# Patient Record
Sex: FEMALE | Race: WHITE | Employment: FULL TIME | URBAN - METROPOLITAN AREA
[De-identification: names, ages, dates, MRNs, and addresses within clinical notes are randomized per-mention and may not be internally consistent; named-entity substitution may affect disease eponyms.]

---

## 2017-01-10 ENCOUNTER — OFFICE VISIT (OUTPATIENT)
Dept: ORTHOPEDIC SURGERY | Age: 62
End: 2017-01-10

## 2017-01-10 VITALS
BODY MASS INDEX: 38.45 KG/M2 | WEIGHT: 245 LBS | SYSTOLIC BLOOD PRESSURE: 118 MMHG | DIASTOLIC BLOOD PRESSURE: 83 MMHG | HEART RATE: 60 BPM | HEIGHT: 67 IN

## 2017-01-10 DIAGNOSIS — S46.212A RUPTURE OF LEFT DISTAL BICEPS TENDON, INITIAL ENCOUNTER: Primary | ICD-10-CM

## 2017-01-10 DIAGNOSIS — S53.402D SPRAIN OF LEFT ELBOW, SUBSEQUENT ENCOUNTER: ICD-10-CM

## 2017-01-10 PROCEDURE — 99024 POSTOP FOLLOW-UP VISIT: CPT | Performed by: ORTHOPAEDIC SURGERY

## 2017-01-12 ENCOUNTER — TELEPHONE (OUTPATIENT)
Dept: ORTHOPEDIC SURGERY | Age: 62
End: 2017-01-12

## 2017-01-16 RX ORDER — BUDESONIDE AND FORMOTEROL FUMARATE DIHYDRATE 160; 4.5 UG/1; UG/1
AEROSOL RESPIRATORY (INHALATION)
Qty: 3 INHALER | Refills: 1 | Status: SHIPPED | OUTPATIENT
Start: 2017-01-16 | End: 2017-09-16 | Stop reason: SDUPTHER

## 2017-01-31 RX ORDER — TRIAMTERENE AND HYDROCHLOROTHIAZIDE 37.5; 25 MG/1; MG/1
CAPSULE ORAL
Qty: 90 CAPSULE | OUTPATIENT
Start: 2017-01-31

## 2017-02-13 ENCOUNTER — OFFICE VISIT (OUTPATIENT)
Dept: FAMILY MEDICINE CLINIC | Age: 62
End: 2017-02-13

## 2017-02-13 VITALS
SYSTOLIC BLOOD PRESSURE: 128 MMHG | RESPIRATION RATE: 16 BRPM | OXYGEN SATURATION: 98 % | HEIGHT: 67 IN | WEIGHT: 252 LBS | HEART RATE: 68 BPM | DIASTOLIC BLOOD PRESSURE: 74 MMHG | BODY MASS INDEX: 39.55 KG/M2

## 2017-02-13 DIAGNOSIS — E78.00 HYPERCHOLESTEREMIA: ICD-10-CM

## 2017-02-13 DIAGNOSIS — R73.9 HYPERGLYCEMIA: ICD-10-CM

## 2017-02-13 DIAGNOSIS — J45.20 MILD INTERMITTENT ASTHMA WITHOUT COMPLICATION: ICD-10-CM

## 2017-02-13 DIAGNOSIS — Z12.11 COLON CANCER SCREENING: ICD-10-CM

## 2017-02-13 DIAGNOSIS — E55.9 VITAMIN D DEFICIENCY: ICD-10-CM

## 2017-02-13 DIAGNOSIS — E03.9 ACQUIRED HYPOTHYROIDISM: Chronic | ICD-10-CM

## 2017-02-13 DIAGNOSIS — E27.40 ADRENAL INSUFFICIENCY (HCC): Primary | ICD-10-CM

## 2017-02-13 DIAGNOSIS — D35.2 PITUITARY ADENOMA (HCC): ICD-10-CM

## 2017-02-13 DIAGNOSIS — J06.9 UPPER RESPIRATORY TRACT INFECTION, UNSPECIFIED TYPE: ICD-10-CM

## 2017-02-13 DIAGNOSIS — I10 ESSENTIAL HYPERTENSION: ICD-10-CM

## 2017-02-13 PROCEDURE — 3017F COLORECTAL CA SCREEN DOC REV: CPT | Performed by: FAMILY MEDICINE

## 2017-02-13 PROCEDURE — 1036F TOBACCO NON-USER: CPT | Performed by: FAMILY MEDICINE

## 2017-02-13 PROCEDURE — G8484 FLU IMMUNIZE NO ADMIN: HCPCS | Performed by: FAMILY MEDICINE

## 2017-02-13 PROCEDURE — G8427 DOCREV CUR MEDS BY ELIG CLIN: HCPCS | Performed by: FAMILY MEDICINE

## 2017-02-13 PROCEDURE — 3014F SCREEN MAMMO DOC REV: CPT | Performed by: FAMILY MEDICINE

## 2017-02-13 PROCEDURE — G8417 CALC BMI ABV UP PARAM F/U: HCPCS | Performed by: FAMILY MEDICINE

## 2017-02-13 PROCEDURE — 99214 OFFICE O/P EST MOD 30 MIN: CPT | Performed by: FAMILY MEDICINE

## 2017-02-13 RX ORDER — TRIAMTERENE AND HYDROCHLOROTHIAZIDE 37.5; 25 MG/1; MG/1
CAPSULE ORAL
Qty: 90 CAPSULE | Refills: 3 | Status: SHIPPED | OUTPATIENT
Start: 2017-02-13 | End: 2017-12-27 | Stop reason: SDUPTHER

## 2017-02-13 RX ORDER — PREDNISONE 10 MG/1
TABLET ORAL
Qty: 30 TABLET | Refills: 0 | Status: SHIPPED | OUTPATIENT
Start: 2017-02-13 | End: 2017-02-23

## 2017-03-29 ENCOUNTER — OFFICE VISIT (OUTPATIENT)
Dept: FAMILY MEDICINE CLINIC | Age: 62
End: 2017-03-29

## 2017-03-29 VITALS
HEIGHT: 67 IN | SYSTOLIC BLOOD PRESSURE: 120 MMHG | HEART RATE: 72 BPM | BODY MASS INDEX: 39.18 KG/M2 | RESPIRATION RATE: 16 BRPM | DIASTOLIC BLOOD PRESSURE: 80 MMHG | WEIGHT: 249.6 LBS | OXYGEN SATURATION: 98 % | TEMPERATURE: 97.4 F

## 2017-03-29 DIAGNOSIS — H26.9 CATARACT: Primary | ICD-10-CM

## 2017-03-29 DIAGNOSIS — Z01.818 PRE-OP EXAM: ICD-10-CM

## 2017-03-29 PROCEDURE — 99242 OFF/OP CONSLTJ NEW/EST SF 20: CPT | Performed by: NURSE PRACTITIONER

## 2017-03-29 PROCEDURE — 3017F COLORECTAL CA SCREEN DOC REV: CPT | Performed by: NURSE PRACTITIONER

## 2017-03-29 PROCEDURE — G8417 CALC BMI ABV UP PARAM F/U: HCPCS | Performed by: NURSE PRACTITIONER

## 2017-03-29 PROCEDURE — G8427 DOCREV CUR MEDS BY ELIG CLIN: HCPCS | Performed by: NURSE PRACTITIONER

## 2017-03-29 PROCEDURE — 3014F SCREEN MAMMO DOC REV: CPT | Performed by: NURSE PRACTITIONER

## 2017-03-29 PROCEDURE — G8484 FLU IMMUNIZE NO ADMIN: HCPCS | Performed by: NURSE PRACTITIONER

## 2017-03-29 PROCEDURE — 1036F TOBACCO NON-USER: CPT | Performed by: NURSE PRACTITIONER

## 2017-04-06 ENCOUNTER — TELEPHONE (OUTPATIENT)
Dept: ORTHOPEDIC SURGERY | Age: 62
End: 2017-04-06

## 2017-04-06 ENCOUNTER — NURSE ONLY (OUTPATIENT)
Dept: FAMILY MEDICINE CLINIC | Age: 62
End: 2017-04-06

## 2017-04-06 DIAGNOSIS — Z12.11 COLON CANCER SCREENING: ICD-10-CM

## 2017-04-06 DIAGNOSIS — R19.5 GUAIAC POSITIVE STOOLS: Primary | ICD-10-CM

## 2017-04-06 LAB
CONTROL: YES
HEMOCCULT STL QL: ABNORMAL

## 2017-04-06 PROCEDURE — 82274 ASSAY TEST FOR BLOOD FECAL: CPT | Performed by: FAMILY MEDICINE

## 2017-04-17 ENCOUNTER — OFFICE VISIT (OUTPATIENT)
Dept: ORTHOPEDIC SURGERY | Age: 62
End: 2017-04-17

## 2017-04-17 VITALS
BODY MASS INDEX: 37.67 KG/M2 | WEIGHT: 240 LBS | DIASTOLIC BLOOD PRESSURE: 80 MMHG | SYSTOLIC BLOOD PRESSURE: 118 MMHG | HEART RATE: 72 BPM | HEIGHT: 67 IN

## 2017-04-17 DIAGNOSIS — M25.562 CHRONIC PAIN OF LEFT KNEE: ICD-10-CM

## 2017-04-17 DIAGNOSIS — G89.29 CHRONIC PAIN OF LEFT KNEE: ICD-10-CM

## 2017-04-17 DIAGNOSIS — M17.12 PRIMARY OSTEOARTHRITIS OF LEFT KNEE: Primary | ICD-10-CM

## 2017-04-17 PROCEDURE — 3017F COLORECTAL CA SCREEN DOC REV: CPT | Performed by: ORTHOPAEDIC SURGERY

## 2017-04-17 PROCEDURE — G8417 CALC BMI ABV UP PARAM F/U: HCPCS | Performed by: ORTHOPAEDIC SURGERY

## 2017-04-17 PROCEDURE — 99213 OFFICE O/P EST LOW 20 MIN: CPT | Performed by: ORTHOPAEDIC SURGERY

## 2017-04-17 PROCEDURE — G8427 DOCREV CUR MEDS BY ELIG CLIN: HCPCS | Performed by: ORTHOPAEDIC SURGERY

## 2017-04-17 PROCEDURE — 1036F TOBACCO NON-USER: CPT | Performed by: ORTHOPAEDIC SURGERY

## 2017-04-17 PROCEDURE — 3014F SCREEN MAMMO DOC REV: CPT | Performed by: ORTHOPAEDIC SURGERY

## 2017-04-17 PROCEDURE — 20610 DRAIN/INJ JOINT/BURSA W/O US: CPT | Performed by: ORTHOPAEDIC SURGERY

## 2017-04-24 ENCOUNTER — OFFICE VISIT (OUTPATIENT)
Dept: ORTHOPEDIC SURGERY | Age: 62
End: 2017-04-24

## 2017-04-24 VITALS — BODY MASS INDEX: 37.67 KG/M2 | WEIGHT: 240 LBS | HEIGHT: 67 IN

## 2017-04-24 DIAGNOSIS — M17.12 PRIMARY OSTEOARTHRITIS OF LEFT KNEE: Primary | ICD-10-CM

## 2017-04-24 DIAGNOSIS — M25.562 CHRONIC PAIN OF LEFT KNEE: ICD-10-CM

## 2017-04-24 DIAGNOSIS — G89.29 CHRONIC PAIN OF LEFT KNEE: ICD-10-CM

## 2017-04-24 PROCEDURE — 1036F TOBACCO NON-USER: CPT | Performed by: ORTHOPAEDIC SURGERY

## 2017-04-24 PROCEDURE — 20610 DRAIN/INJ JOINT/BURSA W/O US: CPT | Performed by: ORTHOPAEDIC SURGERY

## 2017-04-26 ENCOUNTER — TELEPHONE (OUTPATIENT)
Dept: FAMILY MEDICINE CLINIC | Age: 62
End: 2017-04-26

## 2017-04-27 ENCOUNTER — OFFICE VISIT (OUTPATIENT)
Dept: FAMILY MEDICINE CLINIC | Age: 62
End: 2017-04-27

## 2017-04-27 VITALS
HEART RATE: 73 BPM | HEIGHT: 67 IN | SYSTOLIC BLOOD PRESSURE: 116 MMHG | RESPIRATION RATE: 16 BRPM | DIASTOLIC BLOOD PRESSURE: 70 MMHG | OXYGEN SATURATION: 97 %

## 2017-04-27 DIAGNOSIS — I48.0 PAROXYSMAL A-FIB (HCC): Primary | ICD-10-CM

## 2017-04-27 PROCEDURE — 1036F TOBACCO NON-USER: CPT | Performed by: FAMILY MEDICINE

## 2017-04-27 PROCEDURE — 99213 OFFICE O/P EST LOW 20 MIN: CPT | Performed by: FAMILY MEDICINE

## 2017-04-27 PROCEDURE — 3017F COLORECTAL CA SCREEN DOC REV: CPT | Performed by: FAMILY MEDICINE

## 2017-04-27 PROCEDURE — G8417 CALC BMI ABV UP PARAM F/U: HCPCS | Performed by: FAMILY MEDICINE

## 2017-04-27 PROCEDURE — G8427 DOCREV CUR MEDS BY ELIG CLIN: HCPCS | Performed by: FAMILY MEDICINE

## 2017-04-27 PROCEDURE — 3014F SCREEN MAMMO DOC REV: CPT | Performed by: FAMILY MEDICINE

## 2017-04-27 RX ORDER — DILTIAZEM HYDROCHLORIDE 180 MG/1
180 CAPSULE, COATED, EXTENDED RELEASE ORAL DAILY
Qty: 30 CAPSULE | Refills: 2 | Status: SHIPPED | OUTPATIENT
Start: 2017-04-27 | End: 2017-05-18

## 2017-04-28 ENCOUNTER — NURSE ONLY (OUTPATIENT)
Dept: FAMILY MEDICINE CLINIC | Age: 62
End: 2017-04-28

## 2017-04-28 DIAGNOSIS — E27.40 ADRENAL INSUFFICIENCY (HCC): ICD-10-CM

## 2017-04-28 DIAGNOSIS — E03.9 ACQUIRED HYPOTHYROIDISM: Chronic | ICD-10-CM

## 2017-04-28 DIAGNOSIS — R73.9 HYPERGLYCEMIA: ICD-10-CM

## 2017-04-28 DIAGNOSIS — E55.9 VITAMIN D DEFICIENCY: ICD-10-CM

## 2017-04-28 DIAGNOSIS — E78.00 HYPERCHOLESTEREMIA: ICD-10-CM

## 2017-04-28 DIAGNOSIS — D35.2 PITUITARY ADENOMA (HCC): ICD-10-CM

## 2017-04-28 LAB
A/G RATIO: 1.9 (ref 1.1–2.2)
ALBUMIN SERPL-MCNC: 4.1 G/DL (ref 3.4–5)
ALP BLD-CCNC: 100 U/L (ref 40–129)
ALT SERPL-CCNC: 13 U/L (ref 10–40)
ANION GAP SERPL CALCULATED.3IONS-SCNC: 15 MMOL/L (ref 3–16)
AST SERPL-CCNC: 11 U/L (ref 15–37)
BILIRUB SERPL-MCNC: 0.4 MG/DL (ref 0–1)
BUN BLDV-MCNC: 18 MG/DL (ref 7–20)
CALCIUM SERPL-MCNC: 9.5 MG/DL (ref 8.3–10.6)
CHLORIDE BLD-SCNC: 99 MMOL/L (ref 99–110)
CHOLESTEROL, TOTAL: 179 MG/DL (ref 0–199)
CO2: 30 MMOL/L (ref 21–32)
CORTISOL - AM: 5.7 UG/DL (ref 4.3–22.4)
CREAT SERPL-MCNC: 1.1 MG/DL (ref 0.6–1.1)
ESTIMATED AVERAGE GLUCOSE: 131.2 MG/DL
GFR AFRICAN AMERICAN: >60
GFR NON-AFRICAN AMERICAN: 51
GLOBULIN: 2.2 G/DL
GLUCOSE BLD-MCNC: 120 MG/DL (ref 70–99)
HBA1C MFR BLD: 6.2 %
HDLC SERPL-MCNC: 66 MG/DL (ref 40–60)
LDL CHOLESTEROL CALCULATED: 89 MG/DL
POTASSIUM SERPL-SCNC: 3.6 MMOL/L (ref 3.5–5.1)
PROLACTIN: 1.6 NG/ML
SODIUM BLD-SCNC: 144 MMOL/L (ref 136–145)
T4 FREE: 1.4 NG/DL (ref 0.9–1.8)
TOTAL PROTEIN: 6.3 G/DL (ref 6.4–8.2)
TRIGL SERPL-MCNC: 122 MG/DL (ref 0–150)
TSH SERPL DL<=0.05 MIU/L-ACNC: 2.4 UIU/ML (ref 0.27–4.2)
VITAMIN D 25-HYDROXY: 35.4 NG/ML
VLDLC SERPL CALC-MCNC: 24 MG/DL

## 2017-04-28 PROCEDURE — 36415 COLL VENOUS BLD VENIPUNCTURE: CPT | Performed by: FAMILY MEDICINE

## 2017-05-01 ENCOUNTER — OFFICE VISIT (OUTPATIENT)
Dept: ORTHOPEDIC SURGERY | Age: 62
End: 2017-05-01

## 2017-05-01 VITALS — WEIGHT: 240 LBS | HEIGHT: 67 IN | RESPIRATION RATE: 12 BRPM | BODY MASS INDEX: 37.67 KG/M2

## 2017-05-01 DIAGNOSIS — M25.562 CHRONIC PAIN OF LEFT KNEE: ICD-10-CM

## 2017-05-01 DIAGNOSIS — G89.29 CHRONIC PAIN OF LEFT KNEE: ICD-10-CM

## 2017-05-01 DIAGNOSIS — M17.12 PRIMARY OSTEOARTHRITIS OF LEFT KNEE: Primary | ICD-10-CM

## 2017-05-01 PROCEDURE — 1036F TOBACCO NON-USER: CPT | Performed by: ORTHOPAEDIC SURGERY

## 2017-05-01 PROCEDURE — 20610 DRAIN/INJ JOINT/BURSA W/O US: CPT | Performed by: ORTHOPAEDIC SURGERY

## 2017-05-05 DIAGNOSIS — I48.0 PAROXYSMAL A-FIB (HCC): ICD-10-CM

## 2017-05-05 PROCEDURE — 93228 REMOTE 30 DAY ECG REV/REPORT: CPT | Performed by: INTERNAL MEDICINE

## 2017-05-08 ENCOUNTER — TELEPHONE (OUTPATIENT)
Dept: FAMILY MEDICINE CLINIC | Age: 62
End: 2017-05-08

## 2017-05-08 ENCOUNTER — OFFICE VISIT (OUTPATIENT)
Dept: CARDIOLOGY CLINIC | Age: 62
End: 2017-05-08

## 2017-05-08 VITALS
WEIGHT: 253 LBS | HEIGHT: 67 IN | BODY MASS INDEX: 39.71 KG/M2 | DIASTOLIC BLOOD PRESSURE: 86 MMHG | OXYGEN SATURATION: 97 % | HEART RATE: 64 BPM | SYSTOLIC BLOOD PRESSURE: 132 MMHG

## 2017-05-08 DIAGNOSIS — E27.40 ADRENAL INSUFFICIENCY (HCC): ICD-10-CM

## 2017-05-08 DIAGNOSIS — I10 ESSENTIAL HYPERTENSION: ICD-10-CM

## 2017-05-08 DIAGNOSIS — E78.2 MIXED HYPERLIPIDEMIA: ICD-10-CM

## 2017-05-08 DIAGNOSIS — I48.0 PAROXYSMAL ATRIAL FIBRILLATION (HCC): Primary | ICD-10-CM

## 2017-05-08 DIAGNOSIS — Z86.73 HISTORY OF TIA (TRANSIENT ISCHEMIC ATTACK): ICD-10-CM

## 2017-05-08 DIAGNOSIS — E07.9 THYROID DISEASE: ICD-10-CM

## 2017-05-08 PROCEDURE — 3014F SCREEN MAMMO DOC REV: CPT | Performed by: INTERNAL MEDICINE

## 2017-05-08 PROCEDURE — G8427 DOCREV CUR MEDS BY ELIG CLIN: HCPCS | Performed by: INTERNAL MEDICINE

## 2017-05-08 PROCEDURE — 93000 ELECTROCARDIOGRAM COMPLETE: CPT | Performed by: INTERNAL MEDICINE

## 2017-05-08 PROCEDURE — 1036F TOBACCO NON-USER: CPT | Performed by: INTERNAL MEDICINE

## 2017-05-08 PROCEDURE — G8417 CALC BMI ABV UP PARAM F/U: HCPCS | Performed by: INTERNAL MEDICINE

## 2017-05-08 PROCEDURE — 99205 OFFICE O/P NEW HI 60 MIN: CPT | Performed by: INTERNAL MEDICINE

## 2017-05-08 PROCEDURE — 3017F COLORECTAL CA SCREEN DOC REV: CPT | Performed by: INTERNAL MEDICINE

## 2017-05-08 RX ORDER — ASCORBIC ACID 500 MG
500 TABLET ORAL DAILY
COMMUNITY
End: 2020-09-16

## 2017-05-15 ENCOUNTER — HOSPITAL ENCOUNTER (OUTPATIENT)
Dept: NON INVASIVE DIAGNOSTICS | Age: 62
Discharge: HOME | End: 2017-05-15
Admitting: INTERNAL MEDICINE

## 2017-05-15 DIAGNOSIS — I48.0 PAROXYSMAL ATRIAL FIBRILLATION (HCC): ICD-10-CM

## 2017-05-15 LAB
LV EF: 58 %
LV EF: 73 %
LVEF MODALITY: NORMAL
LVEF MODALITY: NORMAL

## 2017-05-18 RX ORDER — FLECAINIDE ACETATE 50 MG/1
50 TABLET ORAL 2 TIMES DAILY
Qty: 60 TABLET | Refills: 5 | Status: SHIPPED | OUTPATIENT
Start: 2017-05-18 | End: 2017-07-13

## 2017-05-19 ENCOUNTER — TELEPHONE (OUTPATIENT)
Dept: FAMILY MEDICINE CLINIC | Age: 62
End: 2017-05-19

## 2017-05-19 RX ORDER — TRIAMTERENE AND HYDROCHLOROTHIAZIDE 37.5; 25 MG/1; MG/1
1 TABLET ORAL DAILY
Qty: 30 TABLET | Refills: 0 | Status: SHIPPED | OUTPATIENT
Start: 2017-05-19 | End: 2017-12-27 | Stop reason: ALTCHOICE

## 2017-05-30 RX ORDER — HYDROCORTISONE 10 MG/1
TABLET ORAL
Qty: 270 TABLET | Refills: 1 | Status: SHIPPED | OUTPATIENT
Start: 2017-05-30 | End: 2017-12-27 | Stop reason: SDUPTHER

## 2017-06-30 ENCOUNTER — TELEPHONE (OUTPATIENT)
Dept: FAMILY MEDICINE CLINIC | Age: 62
End: 2017-06-30

## 2017-07-13 ENCOUNTER — OFFICE VISIT (OUTPATIENT)
Dept: FAMILY MEDICINE CLINIC | Age: 62
End: 2017-07-13

## 2017-07-13 VITALS
WEIGHT: 243.6 LBS | BODY MASS INDEX: 38.24 KG/M2 | HEIGHT: 67 IN | RESPIRATION RATE: 16 BRPM | DIASTOLIC BLOOD PRESSURE: 78 MMHG | HEART RATE: 68 BPM | SYSTOLIC BLOOD PRESSURE: 128 MMHG | OXYGEN SATURATION: 97 %

## 2017-07-13 DIAGNOSIS — L98.9 SKIN LESION: ICD-10-CM

## 2017-07-13 DIAGNOSIS — J01.00 ACUTE NON-RECURRENT MAXILLARY SINUSITIS: Primary | ICD-10-CM

## 2017-07-13 DIAGNOSIS — Z11.59 NEED FOR HEPATITIS C SCREENING TEST: ICD-10-CM

## 2017-07-13 DIAGNOSIS — L82.1 SK (SEBORRHEIC KERATOSIS): ICD-10-CM

## 2017-07-13 LAB — HEPATITIS C ANTIBODY INTERPRETATION: NORMAL

## 2017-07-13 PROCEDURE — G8427 DOCREV CUR MEDS BY ELIG CLIN: HCPCS | Performed by: NURSE PRACTITIONER

## 2017-07-13 PROCEDURE — 1036F TOBACCO NON-USER: CPT | Performed by: NURSE PRACTITIONER

## 2017-07-13 PROCEDURE — 3017F COLORECTAL CA SCREEN DOC REV: CPT | Performed by: NURSE PRACTITIONER

## 2017-07-13 PROCEDURE — G8417 CALC BMI ABV UP PARAM F/U: HCPCS | Performed by: NURSE PRACTITIONER

## 2017-07-13 PROCEDURE — 3014F SCREEN MAMMO DOC REV: CPT | Performed by: NURSE PRACTITIONER

## 2017-07-13 PROCEDURE — 99214 OFFICE O/P EST MOD 30 MIN: CPT | Performed by: NURSE PRACTITIONER

## 2017-07-13 RX ORDER — AMOXICILLIN AND CLAVULANATE POTASSIUM 875; 125 MG/1; MG/1
1 TABLET, FILM COATED ORAL 2 TIMES DAILY
Qty: 20 TABLET | Refills: 0 | Status: SHIPPED | OUTPATIENT
Start: 2017-07-13 | End: 2022-09-06 | Stop reason: SDUPTHER

## 2017-07-13 ASSESSMENT — PATIENT HEALTH QUESTIONNAIRE - PHQ9
SUM OF ALL RESPONSES TO PHQ QUESTIONS 1-9: 0
2. FEELING DOWN, DEPRESSED OR HOPELESS: 0
1. LITTLE INTEREST OR PLEASURE IN DOING THINGS: 0
SUM OF ALL RESPONSES TO PHQ9 QUESTIONS 1 & 2: 0

## 2017-08-08 ENCOUNTER — TELEPHONE (OUTPATIENT)
Dept: CARDIOLOGY CLINIC | Age: 62
End: 2017-08-08

## 2017-08-08 ENCOUNTER — OFFICE VISIT (OUTPATIENT)
Dept: ORTHOPEDIC SURGERY | Age: 62
End: 2017-08-08

## 2017-08-08 VITALS — WEIGHT: 240 LBS | HEIGHT: 67 IN | RESPIRATION RATE: 12 BRPM | BODY MASS INDEX: 37.67 KG/M2

## 2017-08-08 DIAGNOSIS — M62.838 TRAPEZIUS MUSCLE SPASM: Primary | ICD-10-CM

## 2017-08-08 DIAGNOSIS — M25.512 ACUTE PAIN OF LEFT SHOULDER: ICD-10-CM

## 2017-08-08 PROCEDURE — 99213 OFFICE O/P EST LOW 20 MIN: CPT | Performed by: ORTHOPAEDIC SURGERY

## 2017-08-08 PROCEDURE — 3017F COLORECTAL CA SCREEN DOC REV: CPT | Performed by: ORTHOPAEDIC SURGERY

## 2017-08-08 PROCEDURE — 1036F TOBACCO NON-USER: CPT | Performed by: ORTHOPAEDIC SURGERY

## 2017-08-08 PROCEDURE — 3014F SCREEN MAMMO DOC REV: CPT | Performed by: ORTHOPAEDIC SURGERY

## 2017-08-08 PROCEDURE — G8427 DOCREV CUR MEDS BY ELIG CLIN: HCPCS | Performed by: ORTHOPAEDIC SURGERY

## 2017-08-08 PROCEDURE — G8417 CALC BMI ABV UP PARAM F/U: HCPCS | Performed by: ORTHOPAEDIC SURGERY

## 2017-08-08 RX ORDER — METHYLPREDNISOLONE 4 MG/1
TABLET ORAL
Qty: 1 KIT | Refills: 0 | Status: SHIPPED | OUTPATIENT
Start: 2017-08-08 | End: 2017-12-27 | Stop reason: ALTCHOICE

## 2017-08-08 RX ORDER — METAXALONE 800 MG/1
800 TABLET ORAL 3 TIMES DAILY PRN
Qty: 30 TABLET | Refills: 0 | Status: SHIPPED | OUTPATIENT
Start: 2017-08-08 | End: 2017-08-18

## 2017-08-08 ASSESSMENT — ENCOUNTER SYMPTOMS
GASTROINTESTINAL NEGATIVE: 1
BACK PAIN: 1
RESPIRATORY NEGATIVE: 1
EYES NEGATIVE: 1

## 2017-08-18 ENCOUNTER — HOSPITAL ENCOUNTER (OUTPATIENT)
Dept: PHYSICAL THERAPY | Age: 62
Discharge: HOME | End: 2017-08-31
Attending: ORTHOPAEDIC SURGERY | Admitting: ORTHOPAEDIC SURGERY

## 2017-09-20 ENCOUNTER — OFFICE VISIT (OUTPATIENT)
Dept: ORTHOPEDIC SURGERY | Age: 62
End: 2017-09-20

## 2017-09-20 VITALS
WEIGHT: 240 LBS | HEIGHT: 67 IN | DIASTOLIC BLOOD PRESSURE: 88 MMHG | SYSTOLIC BLOOD PRESSURE: 153 MMHG | BODY MASS INDEX: 37.67 KG/M2 | HEART RATE: 60 BPM

## 2017-09-20 DIAGNOSIS — M54.2 NECK PAIN: Primary | ICD-10-CM

## 2017-09-20 DIAGNOSIS — Z98.1 STATUS POST CERVICAL SPINAL FUSION: ICD-10-CM

## 2017-09-20 DIAGNOSIS — M54.12 CERVICAL RADICULITIS: ICD-10-CM

## 2017-09-20 PROCEDURE — G8427 DOCREV CUR MEDS BY ELIG CLIN: HCPCS | Performed by: NURSE PRACTITIONER

## 2017-09-20 PROCEDURE — 3017F COLORECTAL CA SCREEN DOC REV: CPT | Performed by: NURSE PRACTITIONER

## 2017-09-20 PROCEDURE — 3014F SCREEN MAMMO DOC REV: CPT | Performed by: NURSE PRACTITIONER

## 2017-09-20 PROCEDURE — 99243 OFF/OP CNSLTJ NEW/EST LOW 30: CPT | Performed by: NURSE PRACTITIONER

## 2017-09-20 PROCEDURE — G8417 CALC BMI ABV UP PARAM F/U: HCPCS | Performed by: NURSE PRACTITIONER

## 2017-09-20 PROCEDURE — 1036F TOBACCO NON-USER: CPT | Performed by: NURSE PRACTITIONER

## 2017-09-20 RX ORDER — BUDESONIDE AND FORMOTEROL FUMARATE DIHYDRATE 160; 4.5 UG/1; UG/1
AEROSOL RESPIRATORY (INHALATION)
Qty: 30.6 G | Refills: 0 | Status: SHIPPED | OUTPATIENT
Start: 2017-09-20 | End: 2017-12-12 | Stop reason: SDUPTHER

## 2017-10-16 RX ORDER — TRAZODONE HYDROCHLORIDE 150 MG/1
TABLET ORAL
Qty: 180 TABLET | Refills: 0 | Status: SHIPPED | OUTPATIENT
Start: 2017-10-16 | End: 2017-12-27 | Stop reason: SDUPTHER

## 2017-10-31 RX ORDER — APIXABAN 5 MG/1
TABLET, FILM COATED ORAL
Qty: 60 TABLET | Refills: 2 | Status: SHIPPED | OUTPATIENT
Start: 2017-10-31 | End: 2017-12-27 | Stop reason: SDUPTHER

## 2017-12-13 RX ORDER — BUDESONIDE AND FORMOTEROL FUMARATE DIHYDRATE 160; 4.5 UG/1; UG/1
AEROSOL RESPIRATORY (INHALATION)
Qty: 30.6 G | Refills: 0 | Status: SHIPPED | OUTPATIENT
Start: 2017-12-13 | End: 2017-12-27 | Stop reason: SDUPTHER

## 2017-12-27 ENCOUNTER — OFFICE VISIT (OUTPATIENT)
Dept: FAMILY MEDICINE CLINIC | Age: 62
End: 2017-12-27

## 2017-12-27 VITALS
HEIGHT: 67 IN | HEART RATE: 70 BPM | WEIGHT: 245 LBS | SYSTOLIC BLOOD PRESSURE: 108 MMHG | DIASTOLIC BLOOD PRESSURE: 72 MMHG | BODY MASS INDEX: 38.45 KG/M2 | RESPIRATION RATE: 14 BRPM

## 2017-12-27 DIAGNOSIS — R73.03 PREDIABETES: ICD-10-CM

## 2017-12-27 DIAGNOSIS — J45.41 MODERATE PERSISTENT ASTHMA WITH ACUTE EXACERBATION: Chronic | ICD-10-CM

## 2017-12-27 DIAGNOSIS — E27.40 ADRENAL INSUFFICIENCY (HCC): Chronic | ICD-10-CM

## 2017-12-27 DIAGNOSIS — I10 ESSENTIAL HYPERTENSION: Chronic | ICD-10-CM

## 2017-12-27 DIAGNOSIS — S39.012A BACK STRAIN, INITIAL ENCOUNTER: ICD-10-CM

## 2017-12-27 DIAGNOSIS — E03.9 ACQUIRED HYPOTHYROIDISM: Primary | Chronic | ICD-10-CM

## 2017-12-27 LAB
A/G RATIO: 1.9 (ref 1.1–2.2)
ALBUMIN SERPL-MCNC: 4.2 G/DL (ref 3.4–5)
ALP BLD-CCNC: 92 U/L (ref 40–129)
ALT SERPL-CCNC: 14 U/L (ref 10–40)
ANION GAP SERPL CALCULATED.3IONS-SCNC: 16 MMOL/L (ref 3–16)
AST SERPL-CCNC: 13 U/L (ref 15–37)
BILIRUB SERPL-MCNC: 0.4 MG/DL (ref 0–1)
BUN BLDV-MCNC: 20 MG/DL (ref 7–20)
CALCIUM SERPL-MCNC: 9.4 MG/DL (ref 8.3–10.6)
CHLORIDE BLD-SCNC: 100 MMOL/L (ref 99–110)
CO2: 27 MMOL/L (ref 21–32)
CREAT SERPL-MCNC: 1.1 MG/DL (ref 0.6–1.2)
GFR AFRICAN AMERICAN: >60
GFR NON-AFRICAN AMERICAN: 51
GLOBULIN: 2.2 G/DL
GLUCOSE BLD-MCNC: 108 MG/DL (ref 70–99)
POTASSIUM SERPL-SCNC: 3.6 MMOL/L (ref 3.5–5.1)
SODIUM BLD-SCNC: 143 MMOL/L (ref 136–145)
T4 FREE: 1.5 NG/DL (ref 0.9–1.8)
TOTAL PROTEIN: 6.4 G/DL (ref 6.4–8.2)
TSH SERPL DL<=0.05 MIU/L-ACNC: 0.99 UIU/ML (ref 0.27–4.2)

## 2017-12-27 PROCEDURE — 99214 OFFICE O/P EST MOD 30 MIN: CPT | Performed by: NURSE PRACTITIONER

## 2017-12-27 PROCEDURE — 3014F SCREEN MAMMO DOC REV: CPT | Performed by: NURSE PRACTITIONER

## 2017-12-27 PROCEDURE — G8484 FLU IMMUNIZE NO ADMIN: HCPCS | Performed by: NURSE PRACTITIONER

## 2017-12-27 PROCEDURE — 36415 COLL VENOUS BLD VENIPUNCTURE: CPT | Performed by: NURSE PRACTITIONER

## 2017-12-27 PROCEDURE — G8417 CALC BMI ABV UP PARAM F/U: HCPCS | Performed by: NURSE PRACTITIONER

## 2017-12-27 PROCEDURE — 1036F TOBACCO NON-USER: CPT | Performed by: NURSE PRACTITIONER

## 2017-12-27 PROCEDURE — G8427 DOCREV CUR MEDS BY ELIG CLIN: HCPCS | Performed by: NURSE PRACTITIONER

## 2017-12-27 PROCEDURE — 3017F COLORECTAL CA SCREEN DOC REV: CPT | Performed by: NURSE PRACTITIONER

## 2017-12-27 RX ORDER — HYDROCORTISONE 10 MG/1
TABLET ORAL
Qty: 270 TABLET | Refills: 1 | Status: SHIPPED | OUTPATIENT
Start: 2017-12-27 | End: 2018-05-14 | Stop reason: SDUPTHER

## 2017-12-27 RX ORDER — TRAZODONE HYDROCHLORIDE 150 MG/1
TABLET ORAL
Qty: 180 TABLET | Refills: 3 | Status: SHIPPED | OUTPATIENT
Start: 2017-12-27 | End: 2018-01-16 | Stop reason: SDUPTHER

## 2017-12-27 RX ORDER — BUDESONIDE AND FORMOTEROL FUMARATE DIHYDRATE 160; 4.5 UG/1; UG/1
AEROSOL RESPIRATORY (INHALATION)
Qty: 1 INHALER | Refills: 12 | Status: SHIPPED | OUTPATIENT
Start: 2017-12-27 | End: 2018-05-21

## 2017-12-27 RX ORDER — TRIAMTERENE AND HYDROCHLOROTHIAZIDE 37.5; 25 MG/1; MG/1
CAPSULE ORAL
Qty: 90 CAPSULE | Refills: 3 | Status: SHIPPED | OUTPATIENT
Start: 2017-12-27 | End: 2018-02-12 | Stop reason: SDUPTHER

## 2017-12-27 RX ORDER — LEVOTHYROXINE SODIUM 0.05 MG/1
50 TABLET ORAL DAILY
Qty: 90 TABLET | Refills: 3 | Status: SHIPPED | OUTPATIENT
Start: 2017-12-27 | End: 2019-01-15 | Stop reason: SDUPTHER

## 2017-12-27 RX ORDER — PREDNISONE 10 MG/1
TABLET ORAL
Qty: 30 TABLET | Refills: 0 | Status: SHIPPED | OUTPATIENT
Start: 2017-12-27 | End: 2018-02-12 | Stop reason: ALTCHOICE

## 2017-12-27 RX ORDER — ALBUTEROL SULFATE 90 MCG
2 HFA AEROSOL WITH ADAPTER (GRAM) INHALATION EVERY 6 HOURS PRN
Qty: 1 INHALER | Refills: 3 | Status: SHIPPED | OUTPATIENT
Start: 2017-12-27 | End: 2018-06-20 | Stop reason: SDUPTHER

## 2017-12-27 NOTE — PROGRESS NOTES
external ear and ear canal normal.   Nose: Nose normal. No mucosal edema. Right sinus exhibits no maxillary sinus tenderness and no frontal sinus tenderness. Left sinus exhibits no maxillary sinus tenderness and no frontal sinus tenderness. Mouth/Throat: Mucous membranes are normal. Posterior oropharyngeal erythema present. No oropharyngeal exudate or posterior oropharyngeal edema. Neck: No thyromegaly (nodules per history and thickening palpable. ) present. Cardiovascular: Normal rate and regular rhythm. Exam reveals no gallop and no friction rub. No murmur heard. Pulmonary/Chest: Effort normal. No respiratory distress. She has decreased breath sounds. She has no wheezes. She has no rhonchi. She has no rales. Pain over left back area. Musculoskeletal: She exhibits no edema. Lymphadenopathy:     She has no cervical adenopathy. Neurological: She is alert and oriented to person, place, and time. Skin: Skin is warm and dry. She is not diaphoretic. No erythema. Psychiatric: Mood, memory, affect and judgment normal.   Nursing note and vitals reviewed. Vitals:    12/27/17 1014   BP: 108/72   Site: Right Arm   Position: Sitting   Cuff Size: Large Adult   Pulse: 70   Resp: 14   Weight: 245 lb (111.1 kg)   Height: 5' 7\" (1.702 m)     Body mass index is 38.37 kg/m². Wt Readings from Last 3 Encounters:   12/27/17 245 lb (111.1 kg)   09/20/17 240 lb (108.9 kg)   08/08/17 240 lb (108.9 kg)     BP Readings from Last 3 Encounters:   12/27/17 108/72   09/20/17 (!) 153/88   07/13/17 128/78        No results found for this visit on 12/27/17. Assessment    1. Acquired hypothyroidism  T4, Free    TSH without Reflex    T4, Free    TSH without Reflex   2. Prediabetes  Hemoglobin A1C    Hemoglobin A1C   3. Essential hypertension  Comprehensive Metabolic Panel    Comprehensive Metabolic Panel   4. Moderate persistent asthma with acute exacerbation  predniSONE (DELTASONE) 10 MG tablet   5.  Adrenal insufficiency     6. Back strain, initial encounter  predniSONE (DELTASONE) 10 MG tablet         Plan  Prednisone taper for Asthma flare and muscle  Use zantac unless has breakthrough GERD then use nexium PRN. Refilled other meds  Labs as above - adjust medications as needed based on results. Return in about 6 months (around 6/27/2018). There are no Patient Instructions on file for this visit.

## 2017-12-28 LAB
ESTIMATED AVERAGE GLUCOSE: 142.7 MG/DL
HBA1C MFR BLD: 6.6 %

## 2017-12-29 ENCOUNTER — TELEPHONE (OUTPATIENT)
Dept: FAMILY MEDICINE CLINIC | Age: 62
End: 2017-12-29

## 2017-12-29 RX ORDER — DOXYCYCLINE HYCLATE 100 MG
100 TABLET ORAL 2 TIMES DAILY
Qty: 20 TABLET | Refills: 0 | Status: SHIPPED | OUTPATIENT
Start: 2017-12-29 | End: 2018-01-08

## 2018-01-16 RX ORDER — TRAZODONE HYDROCHLORIDE 150 MG/1
TABLET ORAL
Qty: 180 TABLET | Refills: 0 | Status: SHIPPED | OUTPATIENT
Start: 2018-01-16 | End: 2018-07-08 | Stop reason: SDUPTHER

## 2018-01-29 RX ORDER — APIXABAN 5 MG/1
TABLET, FILM COATED ORAL
Qty: 60 TABLET | Refills: 0 | Status: SHIPPED | OUTPATIENT
Start: 2018-01-29 | End: 2018-02-12 | Stop reason: SDUPTHER

## 2018-02-12 ENCOUNTER — OFFICE VISIT (OUTPATIENT)
Dept: ENDOCRINOLOGY | Age: 63
End: 2018-02-12

## 2018-02-12 VITALS
SYSTOLIC BLOOD PRESSURE: 126 MMHG | OXYGEN SATURATION: 95 % | WEIGHT: 243.8 LBS | DIASTOLIC BLOOD PRESSURE: 81 MMHG | HEART RATE: 72 BPM | HEIGHT: 67 IN | BODY MASS INDEX: 38.27 KG/M2

## 2018-02-12 DIAGNOSIS — E78.00 HYPERCHOLESTEREMIA: ICD-10-CM

## 2018-02-12 DIAGNOSIS — E11.9 CONTROLLED TYPE 2 DIABETES MELLITUS WITHOUT COMPLICATION, WITHOUT LONG-TERM CURRENT USE OF INSULIN (HCC): ICD-10-CM

## 2018-02-12 DIAGNOSIS — Z86.018 S/P SELECTIVE TRANSSPHENOIDAL PITUITARY ADENOMECTOMY: ICD-10-CM

## 2018-02-12 DIAGNOSIS — E03.9 ACQUIRED HYPOTHYROIDISM: Chronic | ICD-10-CM

## 2018-02-12 DIAGNOSIS — Z98.890 S/P SELECTIVE TRANSSPHENOIDAL PITUITARY ADENOMECTOMY: ICD-10-CM

## 2018-02-12 DIAGNOSIS — E23.0 PANHYPOPITUITARISM (HCC): Primary | ICD-10-CM

## 2018-02-12 DIAGNOSIS — E04.2 MULTINODULAR GOITER: ICD-10-CM

## 2018-02-12 DIAGNOSIS — E27.40 ADRENAL INSUFFICIENCY (HCC): Chronic | ICD-10-CM

## 2018-02-12 PROBLEM — R73.03 PREDIABETES: Status: RESOLVED | Noted: 2017-12-27 | Resolved: 2018-02-12

## 2018-02-12 PROCEDURE — G8427 DOCREV CUR MEDS BY ELIG CLIN: HCPCS | Performed by: INTERNAL MEDICINE

## 2018-02-12 PROCEDURE — 1036F TOBACCO NON-USER: CPT | Performed by: INTERNAL MEDICINE

## 2018-02-12 PROCEDURE — 3014F SCREEN MAMMO DOC REV: CPT | Performed by: INTERNAL MEDICINE

## 2018-02-12 PROCEDURE — G8484 FLU IMMUNIZE NO ADMIN: HCPCS | Performed by: INTERNAL MEDICINE

## 2018-02-12 PROCEDURE — G8417 CALC BMI ABV UP PARAM F/U: HCPCS | Performed by: INTERNAL MEDICINE

## 2018-02-12 PROCEDURE — 99204 OFFICE O/P NEW MOD 45 MIN: CPT | Performed by: INTERNAL MEDICINE

## 2018-02-12 PROCEDURE — 3017F COLORECTAL CA SCREEN DOC REV: CPT | Performed by: INTERNAL MEDICINE

## 2018-02-12 PROCEDURE — 3046F HEMOGLOBIN A1C LEVEL >9.0%: CPT | Performed by: INTERNAL MEDICINE

## 2018-02-12 RX ORDER — TRIAMTERENE AND HYDROCHLOROTHIAZIDE 37.5; 25 MG/1; MG/1
CAPSULE ORAL
Qty: 90 CAPSULE | Refills: 3 | Status: SHIPPED | OUTPATIENT
Start: 2018-02-12 | End: 2019-02-10 | Stop reason: SDUPTHER

## 2018-02-12 RX ORDER — METFORMIN HYDROCHLORIDE 500 MG/1
TABLET, EXTENDED RELEASE ORAL
Qty: 60 TABLET | Refills: 5 | Status: SHIPPED | OUTPATIENT
Start: 2018-02-12 | End: 2019-02-11 | Stop reason: SDUPTHER

## 2018-02-12 RX ORDER — LANCETS
EACH MISCELLANEOUS
Qty: 100 EACH | Refills: 5 | Status: SHIPPED | OUTPATIENT
Start: 2018-02-12 | End: 2018-12-05 | Stop reason: ALTCHOICE

## 2018-02-12 RX ORDER — BLOOD-GLUCOSE METER
KIT MISCELLANEOUS
Qty: 1 KIT | Refills: 0 | Status: SHIPPED | OUTPATIENT
Start: 2018-02-12 | End: 2018-12-05 | Stop reason: ALTCHOICE

## 2018-02-12 ASSESSMENT — PATIENT HEALTH QUESTIONNAIRE - PHQ9
SUM OF ALL RESPONSES TO PHQ9 QUESTIONS 1 & 2: 0
2. FEELING DOWN, DEPRESSED OR HOPELESS: 0
SUM OF ALL RESPONSES TO PHQ QUESTIONS 1-9: 0
1. LITTLE INTEREST OR PLEASURE IN DOING THINGS: 0

## 2018-02-12 NOTE — PROGRESS NOTES
TIA 2006         Past Surgical History:   Procedure Laterality Date    BICEPS TENODESIS Left 11/10/2016    Dr Walt Pena  3/24/11    chiari 1 repair/ decompression and C1 laminectomy    BRAIN SURGERY  11/07    adenoma removal, pituitary    CATARACT REMOVAL Bilateral 04/2017    HYSTERECTOMY      KNEE ARTHROPLASTY Right 08/25/2014    right tkr    LAMINECTOMY  C1    chiari    PITUITARY SURGERY  2/15    transphenoidal         Allergies   Allergen Reactions    Levofloxacin      Out of touch w/ world    Levofloxacin Other (See Comments)    Metformin Other (See Comments)    Metformin And Related Other (See Comments)     Passed out- due to lactic acidosis         Current Outpatient Prescriptions:     triamterene-hydrochlorothiazide (DYAZIDE) 37.5-25 MG per capsule, TAKE 1 CAPSULE DAILY, Disp: 90 capsule, Rfl: 3    RaNITidine HCl (ZANTAC PO), Take by mouth, Disp: , Rfl:     POTASSIUM GLUCONATE PO, Take by mouth, Disp: , Rfl:     metFORMIN (GLUCOPHAGE XR) 500 MG extended release tablet, 500mg daily and increase to 1000mg daily after a week, Disp: 60 tablet, Rfl: 5    Blood Glucose Monitoring Suppl ([employer] BLOOD GLUCOSE KIT) KIT, Check blood sugars 1-2 times per day., Disp: 1 kit, Rfl: 0    glucose blood VI test strips ([employer] BLOOD GLUCOSE TEST) strip, Check blood sugars 1-2 times per day. And As needed. , Disp: 100 each, Rfl: 5    [employer] LANCETS THIN MISC, Check blood sugars 1-2 times per day. And As needed. , Disp: 100 each, Rfl: 5    traZODone (DESYREL) 150 MG tablet, TAKE 1 TO 2 TABLETS BY MOUTH EVERY NIGHT AS NEEDED, Disp: 180 tablet, Rfl: 0    MAGNESIUM MALATE PO, Take by mouth daily, Disp: , Rfl:     apixaban (ELIQUIS) 5 MG TABS tablet, TAKE 1 TABLET BY MOUTH TWICE DAILY, Disp: 60 tablet, Rfl: 12    budesonide-formoterol (SYMBICORT) 160-4.5 MCG/ACT AERO, INHALE 2 PUFFS BY MOUTH TWICE DAILY.  RINSE MOUTH AFTER USE, Disp: 1 Inhaler, Rfl: 12    hydrocortisone (CORTEF) 10 MG tablet, Final    Globulin 12/27/2017 2.2  g/dL Final   Office Visit on 07/13/2017   Component Date Value Ref Range Status    Hep C Ab Interp 07/13/2017 Non-reactive  Non-reactive Final   Nurse Only on 04/28/2017   Component Date Value Ref Range Status    Prolactin 04/28/2017 1.6  ng/mL Final    Cholesterol, Total 04/28/2017 179  0 - 199 mg/dL Final    Triglycerides 04/28/2017 122  0 - 150 mg/dL Final    HDL 04/28/2017 66* 40 - 60 mg/dL Final    LDL Calculated 04/28/2017 89  <100 mg/dL Final    VLDL Cholesterol Calculated 04/28/2017 24  Not Established mg/dL Final    Sodium 04/28/2017 144  136 - 145 mmol/L Final    Potassium 04/28/2017 3.6  3.5 - 5.1 mmol/L Final    Chloride 04/28/2017 99  99 - 110 mmol/L Final    CO2 04/28/2017 30  21 - 32 mmol/L Final    Anion Gap 04/28/2017 15  3 - 16 Final    Glucose 04/28/2017 120* 70 - 99 mg/dL Final    BUN 04/28/2017 18  7 - 20 mg/dL Final    CREATININE 04/28/2017 1.1  0.6 - 1.1 mg/dL Final    GFR Non- 04/28/2017 51* >60 Final    GFR  04/28/2017 >60  >60 Final    Calcium 04/28/2017 9.5  8.3 - 10.6 mg/dL Final    Total Protein 04/28/2017 6.3* 6.4 - 8.2 g/dL Final    Alb 04/28/2017 4.1  3.4 - 5.0 g/dL Final    Albumin/Globulin Ratio 04/28/2017 1.9  1.1 - 2.2 Final    Total Bilirubin 04/28/2017 0.4  0.0 - 1.0 mg/dL Final    Alkaline Phosphatase 04/28/2017 100  40 - 129 U/L Final    ALT 04/28/2017 13  10 - 40 U/L Final    AST 04/28/2017 11* 15 - 37 U/L Final    Globulin 04/28/2017 2.2  g/dL Final    TSH 04/28/2017 2.40  0.27 - 4.20 uIU/mL Final    T4 Free 04/28/2017 1.4  0.9 - 1.8 ng/dL Final    Hemoglobin A1C 04/28/2017 6.2  See comment % Final    eAG 04/28/2017 131.2  mg/dL Final    Vit D, 25-Hydroxy 04/28/2017 35.4  >=30 ng/mL Final    Cortisol - AM 04/28/2017 5.7  4.3 - 22.4 ug/dL Final   Nurse Only on 04/06/2017   Component Date Value Ref Range Status    OCCULT BLOOD FECAL 04/06/2017 POS   Final    Control 04/06/2017 Education: Reviewed how to properly take thyroid replacement. Instructed to take daily with water on an empty stomach without concomitant vitamins or calcium or other medicine. Wait for 30-45 minutes before eating. If patient is confident they missed a day of pills, they can take the missed dose with their next tablet. 4: MNG   US could be done as its been more than 2 years   She wants to wait until she meets high deductibles     5: Type 2 DM controlled uncomplicated   H0H 6.6 Dec 2017     Restart Metformin ER 500mg daily and increase to 1000mg after a week     Eye exam: Needs eye exam   Foot exam: Feb 2018   Monofilament normal   Vibrations with tuning fork: normal   Left big toe ingrow nail: Needs podiatrist eval     Renal screen: later     Check blood sugars 1-2 per day or anytime feeling dizzy, tired.      6: Hyperlipidemia  LDL: 89, HDL: 66, Tgs 122 April 2017   Discussed recommendations of AHA and ADA -- needs statins   According AACE, LDL is on target   She wants to wait   Will recheck     IGF-1, A1C, lipids, Ft4, Ft3, MACR in 3 months     Electronically signed by Joao Isaacs MD on 2/12/2018 at 5:06 PM

## 2018-05-07 DIAGNOSIS — E03.9 ACQUIRED HYPOTHYROIDISM: Chronic | ICD-10-CM

## 2018-05-07 DIAGNOSIS — E78.00 HYPERCHOLESTEREMIA: ICD-10-CM

## 2018-05-07 DIAGNOSIS — E23.0 PANHYPOPITUITARISM (HCC): ICD-10-CM

## 2018-05-07 DIAGNOSIS — E11.9 CONTROLLED TYPE 2 DIABETES MELLITUS WITHOUT COMPLICATION, WITHOUT LONG-TERM CURRENT USE OF INSULIN (HCC): ICD-10-CM

## 2018-05-07 LAB
CHOLESTEROL, TOTAL: 174 MG/DL (ref 0–199)
CREATININE URINE: 207.4 MG/DL (ref 28–259)
ESTIMATED AVERAGE GLUCOSE: 125.5 MG/DL
HBA1C MFR BLD: 6 %
HDLC SERPL-MCNC: 58 MG/DL (ref 40–60)
LDL CHOLESTEROL CALCULATED: 92 MG/DL
MICROALBUMIN UR-MCNC: <1.2 MG/DL
MICROALBUMIN/CREAT UR-RTO: NORMAL MG/G (ref 0–30)
T3 FREE: 2.7 PG/ML (ref 2.3–4.2)
T4 FREE: 1.4 NG/DL (ref 0.9–1.8)
TRIGL SERPL-MCNC: 122 MG/DL (ref 0–150)
VLDLC SERPL CALC-MCNC: 24 MG/DL

## 2018-05-09 LAB
IGF-1 (INSULIN-LIKE GROWTH I): 88 NG/ML (ref 43–241)
INSULIN-LIKE GROWTH FACTOR-1 Z-SCORE: -0.6

## 2018-05-14 ENCOUNTER — OFFICE VISIT (OUTPATIENT)
Dept: ENDOCRINOLOGY | Age: 63
End: 2018-05-14

## 2018-05-14 VITALS
HEIGHT: 67 IN | WEIGHT: 234.6 LBS | BODY MASS INDEX: 36.82 KG/M2 | SYSTOLIC BLOOD PRESSURE: 118 MMHG | OXYGEN SATURATION: 97 % | DIASTOLIC BLOOD PRESSURE: 72 MMHG | HEART RATE: 70 BPM

## 2018-05-14 DIAGNOSIS — E27.40 ADRENAL INSUFFICIENCY (HCC): Chronic | ICD-10-CM

## 2018-05-14 DIAGNOSIS — E20.9 HYPOPARATHYROIDISM, UNSPECIFIED HYPOPARATHYROIDISM TYPE (HCC): ICD-10-CM

## 2018-05-14 DIAGNOSIS — E03.9 HYPOTHYROIDISM, UNSPECIFIED TYPE: Primary | ICD-10-CM

## 2018-05-14 DIAGNOSIS — E78.5 DYSLIPIDEMIA ASSOCIATED WITH TYPE 2 DIABETES MELLITUS (HCC): ICD-10-CM

## 2018-05-14 DIAGNOSIS — E11.69 DYSLIPIDEMIA ASSOCIATED WITH TYPE 2 DIABETES MELLITUS (HCC): ICD-10-CM

## 2018-05-14 DIAGNOSIS — E04.2 MULTINODULAR GOITER: ICD-10-CM

## 2018-05-14 DIAGNOSIS — D49.7 PITUITARY TUMOR: ICD-10-CM

## 2018-05-14 DIAGNOSIS — E11.9 CONTROLLED TYPE 2 DIABETES MELLITUS WITHOUT COMPLICATION, WITHOUT LONG-TERM CURRENT USE OF INSULIN (HCC): ICD-10-CM

## 2018-05-14 DIAGNOSIS — E23.0 PANHYPOPITUITARISM (HCC): ICD-10-CM

## 2018-05-14 DIAGNOSIS — E04.1 THYROID NODULE: ICD-10-CM

## 2018-05-14 PROCEDURE — G8417 CALC BMI ABV UP PARAM F/U: HCPCS | Performed by: INTERNAL MEDICINE

## 2018-05-14 PROCEDURE — 1036F TOBACCO NON-USER: CPT | Performed by: INTERNAL MEDICINE

## 2018-05-14 PROCEDURE — 99214 OFFICE O/P EST MOD 30 MIN: CPT | Performed by: INTERNAL MEDICINE

## 2018-05-14 PROCEDURE — 2022F DILAT RTA XM EVC RTNOPTHY: CPT | Performed by: INTERNAL MEDICINE

## 2018-05-14 PROCEDURE — G8427 DOCREV CUR MEDS BY ELIG CLIN: HCPCS | Performed by: INTERNAL MEDICINE

## 2018-05-14 PROCEDURE — 3044F HG A1C LEVEL LT 7.0%: CPT | Performed by: INTERNAL MEDICINE

## 2018-05-14 PROCEDURE — 3017F COLORECTAL CA SCREEN DOC REV: CPT | Performed by: INTERNAL MEDICINE

## 2018-05-14 RX ORDER — HYDROCORTISONE 10 MG/1
TABLET ORAL
Qty: 60 TABLET | Refills: 5 | Status: SHIPPED | OUTPATIENT
Start: 2018-05-14 | End: 2018-11-18 | Stop reason: SDUPTHER

## 2018-05-14 RX ORDER — ATORVASTATIN CALCIUM 20 MG/1
20 TABLET, FILM COATED ORAL DAILY
Qty: 90 TABLET | Refills: 3 | Status: SHIPPED | OUTPATIENT
Start: 2018-05-14 | End: 2018-10-15 | Stop reason: ALTCHOICE

## 2018-05-14 RX ORDER — FLUTICASONE PROPIONATE 50 MCG
1 SPRAY, SUSPENSION (ML) NASAL
COMMUNITY
Start: 2018-01-26 | End: 2019-12-20

## 2018-05-14 ASSESSMENT — PATIENT HEALTH QUESTIONNAIRE - PHQ9
SUM OF ALL RESPONSES TO PHQ QUESTIONS 1-9: 0
SUM OF ALL RESPONSES TO PHQ9 QUESTIONS 1 & 2: 0
1. LITTLE INTEREST OR PLEASURE IN DOING THINGS: 0
2. FEELING DOWN, DEPRESSED OR HOPELESS: 0

## 2018-05-21 RX ORDER — BUDESONIDE AND FORMOTEROL FUMARATE DIHYDRATE 160; 4.5 UG/1; UG/1
AEROSOL RESPIRATORY (INHALATION)
Qty: 30.6 G | Refills: 0 | Status: SHIPPED | OUTPATIENT
Start: 2018-05-21 | End: 2018-08-16 | Stop reason: SDUPTHER

## 2018-06-11 ENCOUNTER — TELEPHONE (OUTPATIENT)
Dept: ORTHOPEDIC SURGERY | Age: 63
End: 2018-06-11

## 2018-06-11 DIAGNOSIS — G89.29 CHRONIC PAIN OF LEFT KNEE: ICD-10-CM

## 2018-06-11 DIAGNOSIS — M17.12 PRIMARY OSTEOARTHRITIS OF LEFT KNEE: Primary | ICD-10-CM

## 2018-06-11 DIAGNOSIS — M25.562 CHRONIC PAIN OF LEFT KNEE: ICD-10-CM

## 2018-06-20 RX ORDER — ALBUTEROL SULFATE 90 MCG
2 HFA AEROSOL WITH ADAPTER (GRAM) INHALATION EVERY 6 HOURS PRN
Qty: 1 INHALER | Refills: 3 | Status: SHIPPED | OUTPATIENT
Start: 2018-06-20 | End: 2018-06-22 | Stop reason: CLARIF

## 2018-06-20 RX ORDER — LEVOTHYROXINE SODIUM 0.05 MG/1
50 TABLET ORAL DAILY
Qty: 90 TABLET | Refills: 3 | OUTPATIENT
Start: 2018-06-20

## 2018-06-22 RX ORDER — ALBUTEROL SULFATE 90 UG/1
2 AEROSOL, METERED RESPIRATORY (INHALATION) EVERY 6 HOURS PRN
Qty: 1 INHALER | Refills: 3 | Status: SHIPPED | OUTPATIENT
Start: 2018-06-22 | End: 2018-10-07 | Stop reason: SDUPTHER

## 2018-06-28 ENCOUNTER — TELEPHONE (OUTPATIENT)
Dept: ORTHOPEDIC SURGERY | Age: 63
End: 2018-06-28

## 2018-07-09 ENCOUNTER — OFFICE VISIT (OUTPATIENT)
Dept: ORTHOPEDIC SURGERY | Age: 63
End: 2018-07-09

## 2018-07-09 VITALS — WEIGHT: 231.4 LBS | BODY MASS INDEX: 36.32 KG/M2 | HEIGHT: 67 IN

## 2018-07-09 DIAGNOSIS — G89.29 CHRONIC PAIN OF LEFT KNEE: ICD-10-CM

## 2018-07-09 DIAGNOSIS — M25.562 CHRONIC PAIN OF LEFT KNEE: ICD-10-CM

## 2018-07-09 DIAGNOSIS — M17.12 PRIMARY OSTEOARTHRITIS OF LEFT KNEE: Primary | ICD-10-CM

## 2018-07-09 PROCEDURE — 99213 OFFICE O/P EST LOW 20 MIN: CPT | Performed by: ORTHOPAEDIC SURGERY

## 2018-07-09 PROCEDURE — G8417 CALC BMI ABV UP PARAM F/U: HCPCS | Performed by: ORTHOPAEDIC SURGERY

## 2018-07-09 PROCEDURE — 20610 DRAIN/INJ JOINT/BURSA W/O US: CPT | Performed by: ORTHOPAEDIC SURGERY

## 2018-07-09 PROCEDURE — 3017F COLORECTAL CA SCREEN DOC REV: CPT | Performed by: ORTHOPAEDIC SURGERY

## 2018-07-09 PROCEDURE — G8427 DOCREV CUR MEDS BY ELIG CLIN: HCPCS | Performed by: ORTHOPAEDIC SURGERY

## 2018-07-09 PROCEDURE — 1036F TOBACCO NON-USER: CPT | Performed by: ORTHOPAEDIC SURGERY

## 2018-07-09 ASSESSMENT — ENCOUNTER SYMPTOMS
BACK PAIN: 1
RESPIRATORY NEGATIVE: 1
EYES NEGATIVE: 1
GASTROINTESTINAL NEGATIVE: 1

## 2018-07-09 NOTE — PROGRESS NOTES
[facility]: [id number]  Lot #: [id number]  Exp: 6/3/19  : Brian Diaz  Site: 1/3 left knee  Unit: 1     Office supplied medication.
arthroplasty. Left knee has moderate medial and lateral joint line tenderness. Range of motion 0 110°. She has significant crepitation. She has no drainable effusion. Calf is soft. X-rays were obtained today. AP standing, PA flexed, and merchant views of the bilateral knees as well as a lateral of the left knee. These demonstrate: Moderate to significant medial and patellofemoral narrowing of the left knee. She status post arthroplasty on the right. Assessment:      Worsening left knee Symptomatic osteoarthritis      Plan:      Euflexxa Visco supplementation in an attempt to alleviate the need for arthroplasty. Procedure: Under sterile technique the affected left knee was injected with 20 mg of Euflexxa into the anterior lateral arthroscopy portal.  She tolerated that well. Follow-up next week for the second injection. This note was created using voice recognition software. It has been proofread, but occasionally errors remain. Please disregard these errors. They will be corrected as they are noted.

## 2018-07-10 RX ORDER — TRAZODONE HYDROCHLORIDE 150 MG/1
TABLET ORAL
Qty: 180 TABLET | Refills: 0 | Status: SHIPPED | OUTPATIENT
Start: 2018-07-10 | End: 2018-12-05 | Stop reason: SDUPTHER

## 2018-07-16 ENCOUNTER — OFFICE VISIT (OUTPATIENT)
Dept: ORTHOPEDIC SURGERY | Age: 63
End: 2018-07-16

## 2018-07-16 VITALS — WEIGHT: 231 LBS | BODY MASS INDEX: 36.26 KG/M2 | RESPIRATION RATE: 12 BRPM | HEIGHT: 67 IN

## 2018-07-16 DIAGNOSIS — G89.29 CHRONIC PAIN OF LEFT KNEE: Primary | ICD-10-CM

## 2018-07-16 DIAGNOSIS — M17.12 PRIMARY OSTEOARTHRITIS OF LEFT KNEE: ICD-10-CM

## 2018-07-16 DIAGNOSIS — M25.562 CHRONIC PAIN OF LEFT KNEE: Primary | ICD-10-CM

## 2018-07-16 PROCEDURE — 99999 PR OFFICE/OUTPT VISIT,PROCEDURE ONLY: CPT | Performed by: ORTHOPAEDIC SURGERY

## 2018-07-16 PROCEDURE — 20610 DRAIN/INJ JOINT/BURSA W/O US: CPT | Performed by: ORTHOPAEDIC SURGERY

## 2018-07-16 NOTE — PROGRESS NOTES
[facility]: [id number]  Lot #: [id number]  Exp: 6/3/19  : Shannon Rizo  Site: 2/3 left knee  Unit: 1     Office supplied medication.

## 2018-07-23 ENCOUNTER — OFFICE VISIT (OUTPATIENT)
Dept: ORTHOPEDIC SURGERY | Age: 63
End: 2018-07-23

## 2018-07-23 VITALS — RESPIRATION RATE: 12 BRPM | WEIGHT: 231 LBS | HEIGHT: 67 IN | BODY MASS INDEX: 36.26 KG/M2

## 2018-07-23 DIAGNOSIS — G89.29 CHRONIC PAIN OF LEFT KNEE: Primary | ICD-10-CM

## 2018-07-23 DIAGNOSIS — M17.12 PRIMARY OSTEOARTHRITIS OF LEFT KNEE: ICD-10-CM

## 2018-07-23 DIAGNOSIS — M25.562 CHRONIC PAIN OF LEFT KNEE: Primary | ICD-10-CM

## 2018-07-23 PROCEDURE — 20610 DRAIN/INJ JOINT/BURSA W/O US: CPT | Performed by: ORTHOPAEDIC SURGERY

## 2018-07-23 NOTE — PROGRESS NOTES
Tee Mascorro returns today for the second injection of Visco supplementation. .  Pain is currently about 3 out of 10.   she feels a \"twinge\" on stairs. She has had no other issues.     Right knee is status post arthroplasty.     Left knee has mild medial and lateral joint line tenderness.  Range of motion 0 -110°.  She has significant crepitation.  She has no drainable effusion.  Calf is soft.     Assessment: Symptomatic osteoarthritis of the left knee      Plan:              Euflexxa Visco supplementation in an attempt to alleviate the need for arthroplasty.     Procedure: Under sterile technique the affected left knee was injected with 20 mg of Euflexxa into the anterior lateral arthroscopy portal.  She tolerated that well. Follow-up on an as-needed basis. This note was created using voice recognition software. It has been proofread, but occasionally errors remain. Please disregard these errors. They will be corrected as they are noted.

## 2018-07-23 NOTE — PROGRESS NOTES
[facility]: [id number]  Lot #: [id number]  Exp: 8/5/19  : Brian Diaz  Site: 3/3 left knee  Unit: 1     Office supplied medication.

## 2018-08-16 RX ORDER — BUDESONIDE AND FORMOTEROL FUMARATE DIHYDRATE 160; 4.5 UG/1; UG/1
AEROSOL RESPIRATORY (INHALATION)
Qty: 30.6 G | Refills: 0 | Status: SHIPPED | OUTPATIENT
Start: 2018-08-16 | End: 2018-11-17 | Stop reason: SDUPTHER

## 2018-08-28 ENCOUNTER — OFFICE VISIT (OUTPATIENT)
Dept: ORTHOPEDIC SURGERY | Age: 63
End: 2018-08-28

## 2018-08-28 ENCOUNTER — TELEPHONE (OUTPATIENT)
Dept: ENDOCRINOLOGY | Age: 63
End: 2018-08-28

## 2018-08-28 ENCOUNTER — TELEPHONE (OUTPATIENT)
Dept: ORTHOPEDIC SURGERY | Age: 63
End: 2018-08-28

## 2018-08-28 VITALS — BODY MASS INDEX: 35.97 KG/M2 | WEIGHT: 229.2 LBS | HEIGHT: 67 IN

## 2018-08-28 DIAGNOSIS — M76.61 ACHILLES TENDINITIS OF RIGHT LOWER EXTREMITY: ICD-10-CM

## 2018-08-28 DIAGNOSIS — M79.671 RIGHT FOOT PAIN: Primary | ICD-10-CM

## 2018-08-28 DIAGNOSIS — M72.2 PLANTAR FASCIITIS OF RIGHT FOOT: ICD-10-CM

## 2018-08-28 PROCEDURE — G8427 DOCREV CUR MEDS BY ELIG CLIN: HCPCS | Performed by: FAMILY MEDICINE

## 2018-08-28 PROCEDURE — 99213 OFFICE O/P EST LOW 20 MIN: CPT | Performed by: FAMILY MEDICINE

## 2018-08-28 PROCEDURE — L4361 PNEUMA/VAC WALK BOOT PRE OTS: HCPCS | Performed by: FAMILY MEDICINE

## 2018-08-28 PROCEDURE — 3017F COLORECTAL CA SCREEN DOC REV: CPT | Performed by: FAMILY MEDICINE

## 2018-08-28 PROCEDURE — 1036F TOBACCO NON-USER: CPT | Performed by: FAMILY MEDICINE

## 2018-08-28 PROCEDURE — G8417 CALC BMI ABV UP PARAM F/U: HCPCS | Performed by: FAMILY MEDICINE

## 2018-08-28 RX ORDER — PREDNISONE 20 MG/1
TABLET ORAL
Qty: 20 TABLET | Refills: 0 | Status: SHIPPED | OUTPATIENT
Start: 2018-08-28 | End: 2018-10-15 | Stop reason: ALTCHOICE

## 2018-08-28 NOTE — TELEPHONE ENCOUNTER
Mrs. Perez Thomason informed per Dr. Kurtis Sen;    Je Clayton MD Physician Signed  Creation Time: 08/28/2018 2:27 PM         She can take prednisone (hold hydrocortisone on the days of prednisone)  After prednisone resume hydrocortisone

## 2018-08-28 NOTE — TELEPHONE ENCOUNTER
8/28/18  [facility]    -  NO PRECERT REQUIRED - PER MATTY -  REF #[id number] -  NDS

## 2018-08-28 NOTE — TELEPHONE ENCOUNTER
She can take prednisone (hold hydrocortisone on the days of prednisone)  After prednisone resume hydrocortisone

## 2018-08-28 NOTE — PROGRESS NOTES
adequately groomed with no evidence of malnutrition  DTRs: Deep tendon reflexes are intact  Mental Status: The patient is oriented to time, place and person. The patient's mood and affect are appropriate. Lymphatic: The lymphatic examination bilaterally reveals all areas to be without enlargement or induration. Vascular: Examination reveals no swelling or calf tenderness. Peripheral pulses are palpable and 2+. Neurological: The patient has good coordination. There is no weakness or sensory deficit. Ankle Examination  Inspection:  There is no high-grade deformity although she does have some swelling and thickening over the distal portion of the Achilles and mildly over the posterior tip. Greater than peroneals. No tense ankle joint effusion. Palpation:  She does have clinical tenderness without defect to the distal 6 cm of the Achilles. She has some posterior tip and medial calcaneal tubercle tenderness as well. Rang of Motion:  She is very stiff and inflamed this point and can only dorsiflex to 5°. Plantar flexion recently well preserved with limitations of inversion and eversion. Strength: Ankle strength testing appears to be intact although she does have some Achilles discomfort with plantarflexion and weakness 4-5 with resisted inversion and dorsiflexion. Special Tests: She does have a negative anterior drawer talar tilt and Avila's test.         Skin: There are no rashes, ulcerations or lesions. Distal motor sensory and vascular exam is intact. Gait: Moderate altalgia. Currently using crutches. Reflex symmetrically preserved. Additional Comments:       Additional Examinations:       Contralateral Exam: Examination of the left ankle reveals intact skin. There is no focal tenderness or swelling. The patient demonstrates full painless range of motion with regards to plantarflexion, dorsiflexion, inversion, eversion. Strength is 5/5 thorough out all planes.

## 2018-09-04 ENCOUNTER — HOSPITAL ENCOUNTER (OUTPATIENT)
Dept: PHYSICAL THERAPY | Age: 63
Discharge: HOME | End: 2018-09-05
Attending: FAMILY MEDICINE | Admitting: FAMILY MEDICINE

## 2018-09-04 NOTE — PLAN OF CARE
experience  []other:      Falls Risk Assessment (30 days):   [x] Falls Risk assessed and no intervention required. [] Falls Risk assessed and Patient requires intervention due to being higher risk   TUG score (>12s at risk):     [] Falls education provided, including       G-Codes:  PT G-Codes  Functional Assessment Tool Used: WOMAC  Score: 47%  Functional Limitation: Mobility: Walking and moving around  Mobility: Walking and Moving Around Current Status (): At least 40 percent but less than 60 percent impaired, limited or restricted  Mobility: Walking and Moving Around Goal Status ():  At least 1 percent but less than 20 percent impaired, limited or restricted    ASSESSMENT:   Functional Impairments:     []Noted lumbar/proximal hip/LE joint hypomobility   [x]Decreased LE functional ROM   []Decreased core/proximal hip strength and neuromuscular control   [x]Decreased LE functional strength   [x]Reduced balance/proprioceptive control   []other:      Functional Activity Limitations (from functional questionnaire and intake)   [x]Reduced ability to tolerate prolonged functional positions   [x]Reduced ability or difficulty with changes of positions or transfers between positions   []Reduced ability to maintain good posture and demonstrate good body mechanics with sitting, bending, and lifting   [x]Reduced ability to sleep   [x] Reduced ability or tolerance with driving and/or computer work   []Reduced ability to perform lifting, carrying tasks   [x]Reduced ability to squat   []Reduced ability to forward bend   [x]Reduced ability to ambulate prolonged functional periods/distances/surfaces   [x]Reduced ability to ascend/descend stairs   [x]Reduced ability to run, hop, cut or jump   []other:    Participation Restrictions   [x]Reduced participation in self care activities   [x]Reduced participation in home management activities   []Reduced participation in work activities   [x]Reduced participation in social

## 2018-09-04 NOTE — FLOWSHEET NOTE
positioning and eccentric body weight control with ambulation re-education including up and down stairs     Home Exercise Program:    [x] (23787) Reviewed/Progressed HEP activities related to strengthening, flexibility, endurance, ROM of core, proximal hip and LE for functional self-care, mobility, lifting and ambulation/stair navigation   [] (79798)Reviewed/Progressed HEP activities related to improving balance, coordination, kinesthetic sense, posture, motor skill, proprioception of core, proximal hip and LE for self care, mobility, lifting, and ambulation/stair navigation      Manual Treatments:  PROM / STM / Oscillations-Mobs:  G-I, II, III, IV (PA's, Inf., Post.)  [] (60881) Provided manual therapy to mobilize LE, proximal hip and/or LS spine soft tissue/joints for the purpose of modulating pain, promoting relaxation,  increasing ROM, reducing/eliminating soft tissue swelling/inflammation/restriction, improving soft tissue extensibility and allowing for proper ROM for normal function with self care, mobility, lifting and ambulation. Modalities:      Charges:  Timed Code Treatment Minutes: 33   Total Treatment Minutes: 60     [x] EVAL (LOW) 97278   [] EVAL (MOD) 33829   [] EVAL (HIGH) 72134   [] RE-EVAL   [x] FX(95157) x  2   [] IONTO  [] NMR (17077) x      [] VASO  [] Manual (64005) x       [] Other:  [] TA x       [] Mech Traction (84862)  [] ES(attended) (54795)      [] ES (un) (99005):       GOALS:  Patient stated goal: to reduce pain with walking      Therapist goals for Patient:   Short Term Goals: To be achieved in: 2 weeks  1. Independent in HEP and progression per patient tolerance, in order to prevent re-injury. 2. Patient will have a decrease in pain to facilitate improvement in movement, function, and ADLs as indicated by Functional Deficits.     Long Term Goals: To be achieved in:4-6  weeks  1. Disability index score of 19% or less for the [facility] to assist with reaching prior level of function.

## 2018-09-11 ENCOUNTER — OFFICE VISIT (OUTPATIENT)
Dept: ORTHOPEDIC SURGERY | Age: 63
End: 2018-09-11

## 2018-09-11 ENCOUNTER — HOSPITAL ENCOUNTER (OUTPATIENT)
Dept: PHYSICAL THERAPY | Age: 63
Discharge: HOME | End: 2018-09-12
Admitting: FAMILY MEDICINE

## 2018-09-11 VITALS — BODY MASS INDEX: 35.94 KG/M2 | WEIGHT: 229 LBS | HEIGHT: 67 IN

## 2018-09-11 DIAGNOSIS — M76.61 ACHILLES TENDINITIS OF RIGHT LOWER EXTREMITY: ICD-10-CM

## 2018-09-11 DIAGNOSIS — M79.671 RIGHT FOOT PAIN: ICD-10-CM

## 2018-09-11 DIAGNOSIS — M25.571 ACUTE RIGHT ANKLE PAIN: ICD-10-CM

## 2018-09-11 DIAGNOSIS — M76.821 POSTERIOR TIBIAL TENDINITIS OF RIGHT LOWER EXTREMITY: ICD-10-CM

## 2018-09-11 DIAGNOSIS — M72.2 PLANTAR FASCIITIS OF RIGHT FOOT: Primary | ICD-10-CM

## 2018-09-11 PROCEDURE — G8427 DOCREV CUR MEDS BY ELIG CLIN: HCPCS | Performed by: FAMILY MEDICINE

## 2018-09-11 PROCEDURE — 3017F COLORECTAL CA SCREEN DOC REV: CPT | Performed by: FAMILY MEDICINE

## 2018-09-11 PROCEDURE — G8417 CALC BMI ABV UP PARAM F/U: HCPCS | Performed by: FAMILY MEDICINE

## 2018-09-11 PROCEDURE — 1036F TOBACCO NON-USER: CPT | Performed by: FAMILY MEDICINE

## 2018-09-11 PROCEDURE — L1902 AFO ANKLE GAUNTLET PRE OTS: HCPCS | Performed by: FAMILY MEDICINE

## 2018-09-11 PROCEDURE — 99213 OFFICE O/P EST LOW 20 MIN: CPT | Performed by: FAMILY MEDICINE

## 2018-09-11 NOTE — PROGRESS NOTES
Chief Complaint     Foot Pain (CK RIGHT FOOT)    Follow-up right foot and ankle Achilles tendinitis with posterior tib tendinitis and low grade plantar fasciitis with altalgia    History of Present Illness:  Breann Rodriguez is a 63 y.o. female is a very pleasant white female with history of adrenal insufficiency followed by endocrinology is maintained on chronic hydrocortisone who is a patient of Dr. Zane Lowe who is being seen today for evaluation of acute onset right Achilles and heel pain. Apparently this past weekend on 8/24/2018, she was walking with her friends around [place] and developed pain to her distal Achilles and heel. There is no history of actual injury or activity prior to become symptomatic. She initially did have some mild soreness but she continued to attempt walking had progressively worsening pain to the point where his subjective we have 10 out of 10 and limited her ability to walk and she was actively limping. Did obtain an anklet and has been using crutches. She feels very stiff and weak. Has been attempting to ice that with her adrenal insufficiency was told to limit anti-inflammatory medications. She is having soreness and stiffness at rest but most her pain is with weightbearing. Once again there is no history of injury. She does typically wear off-the-shelf inserts. It sounds as if she did have \"bone spurs\" removed from heel as a teenager. She is being seen today for orthopedic and sports consultation with imaging. She was last seen in the office on 8/28/2018 was started on aggressive conservative treatment for her right foot and ankle. She presents back today stating her symptoms are doing much better. She is 89% improved subjectively and has had 2 sessions of therapy formally. Her motion and strength are improving although she does have residual stiffness.   She began excellent response to the prednisone but has been a little bit reluctant to use her Voltaren gel as she is on Eloquist for A. Fib. She has been very compliant with utilization of her boot. She is having less rest and night pain and her ability to walk distances has improved and she has been very compliant with utilizing the boot. Medical History    Patient's medications, allergies, past medical, surgical, social and family histories were reviewed and updated as appropriate. Review of Systems    Pertinent items are noted in HPI  Review of systems reviewed from Patient History Form dated on 8/28/2018 and available in the patient's chart under the Media tab. Vital Signs  There were no vitals filed for this visit. General Exam:     Constitutional: Patient is adequately groomed with no evidence of malnutrition  DTRs: Deep tendon reflexes are intact  Mental Status: The patient is oriented to time, place and person. The patient's mood and affect are appropriate. Lymphatic: The lymphatic examination bilaterally reveals all areas to be without enlargement or induration. Vascular: Examination reveals no swelling or calf tenderness. Peripheral pulses are palpable and 2+. Neurological: The patient has good coordination. There is no weakness or sensory deficit. Ankle Examination  Inspection:  There is no high-grade deformity although she does have some swelling and thickening over the distal portion of the Achilles and mildly over the posterior tip. Greater than peroneals. No tense ankle joint effusion. Palpation:  She does have less prominent clinical tenderness without defect to the distal 6 cm of the Achilles. She still rates a 5-6 out of 10. She has some posterior tib tenderness at 4 out of 10 with less pain with palpation of the medial calcaneal tubercle tenderness as well. Rang of Motion:  She is less stiff and inflamed this point and can now dorsiflex to about 10° °. Plantar flexion recently well preserved with limitations of inversion and eversion.          Strength: and it was checked for errors. It is possible that there are still dictated errors within this office note. If so, please bring any errors to my attention for an addendum. All efforts were made to ensure that this office note is accurate.

## 2018-09-11 NOTE — FLOWSHEET NOTE
[address] [facility], [place]                                                         Physical Therapy Daily Treatment Note  Date:  2018    Patient Name:  Freda Keating    :  1955  MRN: [id number]    Medical/Treatment Diagnosis Information:  · Diagnosis: M76.61 (ICD-10-CM) - Achilles tendinitis of right lower extremity; M72.2 (ICD-10-CM) - Plantar fasciitis of right foot  · Treatment Diagnosis: M79.671 (ICD-10-CM) - Right foot pain  Insurance/Certification information:  PT Insurance Information: Med [place]   Physician Information:  Referring Practitioner: Sandra Wesley of care signed (Y/N):     Date of Patient follow up with Physician:     G-Code (if applicable):      Date G-Code Applied:    PT G-Codes 18  Functional Assessment Tool Used: WOMAC  Score: 47%  Functional Limitation: Mobility: Walking and moving around  Mobility: Walking and Moving Around Current Status (): At least 40 percent but less than 60 percent impaired, limited or restricted  Mobility: Walking and Moving Around Goal Status (): At least 1 percent but less than 20 percent impaired, limited or restricted    Progress Note: [x]  Yes  []  No  Next due by: Visit #10       Latex Allergy:  [x]NO      []YES  Preferred Language for Healthcare:   [x]English       []other:    Visit # Insurance Allowable   2 40     Pain level:  1-2/10     SUBJECTIVE:  Pt reports she has intermittent pains along achilles, into gastroc and occasionally along lateral side of ankle near peroneals.      OBJECTIVE: See eval  Observation:   Test measurements:            ROM PROM AROM Comments     Left Right Left Right     Dorsiflexion       To neutral; painful     Plantarflexion       30 painful      Inversion             Eversion                                            Strength Left Right Comments   Dorsiflexion   4-     Plantarflexion   4-     Inversion       Eversion                RESTRICTIONS/PRECAUTIONS:     Exercises/Interventions:     Exercise/Equipment Resistance/Repetitions Other comments   ROM     ABC'S x2    BAPS     Circles     Ankle Pumps     Inversion/Eversion          Rocks     Towels     Toe curls     Bottle Roll               Stretching     Towel Pull 1 gastroc [id number]    Towel Pull 2 Soleus 5x30 sec     Calf 1     Calf 2     Incline Stretch     Stair Stretch     Pro-Stretch     Toe Extension [id number]     ERMI          Hamstring                         Isometrics     Dorsiflexion 10x10    Plantarflexion 10x10    Inversion 10x10    Eversion 10x10         PRE's     Dorsiflexion     Plantarflexion     Inversion     Eversion          SLR flex     SLR ABD     SLR ADD     SLR ext          Calf Raises     Calf Raises off step          Soleus Calf raises               Step Up          Knee Extension     Hamstring Curls               Leg Press               Balance:     Rocker Board     BOSU     SLS     Aeromat     Foam Roll     Plyoback     Tandem Stance     Biodex          Bike     Treadmill          Cone Walks                          Therapeutic Exercise and NMR EXR  [] (37656) Provided verbal/tactile cueing for activities related to strengthening, flexibility, endurance, ROM for improvements in LE, proximal hip, and core control with self care, mobility, lifting, ambulation.  [] (55502) Provided verbal/tactile cueing for activities related to improving balance, coordination, kinesthetic sense, posture, motor skill, proprioception  to assist with LE, proximal hip, and core control in self care, mobility, lifting, ambulation and eccentric single leg control.      NMR and Therapeutic Activities:    [] (75702 or 87991) Provided verbal/tactile cueing for activities related to improving balance, coordination, kinesthetic sense, posture, motor skill, proprioception and motor activation to allow for proper function of core, proximal hip and LE with self care and ADLs  [] (05978) Gait Re-education- Provided training and instruction to the patient for proper LE, core and proximal hip recruitment and positioning and eccentric body weight control with ambulation re-education including up and down stairs     Home Exercise Program:    [x] (61579) Reviewed/Progressed HEP activities related to strengthening, flexibility, endurance, ROM of core, proximal hip and LE for functional self-care, mobility, lifting and ambulation/stair navigation   [] (61243)Reviewed/Progressed HEP activities related to improving balance, coordination, kinesthetic sense, posture, motor skill, proprioception of core, proximal hip and LE for self care, mobility, lifting, and ambulation/stair navigation      Manual Treatments:  PROM / STM / Oscillations-Mobs:  G-I, II, III, IV (PA's, Inf., Post.)  [] (58516) Provided manual therapy to mobilize LE, proximal hip and/or LS spine soft tissue/joints for the purpose of modulating pain, promoting relaxation,  increasing ROM, reducing/eliminating soft tissue swelling/inflammation/restriction, improving soft tissue extensibility and allowing for proper ROM for normal function with self care, mobility, lifting and ambulation. Instrument Assisted Soft Tissue Mobilization (IASTM): was applied to the following muscles: gastroc and peroneals, into achilles tendon with Hawk  tools including HG2 (handle-bar), HG4 (small can-opener), HG5 (medium can-opener), HG 8 (biscotti), and HG9 (tongue depressor). Treatment consisted of IASTM strokes including sweeping, fanning, brushing, strumming, filleting, pinning and framing, based on body region contours, nature of the soft tissue restriction and desired treatment outcomes. These techniques were used to restore neurophysiology, improve mechanotransduction, enhance fluid dynamics and break collagen crosslinks. The treatment area was exposed and the patient was draped in an appropriate manner.  Upon completion the clinician cleaned the IASTM tools as per manufactures recommendations. Skin check pre:   Skin check post:   Intermittent tx time: 10    Modalities:      Charges:  Timed Code Treatment Minutes: 42   Total Treatment Minutes: 60     [] EVAL (LOW) 94253   [] EVAL (MOD) 49449   [] EVAL (HIGH) 29378   [] RE-EVAL   [x] MX(88371) x  2   [] IONTO  [] NMR (20112) x      [] VASO  [x] Manual (19558) x  1    [] Other:  [] TA x       [] Mech Traction (82465)  [] ES(attended) (15884)      [] ES (un) (67375):       GOALS:  Patient stated goal: to reduce pain with walking      Therapist goals for Patient:   Short Term Goals: To be achieved in: 2 weeks  1. Independent in HEP and progression per patient tolerance, in order to prevent re-injury. 2. Patient will have a decrease in pain to facilitate improvement in movement, function, and ADLs as indicated by Functional Deficits.     Long Term Goals: To be achieved in:4-6  weeks  1. Disability index score of 19% or less for the [facility] to assist with reaching prior level of function. 2. Patient will demonstrate increased AROM to 5+ DF, 35+ DF to allow for proper joint functioning as indicated by patients Functional Deficits. 3. Patient will demonstrate an increase in Strength to 4+/5 or greater to allow for proper functional mobility as indicated by patients Functional Deficits. 4. Patient will return to ambulation on all surfaces without increased symptoms or restriction. Progression Towards Functional goals:  [] Patient is progressing as expected towards functional goals listed. [] Progression is slowed due to complexities listed. [] Progression has been slowed due to co-morbidities.   [x] Plan just implemented, too soon to assess goals progression  [] Other:     ASSESSMENT:  See eval    Treatment/Activity Tolerance:  [] Patient tolerated treatment well [] Patient limited by fatique  [] Patient limited by pain  [] Patient limited by other medical complications  [] Other: Pt able

## 2018-09-21 ENCOUNTER — HOSPITAL ENCOUNTER (OUTPATIENT)
Dept: PHYSICAL THERAPY | Age: 63
Discharge: HOME | End: 2018-09-22
Admitting: FAMILY MEDICINE

## 2018-09-21 NOTE — FLOWSHEET NOTE
[address] [facility], [place]                                                         Physical Therapy Daily Treatment Note  Date:  2018    Patient Name:  Ariana Harmon    :  1955  MRN: [id number]    Medical/Treatment Diagnosis Information:  · Diagnosis: M76.61 (ICD-10-CM) - Achilles tendinitis of right lower extremity; M72.2 (ICD-10-CM) - Plantar fasciitis of right foot  · Treatment Diagnosis: M79.671 (ICD-10-CM) - Right foot pain  Insurance/Certification information:  PT Insurance Information: Med [place]   Physician Information:  Referring Practitioner: Amisha Caballero of care signed (Y/N):     Date of Patient follow up with Physician:     G-Code (if applicable):      Date G-Code Applied:    PT G-Codes 18  Functional Assessment Tool Used: WOMAC  Score: 47%  Functional Limitation: Mobility: Walking and moving around  Mobility: Walking and Moving Around Current Status (): At least 40 percent but less than 60 percent impaired, limited or restricted  Mobility: Walking and Moving Around Goal Status (): At least 1 percent but less than 20 percent impaired, limited or restricted    Progress Note: [x]  Yes  []  No  Next due by: Visit #10       Latex Allergy:  [x]NO      []YES  Preferred Language for Healthcare:   [x]English       []other:    Visit # Insurance Allowable   3 40     Pain level:  2-3/10     SUBJECTIVE:  Pt has weaned to brace for ankle and she notes she drove up to [place] and back over the last two days. She did not wear her brace yesterday while driving and is frustrated that she has soreness today.       OBJECTIVE: See eval  Observation:   Test measurements:            ROM PROM AROM Comments     Left Right Left Right     Dorsiflexion       To neutral; painful     Plantarflexion       30 painful      Inversion             Eversion                                            Strength Left Right Comments   Dorsiflexion   4-     Plantarflexion   4-     Inversion         Eversion                RESTRICTIONS/PRECAUTIONS:     Exercises/Interventions:     Exercise/Equipment Resistance/Repetitions Other comments   ROM     ABC'S x2    BAPS x15 CW/CCW    Circles     Ankle Pumps     Inversion/Eversion          Rocks     Towels     Toe curls     Bottle Roll               Stretching     Towel Pull 1        Calf 1     Calf 2     Incline Stretch gastroc [id number]    Stair Stretch     Pro-Stretch     Toe Extension [id number]     ERMI          Hamstring                         Isometrics     Dorsiflexion    Plantarflexion    Inversion    Eversion         PRE's Blue     Dorsiflexion 3x10     Plantarflexion 3x10     Inversion 3x10     Eversion 3x10          SLR flex     SLR ABD     SLR ADD     SLR ext          Calf Raises 3x10  Only able to to complete 2x10 because    Calf Raises off step          Soleus Calf raises               Step Up          Knee Extension     Hamstring Curls               Leg Press               Balance:     Rocker Board     BOSU     SLS 5x20 sec     Aeromat     Foam Roll     Plyoback     Tandem Stance     Biodex          Bike     Treadmill          [facility]one Walks                          Therapeutic Exercise and NMR EXR  [] ([id number]) Provided verbal/tactile cueing for activities related to strengthening, flexibility, endurance, ROM for improvements in LE, proximal hip, and core control with self care, mobility, lifting, ambulation.  [] (64776) Provided verbal/tactile cueing for activities related to improving balance, coordination, kinesthetic sense, posture, motor skill, proprioception  to assist with LE, proximal hip, and core control in self care, mobility, lifting, ambulation and eccentric single leg control.      NMR and Therapeutic Activities:    [] (45714 or 69777) Provided verbal/tactile cueing for activities related to improving balance, coordination, kinesthetic sense, posture, motor skill,

## 2018-09-25 ENCOUNTER — HOSPITAL ENCOUNTER (OUTPATIENT)
Dept: PHYSICAL THERAPY | Age: 63
Setting detail: THERAPIES SERIES
Discharge: HOME | End: 2018-09-25
Payer: COMMERCIAL

## 2018-09-25 NOTE — FLOWSHEET NOTE
[address] [facility]     Physical Therapy  Cancellation/No-show Note  Patient Name:  Uvaldo Lance  :  1955   Date:  2018  Cancelled visits to date: 1  No-shows to date: 0    For today's appointment patient:  [x]  Cancelled  []  Rescheduled appointment  []  No-show     Reason given by patient:  []  Patient ill  []  Conflicting appointment  []  No transportation    []  Conflict with work  []  No reason given  [x]  Other:  Family emergency   Comments:      Electronically signed by:  Keegan Syed PT

## 2018-10-11 ENCOUNTER — HOSPITAL ENCOUNTER (OUTPATIENT)
Dept: MRI IMAGING | Age: 63
Discharge: HOME | End: 2018-10-11
Payer: COMMERCIAL

## 2018-10-11 ENCOUNTER — HOSPITAL ENCOUNTER (OUTPATIENT)
Dept: ULTRASOUND IMAGING | Age: 63
Discharge: HOME | End: 2018-10-11
Payer: COMMERCIAL

## 2018-10-11 DIAGNOSIS — D49.7 PITUITARY TUMOR: ICD-10-CM

## 2018-10-11 DIAGNOSIS — E11.9 CONTROLLED TYPE 2 DIABETES MELLITUS WITHOUT COMPLICATION, WITHOUT LONG-TERM CURRENT USE OF INSULIN (HCC): ICD-10-CM

## 2018-10-11 DIAGNOSIS — E11.69 DYSLIPIDEMIA ASSOCIATED WITH TYPE 2 DIABETES MELLITUS (HCC): ICD-10-CM

## 2018-10-11 DIAGNOSIS — E03.9 HYPOTHYROIDISM, UNSPECIFIED TYPE: ICD-10-CM

## 2018-10-11 DIAGNOSIS — E04.1 THYROID NODULE: ICD-10-CM

## 2018-10-11 DIAGNOSIS — E78.5 DYSLIPIDEMIA ASSOCIATED WITH TYPE 2 DIABETES MELLITUS (HCC): ICD-10-CM

## 2018-10-11 LAB
A/G RATIO: 1.6 (ref 1.1–2.2)
ALBUMIN SERPL-MCNC: 4.1 G/DL (ref 3.4–5)
ALP BLD-CCNC: 98 U/L (ref 40–129)
ALT SERPL-CCNC: 33 U/L (ref 10–40)
ANION GAP SERPL CALCULATED.3IONS-SCNC: 13 MMOL/L (ref 3–16)
AST SERPL-CCNC: 24 U/L (ref 15–37)
BILIRUB SERPL-MCNC: 0.4 MG/DL (ref 0–1)
BUN BLDV-MCNC: 18 MG/DL (ref 7–20)
CALCIUM SERPL-MCNC: 9.3 MG/DL (ref 8.3–10.6)
CHLORIDE BLD-SCNC: 103 MMOL/L (ref 99–110)
CHOLESTEROL, TOTAL: 176 MG/DL (ref 0–199)
CO2: 28 MMOL/L (ref 21–32)
CREAT SERPL-MCNC: 1.1 MG/DL (ref 0.6–1.2)
ESTIMATED AVERAGE GLUCOSE: 131.2 MG/DL
GFR AFRICAN AMERICAN: >60
GFR NON-AFRICAN AMERICAN: 51
GLOBULIN: 2.5 G/DL
GLUCOSE BLD-MCNC: 97 MG/DL (ref 70–99)
HBA1C MFR BLD: 6.2 %
HDLC SERPL-MCNC: 66 MG/DL (ref 40–60)
LDL CHOLESTEROL CALCULATED: 86 MG/DL
POTASSIUM SERPL-SCNC: 3.3 MMOL/L (ref 3.5–5.1)
PROLACTIN: 1.2 NG/ML
SODIUM BLD-SCNC: 144 MMOL/L (ref 136–145)
T3 FREE: 2.5 PG/ML (ref 2.3–4.2)
T4 FREE: 1.4 NG/DL (ref 0.9–1.8)
TOTAL PROTEIN: 6.6 G/DL (ref 6.4–8.2)
TRIGL SERPL-MCNC: 120 MG/DL (ref 0–150)
VLDLC SERPL CALC-MCNC: 24 MG/DL

## 2018-10-11 PROCEDURE — 84146 ASSAY OF PROLACTIN: CPT

## 2018-10-11 PROCEDURE — 83036 HEMOGLOBIN GLYCOSYLATED A1C: CPT

## 2018-10-11 PROCEDURE — 80061 LIPID PANEL: CPT

## 2018-10-11 PROCEDURE — 70553 MRI BRAIN STEM W/O & W/DYE: CPT

## 2018-10-11 PROCEDURE — 36415 COLL VENOUS BLD VENIPUNCTURE: CPT

## 2018-10-11 PROCEDURE — A9577 INJ MULTIHANCE: HCPCS | Performed by: INTERNAL MEDICINE

## 2018-10-11 PROCEDURE — 6360000004 HC RX CONTRAST MEDICATION: Performed by: INTERNAL MEDICINE

## 2018-10-11 PROCEDURE — 84439 ASSAY OF FREE THYROXINE: CPT

## 2018-10-11 PROCEDURE — 84481 FREE ASSAY (FT-3): CPT

## 2018-10-11 PROCEDURE — 76536 US EXAM OF HEAD AND NECK: CPT

## 2018-10-11 PROCEDURE — 80053 COMPREHEN METABOLIC PANEL: CPT

## 2018-10-11 RX ADMIN — GADOBENATE DIMEGLUMINE 20 ML: 529 INJECTION, SOLUTION INTRAVENOUS at 09:30

## 2018-10-11 NOTE — PROGRESS NOTES
MRI brain Oct 2018: There appears to be evidence of prior transsphenoidal surgery.  There appears to be increasing asymmetric soft tissue within the right aspect of the sella extending into the right cavernous sinus.  Overall, this region measures approximately 12 x 16 x 12 mm (AP x TRANS x CC).  Lucyann Shark is no extension into the suprasellar cistern.  There is leftward deviation of the infundibulum.  No compression on the optic chiasm

## 2018-10-15 ENCOUNTER — OFFICE VISIT (OUTPATIENT)
Dept: ENDOCRINOLOGY | Age: 63
End: 2018-10-15
Payer: COMMERCIAL

## 2018-10-15 VITALS
HEIGHT: 67 IN | HEART RATE: 60 BPM | WEIGHT: 228 LBS | OXYGEN SATURATION: 98 % | BODY MASS INDEX: 35.79 KG/M2 | SYSTOLIC BLOOD PRESSURE: 113 MMHG | DIASTOLIC BLOOD PRESSURE: 72 MMHG

## 2018-10-15 DIAGNOSIS — D49.7 PITUITARY TUMOR: ICD-10-CM

## 2018-10-15 DIAGNOSIS — D35.2 BENIGN NEOPLASM OF PITUITARY GLAND AND CRANIOPHARYNGEAL DUCT (POUCH) (HCC): Primary | ICD-10-CM

## 2018-10-15 DIAGNOSIS — D35.2 PITUITARY ADENOMA (HCC): ICD-10-CM

## 2018-10-15 DIAGNOSIS — E11.9 CONTROLLED TYPE 2 DIABETES MELLITUS WITHOUT COMPLICATION, WITHOUT LONG-TERM CURRENT USE OF INSULIN (HCC): ICD-10-CM

## 2018-10-15 DIAGNOSIS — E04.2 MULTINODULAR GOITER: ICD-10-CM

## 2018-10-15 DIAGNOSIS — E23.0 PANHYPOPITUITARISM (HCC): ICD-10-CM

## 2018-10-15 DIAGNOSIS — E27.40 ADRENAL INSUFFICIENCY (HCC): Chronic | ICD-10-CM

## 2018-10-15 DIAGNOSIS — D35.3 BENIGN NEOPLASM OF PITUITARY GLAND AND CRANIOPHARYNGEAL DUCT (POUCH) (HCC): Primary | ICD-10-CM

## 2018-10-15 PROBLEM — E04.1 THYROID NODULE: Status: RESOLVED | Noted: 2018-05-14 | Resolved: 2018-10-15

## 2018-10-15 PROCEDURE — 99214 OFFICE O/P EST MOD 30 MIN: CPT | Performed by: INTERNAL MEDICINE

## 2018-10-15 NOTE — PROGRESS NOTES
Unspecified cerebral artery occlusion with cerebral infarction       TIA 2006         Past Surgical History:   Procedure Laterality Date    BICEPS TENODESIS Left 11/10/2016    Dr Vanessa Esteves  3/24/11    chiari 1 repair/ decompression and C1 laminectomy    BRAIN SURGERY  11/07    adenoma removal, pituitary    CATARACT REMOVAL Bilateral 04/2017    HYSTERECTOMY      KNEE ARTHROPLASTY Right 08/25/2014    right tkr    LAMINECTOMY  C1    chiari    PITUITARY SURGERY  2/15    transphenoidal         Allergies   Allergen Reactions    Levofloxacin      Out of touch w/ world    Metformin And Related Other (See Comments)     Passed out- due to lactic acidosis         Current Outpatient Prescriptions:     PROAIR  (90 Base) MCG/ACT inhaler, INHALE 2 PUFFS INTO LUNGS EVERY 6 HOURS AS NEEDED FOR WHEEZING, Disp: 8.5 g, Rfl: 0    SYMBICORT 160-4.5 MCG/ACT AERO, INHALE 2 PUFFS BY MOUTH TWICE DAILY. RINSE MOUTH AFTER USE, Disp: 30.6 g, Rfl: 0    traZODone (DESYREL) 150 MG tablet, TAKE 1 TO 2 TABLETS BY MOUTH EVERY NIGHT AS NEEDED, Disp: 180 tablet, Rfl: 0    fluticasone (FLONASE) 50 MCG/ACT nasal spray, 1 spray by Nasal route, Disp: , Rfl:     hydrocortisone (CORTEF) 10 MG tablet, TAKE 1 TABLET TWICE A DAY, Disp: 60 tablet, Rfl: 5    triamterene-hydrochlorothiazide (DYAZIDE) 37.5-25 MG per capsule, TAKE 1 CAPSULE DAILY, Disp: 90 capsule, Rfl: 3    RaNITidine HCl (ZANTAC PO), Take by mouth, Disp: , Rfl:     POTASSIUM GLUCONATE PO, Take by mouth, Disp: , Rfl:     metFORMIN (GLUCOPHAGE XR) 500 MG extended release tablet, 500mg daily and increase to 1000mg daily after a week (Patient taking differently: 500mg daily), Disp: 60 tablet, Rfl: 5    Blood Glucose Monitoring Suppl ([employer] BLOOD GLUCOSE KIT) KIT, Check blood sugars 1-2 times per day., Disp: 1 kit, Rfl: 0    glucose blood VI test strips ([employer] BLOOD GLUCOSE TEST) strip, Check blood sugars 1-2 times per day. And As needed. , Disp: 100 each, 12/27/2017 1.1  0.6 - 1.2 mg/dL Final    GFR Non- 12/27/2017 51* >60 Final    GFR  12/27/2017 >60  >60 Final    Calcium 12/27/2017 9.4  8.3 - 10.6 mg/dL Final    Total Protein 12/27/2017 6.4  6.4 - 8.2 g/dL Final    Alb 12/27/2017 4.2  3.4 - 5.0 g/dL Final    Albumin/Globulin Ratio 12/27/2017 1.9  1.1 - 2.2 Final    Total Bilirubin 12/27/2017 0.4  0.0 - 1.0 mg/dL Final    Alkaline Phosphatase 12/27/2017 92  40 - 129 U/L Final    ALT 12/27/2017 14  10 - 40 U/L Final    AST 12/27/2017 13* 15 - 37 U/L Final    Globulin 12/27/2017 2.2  g/dL Final           Assessment and [address] [facility] was seen today for follow-up. 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) (HCC)    Adrenal insufficiency  -     Comprehensive Metabolic Panel; Future    Controlled type 2 diabetes mellitus without complication, without long-term current use of insulin (HCC)  -     Hemoglobin A1C; Future    Multinodular goiter    Panhypopituitarism (HCC)  -     T4, Free; Future  -     T3, Free; Future  -     Insulin-Like Growth Factor; Future  -     Insulin-Like Growth Factor; Future    Pituitary adenoma (HCC)  -     Prolactin; Future  -     Insulin-Like Growth Factor; Future    Pituitary tumor          1: Recurrent pituitary tumor with panhypopituitarism with out DI   IGF normal May 2018   Need a repeat VFA     MRI brain Oct 2018: There appears to be evidence of prior transsphenoidal surgery.  There appears to be increasing asymmetric soft tissue within the right aspect of the sella extending into the right cavernous sinus.  Overall, this region measures approximately 12 x 16 x 12 mm (AP x TRANS x CC).  There is no extension into the suprasellar cistern.  There is leftward deviation of the infundibulum.  No compression on the optic chiasm. The tumor has shrunken since last MRI.      C/w Cabergoline 0.25mg twice a week    Next MRI is due in Oct 2019    2: Adrenal insufficiency   C/w hydrocortisone replacement, on it for along time     C/w Hydrocortisone 10mg 8:30am and 1pm, definitely before 4pm     Adrenal insufficiency education. Reviewed adrenal insufficiency precautions. If patient has a fever or significant illness they are to triple their dose of steroids for three days. If illness has resolved, they can resume their previous dose. If not better, they are to call. If they have an illness where they are unable to keep down oral intake, call EMS. They should also get a medic Alert bracelet that states 'Adrenal Insufficiency. 3: Hypothyroidism   Free levels in good range    C/w LT 50mcg daily     Education: Reviewed how to properly take thyroid replacement. Instructed to take daily with water on an empty stomach without concomitant vitamins or calcium or other medicine. Wait for 30-45 minutes before eating. If patient is confident they missed a day of pills, they can take the missed dose with their next tablet. 4: MNG   US thyroid Oct 2018: Multiple nodule on left biggest is 11 x 7 x 7 mm. Nodules are stable or smaller from last scan      Repeat Us in fall 2020     5: Type 2 DM controlled uncomplicated   B8I 9.4% Oct 2018 << 6.0      C/w Metformin ER 500mg daily     Eye exam: Needs eye exam   Foot exam: Feb 2018   Monofilament normal   Vibrations with tuning fork: normal   Left big toe ingrow nail: Needs podiatrist eval     Renal screen: May 2018     Check blood sugars 1-2 per day or anytime feeling dizzy, tired.      6: Hyperlipidemia  LDL: 86, HDL: 66, Tgs 120 Oct 2018   Discussed recommendations of AHA and ADA -- needs statins   According AACE, LDL is on target   Atorvastatin was prescribed, she did not start yet     Prolactin, A1C, IGF-1 Ft4, Ft3 in 6 months     Electronically signed by Giovany Bryant MD on 10/15/2018 at 10:26 AM

## 2018-11-18 DIAGNOSIS — E23.0 PANHYPOPITUITARISM (HCC): ICD-10-CM

## 2018-11-18 DIAGNOSIS — E27.40 ADRENAL INSUFFICIENCY (HCC): Chronic | ICD-10-CM

## 2018-11-19 RX ORDER — HYDROCORTISONE 10 MG/1
TABLET ORAL
Qty: 60 TABLET | Refills: 0 | Status: SHIPPED | OUTPATIENT
Start: 2018-11-19 | End: 2018-12-17 | Stop reason: SDUPTHER

## 2018-11-19 RX ORDER — BUDESONIDE AND FORMOTEROL FUMARATE DIHYDRATE 160; 4.5 UG/1; UG/1
AEROSOL RESPIRATORY (INHALATION)
Qty: 30.6 G | Refills: 0 | Status: SHIPPED | OUTPATIENT
Start: 2018-11-19 | End: 2018-12-05 | Stop reason: SDUPTHER

## 2018-12-05 ENCOUNTER — OFFICE VISIT (OUTPATIENT)
Dept: FAMILY MEDICINE CLINIC | Age: 63
End: 2018-12-05
Payer: COMMERCIAL

## 2018-12-05 VITALS
HEIGHT: 67 IN | SYSTOLIC BLOOD PRESSURE: 124 MMHG | HEART RATE: 68 BPM | OXYGEN SATURATION: 98 % | DIASTOLIC BLOOD PRESSURE: 78 MMHG | BODY MASS INDEX: 35.66 KG/M2 | RESPIRATION RATE: 16 BRPM | WEIGHT: 227.2 LBS

## 2018-12-05 DIAGNOSIS — F51.04 PSYCHOPHYSIOLOGICAL INSOMNIA: ICD-10-CM

## 2018-12-05 DIAGNOSIS — I48.91 ATRIAL FIBRILLATION, UNSPECIFIED TYPE (HCC): Primary | ICD-10-CM

## 2018-12-05 DIAGNOSIS — J45.909 ASTHMA, UNSPECIFIED ASTHMA SEVERITY, UNSPECIFIED WHETHER COMPLICATED, UNSPECIFIED WHETHER PERSISTENT: ICD-10-CM

## 2018-12-05 PROCEDURE — G8427 DOCREV CUR MEDS BY ELIG CLIN: HCPCS | Performed by: NURSE PRACTITIONER

## 2018-12-05 PROCEDURE — 99212 OFFICE O/P EST SF 10 MIN: CPT | Performed by: NURSE PRACTITIONER

## 2018-12-05 PROCEDURE — 3017F COLORECTAL CA SCREEN DOC REV: CPT | Performed by: NURSE PRACTITIONER

## 2018-12-05 PROCEDURE — 1036F TOBACCO NON-USER: CPT | Performed by: NURSE PRACTITIONER

## 2018-12-05 PROCEDURE — G8417 CALC BMI ABV UP PARAM F/U: HCPCS | Performed by: NURSE PRACTITIONER

## 2018-12-05 PROCEDURE — G8484 FLU IMMUNIZE NO ADMIN: HCPCS | Performed by: NURSE PRACTITIONER

## 2018-12-05 RX ORDER — BUDESONIDE AND FORMOTEROL FUMARATE DIHYDRATE 160; 4.5 UG/1; UG/1
AEROSOL RESPIRATORY (INHALATION)
Qty: 30.6 G | Refills: 0 | Status: SHIPPED | OUTPATIENT
Start: 2018-12-05 | End: 2019-06-12

## 2018-12-05 RX ORDER — TRAZODONE HYDROCHLORIDE 150 MG/1
TABLET ORAL
Qty: 180 TABLET | Refills: 0 | Status: SHIPPED | OUTPATIENT
Start: 2018-12-05 | End: 2020-02-21 | Stop reason: SDUPTHER

## 2018-12-05 ASSESSMENT — PATIENT HEALTH QUESTIONNAIRE - PHQ9
SUM OF ALL RESPONSES TO PHQ QUESTIONS 1-9: 0
1. LITTLE INTEREST OR PLEASURE IN DOING THINGS: 0
SUM OF ALL RESPONSES TO PHQ9 QUESTIONS 1 & 2: 0
SUM OF ALL RESPONSES TO PHQ QUESTIONS 1-9: 0
2. FEELING DOWN, DEPRESSED OR HOPELESS: 0

## 2018-12-05 NOTE — PROGRESS NOTES
SUBJECTIVE:    Patient ID: Tahira Giles is a 66 y.o. y.o. female. HPI    SHE WILL BE CHANGING HER HEALTH INSURANCE IN January TO [facility] -[facility] ML BARFIEDL TO COME IN TO DISCUSS    PRICING IN REGARDS TO  COPAYS SHE WOULD ALSO LIKE TO KNOW HOW MUCH HER SYMBICORT WILL BE  AS IT IS CURRENTLY 240 MONTHLY AND HER ELIQUIS  MONTHLY- HER INSURANCE PAYMENT  MONTHLY NOT INCLUDING THE SCRIPTS- SHE     SHE HAS AN ENDOCRINOLOGIST FOR HER PRE DIABETES HER ADRENAL INSUFFICIENCY     SHE NEEDS REFILLS ON HER TRAZODONE AS THIS IS THE ONLY THING THAT HELPS HER SLEEP     Review of Systems   Psychiatric/Behavioral: Positive for sleep disturbance. Negative for dysphoric mood and suicidal ideas. All other systems reviewed and are negative. OBJECTIVE:    Vitals:    12/05/18 1053   BP: 124/78   Pulse: 68   Resp: 16   SpO2: 98%       Physical Exam   Constitutional: Vital signs are normal. She appears well-developed and well-nourished. She is active. Cardiovascular: Normal rate, regular rhythm, S1 normal, S2 normal and normal heart sounds. Pulmonary/Chest: Effort normal and breath sounds normal.   Neurological: She is alert. Psychiatric: She has a normal mood and affect. Her speech is normal and behavior is normal. Judgment and thought content normal. Cognition and memory are normal.       Lisseth Barrera was seen today for discuss medications.     Diagnoses and all orders for this visit:    Atrial fibrillation, unspecified type ([address]  SHE DOES NOT NEED ANY ELIQUIS  [address] PRICING  I GAVE THE PT A 30 DAY FREE/DISCOUNT CARD TO BE USED IN 1/19 SINCE SHE WILL HAVE NEW INSURANCE AT THAT TIME-SHE IS ALSO ENCOURAGED TO CALL THE NEW PLAN TO SEE IF IT IS COVERED ON THEIR PLAN    Asthma, unspecified asthma severity, unspecified whether complicated, unspecified whether persistent  NO MEDICATION NEEDED AT THIS TIME   SHE IS GIVEN SYMBICORT DISCOUNT CARD -SCRIPT TO FILL IN 1/19 AS

## 2018-12-17 DIAGNOSIS — E27.40 ADRENAL INSUFFICIENCY (HCC): Chronic | ICD-10-CM

## 2018-12-17 DIAGNOSIS — E23.0 PANHYPOPITUITARISM (HCC): ICD-10-CM

## 2018-12-17 RX ORDER — HYDROCORTISONE 10 MG/1
TABLET ORAL
Qty: 60 TABLET | Refills: 0 | Status: SHIPPED | OUTPATIENT
Start: 2018-12-17 | End: 2019-02-12

## 2018-12-31 RX ORDER — APIXABAN 5 MG/1
TABLET, FILM COATED ORAL
Qty: 60 TABLET | Refills: 0 | Status: SHIPPED | OUTPATIENT
Start: 2018-12-31 | End: 2019-01-27 | Stop reason: SDUPTHER

## 2019-01-07 RX ORDER — LEVOTHYROXINE SODIUM 0.05 MG/1
50 TABLET ORAL DAILY
Qty: 90 TABLET | Refills: 0 | OUTPATIENT
Start: 2019-01-07

## 2019-01-14 RX ORDER — LEVOTHYROXINE SODIUM 0.05 MG/1
50 TABLET ORAL DAILY
Qty: 90 TABLET | Refills: 0 | OUTPATIENT
Start: 2019-01-14

## 2019-01-15 RX ORDER — LEVOTHYROXINE SODIUM 0.05 MG/1
50 TABLET ORAL DAILY
Qty: 90 TABLET | Refills: 1 | Status: SHIPPED | OUTPATIENT
Start: 2019-01-15 | End: 2019-06-03 | Stop reason: SDUPTHER

## 2019-01-28 RX ORDER — APIXABAN 5 MG/1
TABLET, FILM COATED ORAL
Qty: 60 TABLET | Refills: 0 | Status: SHIPPED | OUTPATIENT
Start: 2019-01-28 | End: 2019-02-24 | Stop reason: SDUPTHER

## 2019-01-31 ENCOUNTER — TELEPHONE (OUTPATIENT)
Dept: FAMILY MEDICINE CLINIC | Age: 64
End: 2019-01-31

## 2019-02-11 DIAGNOSIS — E27.40 ADRENAL INSUFFICIENCY (HCC): Chronic | ICD-10-CM

## 2019-02-11 DIAGNOSIS — E23.0 PANHYPOPITUITARISM (HCC): ICD-10-CM

## 2019-02-11 RX ORDER — TRIAMTERENE AND HYDROCHLOROTHIAZIDE 37.5; 25 MG/1; MG/1
CAPSULE ORAL
Qty: 90 CAPSULE | Refills: 0 | Status: SHIPPED | OUTPATIENT
Start: 2019-02-11 | End: 2019-05-03 | Stop reason: SDUPTHER

## 2019-02-12 RX ORDER — HYDROCORTISONE 10 MG/1
TABLET ORAL
Qty: 60 TABLET | Refills: 0 | Status: SHIPPED | OUTPATIENT
Start: 2019-02-12 | End: 2019-03-11 | Stop reason: SDUPTHER

## 2019-02-18 RX ORDER — BUDESONIDE AND FORMOTEROL FUMARATE DIHYDRATE 160; 4.5 UG/1; UG/1
AEROSOL RESPIRATORY (INHALATION)
Qty: 30.6 G | Refills: 0 | Status: SHIPPED | OUTPATIENT
Start: 2019-02-18 | End: 2019-06-03 | Stop reason: ALTCHOICE

## 2019-02-26 RX ORDER — APIXABAN 5 MG/1
TABLET, FILM COATED ORAL
Qty: 60 TABLET | Refills: 3 | Status: SHIPPED | OUTPATIENT
Start: 2019-02-26 | End: 2019-06-20 | Stop reason: SDUPTHER

## 2019-04-29 ENCOUNTER — TELEPHONE (OUTPATIENT)
Dept: FAMILY MEDICINE CLINIC | Age: 64
End: 2019-04-29

## 2019-04-29 NOTE — TELEPHONE ENCOUNTER
She is on eliquis for afib. I don't know how much bleeding is entailed in root canal, but if able to contain bleeding, would prefer to not take her off eliquis. If bleeding concerns on this medication, pt can stop med 2 days prior to procedure and restart right after surgery is completed.

## 2019-04-29 NOTE — TELEPHONE ENCOUNTER
Patient needs to have a root canal. Dr Zackery Quan needs to know if Dr Bindu Valadez wants her to  Stop her Eliquis prior to the procedure.    What ever Dr Clary Paredes decides they need his instructions in writing faxed to them at [phone]

## 2019-05-03 RX ORDER — TRIAMTERENE AND HYDROCHLOROTHIAZIDE 37.5; 25 MG/1; MG/1
CAPSULE ORAL
Qty: 90 CAPSULE | Refills: 0 | Status: SHIPPED | OUTPATIENT
Start: 2019-05-03 | End: 2019-07-31 | Stop reason: SDUPTHER

## 2019-05-06 DIAGNOSIS — E23.0 PANHYPOPITUITARISM (HCC): ICD-10-CM

## 2019-05-06 DIAGNOSIS — E27.40 ADRENAL INSUFFICIENCY (HCC): Chronic | ICD-10-CM

## 2019-05-06 RX ORDER — HYDROCORTISONE 10 MG/1
TABLET ORAL
Qty: 60 TABLET | Refills: 1 | Status: SHIPPED | OUTPATIENT
Start: 2019-05-06 | End: 2019-06-12

## 2019-05-06 NOTE — TELEPHONE ENCOUNTER
Pt states that she increased the hydrocortisone to 2 in AM and 1 in the PM. Pt needs a refill sent to [facility] on [address]

## 2019-05-12 DIAGNOSIS — E23.0 PANHYPOPITUITARISM (HCC): ICD-10-CM

## 2019-05-12 DIAGNOSIS — E27.40 ADRENAL INSUFFICIENCY (HCC): Chronic | ICD-10-CM

## 2019-05-13 RX ORDER — HYDROCORTISONE 10 MG/1
TABLET ORAL
Qty: 60 TABLET | Refills: 0 | Status: SHIPPED | OUTPATIENT
Start: 2019-05-13 | End: 2019-06-03

## 2019-05-28 DIAGNOSIS — E23.0 PANHYPOPITUITARISM (HCC): ICD-10-CM

## 2019-05-28 DIAGNOSIS — E27.40 ADRENAL INSUFFICIENCY (HCC): Chronic | ICD-10-CM

## 2019-05-28 DIAGNOSIS — E11.9 CONTROLLED TYPE 2 DIABETES MELLITUS WITHOUT COMPLICATION, WITHOUT LONG-TERM CURRENT USE OF INSULIN (HCC): ICD-10-CM

## 2019-05-28 DIAGNOSIS — D35.2 PITUITARY ADENOMA (HCC): ICD-10-CM

## 2019-05-28 LAB
A/G RATIO: 1.8 (ref 1.1–2.2)
ALBUMIN SERPL-MCNC: 4 G/DL (ref 3.4–5)
ALP BLD-CCNC: 78 U/L (ref 40–129)
ALT SERPL-CCNC: 13 U/L (ref 10–40)
ANION GAP SERPL CALCULATED.3IONS-SCNC: 13 MMOL/L (ref 3–16)
AST SERPL-CCNC: 10 U/L (ref 15–37)
BILIRUB SERPL-MCNC: 0.4 MG/DL (ref 0–1)
BUN BLDV-MCNC: 25 MG/DL (ref 7–20)
CALCIUM SERPL-MCNC: 9.6 MG/DL (ref 8.3–10.6)
CHLORIDE BLD-SCNC: 101 MMOL/L (ref 99–110)
CO2: 27 MMOL/L (ref 21–32)
CREAT SERPL-MCNC: 1.2 MG/DL (ref 0.6–1.2)
GFR AFRICAN AMERICAN: 55
GFR NON-AFRICAN AMERICAN: 46
GLOBULIN: 2.2 G/DL
GLUCOSE BLD-MCNC: 85 MG/DL (ref 70–99)
POTASSIUM SERPL-SCNC: 3.4 MMOL/L (ref 3.5–5.1)
PROLACTIN: 12.6 NG/ML
SODIUM BLD-SCNC: 141 MMOL/L (ref 136–145)
T3 FREE: 2.2 PG/ML (ref 2.3–4.2)
T4 FREE: 1.4 NG/DL (ref 0.9–1.8)
TOTAL PROTEIN: 6.2 G/DL (ref 6.4–8.2)

## 2019-05-29 LAB
ESTIMATED AVERAGE GLUCOSE: 125.5 MG/DL
HBA1C MFR BLD: 6 %

## 2019-05-30 LAB
IGF-1 (INSULIN-LIKE GROWTH I): 126 NG/ML (ref 41–243)
INSULIN-LIKE GROWTH FACTOR-1 Z-SCORE: 0.2

## 2019-06-03 ENCOUNTER — OFFICE VISIT (OUTPATIENT)
Dept: ENDOCRINOLOGY | Age: 64
End: 2019-06-03

## 2019-06-03 VITALS
WEIGHT: 227 LBS | SYSTOLIC BLOOD PRESSURE: 121 MMHG | OXYGEN SATURATION: 99 % | HEIGHT: 67 IN | DIASTOLIC BLOOD PRESSURE: 71 MMHG | BODY MASS INDEX: 35.63 KG/M2 | HEART RATE: 68 BPM

## 2019-06-03 DIAGNOSIS — I10 ESSENTIAL HYPERTENSION: Chronic | ICD-10-CM

## 2019-06-03 DIAGNOSIS — E23.0 PANHYPOPITUITARISM (HCC): ICD-10-CM

## 2019-06-03 DIAGNOSIS — D49.7 PITUITARY TUMOR: ICD-10-CM

## 2019-06-03 DIAGNOSIS — E23.0 GROWTH HORMONE DEFICIENCY (HCC): ICD-10-CM

## 2019-06-03 DIAGNOSIS — D35.2 PITUITARY ADENOMA (HCC): Primary | ICD-10-CM

## 2019-06-03 DIAGNOSIS — E78.00 HYPERCHOLESTEREMIA: ICD-10-CM

## 2019-06-03 DIAGNOSIS — Z86.018 S/P SELECTIVE TRANSSPHENOIDAL PITUITARY ADENOMECTOMY: ICD-10-CM

## 2019-06-03 DIAGNOSIS — Z98.890 S/P SELECTIVE TRANSSPHENOIDAL PITUITARY ADENOMECTOMY: ICD-10-CM

## 2019-06-03 DIAGNOSIS — E11.9 CONTROLLED TYPE 2 DIABETES MELLITUS WITHOUT COMPLICATION, WITHOUT LONG-TERM CURRENT USE OF INSULIN (HCC): ICD-10-CM

## 2019-06-03 DIAGNOSIS — E04.2 MULTINODULAR GOITER: ICD-10-CM

## 2019-06-03 PROCEDURE — 99214 OFFICE O/P EST MOD 30 MIN: CPT | Performed by: INTERNAL MEDICINE

## 2019-06-03 RX ORDER — CABERGOLINE 0.5 MG/1
0.25 TABLET ORAL
Qty: 8 TABLET | Refills: 3 | Status: SHIPPED | OUTPATIENT
Start: 2019-06-03 | End: 2020-01-03

## 2019-06-03 RX ORDER — LEVOTHYROXINE SODIUM 0.05 MG/1
TABLET ORAL
Qty: 130 TABLET | Refills: 1 | Status: SHIPPED | OUTPATIENT
Start: 2019-06-03 | End: 2019-06-30 | Stop reason: SDUPTHER

## 2019-06-03 RX ORDER — LISINOPRIL AND HYDROCHLOROTHIAZIDE 12.5; 1 MG/1; MG/1
1 TABLET ORAL DAILY
Qty: 90 TABLET | Refills: 1 | Status: SHIPPED | OUTPATIENT
Start: 2019-06-03 | End: 2019-06-12

## 2019-06-03 NOTE — PROGRESS NOTES
Patient ID:   Avery Corrigan is a 66 y.o. 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 Subjective:   Avery Corrigan had pituitary adenoma status post transphenoidal resection 11/07, repeat TSR 2/17/15 for rt sellar recurrence. Pathology benign with Immunoperoxidase stains for ACTH, LH, FSH, TSH, prolactin and growth hormones is negative. Now s/p SRS in April 2015. MRI Oct 2017:   Postoperative changes are again identified, status post transsphenoidal surgery. A residual mass is again noted in the right sellar region, also involving the right cavernous sinus region, measuring 2.3 x 1.5 cm. Overall size and morphology of the mass is unchanged as compared to the prior study dated 10/14/2016. Infundibulum is again noted to be deviated towards the left side. Saw Dr. Renata Medina from 2008 till 2016. Switched as insurance changed. Established Feb 2018       VFA more than a year ago. Off genotropin Sep 2016. Feeling tired. Levothyroxine 50mcg daily with water, fasting and waits for at least 30 minutes. Some days energy levels are low. Weight stable. Ssome cold intolerance-stable, no compressive symptoms. Hydrocortisone 20mg in am and 10mg around 12pm-1pm. Not missing doses any more. On Cabergoline since Dec 2015: 0.25mg twice weekly. Out for 2 months. Dm2 since Dec 2017  Prediabetes for a long time     Stable     Metformin Er 500mg daily     Check blood sugars once a day   AM: , 150    Meals: 2, skips lunch. Dinner is bigger, No snacks. Exercise: None scheduled. L knee replacement is needed. Eliquis for A fib     MNG: US thyroid March 2015:   Multiple nodules again identified throughout the left lobe, with largest dominant nodules inferiorly  stable measuring 11 x 8 x 13 mm and 11 x 8 x 11 mm. There are other small nodules     FNA in March 2015 was benign for both left sided nodules. FNA previously on left side in 2004 was benign. The following portions of the patient's history were reviewed and updated as appropriate:      Family History   Problem Relation Age of Onset    Hypertension Mother     Arrhythmia Mother     Cancer Father         skin?     No Known Problems Sister     Diabetes Paternal Grandfather     No Known Problems Brother     No Known Problems Maternal Aunt     No Known Problems Maternal Uncle     No Known Problems Paternal Aunt     No Known Problems Paternal Uncle     No Known Problems Maternal Grandmother     No Known Problems Maternal Grandfather     No Known Problems Paternal Grandmother     No Known Problems Other     Anesth Problems Neg Hx     Broken Bones Neg Hx     Clotting Disorder Neg Hx     Collagen Disease Neg Hx     Dislocations Neg Hx     Osteoporosis Neg Hx     Rheumatologic Disease Neg Hx     Scoliosis Neg Hx     Severe Sprains Neg Hx          Social History     Socioeconomic History    Marital status:      Spouse name: Not on file    Number of children: 2    Years of education: Not on file    Highest education level: Not on file   Occupational History    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    Occupation:  of Non-Profit   Social Needs    Financial resource strain: Not on file    Food insecurity:     Worry: Not on file     Inability: Not on file   [place] needs:     Medical: Not on file     Non-medical: Not on file   Tobacco Use    Smoking status: Never Smoker    Smokeless tobacco: Never Used   Substance and Sexual Activity    Alcohol use: No    Drug use: No    Sexual activity: Not on file   Lifestyle    Physical activity:     Days per week: Not on file     Minutes per session: Not on file    Stress: Not on file   Relationships    Social connections:     Talks on phone: Not on file     Gets together: Not on file     Attends Uatsdin service: Not on file     Active member of club or organization: Not on file     Attends meetings of clubs or organizations: Not on file     Relationship status: Not on file    Intimate partner violence:     Fear of current or ex partner: Not on file     Emotionally abused: Not on file     Physically abused: Not on file     Forced sexual activity: Not on file   Other Topics Concern    Not on file   Social History Narrative    Not on file         Past Medical History:   Diagnosis Date    Adrenal insufficiency ([address]     Arthritis     Asthma     [facility]rial [facility]     Brain tumor ([facility]     chiari - malformation    Chiari malformation 3/11    Fibromyalgia     History of TIA (transient ischemic attack) 2006    Panhypopituitarism ([facility] 8/26/2014    S/P selective transsphenoidal pituitary adenomectomy 2007    Thyroid disease     Total knee replacement status 8/14    right    Unspecified cerebral artery occlusion with cerebral infarction       TIA 2006         Past Surgical History:   Procedure Laterality Date    BICEPS TENODESIS Left 11/10/2016    Dr Andrew Kauffman  3/24/11    chiari 1 repair/ decompression and C1 laminectomy    BRAIN SURGERY  11/07    adenoma removal, pituitary    CATARACT REMOVAL Bilateral 04/2017    HYSTERECTOMY      KNEE ARTHROPLASTY Right 08/25/2014    right tkr    LAMINECTOMY  C1    chiari    PITUITARY SURGERY  2/15    transphenoidal         Allergies   Allergen Reactions    Levofloxacin      Out of touch w/ world         Current Outpatient Medications:     Cyanocobalamin (VITAMIN B-12 PO), Take by mouth, Disp: , Rfl:     levothyroxine (SYNTHROID) 50 MCG tablet, Once a day and two on weekends.  9 tabs per week, Disp: 130 tablet, Rfl: 1    hydrocortisone (CORTEF) 10 MG tablet, TAKE 2 TAB PO QAM AND 1 TAB PO QPM, Disp: 60 tablet, Rfl: 1    triamterene-hydrochlorothiazide (DYAZIDE) 37.5-25 MG per capsule, TAKE 1 CAPSULE DAILY, Disp: 90 capsule, Rfl: 0    ELIQUIS 5 MG TABS tablet, TAKE 1 TABLET BY MOUTH TWICE DAILY, Disp: 60 tablet, Rfl: 3    metFORMIN (GLUCOPHAGE-XR) 500 MG extended release tablet, TAKE 1 TABLET BY MOUTH DAILY FOR 1 WEEK AND THEN INCREASE TO 2 TABLETS DAILY THEREAFTER (Patient taking differently: TAKE 1 TABLET BY MOUTH DAILY), Disp: 60 tablet, Rfl: 5    traZODone (DESYREL) 150 MG tablet, TAKE 1 TO 2 TABLETS BY MOUTH EVERY NIGHT AS NEEDED, Disp: 180 tablet, Rfl: 0    budesonide-formoterol (SYMBICORT) 160-4.5 MCG/ACT AERO, INHALE 2 PUFFS BY MOUTH TWICE DAILY. RINSE MOUTH AFTER USE, Disp: 30.6 g, Rfl: 0    PROAIR  (90 Base) MCG/ACT inhaler, INHALE 2 PUFFS INTO THE LUNGS EVERY 6 HOURS AS NEEDED FOR WHEEZING, Disp: 8.5 g, Rfl: 0    fluticasone (FLONASE) 50 MCG/ACT nasal spray, 1 spray by Nasal route, Disp: , Rfl:     RaNITidine HCl (ZANTAC PO), Take by mouth, Disp: , Rfl:     POTASSIUM GLUCONATE PO, Take by mouth, Disp: , Rfl:     MAGNESIUM MALATE PO, Take by mouth daily, Disp: , Rfl:     ascorbic acid (VITAMIN C) 500 MG tablet, Take 500 mg by mouth daily, Disp: , Rfl:     Cholecalciferol (VITAMIN D3) 1000 UNITS TABS, Take by mouth, Disp: , Rfl:     cabergoline (DOSTINEX) 0.5 MG tablet, Take 0.5 tablets by mouth Twice a Week, Disp: 8 tablet, Rfl: 3    lisinopril-hydrochlorothiazide (PRINZIDE;ZESTORETIC) 10-12.5 MG per tablet, Take 1 tablet by mouth daily, Disp: 90 tablet, Rfl: 1    Review of Systems:    Constitutional: Negative for fever, chills, and unexpected weight change. HENT: Negative for congestion, ear pain, rhinorrhea,  sore throat and trouble swallowing. Eyes: Negative for photophobia, redness, itching. Respiratory: Negative for cough, shortness of breath and sputum. Cardiovascular: Negative for chest pain, palpitations and leg swelling. Gastrointestinal: Negative for nausea, vomiting, abdominal pain, diarrhea, constipation. Endocrine: Negative for cold intolerance, heat intolerance, polydipsia, polyphagia and polyuria.    Genitourinary: Negative for dysuria, urgency, frequency, hematuria and flank pain. Musculoskeletal: Negative for myalgias, back pain, arthralgias and neck pain. Skin/Nail: Negative for rash, itching. Normal nails. Neurological: Negative for seizures, weakness, light-headedness, numbness and headaches. Hematological/ Lymph nodes: Negative for adenopathy. Does not bruise/bleed easily. Psychiatric/Behavioral: Negative for suicidal ideas, depression, anxiety, sleep disturbance and decreased concentration. Objective:   Physical Exam:    /71 (Site: Left Upper Arm, Position: Sitting, Cuff Size: Large Adult)   Pulse 68   Ht 5' 7\" (1.702 m)   Wt 227 lb (103 kg)   SpO2 99%   Breastfeeding? No   BMI 35.55 kg/m²       Constitutional: Patient is oriented to person, place, and time. Patient appears well-developed and well-nourished. HENT:    Head: Normocephalic and atraumatic. Eyes: Conjunctivae and EOM are normal. Pupils are equal, round, and reactive to light. Neck: Normal range of motion. Thyroid normal.   Cardiovascular: Normal rate, regular rhythm and normal heart sounds. Pulmonary/Chest: Effort normal and breath sounds normal.   Abdominal: Soft. Bowel sounds are normal.   Musculoskeletal: Normal range of motion. Neurological: Patient is alert and oriented to person, place, and time. Patient has normal reflexes. Skin: Skin is warm and dry. Psychiatric: Patient has a normal mood and affect.  Patient behavior is normal.     Lab Review:      Orders Only on 05/28/2019   Component Date Value Ref Range Status    IGF-1 (INSULIN-LIKE GROWTH I) 05/28/2019 126  41 - 243 ng/mL Final    Insulin-Like GF-1 Z-Score 05/28/2019 0.2   Final    Prolactin 05/28/2019 12.6  ng/mL Final    T3, Free 05/28/2019 2.2* 2.3 - 4.2 pg/mL Final    T4 Free 05/28/2019 1.4  0.9 - 1.8 ng/dL Final    Sodium 05/28/2019 141  136 - 145 mmol/L Final    Potassium 05/28/2019 3.4* 3.5 - 5.1 mmol/L Final    Chloride 05/28/2019 101  99 - 110 mmol/L Final  CO2 05/28/2019 27  21 - 32 mmol/L Final    Anion Gap 05/28/2019 13  3 - 16 Final    Glucose 05/28/2019 85  70 - 99 mg/dL Final    BUN 05/28/2019 25* 7 - 20 mg/dL Final    CREATININE 05/28/2019 1.2  0.6 - 1.2 mg/dL Final    GFR Non- 05/28/2019 46* >60 Final    GFR  05/28/2019 55* >60 Final    Calcium 05/28/2019 9.6  8.3 - 10.6 mg/dL Final    Total Protein 05/28/2019 6.2* 6.4 - 8.2 g/dL Final    Alb 05/28/2019 4.0  3.4 - 5.0 g/dL Final    Albumin/Globulin Ratio 05/28/2019 1.8  1.1 - 2.2 Final    Total Bilirubin 05/28/2019 0.4  0.0 - 1.0 mg/dL Final    Alkaline Phosphatase 05/28/2019 78  40 - 129 U/L Final    ALT 05/28/2019 13  10 - 40 U/L Final    AST 05/28/2019 10* 15 - 37 U/L Final    Globulin 05/28/2019 2.2  g/dL Final    Hemoglobin A1C 05/28/2019 6.0  See comment % Final    eAG 05/28/2019 125.5  mg/dL Final   Hospital Outpatient Visit on 10/11/2018   Component Date Value Ref Range Status    Cholesterol, Total 10/11/2018 176  0 - 199 mg/dL Final    Triglycerides 10/11/2018 120  0 - 150 mg/dL Final    HDL 10/11/2018 66* 40 - 60 mg/dL Final    LDL Calculated 10/11/2018 86  <100 mg/dL Final    VLDL Cholesterol Calculated 10/11/2018 24  Not Established mg/dL Final    Hemoglobin A1C 10/11/2018 6.2  See comment % Final    eAG 10/11/2018 131.2  mg/dL Final    Sodium 10/11/2018 144  136 - 145 mmol/L Final    Potassium 10/11/2018 3.3* 3.5 - 5.1 mmol/L Final    Chloride 10/11/2018 103  99 - 110 mmol/L Final    CO2 10/11/2018 28  21 - 32 mmol/L Final    Anion Gap 10/11/2018 13  3 - 16 Final    Glucose 10/11/2018 97  70 - 99 mg/dL Final    BUN 10/11/2018 18  7 - 20 mg/dL Final    CREATININE 10/11/2018 1.1  0.6 - 1.2 mg/dL Final    GFR Non- 10/11/2018 51* >60 Final    GFR  10/11/2018 >60  >60 Final    Calcium 10/11/2018 9.3  8.3 - 10.6 mg/dL Final    Total Protein 10/11/2018 6.6  6.4 - 8.2 g/dL Final    Alb 10/11/2018 4.1 3.4 - 5.0 g/dL Final    Albumin/Globulin Ratio 10/11/2018 1.6  1.1 - 2.2 Final    Total Bilirubin 10/11/2018 0.4  0.0 - 1.0 mg/dL Final    Alkaline Phosphatase 10/11/2018 98  40 - 129 U/L Final    ALT 10/11/2018 33  10 - 40 U/L Final    AST 10/11/2018 24  15 - 37 U/L Final    Globulin 10/11/2018 2.5  g/dL Final    Prolactin 10/11/2018 1.2  ng/mL Final    T4 Free 10/11/2018 1.4  0.9 - 1.8 ng/dL Final    T3, Free 10/11/2018 2.5  2.3 - 4.2 pg/mL Final           Assessment and [address] was seen today for follow-up. Diagnoses and all orders for this visit:    [facility] ([facility]    Growth hormone deficiency ([facility]  -     cabergoline (DOSTINEX) 0.5 MG tablet; Take 0.5 tablets by mouth Twice a Week  -     lisinopril-hydrochlorothiazide (PRINZIDE;ZESTORETIC) 10-12.5 MG per tablet; Take 1 tablet by mouth daily  -     Comprehensive Metabolic Panel; Future  -     T3, Free; Future  -     T4, Free; Future  -     Hemoglobin A1C; Future  -     Microalbumin / Creatinine Urine Ratio; Future  -     Prolactin; Future  -     MRI Brain W WO Contrast; Future    Pituitary tumor  -     cabergoline (DOSTINEX) 0.5 MG tablet; Take 0.5 tablets by mouth Twice a Week  -     lisinopril-hydrochlorothiazide (PRINZIDE;ZESTORETIC) 10-12.5 MG per tablet; Take 1 tablet by mouth daily  -     Comprehensive Metabolic Panel; Future  -     T3, Free; Future  -     T4, Free; Future  -     Hemoglobin A1C; Future  -     Microalbumin / Creatinine Urine Ratio; Future  -     Prolactin; Future  -     MRI Brain W WO Contrast; Future    Multinodular goiter  -     cabergoline (DOSTINEX) 0.5 MG tablet; Take 0.5 tablets by mouth Twice a Week  -     lisinopril-hydrochlorothiazide (PRINZIDE;ZESTORETIC) 10-12.5 MG per tablet; Take 1 tablet by mouth daily  -     Comprehensive Metabolic Panel; Future  -     T3, Free; Future  -     T4, Free; Future  -     Hemoglobin A1C; Future  -     Microalbumin / Creatinine Urine Ratio;  Future  -     Prolactin; 30-45 minutes before eating. If patient is confident they missed a day of pills, they can take the missed dose with their next tablet. 4: MNG   US thyroid Oct 2018: Multiple nodule on left biggest is 11 x 7 x 7 mm. Nodules are stable or smaller from last scan      Repeat Us in fall 2020     5: Type 2 DM controlled uncomplicated   W9W 6% < 2.4% << 6.0      C/w Metformin ER 500mg daily     Eye exam: Needs eye exam   Foot exam: June 2019   Monofilament normal   Vibrations with tuning fork: normal   Left big toe ingrow nail: Needs podiatrist eval     Renal screen: May 2018     Check blood sugars 1-2 per day or anytime feeling dizzy, tired.      6: Hyperlipidemia  LDL: 86, HDL: 66, Tgs 120 Oct 2018   Discussed recommendations of AHA and ADA -- needs statins   According AACE, LDL is on target   Atorvastatin was prescribed, she did not start yet     7: HTN and ac on CH kidney disease   Will see pcp but with GFR of 46 down from 51 and BUN/Cr >20:1, low GFR is likely due to diuretic   Since BP is running from last few visit will decrease the dose of diuretic   Change triam-HCTZ 37/25mg to lisinopril-HCTZ 10/12.5      Prolactin, A1C, Ft4, Ft3, CMP, MRI in 5 months     Electronically signed by Matt Sen MD on 6/3/2019 at 9:56 AM

## 2019-06-03 NOTE — PATIENT INSTRUCTIONS
Patient Education        Home Blood Pressure Test: About This Test  What is it? A home blood pressure test allows you to keep track of your blood pressure at home. Blood pressure is a measure of the force of blood against the walls of your arteries. Blood pressure readings include two numbers, such as 130/80 (say \"130 over 80\"). The first number is the systolic pressure. The second number is the diastolic pressure. Why is this test done? You may do this test at home to:  · Find out if you have high blood pressure. · Track your blood pressure if you have high blood pressure. · Track how well medicine is working to reduce high blood pressure. · Check how lifestyle changes, such as weight loss and exercise, are affecting blood pressure. How can you prepare for the test?  · Do not use caffeine, tobacco, or medicines known to raise blood pressure (such as nasal decongestant sprays) for at least 30 minutes before taking your blood pressure. · Do not exercise for at least 30 minutes before taking your blood pressure. What happens before the test?  Take your blood pressure while you feel comfortable and relaxed. Sit quietly with both feet on the floor for at least 5 minutes before the test.  What happens during the test?  · Sit with your arm slightly bent and resting on a table so that your upper arm is at the same level as your heart. · Roll up your sleeve or take off your shirt to expose your upper arm. · Wrap the blood pressure cuff around your upper arm so that the lower edge of the cuff is about 1 inch above the bend of your elbow. Proceed with the following steps depending on if you are using an automatic or manual pressure monitor. Automatic blood pressure monitors  · Press the on/off button on the automatic monitor and wait until the ready-to-measure \"heart\" symbol appears next to zero in the display window. · Press the start button. The cuff will inflate and deflate by itself.   · Your blood pressure numbers will appear on the screen. · Write your numbers in your log book, along with the date and time. Manual blood pressure monitors  · Place the earpieces of a stethoscope in your ears, and place the bell of the stethoscope over the artery, just below the cuff. · Close the valve on the rubber inflating bulb. · Squeeze the bulb rapidly with your opposite hand to inflate the cuff until the dial or column of mercury reads about 30 mm Hg higher than your usual systolic pressure. If you do not know your usual pressure, inflate the cuff to 210 mm Hg or until the pulse at your wrist disappears. · Open the pressure valve just slightly by twisting or pressing the valve on the bulb. · As you watch the pressure slowly fall, note the level on the dial at which you first start to hear a pulsing or tapping sound through the stethoscope. This is your systolic blood pressure. · Continue letting the air out slowly. The sounds will become muffled and will finally disappear. Note the pressure when the sounds completely disappear. This is your diastolic blood pressure. Let out all the remaining air. · Write your numbers in your log book, along with the date and time. What else should you know about the test?  It is more accurate to take the average of several readings made throughout the day than to rely on a single reading. It's normal for blood pressure to go up and down throughout the day. Follow-up care is a key part of your treatment and safety. Be sure to make and go to all appointments, and call your doctor if you are having problems. It's also a good idea to keep a list of the medicines you take. Where can you learn more? Go to https://Ethertronicsesther.Advisity. org and sign in to your SprainGo account. Enter C427 in the Fariqak box to learn more about \"Home Blood Pressure Test: About This Test.\"     If you do not have an account, please click on the \"Sign Up Now\" link.   Current as of:

## 2019-06-10 DIAGNOSIS — E23.0 PANHYPOPITUITARISM (HCC): ICD-10-CM

## 2019-06-10 DIAGNOSIS — E27.40 ADRENAL INSUFFICIENCY (HCC): Chronic | ICD-10-CM

## 2019-06-10 RX ORDER — BUDESONIDE AND FORMOTEROL FUMARATE DIHYDRATE 160; 4.5 UG/1; UG/1
AEROSOL RESPIRATORY (INHALATION)
Qty: 30.6 G | Refills: 0 | Status: SHIPPED | OUTPATIENT
Start: 2019-06-10 | End: 2020-02-21 | Stop reason: ALTCHOICE

## 2019-06-10 RX ORDER — HYDROCORTISONE 10 MG/1
TABLET ORAL
Qty: 60 TABLET | Refills: 5 | Status: SHIPPED | OUTPATIENT
Start: 2019-06-10 | End: 2019-06-12

## 2019-06-10 NOTE — TELEPHONE ENCOUNTER
Radha Salas informed to stop the Lisinopril-HCTZ and take previous HTN medication Triamterene. Advised to follow up with PCP .

## 2019-06-10 NOTE — TELEPHONE ENCOUNTER
PT states that her new med Lisinopril is terrible. She has taken herself off of it. States it makes her dizzy and gain weight.  She would like to speak with nurse, [phone]

## 2019-06-10 NOTE — TELEPHONE ENCOUNTER
If it is causing headaches then stop Lisinopril-HCTZ and resume previous HTN med, triamterene and see pcp

## 2019-06-12 ENCOUNTER — OFFICE VISIT (OUTPATIENT)
Dept: FAMILY MEDICINE CLINIC | Age: 64
End: 2019-06-12

## 2019-06-12 VITALS
BODY MASS INDEX: 35.85 KG/M2 | HEIGHT: 67 IN | SYSTOLIC BLOOD PRESSURE: 114 MMHG | WEIGHT: 228.4 LBS | DIASTOLIC BLOOD PRESSURE: 66 MMHG | OXYGEN SATURATION: 98 % | HEART RATE: 60 BPM

## 2019-06-12 DIAGNOSIS — R60.0 LOCALIZED EDEMA: ICD-10-CM

## 2019-06-12 DIAGNOSIS — Z12.39 BREAST CANCER SCREENING: ICD-10-CM

## 2019-06-12 DIAGNOSIS — Z12.11 COLON CANCER SCREENING: ICD-10-CM

## 2019-06-12 DIAGNOSIS — R94.4 DECREASED GFR: ICD-10-CM

## 2019-06-12 PROCEDURE — 99213 OFFICE O/P EST LOW 20 MIN: CPT | Performed by: NURSE PRACTITIONER

## 2019-06-12 ASSESSMENT — PATIENT HEALTH QUESTIONNAIRE - PHQ9
SUM OF ALL RESPONSES TO PHQ9 QUESTIONS 1 & 2: 0
1. LITTLE INTEREST OR PLEASURE IN DOING THINGS: 0
SUM OF ALL RESPONSES TO PHQ QUESTIONS 1-9: 0
SUM OF ALL RESPONSES TO PHQ QUESTIONS 1-9: 0
2. FEELING DOWN, DEPRESSED OR HOPELESS: 0

## 2019-06-12 NOTE — PATIENT INSTRUCTIONS
weather. ? Alcohol or drug abuse or withdrawal.  ? Certain medicines, such as cold and allergy pills (antihistamines), diet pills (diuretics), and laxatives. ? Certain diseases, such as diabetes, cancer, and heart or kidney disease. When should you call for help? Call 911 anytime you think you may need emergency care. For example, call if:    · You passed out (lost consciousness).    Call your doctor now or seek immediate medical care if:    · You are confused and cannot think clearly.     · You are dizzy or lightheaded, or you feel like you may faint.     · You have signs of needing more fluids. You have sunken eyes and a dry mouth, and you pass only a little dark urine.     · You cannot keep fluids down.    Watch closely for changes in your health, and be sure to contact your doctor if:    · You are not making tears.     · Your skin is very dry and sags slowly back into place after you pinch it.     · Your mouth and eyes are very dry. Where can you learn more? Go to https://[email or web address]. org and sign in to your Sync.ME account. Enter X704 in the Storyz box to learn more about \"Dehydration: Care Instructions. \"     If you do not have an account, please click on the \"Sign Up Now\" link. Current as of: September 23, 2018  Content Version: 12.0  © 5740-7907 Healthwise, Incorporated. Care instructions adapted under license by [facility] ([facility]). If you have questions about a medical condition or this instruction, always ask your healthcare professional. [place] [address] any warranty or liability for your use of this information.

## 2019-06-12 NOTE — PROGRESS NOTES
SUBJECTIVE:    Patient ID: Key Tobin is a 66 y.o. y.o. female. HPI  PT STATES HER FEET AND ANKLES GET SWOLLEN LEFT WORSE THAN RIGHT- THE SWELLING GETS WORSE AS THE DAY GOES - SHE HAS NOT HAD ANY SOB OR CHEST PAIN - SHE DOES DRINK ABOUT 59 OZ OF WATER DAILY - CURRENTLY ON DYAZIDE AS WELL- ENDOCRINOLOGIST TOLD HER SHE WAS DEHYDRATED    Review of Systems   Constitutional: Negative for fatigue. Cardiovascular: Positive for leg swelling. Negative for chest pain and palpitations. OBJECTIVE:    Vitals:    06/12/19 1119   BP: 114/66   Pulse: 60   SpO2: 98%      Physical Exam   Constitutional: Vital signs are normal. She appears well-developed and well-nourished. She is active. Cardiovascular: Normal rate, regular rhythm, S1 normal, S2 normal and normal heart sounds. Exam reveals no gallop, no S3, no S4, no distant heart sounds and no friction rub. No murmur heard. No systolic murmur is present. No diastolic murmur is present. NON PITTING EDEMA NOTED TO LEFT FOOT AND ANKLE   Pulmonary/Chest: Effort normal and breath sounds normal.   Neurological: She is alert. Psychiatric: She has a normal mood and affect. Her speech is normal and behavior is normal. Judgment and thought content normal.         Nika was seen today for swelling. Diagnoses and all orders for this visit:    Localized edema  CONTINUE DYAZIDE   ENCOURAGED PT TO ELEVATE FEET AS MUCH AS POSSIBLE- ANKLE PUMPS WHEN SITTING FOR LONG PERIODS- LIMITING SODIUM- COMPRESSION STOCKINGS  NO ADDITIONAL MEDICATION PRESCRIBED AT THS TIME ( DUE TO KIDNEY FXN- SHE WANTED ANOTHER WATER PILL_  REPEAT KIDNEY FXN IN 2-4 WEEKS - INSTRUCTED TO MAKE SURE SHE IS WELL HYDRATED- IF KIDNEY FXN STILL DECREASED CONSIDER REFERRAL TO NEPHROLOGIST- I INFORMED PT THAT THE DECREASE COULD BE DUE TO HER DM  LABS REVIEWED          Decreased GFR  -     RENAL FUNCTION PANEL; Future    Breast cancer screening  -     CRISTÓBAL DANIELA DIGITAL SCREEN BILATERAL;  Future    Colon cancer screening  -     POCT Fecal Immunochemical Test (FIT);  Future    PT DECLINED SHINGRIX- PNEUMONIA VACCINE AS SHE HAS NO INSURANCE AT THIS TIME    WILL HAVE MA CALL FOR  LABS - FOR DIABETES

## 2019-06-21 RX ORDER — APIXABAN 5 MG/1
TABLET, FILM COATED ORAL
Qty: 60 TABLET | Refills: 0 | Status: SHIPPED | OUTPATIENT
Start: 2019-06-21 | End: 2019-07-18 | Stop reason: SDUPTHER

## 2019-06-28 RX ORDER — TRAZODONE HYDROCHLORIDE 150 MG/1
TABLET ORAL
Qty: 180 TABLET | Refills: 0 | Status: SHIPPED | OUTPATIENT
Start: 2019-06-28 | End: 2019-09-26 | Stop reason: SDUPTHER

## 2019-06-30 DIAGNOSIS — E23.0 PANHYPOPITUITARISM (HCC): ICD-10-CM

## 2019-07-01 ENCOUNTER — OFFICE VISIT (OUTPATIENT)
Dept: ORTHOPEDIC SURGERY | Age: 64
End: 2019-07-01

## 2019-07-01 VITALS
HEART RATE: 69 BPM | DIASTOLIC BLOOD PRESSURE: 79 MMHG | SYSTOLIC BLOOD PRESSURE: 126 MMHG | BODY MASS INDEX: 35.31 KG/M2 | HEIGHT: 67 IN | WEIGHT: 225 LBS

## 2019-07-01 DIAGNOSIS — M17.12 PRIMARY OSTEOARTHRITIS OF LEFT KNEE: ICD-10-CM

## 2019-07-01 DIAGNOSIS — M25.562 ACUTE PAIN OF LEFT KNEE: Primary | ICD-10-CM

## 2019-07-01 PROCEDURE — 20610 DRAIN/INJ JOINT/BURSA W/O US: CPT | Performed by: ORTHOPAEDIC SURGERY

## 2019-07-01 PROCEDURE — 99214 OFFICE O/P EST MOD 30 MIN: CPT | Performed by: ORTHOPAEDIC SURGERY

## 2019-07-01 RX ORDER — LEVOTHYROXINE SODIUM 0.05 MG/1
TABLET ORAL
Qty: 390 TABLET | Refills: 1 | Status: SHIPPED | OUTPATIENT
Start: 2019-07-01 | End: 2019-12-31

## 2019-07-01 RX ORDER — METHYLPREDNISOLONE ACETATE 40 MG/ML
80 INJECTION, SUSPENSION INTRA-ARTICULAR; INTRALESIONAL; INTRAMUSCULAR; SOFT TISSUE ONCE
Status: COMPLETED | OUTPATIENT
Start: 2019-07-01 | End: 2019-07-01

## 2019-07-01 RX ADMIN — METHYLPREDNISOLONE ACETATE 80 MG: 40 INJECTION, SUSPENSION INTRA-ARTICULAR; INTRALESIONAL; INTRAMUSCULAR; SOFT TISSUE at 15:59

## 2019-07-01 ASSESSMENT — ENCOUNTER SYMPTOMS
EYES NEGATIVE: 1
RESPIRATORY NEGATIVE: 1
GASTROINTESTINAL NEGATIVE: 1
ALLERGIC/IMMUNOLOGIC NEGATIVE: 1

## 2019-07-15 NOTE — TELEPHONE ENCOUNTER
Patient needs to get a Prescription sent in for Advair.  This is going to be less expensive than the Symbicort  Spoke with Asha Body about this last week  Please send to [employer] at [phone] [facility]

## 2019-07-18 RX ORDER — APIXABAN 5 MG/1
TABLET, FILM COATED ORAL
Qty: 60 TABLET | Refills: 0 | Status: SHIPPED | OUTPATIENT
Start: 2019-07-18 | End: 2019-08-19 | Stop reason: SDUPTHER

## 2019-07-31 RX ORDER — TRIAMTERENE AND HYDROCHLOROTHIAZIDE 37.5; 25 MG/1; MG/1
CAPSULE ORAL
Qty: 90 CAPSULE | Refills: 0 | Status: SHIPPED | OUTPATIENT
Start: 2019-07-31 | End: 2019-10-27 | Stop reason: SDUPTHER

## 2019-08-19 RX ORDER — APIXABAN 5 MG/1
TABLET, FILM COATED ORAL
Qty: 60 TABLET | Refills: 5 | Status: SHIPPED | OUTPATIENT
Start: 2019-08-19 | End: 2020-02-21 | Stop reason: SDUPTHER

## 2019-08-27 RX ORDER — ALBUTEROL SULFATE 90 UG/1
AEROSOL, METERED RESPIRATORY (INHALATION)
Qty: 8.5 G | Refills: 2 | Status: SHIPPED | OUTPATIENT
Start: 2019-08-27 | End: 2020-06-08

## 2019-09-23 DIAGNOSIS — E11.9 CONTROLLED TYPE 2 DIABETES MELLITUS WITHOUT COMPLICATION, WITHOUT LONG-TERM CURRENT USE OF INSULIN (HCC): ICD-10-CM

## 2019-09-23 RX ORDER — METFORMIN HYDROCHLORIDE 500 MG/1
TABLET, EXTENDED RELEASE ORAL
Qty: 90 TABLET | Refills: 0 | Status: SHIPPED | OUTPATIENT
Start: 2019-09-23 | End: 2019-12-09 | Stop reason: SDUPTHER

## 2019-10-01 RX ORDER — TRAZODONE HYDROCHLORIDE 150 MG/1
TABLET ORAL
Qty: 180 TABLET | Refills: 0 | Status: SHIPPED | OUTPATIENT
Start: 2019-10-01 | End: 2019-12-17 | Stop reason: SDUPTHER

## 2019-10-28 RX ORDER — TRIAMTERENE AND HYDROCHLOROTHIAZIDE 37.5; 25 MG/1; MG/1
CAPSULE ORAL
Qty: 90 CAPSULE | Refills: 0 | Status: SHIPPED | OUTPATIENT
Start: 2019-10-28 | End: 2020-01-29 | Stop reason: SDUPTHER

## 2019-11-03 DIAGNOSIS — E27.40 ADRENAL INSUFFICIENCY (HCC): Chronic | ICD-10-CM

## 2019-11-03 DIAGNOSIS — E23.0 PANHYPOPITUITARISM (HCC): ICD-10-CM

## 2019-11-03 RX ORDER — HYDROCORTISONE 10 MG/1
TABLET ORAL
Qty: 60 TABLET | Refills: 0 | Status: CANCELLED | OUTPATIENT
Start: 2019-11-03

## 2019-11-04 RX ORDER — HYDROCORTISONE 10 MG/1
TABLET ORAL
Qty: 60 TABLET | Refills: 0 | Status: SHIPPED | OUTPATIENT
Start: 2019-11-04 | End: 2019-12-09

## 2019-11-14 PROBLEM — H43.813 VITREOUS DEGENERATION, BILATERAL: Status: ACTIVE | Noted: 2019-11-14

## 2019-11-14 PROBLEM — H25.10 AGE-RELATED NUCLEAR CATARACT: Status: ACTIVE | Noted: 2019-11-14

## 2019-11-14 PROBLEM — H52.221 REGULAR ASTIGMATISM, RIGHT EYE: Status: ACTIVE | Noted: 2019-11-14

## 2019-11-14 PROBLEM — Z09 ENCNTR FOR F/U EXAM AFT TRTMT FOR COND OTH THAN MALIG NEOPLM: Status: ACTIVE | Noted: 2019-11-14

## 2019-12-01 DIAGNOSIS — E04.2 MULTINODULAR GOITER: ICD-10-CM

## 2019-12-01 DIAGNOSIS — E23.0 GROWTH HORMONE DEFICIENCY (HCC): ICD-10-CM

## 2019-12-01 DIAGNOSIS — D49.7 PITUITARY TUMOR: ICD-10-CM

## 2019-12-01 DIAGNOSIS — E23.0 PANHYPOPITUITARISM (HCC): ICD-10-CM

## 2019-12-01 DIAGNOSIS — E78.00 HYPERCHOLESTEREMIA: ICD-10-CM

## 2019-12-01 DIAGNOSIS — E11.9 CONTROLLED TYPE 2 DIABETES MELLITUS WITHOUT COMPLICATION, WITHOUT LONG-TERM CURRENT USE OF INSULIN (HCC): ICD-10-CM

## 2019-12-01 DIAGNOSIS — I10 ESSENTIAL HYPERTENSION: Chronic | ICD-10-CM

## 2019-12-02 RX ORDER — LISINOPRIL AND HYDROCHLOROTHIAZIDE 12.5; 1 MG/1; MG/1
1 TABLET ORAL DAILY
Qty: 90 TABLET | Refills: 0 | Status: SHIPPED | OUTPATIENT
Start: 2019-12-02 | End: 2019-12-20

## 2019-12-09 DIAGNOSIS — E27.40 ADRENAL INSUFFICIENCY (HCC): Chronic | ICD-10-CM

## 2019-12-09 DIAGNOSIS — E11.9 CONTROLLED TYPE 2 DIABETES MELLITUS WITHOUT COMPLICATION, WITHOUT LONG-TERM CURRENT USE OF INSULIN (HCC): ICD-10-CM

## 2019-12-09 DIAGNOSIS — E23.0 PANHYPOPITUITARISM (HCC): ICD-10-CM

## 2019-12-09 RX ORDER — HYDROCORTISONE 10 MG/1
TABLET ORAL
Qty: 90 TABLET | Refills: 0 | Status: SHIPPED | OUTPATIENT
Start: 2019-12-09 | End: 2020-01-08

## 2019-12-09 RX ORDER — METFORMIN HYDROCHLORIDE 500 MG/1
TABLET, EXTENDED RELEASE ORAL
Qty: 90 TABLET | Refills: 1 | Status: SHIPPED | OUTPATIENT
Start: 2019-12-09 | End: 2020-04-16

## 2019-12-19 DIAGNOSIS — E23.0 PANHYPOPITUITARISM (HCC): ICD-10-CM

## 2019-12-19 DIAGNOSIS — I10 ESSENTIAL HYPERTENSION: Chronic | ICD-10-CM

## 2019-12-19 DIAGNOSIS — D49.7 PITUITARY TUMOR: ICD-10-CM

## 2019-12-19 DIAGNOSIS — E04.2 MULTINODULAR GOITER: ICD-10-CM

## 2019-12-19 DIAGNOSIS — E11.9 CONTROLLED TYPE 2 DIABETES MELLITUS WITHOUT COMPLICATION, WITHOUT LONG-TERM CURRENT USE OF INSULIN (HCC): ICD-10-CM

## 2019-12-19 DIAGNOSIS — E78.00 HYPERCHOLESTEREMIA: ICD-10-CM

## 2019-12-19 DIAGNOSIS — E23.0 GROWTH HORMONE DEFICIENCY (HCC): ICD-10-CM

## 2019-12-19 LAB
A/G RATIO: 2 (ref 1.1–2.2)
ALBUMIN SERPL-MCNC: 4.3 G/DL (ref 3.4–5)
ALP BLD-CCNC: 82 U/L (ref 40–129)
ALT SERPL-CCNC: 12 U/L (ref 10–40)
ANION GAP SERPL CALCULATED.3IONS-SCNC: 20 MMOL/L (ref 3–16)
AST SERPL-CCNC: 13 U/L (ref 15–37)
BILIRUB SERPL-MCNC: 0.4 MG/DL (ref 0–1)
BUN BLDV-MCNC: 22 MG/DL (ref 7–20)
CALCIUM SERPL-MCNC: 9.8 MG/DL (ref 8.3–10.6)
CHLORIDE BLD-SCNC: 99 MMOL/L (ref 99–110)
CO2: 22 MMOL/L (ref 21–32)
CREAT SERPL-MCNC: 1.2 MG/DL (ref 0.6–1.2)
CREATININE URINE: 106.1 MG/DL (ref 28–259)
GFR AFRICAN AMERICAN: 55
GFR NON-AFRICAN AMERICAN: 46
GLOBULIN: 2.1 G/DL
GLUCOSE BLD-MCNC: 108 MG/DL (ref 70–99)
MICROALBUMIN UR-MCNC: <1.2 MG/DL
MICROALBUMIN/CREAT UR-RTO: NORMAL MG/G (ref 0–30)
POTASSIUM SERPL-SCNC: 3.9 MMOL/L (ref 3.5–5.1)
PROLACTIN: 0.9 NG/ML
SODIUM BLD-SCNC: 141 MMOL/L (ref 136–145)
T3 FREE: 2.6 PG/ML (ref 2.3–4.2)
T4 FREE: 1.4 NG/DL (ref 0.9–1.8)
TOTAL PROTEIN: 6.4 G/DL (ref 6.4–8.2)

## 2019-12-20 LAB
ESTIMATED AVERAGE GLUCOSE: 119.8 MG/DL
HBA1C MFR BLD: 5.8 %

## 2019-12-23 ENCOUNTER — OFFICE VISIT (OUTPATIENT)
Dept: ENDOCRINOLOGY | Age: 64
End: 2019-12-23

## 2019-12-23 VITALS
DIASTOLIC BLOOD PRESSURE: 81 MMHG | RESPIRATION RATE: 18 BRPM | SYSTOLIC BLOOD PRESSURE: 125 MMHG | HEIGHT: 67 IN | HEART RATE: 69 BPM | BODY MASS INDEX: 35.16 KG/M2 | OXYGEN SATURATION: 93 % | WEIGHT: 224 LBS

## 2019-12-23 DIAGNOSIS — E27.40 ADRENAL INSUFFICIENCY (HCC): Chronic | ICD-10-CM

## 2019-12-23 DIAGNOSIS — E03.9 HYPOTHYROIDISM, UNSPECIFIED TYPE: Chronic | ICD-10-CM

## 2019-12-23 DIAGNOSIS — Z98.890 S/P SELECTIVE TRANSSPHENOIDAL PITUITARY ADENOMECTOMY: ICD-10-CM

## 2019-12-23 DIAGNOSIS — E04.2 MULTINODULAR GOITER: ICD-10-CM

## 2019-12-23 DIAGNOSIS — E11.69 DYSLIPIDEMIA ASSOCIATED WITH TYPE 2 DIABETES MELLITUS (HCC): ICD-10-CM

## 2019-12-23 DIAGNOSIS — E78.5 DYSLIPIDEMIA ASSOCIATED WITH TYPE 2 DIABETES MELLITUS (HCC): ICD-10-CM

## 2019-12-23 DIAGNOSIS — D35.2 PITUITARY ADENOMA (HCC): ICD-10-CM

## 2019-12-23 DIAGNOSIS — E11.9 CONTROLLED TYPE 2 DIABETES MELLITUS WITHOUT COMPLICATION, WITHOUT LONG-TERM CURRENT USE OF INSULIN (HCC): ICD-10-CM

## 2019-12-23 DIAGNOSIS — Z86.018 S/P SELECTIVE TRANSSPHENOIDAL PITUITARY ADENOMECTOMY: ICD-10-CM

## 2019-12-23 DIAGNOSIS — E23.0 PANHYPOPITUITARISM (HCC): Primary | ICD-10-CM

## 2019-12-23 PROCEDURE — 99215 OFFICE O/P EST HI 40 MIN: CPT | Performed by: INTERNAL MEDICINE

## 2019-12-23 RX ORDER — ATORVASTATIN CALCIUM 10 MG/1
10 TABLET, FILM COATED ORAL DAILY
Qty: 90 TABLET | Refills: 3 | Status: SHIPPED | OUTPATIENT
Start: 2019-12-23 | End: 2019-12-23

## 2019-12-23 RX ORDER — ATORVASTATIN CALCIUM 10 MG/1
10 TABLET, FILM COATED ORAL DAILY
Qty: 90 TABLET | Refills: 3 | Status: SHIPPED | OUTPATIENT
Start: 2019-12-23 | End: 2020-02-21 | Stop reason: ALTCHOICE

## 2019-12-28 DIAGNOSIS — E23.0 PANHYPOPITUITARISM (HCC): ICD-10-CM

## 2019-12-31 RX ORDER — LEVOTHYROXINE SODIUM 0.05 MG/1
TABLET ORAL
Qty: 390 TABLET | Refills: 1 | Status: SHIPPED | OUTPATIENT
Start: 2019-12-31 | End: 2020-07-20 | Stop reason: SDUPTHER

## 2020-01-15 ENCOUNTER — PATIENT MESSAGE (OUTPATIENT)
Dept: FAMILY MEDICINE CLINIC | Age: 65
End: 2020-01-15

## 2020-01-16 ENCOUNTER — TELEPHONE (OUTPATIENT)
Dept: FAMILY MEDICINE CLINIC | Age: 65
End: 2020-01-16

## 2020-01-16 NOTE — TELEPHONE ENCOUNTER
Patient had a MRI of the Brain done on 1/7/20 at [address] said the sent the result on 1/9/20. Please give her a call back. The result is in Dr. Fernie Rodas folder in front office - just received.

## 2020-01-16 NOTE — TELEPHONE ENCOUNTER
From: Jennifer Lewis  To: Werner Crawford MD  Sent: 1/15/2020 7:05 PM EST  Subject: Test Results Question    Hi  I recently had an MRI at [facility] and they were supposed to send you the results. Have you heard anything?

## 2020-01-30 RX ORDER — TRIAMTERENE AND HYDROCHLOROTHIAZIDE 37.5; 25 MG/1; MG/1
CAPSULE ORAL
Qty: 90 CAPSULE | Refills: 0 | Status: SHIPPED | OUTPATIENT
Start: 2020-01-30 | End: 2020-02-21 | Stop reason: SDUPTHER

## 2020-01-30 RX ORDER — TRIAMTERENE AND HYDROCHLOROTHIAZIDE 37.5; 25 MG/1; MG/1
CAPSULE ORAL
Qty: 90 CAPSULE | Refills: 0 | OUTPATIENT
Start: 2020-01-30

## 2020-01-31 RX ORDER — TRIAMTERENE AND HYDROCHLOROTHIAZIDE 37.5; 25 MG/1; MG/1
CAPSULE ORAL
Qty: 90 CAPSULE | Refills: 0 | OUTPATIENT
Start: 2020-01-31

## 2020-02-13 ENCOUNTER — OFFICE VISIT (OUTPATIENT)
Dept: ORTHOPEDIC SURGERY | Age: 65
End: 2020-02-13

## 2020-02-13 VITALS
HEIGHT: 67 IN | WEIGHT: 225 LBS | BODY MASS INDEX: 35.31 KG/M2 | DIASTOLIC BLOOD PRESSURE: 76 MMHG | SYSTOLIC BLOOD PRESSURE: 121 MMHG | HEART RATE: 72 BPM

## 2020-02-13 PROCEDURE — 20610 DRAIN/INJ JOINT/BURSA W/O US: CPT | Performed by: ORTHOPAEDIC SURGERY

## 2020-02-13 RX ORDER — METHYLPREDNISOLONE ACETATE 40 MG/ML
80 INJECTION, SUSPENSION INTRA-ARTICULAR; INTRALESIONAL; INTRAMUSCULAR; SOFT TISSUE ONCE
Status: COMPLETED | OUTPATIENT
Start: 2020-02-13 | End: 2020-02-13

## 2020-02-13 RX ORDER — LIDOCAINE HYDROCHLORIDE 10 MG/ML
4 INJECTION, SOLUTION INFILTRATION; PERINEURAL ONCE
Status: COMPLETED | OUTPATIENT
Start: 2020-02-13 | End: 2020-02-13

## 2020-02-13 RX ADMIN — LIDOCAINE HYDROCHLORIDE 4 ML: 10 INJECTION, SOLUTION INFILTRATION; PERINEURAL at 15:29

## 2020-02-13 RX ADMIN — METHYLPREDNISOLONE ACETATE 80 MG: 40 INJECTION, SUSPENSION INTRA-ARTICULAR; INTRALESIONAL; INTRAMUSCULAR; SOFT TISSUE at 15:30

## 2020-02-13 NOTE — PROGRESS NOTES
Jeny Stovall is today requesting a cortisone injection of the left knee. Her last injection was given July 1. That helped until least December. Pain is currently 6 or 7 out of 10. She is frustrated. Patient's medications, allergies, past medical, surgical, social and family histories were reviewed and updated as appropriate. Relevant review of systems reviewed. General Exam:    Vitals: Blood pressure 121/76, pulse 72, height 5' 7\" (1.702 m), weight 225 lb (102.1 kg), not currently breastfeeding. Constitutional: Patient is adequately groomed with no evidence of malnutrition  Mental Status: The patient is oriented to time, place and person. The patient's mood and affect are appropriate. Left knee has moderate discomfort and  crepitation. Range of motion 0 to about 95 degrees. She has mild tenderness laterally that is worse with extension. She has some trace tenderness toward the calf. She has mild pain over the pes bursa. She has no significant intra-articular effusion. She is grossly ligamentously stable.           Left knee x-rays from July 2019 demonstrate demonstrate: Status-post right knee arthroplasty  Moderate to severe degenerative change tricompartmental of the left knee     Assessment: Exacerbation of left knee arthritis                Plan: Cortisone injection        Procedure: Under sterile technique, left knee was injected into the anterolateral arthroscopy portal with 80 mg of Depo-Medrol 40 mg of lidocaine. She tolerated that well. Follow-up with me on an as-needed basis. She requested narcotic pain medicine because she cannot take anti-inflammatory but I reminded her that that is not indicated for chronic arthritic pain. Ultimately she needs arthroplasty but says she is trying to hold off until she turns 74.                                                                                                                                                   This note was created using voice recognition software. It has been proofread, but occasionally errors remain. Please disregard these errors. They will be corrected as they are noted.

## 2020-02-21 ENCOUNTER — OFFICE VISIT (OUTPATIENT)
Dept: FAMILY MEDICINE CLINIC | Age: 65
End: 2020-02-21

## 2020-02-21 VITALS
HEIGHT: 67 IN | WEIGHT: 223 LBS | DIASTOLIC BLOOD PRESSURE: 66 MMHG | RESPIRATION RATE: 18 BRPM | HEART RATE: 50 BPM | OXYGEN SATURATION: 99 % | BODY MASS INDEX: 35 KG/M2 | SYSTOLIC BLOOD PRESSURE: 106 MMHG

## 2020-02-21 PROBLEM — I48.0 PAROXYSMAL ATRIAL FIBRILLATION (HCC): Status: ACTIVE | Noted: 2020-02-21

## 2020-02-21 PROBLEM — M79.7 FIBROMYALGIA: Status: ACTIVE | Noted: 2020-02-21

## 2020-02-21 PROBLEM — M25.571 ACUTE RIGHT ANKLE PAIN: Status: RESOLVED | Noted: 2018-09-11 | Resolved: 2020-02-21

## 2020-02-21 PROBLEM — M76.61 ACHILLES TENDINITIS OF RIGHT LOWER EXTREMITY: Status: RESOLVED | Noted: 2018-08-28 | Resolved: 2020-02-21

## 2020-02-21 PROCEDURE — 99214 OFFICE O/P EST MOD 30 MIN: CPT | Performed by: NURSE PRACTITIONER

## 2020-02-21 RX ORDER — TRIAMTERENE AND HYDROCHLOROTHIAZIDE 37.5; 25 MG/1; MG/1
CAPSULE ORAL
Qty: 90 CAPSULE | Refills: 3 | Status: SHIPPED | OUTPATIENT
Start: 2020-02-21 | End: 2021-03-17 | Stop reason: SDUPTHER

## 2020-02-21 RX ORDER — TRAZODONE HYDROCHLORIDE 150 MG/1
TABLET ORAL
Qty: 180 TABLET | Refills: 3 | Status: SHIPPED | OUTPATIENT
Start: 2020-02-21 | End: 2021-03-18 | Stop reason: SDUPTHER

## 2020-02-21 ASSESSMENT — PATIENT HEALTH QUESTIONNAIRE - PHQ9
SUM OF ALL RESPONSES TO PHQ9 QUESTIONS 1 & 2: 0
SUM OF ALL RESPONSES TO PHQ QUESTIONS 1-9: 0
SUM OF ALL RESPONSES TO PHQ QUESTIONS 1-9: 0
2. FEELING DOWN, DEPRESSED OR HOPELESS: 0
1. LITTLE INTEREST OR PLEASURE IN DOING THINGS: 0

## 2020-02-21 NOTE — PROGRESS NOTES
mucosal edema, congestion or rhinorrhea. Right Sinus: No maxillary sinus tenderness or frontal sinus tenderness. Left Sinus: No maxillary sinus tenderness or frontal sinus tenderness. Mouth/Throat:      Mouth: Mucous membranes are not pale, not dry and not cyanotic. No oral lesions. Dentition: Normal dentition. Pharynx: Uvula midline. No oropharyngeal exudate or posterior oropharyngeal erythema. Eyes:      General:         Right eye: No discharge. Left eye: No discharge. Conjunctiva/sclera:      Right eye: Right conjunctiva is not injected. No exudate or hemorrhage. Left eye: Left conjunctiva is not injected. No exudate or hemorrhage. Pupils: Pupils are equal, round, and reactive to light. Cardiovascular:      Rate and Rhythm: Normal rate and regular rhythm. Heart sounds: Normal heart sounds, S1 normal and S2 normal. No murmur. No friction rub. No gallop. Pulmonary:      Effort: Pulmonary effort is normal. No respiratory distress. Breath sounds: Normal breath sounds. No decreased breath sounds, wheezing, rhonchi or rales. Abdominal:      General: Bowel sounds are normal. There is no distension. Palpations: Abdomen is soft. Abdomen is not rigid. There is no mass. Tenderness: There is no abdominal tenderness. There is no guarding or rebound. Musculoskeletal:         General: No swelling. Lymphadenopathy:      Head:      Right side of head: No submental, submandibular, tonsillar, preauricular, posterior auricular or occipital adenopathy. Left side of head: No submental, submandibular, tonsillar, preauricular, posterior auricular or occipital adenopathy. Cervical: No cervical adenopathy. Skin:     General: Skin is warm and dry. Findings: No erythema or rash. Neurological:      Mental Status: She is alert and oriented to person, place, and time. Sensory: No sensory deficit.       Coordination: Coordination normal. Psychiatric:         Mood and Affect: Mood normal.         Behavior: Behavior normal. Behavior is cooperative. Thought Content: Thought content normal.         Judgment: Judgment normal.         Vitals:    02/21/20 1028   BP: 106/66   Site: Right Upper Arm   Position: Sitting   Cuff Size: Large Adult   Pulse: 50   Resp: 18   SpO2: 99%   Weight: 223 lb (101.2 kg)   Height: 5' 7\" (1.702 m)     Body mass index is 34.93 kg/m². Wt Readings from Last 3 Encounters:   02/21/20 223 lb (101.2 kg)   02/13/20 225 lb (102.1 kg)   12/23/19 224 lb (101.6 kg)     BP Readings from Last 3 Encounters:   02/21/20 106/66   02/13/20 121/76   12/23/19 125/81        No results found for this visit on 02/21/20. Assessment     Diagnosis Orders   1. Essential hypertension     2. Asthma, unspecified asthma severity, unspecified whether complicated, unspecified whether persistent     3. Paroxysmal atrial fibrillation (HCC)     4. Hypothyroidism, unspecified type     5. Psychophysiological insomnia     6. Fibromyalgia           Plan  Hypertension-well-controlled-continue current medications  Asthma- well controlled-continue current meds  Paroxysmal A. fib-on Eliquis  Hypothyroid-labs from December reviewed and are normal  Recommend Flonase for dull right TM  Is on the-takes trazodone for sleep if she has trouble with sleep due to fibromyalgia-this is controlled with trazodone    Return in about 1 year (around 2/21/2021) for Hypertension. There are no Patient Instructions on file for this visit.

## 2020-04-16 ENCOUNTER — TELEPHONE (OUTPATIENT)
Dept: ENDOCRINOLOGY | Age: 65
End: 2020-04-16

## 2020-04-17 RX ORDER — METFORMIN HYDROCHLORIDE 500 MG/1
1000 TABLET, EXTENDED RELEASE ORAL
Qty: 180 TABLET | Refills: 0 | Status: SHIPPED | OUTPATIENT
Start: 2020-04-17 | End: 2020-07-20 | Stop reason: SDUPTHER

## 2020-04-17 RX ORDER — METFORMIN HYDROCHLORIDE 500 MG/1
1000 TABLET, EXTENDED RELEASE ORAL
Qty: 180 TABLET | Refills: 0 | Status: SHIPPED | OUTPATIENT
Start: 2020-04-17 | End: 2020-04-17

## 2020-04-30 RX ORDER — TRIAMTERENE AND HYDROCHLOROTHIAZIDE 37.5; 25 MG/1; MG/1
CAPSULE ORAL
Qty: 90 CAPSULE | Refills: 3 | OUTPATIENT
Start: 2020-04-30

## 2020-06-02 ENCOUNTER — TELEPHONE (OUTPATIENT)
Dept: FAMILY MEDICINE CLINIC | Age: 65
End: 2020-06-02

## 2020-06-08 RX ORDER — ALBUTEROL SULFATE 90 UG/1
AEROSOL, METERED RESPIRATORY (INHALATION)
Qty: 8.5 G | Refills: 2 | Status: SHIPPED | OUTPATIENT
Start: 2020-06-08 | End: 2021-03-17 | Stop reason: SDUPTHER

## 2020-06-22 ENCOUNTER — TELEPHONE (OUTPATIENT)
Dept: FAMILY MEDICINE CLINIC | Age: 65
End: 2020-06-22

## 2020-07-14 NOTE — TELEPHONE ENCOUNTER
Requested Prescriptions     Pending Prescriptions Disp Refills    hydrocortisone (CORTEF) 10 MG tablet 90 tablet 5     Sig: TAKE 2 TABLETS BY MOUTH EVERY DAY AT 8:30 AM AND 1 TABLET EVERY DAY AT 1 PM       Last OV 12/23/19  Next OV not scheduled  Cx Appt 6/29/20

## 2020-07-15 RX ORDER — HYDROCORTISONE 10 MG/1
TABLET ORAL
Qty: 90 TABLET | Refills: 0 | Status: SHIPPED | OUTPATIENT
Start: 2020-07-15 | End: 2020-08-24

## 2020-07-16 RX ORDER — METFORMIN HYDROCHLORIDE 500 MG/1
TABLET, EXTENDED RELEASE ORAL
Qty: 180 TABLET | Refills: 0 | OUTPATIENT
Start: 2020-07-16

## 2020-07-20 ENCOUNTER — VIRTUAL VISIT (OUTPATIENT)
Dept: ENDOCRINOLOGY | Age: 65
End: 2020-07-20

## 2020-07-20 PROCEDURE — 99442 PR PHYS/QHP TELEPHONE EVALUATION 11-20 MIN: CPT | Performed by: INTERNAL MEDICINE

## 2020-07-20 RX ORDER — LEVOTHYROXINE SODIUM 0.05 MG/1
TABLET ORAL
Qty: 390 TABLET | Refills: 1 | Status: SHIPPED | OUTPATIENT
Start: 2020-07-20 | End: 2021-09-29 | Stop reason: SDUPTHER

## 2020-07-20 RX ORDER — METFORMIN HYDROCHLORIDE 500 MG/1
1000 TABLET, EXTENDED RELEASE ORAL
Qty: 180 TABLET | Refills: 1 | Status: SHIPPED | OUTPATIENT
Start: 2020-07-20 | End: 2022-07-18

## 2020-07-20 NOTE — PATIENT INSTRUCTIONS
rapid-acting insulin to take before you eat. If you use an insulin pump, you get a constant rate of insulin during the day. So the pump must be programmed at meals to give you extra insulin to cover the rise in blood sugar after meals. When you know how much carbohydrate you will eat, you can take the right amount of insulin. Or, if you always use the same amount of insulin, you need to make sure that you eat the same amount of carbohydrate at meals. If you need more help to understand carbohydrate counting and food labels, ask your doctor, dietitian, or diabetes educator. How do you get started with meal planning? Here are some tips to get started:  · Plan your meals a week at a time. Don't forget to include snacks too. · Use cookbooks or online recipes to plan several main meals. Plan some quick meals for busy nights. You also can double some recipes that freeze well. Then you can save half for other busy nights when you don't have time to cook. · Make sure you have the ingredients you need for your recipes. If you're running low on basic items, put these items on your shopping list too. · List foods that you use to make breakfasts, lunches, and snacks. List plenty of fruits and vegetables. · Post this list on the refrigerator. Add to it as you think of more things you need. · Take the list to the store to do your weekly shopping. Follow-up care is a key part of your treatment and safety. Be sure to make and go to all appointments, and call your doctor if you are having problems. It's also a good idea to know your test results and keep a list of the medicines you take. Where can you learn more? Go to https://Tapletesther.[email or web address]. org and sign in to your [employer] account. Enter H180 in the ChangeAgain.Me box to learn more about \"Learning About Meal Planning for Diabetes. \"     If you do not have an account, please click on the \"Sign Up Now\" link.   Current as of: December 20, 2453               VQGBEKH Version: 12.5  © 4041-3814 Healthwise, Incorporated. Care instructions adapted under license by [facility] ([facility]). If you have questions about a medical condition or this instruction, always ask your healthcare professional. [address] any warranty or liability for your use of this information.

## 2020-07-20 NOTE — PROGRESS NOTES
Patient ID:   Alaina Villalobos is a 60 y.o. 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 Pursuant to the emergency declaration under the [facility], 1135 waiver authority and the [employer] Act this Telephone Visit was insisted, with patient's consent, to reduce the patient's risk of exposure to COVID-19 and provide continuity of care for an established patient. Services were provided through a synchronous discussion over a telephone to substitute for in-person clinic visit. Subjective:   Alaina Villalobos had pituitary adenoma status post transphenoidal resection 11/07, repeat TSR 2/17/15 for rt sellar recurrence. Pathology benign with Immunoperoxidase stains for ACTH, LH, FSH, TSH, prolactin and growth hormones is negative. Now s/p SRS in April 2015. MRI Oct 2017:   Postoperative changes are again identified, status post transsphenoidal surgery. A residual mass is again noted in the right sellar region, also involving the right cavernous sinus region, measuring 2.3 x 1.5 cm. Overall size and morphology of the mass is unchanged as compared to the prior study dated 10/14/2016. Infundibulum is again noted to be deviated towards the left side. Saw Dr. Shadi Nick from 2008 till 2016. Switched as insurance changed. Established Feb 2018       MRI brain Oct 2018: There appears to be evidence of prior transsphenoidal surgery.  There appears to be increasing asymmetric soft tissue within the right aspect of the sella extending into the right cavernous sinus.  Overall, this region measures approximately 12 x 16 x 12 mm (AP x TRANS x CC).  There is no extension into the suprasellar cistern.  There is leftward deviation of the infundibulum.  No compression on the optic chiasm. The tumor has shrunken. VFA more than a year ago. Off genotropin Sep 2016. Feeling tired. Levothyroxine 50mcg, one tab on weekdays and two on weekend, with water, fasting and waits for at least 30 minutes. A fib for > 2 years, controlled. Energy levels are not great. Weight stable. Some cold intolerance-stable, no compressive symptoms. Hydrocortisone 20mg in am and 10mg around 12pm-1pm. Missing afternoon dose twice a week. She did not tolerate lower dose in am.       On Cabergoline since Dec 2015: 0.25mg once a week. Dm2 since Dec 2017  Prediabetes for a long time     Metformin Er 500mg two tabs, three days a week and one 4 days a week    Check blood sugars 0 per day   AM:      Meals: 2, skips lunch. Dinner is bigger, No snacks. Exercise: None scheduled. L knee replacement is needed. Eliquis for A fib     MNG: US thyroid March 2015:   Multiple nodules again identified throughout the left lobe, with largest dominant nodules inferiorly  stable measuring 11 x 8 x 13 mm and 11 x 8 x 11 mm. There are other small nodules     FNA in March 2015 was benign for both left sided nodules. FNA previously on left side in 2004 was benign. The following portions of the patient's history were reviewed and updated as appropriate:      Family History   Problem Relation Age of Onset    Hypertension Mother     Arrhythmia Mother     Cancer Father         skin?     No Known Problems Sister     Diabetes Paternal Grandfather     No Known Problems Brother     No Known Problems Maternal Aunt     No Known Problems Maternal Uncle     No Known Problems Paternal Aunt     No Known Problems Paternal Uncle     No Known Problems Maternal Grandmother     No Known Problems Maternal Grandfather     No Known Problems Paternal Grandmother     No Known Problems Other     Anesth Problems Neg Hx     Broken Bones Neg Hx     Clotting Disorder Neg Hx     Collagen Disease Neg Hx     Dislocations Neg Hx     Osteoporosis Neg Hx     Rheumatologic Disease Neg Hx     Scoliosis Neg Hx     Severe Sprains Neg Hx          Social History     Socioeconomic History    Marital status:      Spouse name: Not on file    Number of children: 2    Years of education: Not on file    Highest education level: Not on file   Occupational History    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    Occupation:  of [employer] resource strain: Not on file    Food insecurity     Worry: Not on file     Inability: Not on file   [employer] needs     Medical: Not on file     Non-medical: Not on file   Tobacco Use    Smoking status: Never Smoker    Smokeless tobacco: Never Used   Substance and Sexual Activity    Alcohol use: No    Drug use: No    Sexual activity: Not on file   Lifestyle    Physical activity     Days per week: Not on file     Minutes per session: Not on file    Stress: Not on file   Relationships    Social connections     Talks on phone: Not on file     Gets together: Not on file     Attends Taoism service: Not on file     Active member of club or organization: Not on file     Attends meetings of clubs or organizations: Not on file     Relationship status: Not on file    Intimate partner violence     Fear of current or ex partner: Not on file     Emotionally abused: Not on file     Physically abused: Not on file     Forced sexual activity: Not on file   Other Topics Concern    Not on file   Social History Narrative    Not on file         Past Medical History:   Diagnosis Date    Adrenal insufficiency ([facility]     Arthritis     Asthma     Atrial fibrillation ([facility]     Brain tumor ([facility]     chiari - malformation    Chiari malformation 3/11    Fibromyalgia     History of TIA (transient ischemic attack) 2006    Panhypopituitarism ([facility] 8/26/2014    S/P selective transsphenoidal pituitary adenomectomy 2007    Thyroid disease     Total knee replacement status 8/14    right    Unspecified cerebral artery occlusion with cerebral infarction       TIA 2006         Past Surgical History:   Procedure Laterality Date    BICEPS TENODESIS Left 11/10/2016    Dr Edwin Jensen  3/24/11    chiari 1 repair/ decompression and C1 laminectomy    BRAIN SURGERY  11/07    adenoma removal, pituitary    CATARACT REMOVAL Bilateral 04/2017    HYSTERECTOMY      KNEE ARTHROPLASTY Right 08/25/2014    right tkr    LAMINECTOMY  C1    chiari    PITUITARY SURGERY  2/15    transphenoidal         Allergies   Allergen Reactions    Levofloxacin      Out of touch w/ world         Current Outpatient Medications:     metFORMIN (GLUCOPHAGE-XR) 500 MG extended release tablet, Take 2 tablets by mouth daily (with breakfast), Disp: 180 tablet, Rfl: 1    levothyroxine (SYNTHROID) 50 MCG tablet, One tab five days a week and two on Saturday/Sunday, Disp: 390 tablet, Rfl: 1    hydrocortisone (CORTEF) 10 MG tablet, TAKE 2 TABLETS BY MOUTH EVERY DAY AT 8:30 AM AND 1 TABLET EVERY DAY AT 1 PM (Patient taking differently: TAKE 2 TABLETS BY MOUTH EVERY DAY AT 8:30 AM AND 1 TABLET EVERY DAY AT 12:15 PM), Disp: 90 tablet, Rfl: 0    albuterol sulfate  (90 Base) MCG/ACT inhaler, INHALE 2 PUFFS INTO THE LUNGS EVERY 6 HOURS AS NEEDED FOR WHEEZING, Disp: 8.5 g, Rfl: 2    triamterene-hydroCHLOROthiazide (DYAZIDE) 37.5-25 MG per capsule, TAKE 1 CAPSULE BY MOUTH EVERY DAY, Disp: 90 capsule, Rfl: 3    traZODone (DESYREL) 150 MG tablet, TAKE 2 TABLETS BY MOUTH EVERY NIGHT AS NEEDED, Disp: 180 tablet, Rfl: 3    fluticasone-salmeterol (WIXELA INHUB) 100-50 MCG/DOSE diskus inhaler, INHALE 1 PUFF INTO THE LUNGS EVERY 12 HOURS, Disp: 60 each, Rfl: 11    apixaban (ELIQUIS) 5 MG TABS tablet, TAKE 1 TABLET BY MOUTH TWICE DAILY, Disp: 60 tablet, Rfl: 11    cabergoline (DOSTINEX) 0.5 MG tablet, Take 0.5 tablets by mouth once a week, Disp: 8 tablet, Rfl: 3    Cyanocobalamin (VITAMIN B-12 PO), Take by mouth, Disp: , Rfl:     ascorbic acid (VITAMIN C) 500 MG tablet, Take 500 mg by mouth daily, Disp: , Rfl:     Cholecalciferol (VITAMIN D3) 1000 UNITS TABS, Take by mouth, Disp: , Rfl:     Review of Systems:    Constitutional: Negative for fever, chills, and unexpected weight change. HENT: Negative for congestion, ear pain, rhinorrhea,  sore throat and trouble swallowing. Eyes: Negative for photophobia, redness, itching. Respiratory: Negative for cough, shortness of breath and sputum. Cardiovascular: Negative for chest pain, palpitations and leg swelling. Gastrointestinal: Negative for nausea, vomiting, abdominal pain, diarrhea, constipation. Endocrine: Negative for cold intolerance, heat intolerance, polydipsia, polyphagia and polyuria. Genitourinary: Negative for dysuria, urgency, frequency, hematuria and flank pain. Musculoskeletal: Negative for myalgias, back pain, arthralgias and neck pain. Skin/Nail: Negative for rash, itching. Normal nails. Neurological: Negative for seizures, weakness, light-headedness, numbness and headaches. Hematological/ Lymph nodes: Negative for adenopathy. Does not bruise/bleed easily. Psychiatric/Behavioral: Negative for suicidal ideas, depression, anxiety, sleep disturbance and decreased concentration. Objective:   Physical Exam:    There were no vitals taken for this visit.       Lab Review:      Orders Only on 12/19/2019   Component Date Value Ref Range Status    Microalbumin, Random Urine 12/19/2019 <1.20  <2.0 mg/dL Final    Creatinine, Ur 12/19/2019 106.1  28.0 - 259.0 mg/dL Final    Microalbumin Creatinine Ratio 12/19/2019 see below  0.0 - 30.0 mg/g Final    Hemoglobin A1C 12/19/2019 5.8  See comment % Final    eAG 12/19/2019 119.8  mg/dL Final    T4 Free 12/19/2019 1.4  0.9 - 1.8 ng/dL Final    T3, Free 12/19/2019 2.6  2.3 - 4.2 pg/mL Final    Sodium 12/19/2019 141  136 - 145 mmol/L Final    Potassium 12/19/2019 3.9  3.5 - 5.1 mmol/L Final    Chloride 12/19/2019 99  99 - 110 mmol/L Final  CO2 12/19/2019 22  21 - 32 mmol/L Final    Anion Gap 12/19/2019 20* 3 - 16 Final    Glucose 12/19/2019 108* 70 - 99 mg/dL Final    BUN 12/19/2019 22* 7 - 20 mg/dL Final    CREATININE 12/19/2019 1.2  0.6 - 1.2 mg/dL Final    GFR Non- 12/19/2019 46* >60 Final    GFR  12/19/2019 55* >60 Final    Calcium 12/19/2019 9.8  8.3 - 10.6 mg/dL Final    Total Protein 12/19/2019 6.4  6.4 - 8.2 g/dL Final    Alb 12/19/2019 4.3  3.4 - 5.0 g/dL Final    Albumin/Globulin Ratio 12/19/2019 2.0  1.1 - 2.2 Final    Total Bilirubin 12/19/2019 0.4  0.0 - 1.0 mg/dL Final    Alkaline Phosphatase 12/19/2019 82  40 - 129 U/L Final    ALT 12/19/2019 12  10 - 40 U/L Final    AST 12/19/2019 13* 15 - 37 U/L Final    Globulin 12/19/2019 2.1  g/dL Final    Prolactin 12/19/2019 0.9  ng/mL Final           Assessment and [address] was seen today for follow-up. Diagnoses and all orders for this visit:    Controlled type 2 diabetes mellitus without complication, without long-term current use of insulin (HCC)  -     Hemoglobin A1C; Future  -     Comprehensive Metabolic Panel; Future  -     Lipid, Fasting; Future  -     metFORMIN (GLUCOPHAGE-XR) 500 MG extended release tablet; Take 2 tablets by mouth daily (with breakfast)    Panhypopituitarism (HCC)  -     Prolactin; Future  -     levothyroxine (SYNTHROID) 50 MCG tablet; One tab five days a week and two on Saturday/Sunday    Adrenal insufficiency  -     T4, Free; Future  -     T3, Free; Future    Pituitary tumor  -     Prolactin; Future    Multinodular goiter    Essential hypertension    Hypercholesteremia  -     Lipid, Fasting; Future    Hypothyroidism, unspecified type  -     T4, Free; Future  -     T3, Free; Future    S/P selective transsphenoidal pituitary adenomectomy          1: Recurrent pituitary tumor with panhypopituitarism with out DI   IGF normal May 2019    MRI Jan 2020:  Improvement in size of the pituitary adenoma  Postsurgical changes at pituitary include small pituitary centrally and leftward and 12 x    8mm soft tissue structure rightward favored to reflect residual pituitary adenoma; encasing;    not occluding right cavernous carotid artery     She has an appointment scheduled for VFA     Prolactin 0.9 Dec 2019   C/w Cabergoline 0.25mg once a week  Will continue Cabergoline as it is probably helping in shrinking     If MRI is stable then will keep cabergoline until she is 74     Next MRI in Jan 2021     2: Adrenal insufficiency   C/w hydrocortisone replacement, on it for along time     C/w Hydrocortisone 20mg 8:30am and 10mg at 1pm, definitely before 4pm     Adrenal insufficiency education. Reviewed adrenal insufficiency precautions. If patient has a fever or significant illness they are to triple their dose of steroids for three days. If illness has resolved, they can resume their previous dose. If not better, they are to call. If they have an illness where they are unable to keep down oral intake, call EMS. They should also get a medic Alert bracelet that states 'Adrenal Insufficiency. 3: Hypothyroidism   Free T4 is normal, Free T3 is normal now Dec 2019     Will keep free thyroid levels in mid to low normal range      C/w LT4 50mcg one tab on weekdays and two on weekend     Education: Reviewed how to properly take thyroid replacement. Instructed to take daily with water on an empty stomach without concomitant vitamins or calcium or other medicine. Wait for 30-45 minutes before eating. If patient is confident they missed a day of pills, they can take the missed dose with their next tablet. 4: MNG   US thyroid Oct 2018: Multiple nodule on left biggest is 11 x 7 x 7 mm.    Nodules are stable or smaller from last scan      She has high copays   Repeat Us in fall 2021     5: Type 2 DM controlled uncomplicated   R5G 0.8% < 6% < 6.2% < 6.0      No need to check blood sugars at this time, as she has no coverage     C/w Metformin ER 500mg, two tabs daily with breakfast     Eye exam: Needs eye exam.   Foot exam: June 2019   Monofilament normal   Vibrations with tuning fork: normal   Left big toe ingrow nail: Needs podiatrist chandana     Renal screen: Dec 2019      Check blood sugars 1-2 per day or anytime feeling dizzy, tired. 6: Hyperlipidemia  LDL: 86, HDL: 66, Tgs 120 Oct 2018   Discussed recommendations of AHA and ADA -- needs statins     Atorvastatin was prescribed, she did not start yet     The 10-year ASCVD risk score (Barrington Ng, et al., 2013) is: 5.9%    Values used to calculate the score:      Age: 60 years      Sex: Female      Is Non- : No      Diabetic: Yes      Tobacco smoker: No      Systolic Blood Pressure: 166 mmHg      Is BP treated: Yes      HDL Cholesterol: 66 mg/dL      Total Cholesterol: 176 mg/dL    10 year risk is high   Statins are indicated     Atorva was started in Dec 2019   She does not want to take it     7: HTN and acute on Ch kidney disease   Stable     Prolactin, A1C, Ft4, Ft3, CMP, lipids, CMP now     RTC in 6 months   OR via telemedicine depending on COVID-19 restrictions at the time of their next appointment. A total of 15 minutes was spent conversing with the patient and over half of that time was spent counseling the patient on endocrine related medical issues.        Electronically signed by Loida Lopez MD on 7/20/2020 at 9:54 AM

## 2020-09-16 ENCOUNTER — OFFICE VISIT (OUTPATIENT)
Dept: FAMILY MEDICINE CLINIC | Age: 65
End: 2020-09-16

## 2020-09-16 VITALS
OXYGEN SATURATION: 98 % | RESPIRATION RATE: 18 BRPM | SYSTOLIC BLOOD PRESSURE: 118 MMHG | DIASTOLIC BLOOD PRESSURE: 74 MMHG | HEART RATE: 70 BPM | HEIGHT: 67 IN | WEIGHT: 228 LBS | BODY MASS INDEX: 35.79 KG/M2

## 2020-09-16 PROCEDURE — 99214 OFFICE O/P EST MOD 30 MIN: CPT | Performed by: NURSE PRACTITIONER

## 2020-09-16 ASSESSMENT — ENCOUNTER SYMPTOMS
WHEEZING: 0
ABDOMINAL PAIN: 0
EYE PAIN: 0
EYE DISCHARGE: 0
SORE THROAT: 0
NAUSEA: 0
ABDOMINAL DISTENTION: 0
SINUS PRESSURE: 0
EYE REDNESS: 0
COUGH: 0
DIARRHEA: 0
SINUS PAIN: 0
SHORTNESS OF BREATH: 0
VOMITING: 0

## 2020-09-16 NOTE — PROGRESS NOTES
2020     Jessica Watts (:  1955) is a 60 y.o. female, here for evaluation of the following medical concerns:    HPI  Elizabeth Ramires presents today with chronic fatigue. States it has been going on for years. History of brain tumors with panhypopituitarism. She was being followed by an endocrinologist, but she is in the process of changing to a new endocrinologist at this time. Soonest she could be seen is January. She has not had lab work since 2019. She also knows that she has a thyroid nodule which she wants checked. She has had fatigue for years and currently manages it well in her opinion. Denies any chest pain, shortness of breath, cold or heat intolerance, activity intolerance, dizziness, or light headedness. She also presents to discuss fibromyalgia. States that it predominantly occurs in her left arm. She typically does not treat, but when it is worsened she uses CBD oil. She states this helps significantly. Review of Systems   Constitutional: Positive for fatigue. Negative for chills and fever. HENT: Negative for ear discharge, ear pain, hearing loss, sinus pressure, sinus pain and sore throat. Eyes: Negative for pain, discharge and redness. Respiratory: Negative for cough, shortness of breath and wheezing. Cardiovascular: Negative for chest pain, palpitations and leg swelling. Gastrointestinal: Negative for abdominal distention, abdominal pain, diarrhea, nausea and vomiting. Endocrine: Negative for cold intolerance and heat intolerance. Genitourinary: Negative for dysuria and hematuria. Musculoskeletal: Positive for myalgias. Skin: Negative for rash. Neurological: Negative for weakness, numbness and headaches. Prior to Visit Medications    Medication Sig Taking?  Authorizing Provider   hydrocortisone (CORTEF) 10 MG tablet TAKE 2 TABLETS BY MOUTH EVERY DAY AT 8:30 AM AND 1 TABLET EVERY DAY AT 12:15 PM Yes Zainab Rendon MD   metFORMIN normal.      Left Ear: Tympanic membrane, ear canal and external ear normal.      Mouth/Throat:      Mouth: Mucous membranes are moist.   Eyes:      Extraocular Movements: Extraocular movements intact. Conjunctiva/sclera: Conjunctivae normal.      Pupils: Pupils are equal, round, and reactive to light. Neck:      Musculoskeletal: Normal range of motion and neck supple. Thyroid: Thyromegaly and thyroid tenderness present. Cardiovascular:      Rate and Rhythm: Normal rate and regular rhythm. Pulmonary:      Effort: Pulmonary effort is normal.      Breath sounds: Normal breath sounds. Abdominal:      General: Bowel sounds are normal.      Palpations: Abdomen is soft. Tenderness: There is no abdominal tenderness. There is no guarding or rebound. Musculoskeletal: Normal range of motion. Lymphadenopathy:      Cervical: No cervical adenopathy. Skin:     General: Skin is warm and dry. Capillary Refill: Capillary refill takes less than 2 seconds. Neurological:      Mental Status: She is alert and oriented to person, place, and time. Psychiatric:         Mood and Affect: Mood normal.         Behavior: Behavior normal.         ASSESSMENT/PLAN:  1. Chronic fatigue  - Likely endocrine in cause. Repeat lab work given it has been since December. Needs to be managed by endocrinology given complicated case. Can manage until January unless labs dictate otherwise. 2. Panhypopituitarism ([facility] [address]  - Check thyroid and cortisol  - Cortisol AM, Total; Future    3. Fibromyalgia  - Encouraged to continue CBD oil and use more frequently if necessary since it helps. Educated that this can cause false positive for THC on Urine Drug Tests    4. Hypothyroidism, unspecified type  - Continue current medication at this time. Repeat labs. - TSH without Reflex; Future  - T3, Free; Future  - T4, Free; Future    5. IFG (impaired fasting glucose)  - Hemoglobin A1C; Future    6.  Thyroid nodule  - Last ultrasound was in 2018 and recommended follow-up in 1 year. Repeat US, especially given patient's concern  - US HEAD NECK SOFT TISSUE THYROID; Future    7. Essential hypertension  - COMPREHENSIVE METABOLIC PANEL; Future    8. Screening for hyperlipidemia   - Lipid, Fasting; Future    9. Vaccination refused by patient  - Discussed Health Maintenance. Patient refused all vaccinations and screenings. Return in about 1 year (around 9/16/2021) for Annual Physical.    An electronic signature was used to authenticate this note.     --JERMAINE Torres - CNP on 9/16/2020 at 2:09 PM

## 2020-09-23 ENCOUNTER — HOSPITAL ENCOUNTER (OUTPATIENT)
Dept: ULTRASOUND IMAGING | Age: 65
Discharge: HOME | End: 2020-09-23

## 2020-09-23 DIAGNOSIS — E03.9 HYPOTHYROIDISM, UNSPECIFIED TYPE: Chronic | ICD-10-CM

## 2020-09-23 DIAGNOSIS — I10 ESSENTIAL HYPERTENSION: Chronic | ICD-10-CM

## 2020-09-23 DIAGNOSIS — Z13.220 SCREENING FOR HYPERLIPIDEMIA: ICD-10-CM

## 2020-09-23 DIAGNOSIS — R73.01 IFG (IMPAIRED FASTING GLUCOSE): ICD-10-CM

## 2020-09-23 DIAGNOSIS — E23.0 PANHYPOPITUITARISM (HCC): ICD-10-CM

## 2020-09-23 LAB
A/G RATIO: 2.3 (ref 1.1–2.2)
ALBUMIN SERPL-MCNC: 4.1 G/DL (ref 3.4–5)
ALP BLD-CCNC: 97 U/L (ref 40–129)
ALT SERPL-CCNC: 11 U/L (ref 10–40)
ANION GAP SERPL CALCULATED.3IONS-SCNC: 14 MMOL/L (ref 3–16)
AST SERPL-CCNC: 13 U/L (ref 15–37)
BILIRUB SERPL-MCNC: 0.5 MG/DL (ref 0–1)
BUN BLDV-MCNC: 22 MG/DL (ref 7–20)
CALCIUM SERPL-MCNC: 9.6 MG/DL (ref 8.3–10.6)
CHLORIDE BLD-SCNC: 99 MMOL/L (ref 99–110)
CHOLESTEROL, FASTING: 195 MG/DL (ref 0–199)
CO2: 25 MMOL/L (ref 21–32)
CORTISOL - AM: 12.1 UG/DL (ref 4.3–22.4)
CREAT SERPL-MCNC: 1.4 MG/DL (ref 0.6–1.2)
ESTIMATED AVERAGE GLUCOSE: 128.4 MG/DL
GFR AFRICAN AMERICAN: 46
GFR NON-AFRICAN AMERICAN: 38
GLOBULIN: 1.8 G/DL
GLUCOSE BLD-MCNC: 96 MG/DL (ref 70–99)
HBA1C MFR BLD: 6.1 %
HDLC SERPL-MCNC: 63 MG/DL (ref 40–60)
LDL CHOLESTEROL CALCULATED: 97 MG/DL
POTASSIUM SERPL-SCNC: 3.5 MMOL/L (ref 3.5–5.1)
SODIUM BLD-SCNC: 138 MMOL/L (ref 136–145)
T3 FREE: 2.5 PG/ML (ref 2.3–4.2)
T4 FREE: 1.5 NG/DL (ref 0.9–1.8)
TOTAL PROTEIN: 5.9 G/DL (ref 6.4–8.2)
TRIGLYCERIDE, FASTING: 174 MG/DL (ref 0–150)
TSH SERPL DL<=0.05 MIU/L-ACNC: 1.91 UIU/ML (ref 0.27–4.2)
VLDLC SERPL CALC-MCNC: 35 MG/DL

## 2020-09-23 PROCEDURE — 76536 US EXAM OF HEAD AND NECK: CPT

## 2020-10-19 LAB
CATARACTS: NEGATIVE
DIABETIC RETINOPATHY: NEGATIVE
GLAUCOMA: NEGATIVE
INTRAOCULAR PRESSURE EYE: NORMAL
VISUAL ACUITY DISTANCE LEFT EYE: NORMAL
VISUAL ACUITY DISTANCE RIGHT EYE: NORMAL

## 2020-10-21 ENCOUNTER — PATIENT MESSAGE (OUTPATIENT)
Dept: FAMILY MEDICINE CLINIC | Age: 65
End: 2020-10-21

## 2020-12-09 ENCOUNTER — OFFICE VISIT (OUTPATIENT)
Dept: PRIMARY CARE CLINIC | Age: 65
End: 2020-12-09

## 2020-12-09 PROCEDURE — 99211 OFF/OP EST MAY X REQ PHY/QHP: CPT | Performed by: NURSE PRACTITIONER

## 2020-12-09 NOTE — PROGRESS NOTES
Misty Womack received a viral test for COVID-19. They were educated on isolation and quarantine as appropriate. For any symptoms, they were directed to seek care from their PCP, given contact information to establish with a doctor, directed to an urgent care or the emergency room.

## 2020-12-11 LAB — SARS-COV-2, NAA: ABNORMAL

## 2021-03-17 RX ORDER — ALBUTEROL SULFATE 90 UG/1
AEROSOL, METERED RESPIRATORY (INHALATION)
Qty: 8.5 G | Refills: 2 | Status: SHIPPED | OUTPATIENT
Start: 2021-03-17 | End: 2021-06-04

## 2021-03-17 RX ORDER — TRIAMTERENE AND HYDROCHLOROTHIAZIDE 37.5; 25 MG/1; MG/1
CAPSULE ORAL
Qty: 90 CAPSULE | Refills: 1 | Status: SHIPPED | OUTPATIENT
Start: 2021-03-17 | End: 2021-09-13

## 2021-03-18 RX ORDER — TRAZODONE HYDROCHLORIDE 150 MG/1
TABLET ORAL
Qty: 180 TABLET | Refills: 1 | Status: SHIPPED | OUTPATIENT
Start: 2021-03-18 | End: 2021-09-13

## 2021-06-04 RX ORDER — ALBUTEROL SULFATE 90 UG/1
AEROSOL, METERED RESPIRATORY (INHALATION)
Qty: 8.5 G | Refills: 2 | Status: SHIPPED | OUTPATIENT
Start: 2021-06-04 | End: 2021-08-23

## 2021-06-16 ENCOUNTER — TELEPHONE (OUTPATIENT)
Dept: ORTHOPEDIC SURGERY | Age: 66
End: 2021-06-16

## 2021-06-16 ENCOUNTER — OFFICE VISIT (OUTPATIENT)
Dept: ORTHOPEDIC SURGERY | Age: 66
End: 2021-06-16

## 2021-06-16 VITALS
HEART RATE: 85 BPM | SYSTOLIC BLOOD PRESSURE: 114 MMHG | BODY MASS INDEX: 34.06 KG/M2 | DIASTOLIC BLOOD PRESSURE: 76 MMHG | HEIGHT: 67 IN | WEIGHT: 217 LBS

## 2021-06-16 DIAGNOSIS — M76.61 TENDONITIS, ACHILLES, RIGHT: ICD-10-CM

## 2021-06-16 DIAGNOSIS — M79.671 FOOT PAIN, RIGHT: Primary | ICD-10-CM

## 2021-06-16 PROCEDURE — L4361 PNEUMA/VAC WALK BOOT PRE OTS: HCPCS | Performed by: PHYSICIAN ASSISTANT

## 2021-06-16 PROCEDURE — 99214 OFFICE O/P EST MOD 30 MIN: CPT | Performed by: PHYSICIAN ASSISTANT

## 2021-06-16 NOTE — PROGRESS NOTES
Subjective:      Patient ID: Chanel Foss is a 63 y.o. female. Chief Complaint   Patient presents with    Foot Pain     Rt. HPI:   She is here for an initial evaluation of a new problem. In her right posterior ankle, Achilles tendon. She states she does routine Pilates exercises. The most recent Pilates course was on 6/10/2021. Approximately 2 days later while sitting in a meeting she developed pain in her distal Achilles tendon region. She has developed swelling. She is having difficulty ambulating due to pain. Pain Scale 7/10 VAS. Location of pain near the insertion of the Achilles into the calcaneus. .  Pain is worse with ambulation. Pain improves with elevation. Previous treatments have included Tylenol with minimal relief. She has a history of prior right Achilles tendinitis several years ago. This responded well to physical therapy. She does have a history of a prior bone spur removed from the calcaneus years ago. Review Of Systems:   A 14 point review of systems and history form completed by the patient has been reviewed. This scanned in the media tab of the patient's chart under today's date. As outlined in the HPI. Negative for fever or chills. Negative for poly-joint pain, swelling and stiffness. Negative for numbness or tingling.      Past Medical History:   Diagnosis Date    Adrenal insufficiency (HCC)     Arthritis     Asthma     Atrial fibrillation (HCC)     Brain tumor ([address]     chiari - malformation    Chiari malformation 3/11    Fibromyalgia     History of TIA (transient ischemic attack) 2006    Panhypopituitarism ([address] 8/26/2014    S/P selective transsphenoidal pituitary adenomectomy 2007    Thyroid disease     Total knee replacement status 8/14    right    Unspecified cerebral artery occlusion with cerebral infarction       TIA 2006       Family History   Problem Relation Age of Onset    Hypertension Mother     Arrhythmia Mother    Yudith Curranell Cancer Father         skin?     No Known Problems Sister     Diabetes Paternal Grandfather     No Known Problems Brother     No Known Problems Maternal Aunt     No Known Problems Maternal Uncle     No Known Problems Paternal Aunt     No Known Problems Paternal Uncle     No Known Problems Maternal Grandmother     No Known Problems Maternal Grandfather     No Known Problems Paternal Grandmother     No Known Problems Other     Anesth Problems Neg Hx     Broken Bones Neg Hx     Clotting Disorder Neg Hx     Collagen Disease Neg Hx     Dislocations Neg Hx     Osteoporosis Neg Hx     Rheumatologic Disease Neg Hx     Scoliosis Neg Hx     Severe Sprains Neg Hx        Past Surgical History:   Procedure Laterality Date    BICEPS TENODESIS Left 11/10/2016    Dr Leonard Sicard  3/24/11    chiari 1 repair/ decompression and C1 laminectomy    BRAIN SURGERY  11/07    adenoma removal, pituitary    CATARACT REMOVAL Bilateral 04/2017    HYSTERECTOMY      KNEE ARTHROPLASTY Right 08/25/2014    right tkr    LAMINECTOMY  C1    chiari    PITUITARY SURGERY  2/15    transphenoidal       Social History     Occupational History    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    Occupation:  of Non-Profit   Tobacco Use    Smoking status: Never Smoker    Smokeless tobacco: Never Used   Vaping Use    Vaping Use: Never used   Substance and Sexual Activity    Alcohol use: No    Drug use: No    Sexual activity: Not on file       Current Outpatient Medications   Medication Sig Dispense Refill    albuterol sulfate  (90 Base) MCG/ACT inhaler INHALE 2 PUFFS INTO THE LUNGS EVERY 6 HOURS AS NEEDED FOR WHEEZING 8.5 g 2    fluticasone-salmeterol (WIXELA INHUB) 100-50 MCG/DOSE diskus inhaler INHALE 1 PUFF INTO THE LUNGS EVERY 12 HOURS 60 each 0    apixaban (ELIQUIS) 5 MG TABS tablet TAKE 1 TABLET BY MOUTH TWICE DAILY 60 tablet 4    traZODone (DESYREL) 150 MG tablet TAKE 2 TABLETS BY MOUTH EVERY NIGHT AS NEEDED 180 tablet 1    triamterene-hydroCHLOROthiazide (DYAZIDE) 37.5-25 MG per capsule TAKE 1 CAPSULE BY MOUTH EVERY DAY 90 capsule 1    hydrocortisone (CORTEF) 10 MG tablet TAKE 2 TABLETS BY MOUTH EVERY DAY AT 8:30 AM AND 1 TABLET EVERY DAY AT 12:15 PM 90 tablet 5    metFORMIN (GLUCOPHAGE-XR) 500 MG extended release tablet Take 2 tablets by mouth daily (with breakfast) 180 tablet 1    levothyroxine (SYNTHROID) 50 MCG tablet One tab five days a week and two on Saturday/Sunday 390 tablet 1    cabergoline (DOSTINEX) 0.5 MG tablet Take 0.5 tablets by mouth once a week 8 tablet 3    Cyanocobalamin (VITAMIN B-12 PO) Take by mouth      Cholecalciferol (VITAMIN D3) 1000 UNITS TABS Take by mouth       No current facility-administered medications for this visit. Objective:     She is alert, oriented x 3, pleasant, well nourished, developed and in no   acute distress. /76   Pulse 85   Ht 5' 7\" (1.702 m)   Wt 217 lb (98.4 kg)   BMI 33.99 kg/m²          Examination of the right ankle demonstrates: There is moderate swelling of the posterior lateral ankle. There is not a palpable defect within the achilles tendon. Avila's test is Negative. There is no tenderness and swelling within the Gastrocnemius. There is not a joint effusion. There is not tenderness of the lateral malleolus. There is not tenderness of the medial malleolus. There is not tenderness of the fifth metatarsal.  There is not tenderness over the ATFL. There is not tenderness over the proximal fibula. There is not tenderness of the calcaneous. There is not laxity with anterior drawer testing. There is not laxity with Inversion testing. There is not laxity with Eversion testing. Examination of the lower extremities are intact with sensation to light touch. Motor testing  5/5 in all major motor groups of the lower extremities. Negative Leon's Sign. SLR negative. Examination of the lower extremities shows intact perfusion to all extremities. No cyanosis. Digits are warm to touch, capillary refill is less than 2 seconds. There is mild edema noted. Examination of the skin over both lower extremities reveals: The skin to be intact without lacerations or abrasions. No significant erythema. No significant rashes or skin lesions. X Rays: performed in the office today:   AP, lateral and oblique x-ray of the right foot:  Calcific changes noted in the Achilles tendon region. These calcific changes are unchanged compared to x-rays of the ankle 2018. Advanced arthritis of the first MTP joint. Additional Tests reviewed: None. Additional Outside Records reviewed: None. Diagnosis:       ICD-10-CM    1. Foot pain, right  M79.671 XR FOOT RIGHT (MIN 3 VIEWS)   2. Tendonitis, Achilles, right  M76.61 Ambulatory referral to Physical Therapy     Breg Tall Jennifer Walking Boot        Assessment and Plan:       Assessment:  Right foot/ankle pain in the region of the Achilles insertion into the calcaneus. This is felt to be related to Achilles tendinitis. She does not have a palpable defect to suggest rupture. She denies any significant traumatic event. She does have a history of the same issue 3 years ago and responded well to physical therapy. Plan:  Medications-she cannot take NSAIDs. She is currently on a maintenance dose of steroids hydrocortisone 10 mg tablets. PT-recommended PT for stretching and modalities. Further Imaging-discussed possible need for MRI if symptoms fail to improve over the next 4 weeks. Procedures-she will be immobilized in a tall boot to assist with ambulation. Procedures    Breg Tall Jennifer Walking Boot     [facility]. The right ankle will require stabilization / immobilization from this semi-rigid / rigid orthosis to improve their function.   The orthosis will assist in

## 2021-07-09 LAB
ALBUMIN SERPL-MCNC: 3.7 G/DL
ALP BLD-CCNC: NORMAL U/L
ALT SERPL-CCNC: 12 U/L
ANION GAP SERPL CALCULATED.3IONS-SCNC: 8 MMOL/L
AST SERPL-CCNC: 11 U/L
AVERAGE GLUCOSE: NORMAL
BILIRUB SERPL-MCNC: 0.6 MG/DL (ref 0.1–1.4)
BUN BLDV-MCNC: 16 MG/DL
CALCIUM SERPL-MCNC: 9.2 MG/DL
CHLORIDE BLD-SCNC: 106 MMOL/L
CO2: NORMAL
CREAT SERPL-MCNC: 1.35 MG/DL
GFR CALCULATED: 40
GLUCOSE BLD-MCNC: 104 MG/DL
HBA1C MFR BLD: 5.9 %
POTASSIUM SERPL-SCNC: 3.6 MMOL/L
SODIUM BLD-SCNC: 141 MMOL/L
T4 TOTAL: 1.1
TOTAL PROTEIN: NORMAL
TSH SERPL DL<=0.05 MIU/L-ACNC: 1.37 UIU/ML
VITAMIN B-12: 736

## 2021-07-13 NOTE — TELEPHONE ENCOUNTER
LAST APPT 9/16/20  NO FOLLOW UP SCHEDULED    PER CARISSA HERNANDEZ, PATIENT IS TO FOLLOW UP IN 1 YEAR. SENT "[employer] MESSAGE FOR PATIENT ASKING HER TO SCHEDULE.  SC

## 2021-08-13 NOTE — TELEPHONE ENCOUNTER
Last visit 09/16/2020. Next visit none    Pt is due for an appt in September    Called and spoke with pt.  She was driving and stated that she will call back to schedule

## 2021-08-23 RX ORDER — ALBUTEROL SULFATE 90 UG/1
AEROSOL, METERED RESPIRATORY (INHALATION)
Qty: 8.5 G | Refills: 1 | Status: SHIPPED | OUTPATIENT
Start: 2021-08-23 | End: 2021-09-14

## 2021-08-23 NOTE — TELEPHONE ENCOUNTER
LAST OV WITH TMR 9/16/2020   Return in about 1 year (around 9/16/2021) for Annual Physical.  Future Appointments   Date Time Provider Isabelle Choi   9/29/2021 11:10 AM JERMAINE Chawla - GERALDO Yarbrough

## 2021-09-13 ENCOUNTER — TELEPHONE (OUTPATIENT)
Dept: FAMILY MEDICINE CLINIC | Age: 66
End: 2021-09-13

## 2021-09-13 ENCOUNTER — NURSE TRIAGE (OUTPATIENT)
Dept: OTHER | Facility: CLINIC | Age: 66
End: 2021-09-13

## 2021-09-13 DIAGNOSIS — E23.0 PANHYPOPITUITARISM (HCC): ICD-10-CM

## 2021-09-13 DIAGNOSIS — M54.2 NECK PAIN: Primary | ICD-10-CM

## 2021-09-13 RX ORDER — CYCLOBENZAPRINE HCL 5 MG
5-10 TABLET ORAL 3 TIMES DAILY PRN
Qty: 30 TABLET | Refills: 0 | Status: SHIPPED | OUTPATIENT
Start: 2021-09-13 | End: 2021-09-23

## 2021-09-13 RX ORDER — LEVOTHYROXINE SODIUM 0.05 MG/1
TABLET ORAL
Qty: 390 TABLET | Refills: 1 | OUTPATIENT
Start: 2021-09-13

## 2021-09-13 NOTE — TELEPHONE ENCOUNTER
Sent flexeril to pharmacy for muscle pain/spasms. Pt needs to be evaluated further. Xray of cervical spine ordered. Recommend neurology evaluation and a visit either in person or virtually with Dr Meghann Gupta tomorrow. The vision changes are concerning.

## 2021-09-13 NOTE — TELEPHONE ENCOUNTER
Reason for Disposition   Stiff neck (can't touch chin to chest)    Answer Assessment - Initial Assessment Questions  1. LOCATION: \"Where does it hurt? \"       Hurts in the back area of her head (4/10)    2. ONSET: \"When did the headache start? \" (Minutes, hours or days)       Onset was 9 days ago    3. PATTERN: \"Does the pain come and go, or has it been constant since it started? \"      Constant pain that gets worse with trying to breathe - pain is radiating to left shoulder and she feels she cannot get a good breath    4. SEVERITY: \"How bad is the pain? \" and \"What does it keep you from doing? \"  (e.g., Scale 1-10; mild, moderate, or severe)    - MILD (1-3): doesn't interfere with normal activities     - MODERATE (4-7): interferes with normal activities or awakens from sleep     - SEVERE (8-10): excruciating pain, unable to do any normal activities         Patient will not rate pain - she it irritated - no relief from any of the OTC meds she is taking    5. RECURRENT SYMPTOM: \"Have you ever had headaches before? \" If so, ask: \"When was the last time? \" and \"What happened that time? \"       Yes she had these before    6. CAUSE: \"What do you think is causing the headache? \"      She thinks it is her malformation    7. MIGRAINE: \"Have you been diagnosed with migraine headaches? \" If so, ask: \"Is this headache similar? \"       No    8. HEAD INJURY: \"Has there been any recent injury to the head? \"       No    9. OTHER SYMPTOMS: \"Do you have any other symptoms? \" (fever, stiff neck, eye pain, sore throat, cold symptoms)      Headache pain    10. PREGNANCY: \"Is there any chance you are pregnant? \" \"When was your last menstrual period? \"        No    Protocols used: HEADACHE-ADULT-[place]    Received call from [facility] with Red Flag Complaint. Brief description of triage: Patient calls in to reports headache pain (back of head) that radiates down into her left shoulder that makes is difficult to breathe.  Patient description:  \"I felt like my head had dropped. \"  When trying to determine what this meant, she clarified that if she lifts up on the back of her head the pain is better - she refuses to rate her pain. This began about 9 days ago and has worsened. Patient denies fever but states that she cannot place her chin on her chest.      Triage indicates for patient to be seen in the ED. This disposition was refused and a call to her office was placed. The situation was discussed with Constanza Díaz NP who stated that the office would call the patient back. Care advice provided, patient verbalizes understanding; denies any other questions or concerns; instructed to call back for any new or worsening symptoms. Attention Provider: Thank you for allowing me to participate in the care of your patient. The patient was connected to triage in response to information provided to the ECC. Please do not respond through this encounter as the response is not directed to a shared pool.

## 2021-09-13 NOTE — TELEPHONE ENCOUNTER
PT CALLING WITH NECK PAIN THAT STARTED 2 WEEKS AGO. SHE HAS BEEN USING ICE, HEAT, AND OTHER TREATMENTS BUT THE PAIN IS NOT GOING AWAY. SHE VOICED THAT THE PAIN IS NOW IN HER RIGHT SHOULDER BLADE. SHE IS HAVING PAIN WHEN SHE BREATHS IN, CAUSING HER TO HAVE SOB. SHE ALSO IS EXPERIENCING FATIGUE, EYES SHAKING, DOUBLE VISION AND DIZZINESS. WE SPOKE ABOUT GOING TO THE HOSPITAL FOR AN EVAL. PT DOES NOT WANT TO GO TO [facility] [address]. SHE IS REQUESTING SOMETHING TO TAKE AWAY THE PAIN. PLEASE ADVISE.   ML

## 2021-09-14 ENCOUNTER — HOSPITAL ENCOUNTER (OUTPATIENT)
Age: 66
Discharge: HOME | End: 2021-09-14

## 2021-09-14 ENCOUNTER — VIRTUAL VISIT (OUTPATIENT)
Dept: FAMILY MEDICINE CLINIC | Age: 66
End: 2021-09-14

## 2021-09-14 ENCOUNTER — HOSPITAL ENCOUNTER (OUTPATIENT)
Dept: GENERAL RADIOLOGY | Age: 66
Discharge: HOME | End: 2021-09-14

## 2021-09-14 DIAGNOSIS — M54.2 NECK PAIN: ICD-10-CM

## 2021-09-14 DIAGNOSIS — R51.9 NONINTRACTABLE HEADACHE, UNSPECIFIED CHRONICITY PATTERN, UNSPECIFIED HEADACHE TYPE: Primary | ICD-10-CM

## 2021-09-14 DIAGNOSIS — Z86.69 HISTORY OF CHIARI MALFORMATION: ICD-10-CM

## 2021-09-14 DIAGNOSIS — Z86.018 HISTORY OF PITUITARY ADENOMA: ICD-10-CM

## 2021-09-14 DIAGNOSIS — H53.9 VISION CHANGES: ICD-10-CM

## 2021-09-14 PROCEDURE — 99213 OFFICE O/P EST LOW 20 MIN: CPT | Performed by: FAMILY MEDICINE

## 2021-09-14 PROCEDURE — 72040 X-RAY EXAM NECK SPINE 2-3 VW: CPT

## 2021-09-14 RX ORDER — TRAMADOL HYDROCHLORIDE 50 MG/1
50 TABLET ORAL EVERY 6 HOURS PRN
Qty: 28 TABLET | Refills: 0 | Status: SHIPPED | OUTPATIENT
Start: 2021-09-14 | End: 2021-09-21

## 2021-09-14 RX ORDER — PREDNISONE 10 MG/1
TABLET ORAL
Qty: 25 TABLET | Refills: 0 | Status: SHIPPED | OUTPATIENT
Start: 2021-09-14 | End: 2021-09-29 | Stop reason: ALTCHOICE

## 2021-09-14 SDOH — ECONOMIC STABILITY: FOOD INSECURITY: WITHIN THE PAST 12 MONTHS, YOU WORRIED THAT YOUR FOOD WOULD RUN OUT BEFORE YOU GOT MONEY TO BUY MORE.: NEVER TRUE

## 2021-09-14 SDOH — ECONOMIC STABILITY: TRANSPORTATION INSECURITY
IN THE PAST 12 MONTHS, HAS LACK OF TRANSPORTATION KEPT YOU FROM MEETINGS, WORK, OR FROM GETTING THINGS NEEDED FOR DAILY LIVING?: NO

## 2021-09-14 SDOH — ECONOMIC STABILITY: TRANSPORTATION INSECURITY
IN THE PAST 12 MONTHS, HAS THE LACK OF TRANSPORTATION KEPT YOU FROM MEDICAL APPOINTMENTS OR FROM GETTING MEDICATIONS?: NO

## 2021-09-14 SDOH — ECONOMIC STABILITY: FOOD INSECURITY: WITHIN THE PAST 12 MONTHS, THE FOOD YOU BOUGHT JUST DIDN'T LAST AND YOU DIDN'T HAVE MONEY TO GET MORE.: NEVER TRUE

## 2021-09-14 ASSESSMENT — PATIENT HEALTH QUESTIONNAIRE - PHQ9
SUM OF ALL RESPONSES TO PHQ QUESTIONS 1-9: 0
2. FEELING DOWN, DEPRESSED OR HOPELESS: 0
SUM OF ALL RESPONSES TO PHQ9 QUESTIONS 1 & 2: 0
1. LITTLE INTEREST OR PLEASURE IN DOING THINGS: 0

## 2021-09-14 ASSESSMENT — SOCIAL DETERMINANTS OF HEALTH (SDOH): HOW HARD IS IT FOR YOU TO PAY FOR THE VERY BASICS LIKE FOOD, HOUSING, MEDICAL CARE, AND HEATING?: NOT HARD AT ALL

## 2021-09-14 NOTE — PROGRESS NOTES
2021    TELEHEALTH EVALUATION -- Audio/Visual (During OHCTG-28 public health emergency)    HPI:    Lonnie Neely (:  1955) has requested an audio/video evaluation for the following concern(s):    Chief Complaint   Patient presents with    Neck Pain     PT C/O NECK PAIN, RIGHT SHOULDER BLADE PAIN, VISION CHANGES, SOB, FATIGUE. PT HAS HX OF DISC ISSUES IN THE PAST. PT HAD CHIARI REPAIR IN  AND IT DOES FEEL SIMILIAR TO THAT. PT HAS HISTORY OF 2 BENIGN BRAIN TUMORS. s/p c1 decompression/ chiari surgery  and pituitary adenoma surgery  and   Started 2.5 weeks ago - awoke w/ pain at base of skull -able to turn head but now down into right side of shoulder blade   Can't take deep breath  nki - just woke w/ pain - feels like traction would help. Persistent this time - taking shallow breaths - not able to take deep breath - hurts to do this. occ tingling but ? Positional  Seen by dr. Ezio Zuleta in past - ? Similar to chiari in past.  Nothing helping pain presently - muscle relaxant not helpful  Can't take ibuprofen - if takes 3 tylenol - it may dull pain. Tried cbd w/o relief also  On hydrocortisone 2 am, 1 pm now    Review of Systems    Prior to Visit Medications    Medication Sig Taking?  Authorizing Provider   triamterene-hydroCHLOROthiazide (DYAZIDE) 37.5-25 MG per capsule TAKE 1 CAPSULE BY MOUTH EVERY DAY Yes JERMAINE Santos CNP   traZODone (DESYREL) 150 MG tablet TAKE 2 TABLETS BY MOUTH EVERY NIGHT AS NEEDED Yes JERMAINE Santos CNP   fluticasone-salmeterol (WIXELA INHUB) 100-50 MCG/DOSE diskus inhaler INHALE 1 PUFF INTO THE LUNGS EVERY 12 HOURS Yes JERMAINE Santos CNP   cyclobenzaprine (FLEXERIL) 5 MG tablet Take 1-2 tablets by mouth 3 times daily as needed for Muscle spasms (neck and shoulder pain) Yes JERMAINE Rashid CNP   apixaban (ELIQUIS) 5 MG TABS tablet TAKE 1 TABLET BY MOUTH TWICE DAILY Yes Ivania Hodge MD   hydrocortisone (CORTEF) 10 MG tablet TAKE 2 TABLETS BY MOUTH EVERY DAY AT 8:30 AM AND 1 TABLET EVERY DAY AT 12:15 PM Yes Natalia Flanagan MD   metFORMIN (GLUCOPHAGE-XR) 500 MG extended release tablet Take 2 tablets by mouth daily (with breakfast)  Patient taking differently: Take 500 mg by mouth daily (with breakfast)  Yes Natalia Flanagan MD   levothyroxine (SYNTHROID) 50 MCG tablet One tab five days a week and two on Saturday/Sunday Yes Natalia Flanagan MD   Cholecalciferol (VITAMIN D3) 1000 UNITS TABS Take 1 tablet by mouth daily  Yes Historical Provider, MD       Social History     Tobacco Use    Smoking status: Never Smoker    Smokeless tobacco: Never Used   Vaping Use    Vaping Use: Never used   Substance Use Topics    Alcohol use: No    Drug use: No            PHYSICAL EXAMINATION:  [ INSTRUCTIONS:  \"[x]\" Indicates a positive item  \"[]\" Indicates a negative item  -- DELETE ALL ITEMS NOT EXAMINED]  Vital Signs: (As obtained by patient/caregiver or practitioner observation)    Blood pressure-  Heart rate-    Respiratory rate-    Temperature-  Pulse oximetry-     Constitutional: [x] Appears well-developed and well-nourished [] No apparent distress      [] Abnormal-   Mental status  [x] Alert and awake  [] Oriented to person/place/time []Able to follow commands      Eyes:  EOM    []  Normal  [] Abnormal-  Sclera  []  Normal  [] Abnormal -         Discharge []  None visible  [] Abnormal -    HENT:   [x] Normocephalic, atraumatic.   [] Abnormal   [] Mouth/Throat: Mucous membranes are moist.     External Ears [] Normal  [] Abnormal-     Neck: [] No visualized mass     Pulmonary/Chest: [x] Respiratory effort normal.  [] No visualized signs of difficulty breathing or respiratory distress        [] Abnormal-      Musculoskeletal:   [] Normal gait with no signs of ataxia         [] Normal range of motion of neck        [] Abnormal-       Neurological:        [x] No Facial Asymmetry (Cranial nerve 7 motor function) (limited exam to video visit)          [] No gaze palsy        [] Abnormal-         Skin:        [x] No significant exanthematous lesions or discoloration noted on facial skin         [] Abnormal-            Psychiatric:       [x] Normal Affect [] No Hallucinations        [] Abnormal-     Other pertinent observable physical exam findings-     ASSESSMENT/PLAN:   Diagnosis Orders   1. Nonintractable headache, unspecified chronicity pattern, unspecified headache type  MRI BRAIN W WO CONTRAST    MRI CERVICAL SPINE W WO CONTRAST    traMADol (ULTRAM) 50 MG tablet   2. Vision changes  MRI BRAIN W WO CONTRAST   3. Neck pain  MRI CERVICAL SPINE W WO CONTRAST    traMADol (ULTRAM) 50 MG tablet   4. History of Chiari malformation  MRI BRAIN W WO CONTRAST    MRI CERVICAL SPINE W WO CONTRAST   5. History of pituitary adenoma  MRI BRAIN W WO CONTRAST     F/u endo routinely  Mri brain/ c-spine w/ w/o contrast  Prednisone taper - hold hc usual dose while on 40mg and then resume back starting with one pill /day until gets down to 10mg dose daily should be back on 2 am, 1 pm for last stage of taper. Awaiting word from dr. David Marx regarding any special imaging but last imaging was over 18 months ago and w/ new changes, need further eval w/ her hx  Tramadol/ apap for pain for next 7 days        Valdez Roslyn, was evaluated through a synchronous (real-time) audio-video encounter. The patient (or guardian if applicable) is aware that this is a billable service. Verbal consent to proceed has been obtained within the past 12 months. The visit was conducted pursuant to the emergency declaration under the [address] [place] [address] authority and the [employer] Act. Patient identification was verified, and a caregiver was present when appropriate. The patient was located in a state where the provider was credentialed to provide care.     Total time spent on this encounter: 11-20 minutes were spent on the digital evaluation and management of this patient. --Matt Bowman MD on 9/14/2021 at 2:32 PM    An electronic signature was used to authenticate this note.

## 2021-09-29 ENCOUNTER — OFFICE VISIT (OUTPATIENT)
Dept: FAMILY MEDICINE CLINIC | Age: 66
End: 2021-09-29

## 2021-09-29 VITALS
TEMPERATURE: 97 F | BODY MASS INDEX: 33.99 KG/M2 | DIASTOLIC BLOOD PRESSURE: 72 MMHG | HEART RATE: 66 BPM | SYSTOLIC BLOOD PRESSURE: 114 MMHG | HEIGHT: 67 IN | OXYGEN SATURATION: 98 %

## 2021-09-29 DIAGNOSIS — D35.2 BENIGN NEOPLASM OF PITUITARY GLAND AND CRANIOPHARYNGEAL DUCT (POUCH) (HCC): ICD-10-CM

## 2021-09-29 DIAGNOSIS — D35.3 BENIGN NEOPLASM OF PITUITARY GLAND AND CRANIOPHARYNGEAL DUCT (POUCH) (HCC): ICD-10-CM

## 2021-09-29 DIAGNOSIS — I48.0 PAROXYSMAL ATRIAL FIBRILLATION (HCC): ICD-10-CM

## 2021-09-29 DIAGNOSIS — D35.2 PITUITARY ADENOMA (HCC): ICD-10-CM

## 2021-09-29 DIAGNOSIS — E23.0 PANHYPOPITUITARISM (HCC): ICD-10-CM

## 2021-09-29 DIAGNOSIS — I10 ESSENTIAL HYPERTENSION: Primary | Chronic | ICD-10-CM

## 2021-09-29 DIAGNOSIS — E23.0 PITUITARY DWARFISM (HCC): ICD-10-CM

## 2021-09-29 PROCEDURE — 99213 OFFICE O/P EST LOW 20 MIN: CPT | Performed by: NURSE PRACTITIONER

## 2021-09-29 RX ORDER — LEVOTHYROXINE SODIUM 0.05 MG/1
TABLET ORAL
Qty: 390 TABLET | Refills: 1 | Status: SHIPPED | OUTPATIENT
Start: 2021-09-29 | End: 2022-03-23 | Stop reason: SDUPTHER

## 2021-09-29 ASSESSMENT — ENCOUNTER SYMPTOMS
WHEEZING: 0
SHORTNESS OF BREATH: 0

## 2021-09-29 NOTE — PROGRESS NOTES
Lam Conway (:  1955) is a 63 y.o. female,Established patient, here for evaluation of the following chief complaint(s):  Hypertension (ROUTINE HTN FOLLOW UP NO CONCERNS ) and Hypothyroidism (ROUTINE THYROID FOLLOW UP WITH MED REFILLS )      ASSESSMENT/PLAN:  1. Essential hypertension  -Controlled today. Given impaired renal function, discontinue Dyazide. Educated to monitor swelling as well blood pressure. Follow-up as needed. Otherwise can follow-up yearly. -     Basic Metabolic Panel; Future  -     Lipid Panel; Future  2. Panhypopituitarism ([address]  -Therapeutic on current dose. Management per endocrinology. -     levothyroxine (SYNTHROID) 50 MCG tablet; One tab five days a week and two on Saturday/, Disp-390 tablet, R-1Normal  3. Paroxysmal atrial fibrillation (HCC)  -Continue Eliquis. Irregular in office today. -     apixaban (ELIQUIS) 5 MG TABS tablet; TAKE 1 TABLET BY MOUTH TWICE DAILY, Disp-60 tablet, [id number] refills remain on this prescription. Your patient is requesting advance approval of refills for this medication to [address]  4. Pituitary adenoma ([address]  -Follows endocrinology. Defer management to them. 5. 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[address]  -Follows endocrinology. Defer management to them. 6. Benign neoplasm of pituitary gland and craniopharyngeal duct (pouch) ([address]  -Follows endocrinology. Defer management to them. Return in about 1 year (around 2022) for Annual Physical.    SUBJECTIVE/OBJECTIVE:  TJ  Yessi Mack presents today for blood pressure and thyroid follow-ups. She continues to see endocrinology. Went back to her previously noted cardiologist.  Has been receiving lab work periodically from them. They manage her Metformin as well as her thyroid medication. Most recent TSH was performed in July and was therapeutic. Continues Eliquis for A. fib. It is taking Dyazide. Kidney function last year.   Repeat this year showed creatinine 1.35 with [place].  She denies any swelling. States she has been significantly monitoring her salt intake and this has not been an issue for quite some time. She is inquiring whether she could discontinue the diuretic. She checks her blood pressure at home. States that typically does run in the 110-120 range. She is self-pay and declines all healthcare maintenance today. Lacy Bryant does state that she continues to have some dizziness. Have the MRI scheduled that were ordered by Dr. Junior Rojas recently. She states that the neck discomfort has resolved. Review of Systems   Constitutional: Negative for chills and fever. Respiratory: Negative for shortness of breath and wheezing. Cardiovascular: Negative for chest pain, palpitations and leg swelling. Neurological: Positive for dizziness. Negative for weakness, light-headedness, numbness and headaches. Psychiatric/Behavioral: Negative for decreased concentration, dysphoric mood and sleep disturbance. The patient is not nervous/anxious. Physical Exam  Constitutional:       General: She is not in acute distress. Appearance: Normal appearance. She is obese. Cardiovascular:      Rate and Rhythm: Normal rate. Rhythm irregular. Pulses: Normal pulses. Heart sounds: Murmur heard. No gallop. Comments: Grade 2 systolic murmur  Pulmonary:      Effort: Pulmonary effort is normal.      Breath sounds: Normal breath sounds. No wheezing. Neurological:      Mental Status: She is alert and oriented to person, place, and time. Psychiatric:         Mood and Affect: Mood normal.         Behavior: Behavior normal.         Thought Content: Thought content normal.         Judgment: Judgment normal.               This dictation was generated by voice recognition computer software. Although all attempts are made to edit the dictation for accuracy, there may be errors in the transcription that are not intended.     An electronic signature was used to authenticate this note.     --Braulio Stafford, APRPETR - CNP

## 2021-10-06 ENCOUNTER — HOSPITAL ENCOUNTER (OUTPATIENT)
Dept: MRI IMAGING | Age: 66
Discharge: HOME | End: 2021-10-06

## 2021-10-06 DIAGNOSIS — M54.2 NECK PAIN: ICD-10-CM

## 2021-10-06 DIAGNOSIS — Z86.018 HISTORY OF PITUITARY ADENOMA: ICD-10-CM

## 2021-10-06 DIAGNOSIS — R51.9 NONINTRACTABLE HEADACHE, UNSPECIFIED CHRONICITY PATTERN, UNSPECIFIED HEADACHE TYPE: ICD-10-CM

## 2021-10-06 DIAGNOSIS — Z86.69 HISTORY OF CHIARI MALFORMATION: ICD-10-CM

## 2021-10-06 DIAGNOSIS — H53.9 VISION CHANGES: ICD-10-CM

## 2021-10-06 PROCEDURE — 6360000004 HC RX CONTRAST MEDICATION: Performed by: FAMILY MEDICINE

## 2021-10-06 PROCEDURE — 70553 MRI BRAIN STEM W/O & W/DYE: CPT

## 2021-10-06 PROCEDURE — 72156 MRI NECK SPINE W/O & W/DYE: CPT

## 2021-10-06 PROCEDURE — A9577 INJ MULTIHANCE: HCPCS | Performed by: FAMILY MEDICINE

## 2021-10-06 RX ADMIN — GADOBENATE DIMEGLUMINE 20 ML: 529 INJECTION, SOLUTION INTRAVENOUS at 12:59

## 2021-12-14 RX ORDER — TRAZODONE HYDROCHLORIDE 150 MG/1
TABLET ORAL
Qty: 180 TABLET | Refills: 1 | Status: SHIPPED | OUTPATIENT
Start: 2021-12-14 | End: 2022-06-14

## 2022-03-23 ENCOUNTER — OFFICE VISIT (OUTPATIENT)
Dept: FAMILY MEDICINE CLINIC | Age: 67
End: 2022-03-23

## 2022-03-23 VITALS
HEIGHT: 67 IN | OXYGEN SATURATION: 100 % | BODY MASS INDEX: 33.99 KG/M2 | DIASTOLIC BLOOD PRESSURE: 72 MMHG | HEART RATE: 102 BPM | SYSTOLIC BLOOD PRESSURE: 124 MMHG

## 2022-03-23 DIAGNOSIS — E23.0 PITUITARY DWARFISM (HCC): ICD-10-CM

## 2022-03-23 DIAGNOSIS — E23.0 PANHYPOPITUITARISM (HCC): ICD-10-CM

## 2022-03-23 DIAGNOSIS — D35.2 PITUITARY ADENOMA (HCC): ICD-10-CM

## 2022-03-23 DIAGNOSIS — I48.0 PAROXYSMAL ATRIAL FIBRILLATION (HCC): ICD-10-CM

## 2022-03-23 DIAGNOSIS — Z00.00 ANNUAL VISIT FOR GENERAL ADULT MEDICAL EXAMINATION WITHOUT ABNORMAL FINDINGS: Primary | ICD-10-CM

## 2022-03-23 DIAGNOSIS — J45.30 MILD PERSISTENT ALLERGIC ASTHMA WITHOUT COMPLICATION: ICD-10-CM

## 2022-03-23 PROCEDURE — 99396 PREV VISIT EST AGE 40-64: CPT | Performed by: NURSE PRACTITIONER

## 2022-03-23 RX ORDER — LEVOTHYROXINE SODIUM 0.05 MG/1
TABLET ORAL
Qty: 390 TABLET | Refills: 1 | Status: SHIPPED | OUTPATIENT
Start: 2022-03-23 | End: 2022-05-04

## 2022-03-23 ASSESSMENT — ENCOUNTER SYMPTOMS
SORE THROAT: 0
ABDOMINAL PAIN: 0
NAUSEA: 0
WHEEZING: 0
DIARRHEA: 0
ABDOMINAL DISTENTION: 0
EYE PAIN: 0
EYE DISCHARGE: 0
EYE REDNESS: 0
SINUS PRESSURE: 0
COUGH: 0
SHORTNESS OF BREATH: 0
SINUS PAIN: 0
VOMITING: 0

## 2022-03-23 NOTE — PROGRESS NOTES
Behzad Hodgson (:  1955) is a 61 y.o. female,Established patient, here for evaluation of the following chief complaint(s): Other (NEEDS FORM TO WORK IN  ) and Annual Exam (PHYSICAL )      ASSESSMENT/PLAN:  1. Annual visit for general adult medical examination without abnormal findings  -Aside from baseline A. fib, normal exam  -Gets labs periodically through [facility].  Has not had lipids checked since . Recommend repeating when she has Medicare  -Form completed for childcare. Patient is not up-to-date on Tdap at this time. Since she is self-pay, recommended that she have this obtained at the local pharmacy.  -Follow-up in 1 year for AWV  2. Panhypopituitarism ([address]  -Continues to follow endocrinology. Recent results reviewed. Therapeutic with levothyroxine. Continue current dose. -     levothyroxine (SYNTHROID) 50 MCG tablet; One tab five days a week and two on Saturday/, Disp-390 tablet, R-1Normal  3. Mild persistent allergic asthma without complication  -Controlled with Advair. Has some issues with cost, but no better alternatives. Can continue as ordered. Hopefully will have improved cost once on Medicare November.  -     fluticasone-salmeterol (ADVAIR) 100-50 MCG/DOSE diskus inhaler; INHALE 1 PUFF INTO THE LUNGS EVERY 12 HOURS, Disp-60 each, R-5Normal  4. Paroxysmal atrial fibrillation (HCC)  -Heart rate irregular in office today. Continue Eliquis. Controlled rate. 5. Pituitary adenoma ([address]  -Continues to follow endocrinology. Defer management to them. 6. 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 Defer management to them. Return in about 1 year (around 3/23/2023) for Welcome to Medicare. SUBJECTIVE/OBJECTIVE:  TJ Burger presents today for annual physical.  Denies any concerns today. She has a childcare form that needs to be completed. She continues to see endocrinology. No new updates regarding panhypopituitary is him.   She has been cleared to repeat MRI in 5 years. No changes in medications. She periodically gets labs through her endocrinologist.  Only taking Metformin 500 mg. Last A1c was 5.9%. Continues hydrocortisone chronically. They have not checked her cholesterol in quite some time. She would like to wait till next year when she is on Medicare to have this done. Also declines all healthcare maintenance at this time as she will be on Medicare next year. Patient denies any issues with mood. Continues trazodone. States this works well for her. She continues Eliquis for A. fib. States that Advil works well for her. This is quite expensive for her, but she needs this. She is going to be on Medicare later in the year and is hopeful that this will help. No MRI for 5 years. Endocrinology. Review of Systems   Constitutional: Negative for chills and fever. HENT: Negative for ear discharge, ear pain, hearing loss, sinus pressure, sinus pain and sore throat. Eyes: Negative for pain, discharge and redness. Respiratory: Negative for cough, shortness of breath and wheezing. Cardiovascular: Negative for chest pain and palpitations. Gastrointestinal: Negative for abdominal distention, abdominal pain, diarrhea, nausea and vomiting. Genitourinary: Negative for dysuria and hematuria. Musculoskeletal: Positive for arthralgias. Negative for myalgias. Skin: Negative for rash. Neurological: Negative for dizziness, weakness, light-headedness, numbness and headaches. Psychiatric/Behavioral: Negative for decreased concentration, dysphoric mood, self-injury, sleep disturbance and suicidal ideas. The patient is not nervous/anxious. Physical Exam  Constitutional:       General: She is not in acute distress. Appearance: Normal appearance. She is obese. HENT:      Head: Normocephalic and atraumatic.       Right Ear: Tympanic membrane, ear canal and external ear normal.      Left Ear: Tympanic membrane, ear canal and external ear normal.      Mouth/Throat:      Mouth: Mucous membranes are moist.   Eyes:      Extraocular Movements: Extraocular movements intact. Conjunctiva/sclera: Conjunctivae normal.      Pupils: Pupils are equal, round, and reactive to light. Neck:      Thyroid: No thyromegaly. Cardiovascular:      Rate and Rhythm: Normal rate. Rhythm irregular. Pulses: Normal pulses. Heart sounds: Normal heart sounds. No murmur heard. No gallop. Pulmonary:      Effort: Pulmonary effort is normal.      Breath sounds: Normal breath sounds. Abdominal:      General: Bowel sounds are normal.      Palpations: Abdomen is soft. Tenderness: There is no abdominal tenderness. There is no guarding or rebound. Musculoskeletal:         General: Normal range of motion. Cervical back: Normal range of motion and neck supple. Lymphadenopathy:      Cervical: No cervical adenopathy. Skin:     General: Skin is warm and dry. Capillary Refill: Capillary refill takes less than 2 seconds. Neurological:      Mental Status: She is alert and oriented to person, place, and time. Psychiatric:         Mood and Affect: Mood normal.         Behavior: Behavior normal.         Thought Content: Thought content normal.         Judgment: Judgment normal.               This dictation was generated by voice recognition computer software. Although all attempts are made to edit the dictation for accuracy, there may be errors in the transcription that are not intended. An electronic signature was used to authenticate this note.     --JERMAINE Martins - CNP

## 2022-03-23 NOTE — PATIENT INSTRUCTIONS
Patient Education        Well Visit, Women 48 to 72: Care Instructions  Overview     Well visits can help you stay healthy. Your doctor has checked your overall health and may have suggested ways to take good care of yourself. Your doctor also may have recommended tests. At home, you can help prevent illness with healthy eating, regular exercise, and other steps. Follow-up care is a key part of your treatment and safety. Be sure to make and go to all appointments, and call your doctor if you are having problems. It's also a good idea to know your test results and keep a list of the medicines you take. How can you care for yourself at home? · Get screening tests that you and your doctor decide on. Screening helps find diseases before any symptoms appear. · Eat healthy foods. Choose fruits, vegetables, whole grains, protein, and low-fat dairy foods. Limit fat, especially saturated fat. Reduce salt in your diet. · Limit alcohol. Have no more than 1 drink a day or 7 drinks a week. · Get at least 30 minutes of exercise on most days of the week. Walking is a good choice. You also may want to do other activities, such as running, swimming, cycling, or playing tennis or team sports. · Reach and stay at a healthy weight. This will lower your risk for many problems, such as obesity, diabetes, heart disease, and high blood pressure. · Do not smoke. Smoking can make health problems worse. If you need help quitting, talk to your doctor about stop-smoking programs and medicines. These can increase your chances of quitting for good. · Care for your mental health. It is easy to get weighed down by worry and stress. Learn strategies to manage stress, like deep breathing and mindfulness, and stay connected with your family and community. If you find you often feel sad or hopeless, talk with your doctor. Treatment can help.   · Talk to your doctor about whether you have any risk factors for sexually transmitted infections (STIs). You can help prevent STIs if you wait to have sex with a new partner (or partners) until you've each been tested for STIs. It also helps if you use condoms (male or female condoms) and if you limit your sex partners to one person who only has sex with you. Vaccines are available for some STIs. · If you think you may have a problem with alcohol or drug use, talk to your doctor. This includes prescription medicines (such as amphetamines and opioids) and illegal drugs (such as cocaine and methamphetamine). Your doctor can help you figure out what type of treatment is best for you. · Protect your skin from too much sun. When you're outdoors from 10 a.m. to 4 p.m., stay in the shade or cover up with clothing and a hat with a wide brim. Wear sunglasses that block UV rays. Even when it's cloudy, put broad-spectrum sunscreen (SPF 30 or higher) on any exposed skin. · See a dentist one or two times a year for checkups and to have your teeth cleaned. · Wear a seat belt in the car. When should you call for help? Watch closely for changes in your health, and be sure to contact your doctor if you have any problems or symptoms that concern you. Where can you learn more? Go to https://[employer]. org and sign in to your [employer] account. Enter K069 in the [facility] box to learn more about \"Well Visit, Women 50 to 72: Care Instructions. \"     If you do not have an account, please click on the \"Sign Up Now\" link. Current as of: October 6, 2021               Content Version: 13.1  © 0478-1807 [employer]wise, Incorporated. Care instructions adapted under license by [facility] ([facility]). If you have questions about a medical condition or this instruction, always ask your healthcare professional. [place] [address] any warranty or liability for your use of this information.

## 2022-04-13 DIAGNOSIS — J45.30 MILD PERSISTENT ALLERGIC ASTHMA WITHOUT COMPLICATION: ICD-10-CM

## 2022-04-13 NOTE — TELEPHONE ENCOUNTER
LV 3/23/22 WITH TR FOR PHYSICAL NV NONE  fluticasone-salmeterol (ADVAIR) 100-50 MCG/DOSE diskus inhaler 60 each 5 3/23/2022     Sig: INHALE 1 PUFF INTO THE LUNGS EVERY 12 HOURS    Sent to pharmacy as: Fluticasone-Salmeterol 100-50 MCG/DOSE Inhalation Aerosol Powder Breath Activated (ADVAIR)    E-Prescribing Status: Receipt confirmed by pharmacy (3/23/2022  1:33 PM EDT)

## 2022-05-04 DIAGNOSIS — E23.0 PANHYPOPITUITARISM (HCC): ICD-10-CM

## 2022-05-04 RX ORDER — LEVOTHYROXINE SODIUM 0.05 MG/1
TABLET ORAL
Qty: 390 TABLET | Refills: 1 | Status: SHIPPED | OUTPATIENT
Start: 2022-05-04

## 2022-05-18 ENCOUNTER — TELEPHONE (OUTPATIENT)
Dept: FAMILY MEDICINE CLINIC | Age: 67
End: 2022-05-18

## 2022-05-18 RX ORDER — PREDNISONE 10 MG/1
TABLET ORAL
Qty: 25 TABLET | Refills: 0 | Status: SHIPPED | OUTPATIENT
Start: 2022-05-18 | End: 2022-06-09 | Stop reason: ALTCHOICE

## 2022-05-18 NOTE — TELEPHONE ENCOUNTER
SPOKE TO PATIENT. SHE IS STRUGGLING TO BREATH, USING HER INHALER MORE THAN NORMAL, RUNNY/STUFFY NOSE, ASTHMATIC COUGH, PT IS UNABLE TO TAKE OTC MEDICATION DUE TO HER MEDICAL HISTORY X 5 DAYS, ASKING FOR PREDNISONE TO HELP WITH BREATHING DUE TO ASTHMA.  SC

## 2022-05-18 NOTE — TELEPHONE ENCOUNTER
Prednisone sent to pharmacy - ER or urgent care if worsening - will take 1-2 days to see improvement from prednisone - continue albuterol and symptom tx w/ hydration

## 2022-05-18 NOTE — TELEPHONE ENCOUNTER
----- Message from Germania Shelley sent at 5/18/2022 10:18 AM EDT -----  Subject: Message to Provider    QUESTIONS  Information for Provider? Patient is calling to request Prednisone Rx to   her pharmacy for sinus/ Allergy issue. Pharmacy is [facility]. Please call   the patient  ---------------------------------------------------------------------------  --------------  CALL BACK INFO  What is the best way for the office to contact you? OK to leave message on   voicemail  Preferred Call Back Phone Number? [phone]  ---------------------------------------------------------------------------  --------------  SCRIPT ANSWERS  Relationship to Patient?  Self

## 2022-05-19 RX ORDER — ALBUTEROL SULFATE 90 UG/1
AEROSOL, METERED RESPIRATORY (INHALATION)
Qty: 8.5 G | Refills: 1 | OUTPATIENT
Start: 2022-05-19

## 2022-05-19 RX ORDER — ALBUTEROL SULFATE 90 UG/1
AEROSOL, METERED RESPIRATORY (INHALATION)
Qty: 8.5 G | Refills: 0 | Status: SHIPPED | OUTPATIENT
Start: 2022-05-19 | End: 2022-06-09 | Stop reason: SDUPTHER

## 2022-05-19 NOTE — TELEPHONE ENCOUNTER
LV 3/23/22 WITH TR FOR PHYSICAL NV NONE  albuterol sulfate  (90 Base) MCG/ACT inhaler (Discontinued) 8.5 g 1 8/23/2021 9/14/2021    Sig: INHALE 2 PUFFS INTO THE LUNGS EVERY 6 HOURS AS NEEDED FOR WHEEZING    Sent to pharmacy as:  Albuterol Sulfate  (90 Base) MCG/ACT Inhalation Aerosol Solution    Cosign for Ordering: Accepted by Noemi Coker MD on 8/23/2021  4:24 PM    E-Prescribing Status: Receipt confirmed by pharmacy (8/23/2021  1:00 PM EDT)      DISCONTINUED

## 2022-05-19 NOTE — TELEPHONE ENCOUNTER
SPOKE WITH PT.  SHE VOICED THAT SHE STILL DOES TAKE THE ALBUTEROL INHALER.  SCRIPT OK TO RESEND PER DR FOREMAN

## 2022-06-09 ENCOUNTER — OFFICE VISIT (OUTPATIENT)
Dept: FAMILY MEDICINE CLINIC | Age: 67
End: 2022-06-09

## 2022-06-09 ENCOUNTER — TELEPHONE (OUTPATIENT)
Dept: FAMILY MEDICINE CLINIC | Age: 67
End: 2022-06-09

## 2022-06-09 VITALS
BODY MASS INDEX: 34.46 KG/M2 | WEIGHT: 220 LBS | HEART RATE: 113 BPM | SYSTOLIC BLOOD PRESSURE: 118 MMHG | OXYGEN SATURATION: 97 % | DIASTOLIC BLOOD PRESSURE: 76 MMHG

## 2022-06-09 DIAGNOSIS — I48.21 PERMANENT ATRIAL FIBRILLATION (HCC): ICD-10-CM

## 2022-06-09 DIAGNOSIS — R07.89 CHEST TIGHTNESS: Primary | ICD-10-CM

## 2022-06-09 DIAGNOSIS — I49.9 IRREGULAR HEART BEAT: ICD-10-CM

## 2022-06-09 DIAGNOSIS — R01.1 HEART MURMUR: ICD-10-CM

## 2022-06-09 PROCEDURE — 99213 OFFICE O/P EST LOW 20 MIN: CPT | Performed by: FAMILY MEDICINE

## 2022-06-09 PROCEDURE — 93000 ELECTROCARDIOGRAM COMPLETE: CPT | Performed by: FAMILY MEDICINE

## 2022-06-09 RX ORDER — ALBUTEROL SULFATE 90 UG/1
AEROSOL, METERED RESPIRATORY (INHALATION)
Qty: 8.5 G | Refills: 3 | Status: SHIPPED | OUTPATIENT
Start: 2022-06-09 | End: 2022-09-07

## 2022-06-09 ASSESSMENT — PATIENT HEALTH QUESTIONNAIRE - PHQ9
SUM OF ALL RESPONSES TO PHQ QUESTIONS 1-9: 0
10. IF YOU CHECKED OFF ANY PROBLEMS, HOW DIFFICULT HAVE THESE PROBLEMS MADE IT FOR YOU TO DO YOUR WORK, TAKE CARE OF THINGS AT HOME, OR GET ALONG WITH OTHER PEOPLE: 0
SUM OF ALL RESPONSES TO PHQ QUESTIONS 1-9: 0
2. FEELING DOWN, DEPRESSED OR HOPELESS: 0
7. TROUBLE CONCENTRATING ON THINGS, SUCH AS READING THE NEWSPAPER OR WATCHING TELEVISION: 0
6. FEELING BAD ABOUT YOURSELF - OR THAT YOU ARE A FAILURE OR HAVE LET YOURSELF OR YOUR FAMILY DOWN: 0
SUM OF ALL RESPONSES TO PHQ QUESTIONS 1-9: 0
1. LITTLE INTEREST OR PLEASURE IN DOING THINGS: 0
SUM OF ALL RESPONSES TO PHQ QUESTIONS 1-9: 0
SUM OF ALL RESPONSES TO PHQ QUESTIONS 1-9: 0
5. POOR APPETITE OR OVEREATING: 0
8. MOVING OR SPEAKING SO SLOWLY THAT OTHER PEOPLE COULD HAVE NOTICED. OR THE OPPOSITE, BEING SO FIGETY OR RESTLESS THAT YOU HAVE BEEN MOVING AROUND A LOT MORE THAN USUAL: 0
4. FEELING TIRED OR HAVING LITTLE ENERGY: 0
9. THOUGHTS THAT YOU WOULD BE BETTER OFF DEAD, OR OF HURTING YOURSELF: 0
2. FEELING DOWN, DEPRESSED OR HOPELESS: 0
SUM OF ALL RESPONSES TO PHQ QUESTIONS 1-9: 0
SUM OF ALL RESPONSES TO PHQ9 QUESTIONS 1 & 2: 0
SUM OF ALL RESPONSES TO PHQ QUESTIONS 1-9: 0
3. TROUBLE FALLING OR STAYING ASLEEP: 0
SUM OF ALL RESPONSES TO PHQ QUESTIONS 1-9: 0

## 2022-06-09 NOTE — TELEPHONE ENCOUNTER
Pt was prescribed Prednisone 05/18/22    She still has the coughing - do you want to call in another script for Prednisone or what can she do now?     Pt @  [address] [place] [address] Balta Han - [place] [phone]

## 2022-06-09 NOTE — PROGRESS NOTES
[facility] Medicine  Clinic Note    Date: 6/9/2022                                               Subjective:     Chief Complaint   Patient presents with    Cough     DRY COUGH THAT WILL NOT GO AWAY, STARTED OVER A MONTH AGO. COMES AND GOES SOME SOB      HPI  STARTED EARLY MAY - GOT BETTER W/ PREDNISONE BUT BACK  MUCUS VARIES CLEAR TO GREEN  GETS WEARY GENERALLY  NO SINUS / NASAL CONGESTION OF SIGNIFICANCE  TAKES BENADRYL AT NIGHT TO HELP SLEEP  CHEST TIGHT AND FULL IN AM - ALBUTEROL ALLOWS HER TO GET MUCUS UP - TAKES AN HOUR TO GET UP - THEN STARTS BACK UP AND USES ALBUTEROL AGAIN LATER IN DAY  NASAL DRAINAGE ALSO SEEMS TO HAVE SUBSIDED  NEVER GOT COVID - NO EXPOSURES. FEELS TIGHT BUT NOT REALLY SOBOE  DOESN'T FEEL LIKE REALLY WHEEZING.   HARDER TO BREATHE LYING ON LEFT SIDE AND W/O BRA  CAN'T USE MUCINEX/ OTC COUGH MEDS AS CORN/ CORN STARCH MATERIAL - WHICH MAKES HER FEEL TERRIBLE  HYDRATES WELL W/ WATER  SOME PAIN RIGHT SIDE OF LUNGS       Patient Active Problem List    Diagnosis Date Noted    Fibromyalgia 02/21/2020    Paroxysmal atrial fibrillation ([address] 02/21/2020    Dyslipidemia associated with type 2 diabetes mellitus ([address] 12/23/2019    Age-related nuclear cataract 11/14/2019    Encntr for f/u exam aft trtmt for cond oth than malig neoplm 11/14/2019    Regular astigmatism, right eye 11/14/2019    Vitreous degeneration, bilateral 11/14/2019    Posterior tibial tendinitis of right lower extremity 09/11/2018    Plantar fasciitis of right foot 08/28/2018    Tendonitis, Achilles, right 08/28/2018    Right foot pain 08/28/2018    Pituitary tumor 05/14/2018    Multinodular goiter 02/12/2018    Controlled type 2 diabetes mellitus without complication, without long-term current use of insulin ([address] 02/12/2018    Hypercholesteremia 02/12/2018    History of TIA (transient ischemic attack)     Rupture of left distal biceps tendon 11/10/2016    Sprain of left elbow  [facility]  DOI 6/17/16 08/23/2016  Pituitary adenoma ([address] 10/01/2015    Chronic ethmoidal sinusitis 02/16/2015    Chronic maxillary sinusitis 02/16/2015    Status post total right knee replacement using cement 8/25/2014 09/04/2014    Panhypopituitarism ([address] 08/26/2014    S/P selective transsphenoidal pituitary adenomectomy 08/26/2014    Right knee DJD 07/22/2014    Obesity 04/17/2014    Adrenal insufficiency 06/05/2012    Insomnia 03/01/2012    Asthma 03/01/2012    Edema 03/01/2012    Hypertension 03/01/2012    Hypothyroidism 03/01/2012    Chronic rhinitis 09/10/2010    Pain in limb 09/10/2010    Pruritic disorder 09/10/2010    Pain in joint 02/23/2010    Anemia 04/24/2009    Depressive disorder, not elsewhere classified 04/24/2009    Disorder of skin or subcutaneous tissue 04/24/2009    Brachial neuritis or radiculitis 12/23/2008    Constipation 12/23/2008    Degeneration of intervertebral disc, site unspecified 12/23/2008    Displacement of cervical intervertebral disc without myelopathy 12/23/2008    Disturbance of skin sensation 12/23/2008    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    Iron deficiency anemia 12/23/2008    Myalgia and myositis 12/23/2008    Other extrapyramidal disease and abnormal movement disorder 12/23/2008    Chronic pain disorder 11/13/2008    Hypopotassemia 08/26/2008    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    Galactorrhea not associated with childbirth 01/31/2008    Other malaise and fatigue 12/20/2007    Benign neoplasm of pituitary gland and craniopharyngeal duct (pouch) ([address] 11/09/2007     Past Medical History:   Diagnosis Date    Adrenal insufficiency (HCC)     Arthritis     Asthma     Atrial fibrillation ([address]     Brain tumor ([address]     chiari - malformation    Chiari malformation 3/11    Fibromyalgia     History of TIA (transient ischemic attack) 2006    Panhypopituitarism ([address] 8/26/2014    S/P selective transsphenoidal pituitary adenomectomy 2007    Thyroid disease     Total knee replacement status 8/14    right    Unspecified cerebral artery occlusion with cerebral infarction       TIA 2006     Past Surgical History:   Procedure Laterality Date    BICEPS TENODESIS Left 11/10/2016    Dr Stacie Bonilla  3/24/11    chiari 1 repair/ decompression and C1 laminectomy    BRAIN SURGERY  11/07    adenoma removal, pituitary    CATARACT REMOVAL Bilateral 04/2017    HYSTERECTOMY (CERVIX STATUS UNKNOWN)      KNEE ARTHROPLASTY Right 08/25/2014    right tkr    LAMINECTOMY  C1    chiari    PITUITARY SURGERY  2/15    transphenoidal     Office Visit on 09/29/2021   Component Date Value Ref Range Status    Hemoglobin A1C 07/09/2021 5.9  % Final    Vitamin B-12 07/09/2021 736   Final    T4, Total 07/09/2021 1.1   Final    TSH 07/09/2021 1.37  uIU/mL Final    Sodium 07/09/2021 141  mmol/L Final    Chloride 07/09/2021 106  mmol/L Final    Potassium 07/09/2021 3.6  mmol/L Final    BUN 07/09/2021 16  mg/dL Final    CREATININE 07/09/2021 1.35   Final    Glucose 07/09/2021 104  mg/dL Final    AST 07/09/2021 11  U/L Final    ALT 07/09/2021 12  U/L Final    Calcium 07/09/2021 9.2  mg/dL Final    Albumin 07/09/2021 3.7   Final    Total Bilirubin 07/09/2021 0.6  0.1 - 1.4 mg/dL Final    Gfr Calculated 07/09/2021 40   Final    Anion Gap 07/09/2021 8  mmol/L Final     Family History   Problem Relation Age of Onset    Hypertension Mother     Arrhythmia Mother     Cancer Father         skin?     No Known Problems Sister     Diabetes Paternal Grandfather     No Known Problems Brother     No Known Problems Maternal Aunt     No Known Problems Maternal Uncle     No Known Problems Paternal Aunt     No Known Problems Paternal Uncle     No Known Problems Maternal Grandmother     No Known Problems Maternal Grandfather     No Known Problems Paternal Grandmother     No Known Problems Other     Anesth Problems Neg Hx     Broken Bones Neg Hx     Clotting Disorder Neg Hx  Collagen Disease Neg Hx     Dislocations Neg Hx     Osteoporosis Neg Hx     Rheumatologic Disease Neg Hx     Scoliosis Neg Hx     Severe Sprains Neg Hx      Current Outpatient Medications   Medication Sig Dispense Refill    albuterol sulfate  (90 Base) MCG/ACT inhaler INHALE 2 PUFFS INTO THE LUNGS EVERY 6 HOURS AS NEEDED FOR WHEEZING 8.5 g 0    levothyroxine (SYNTHROID) 50 MCG tablet TAKE 1 TABLET BY MOUTH FIVE DAYS A WEEK AND 2 TABLETS ON SATURDAY/SUNDAY 390 tablet 1    fluticasone-salmeterol (ADVAIR) 100-50 MCG/DOSE diskus inhaler INHALE 1 PUFF INTO THE LUNGS EVERY 12 HOURS 60 each 5    traZODone (DESYREL) 150 MG tablet TAKE 2 TABLETS BY MOUTH EVERY NIGHT AS NEEDED 180 tablet 1    apixaban (ELIQUIS) 5 MG TABS tablet TAKE 1 TABLET BY MOUTH TWICE DAILY 60 tablet 11    hydrocortisone (CORTEF) 10 MG tablet TAKE 2 TABLETS BY MOUTH EVERY DAY AT 8:30 AM AND 1 TABLET EVERY DAY AT 12:15 PM 90 tablet 5    metFORMIN (GLUCOPHAGE-XR) 500 MG extended release tablet Take 2 tablets by mouth daily (with breakfast) (Patient taking differently: Take 500 mg by mouth daily (with breakfast) ) 180 tablet 1    Cholecalciferol (VITAMIN D3) 1000 UNITS TABS Take 1 tablet by mouth daily        No current facility-administered medications for this visit.      Allergies   Allergen Reactions    Corn Oil     Levofloxacin      Out of touch w/ world       Review of Systems    Objective:  /76 (Site: Left Upper Arm, Position: Sitting, Cuff Size: Large Adult)   Pulse (!) 113   Wt 220 lb (99.8 kg)   LMP  (Exact Date)   SpO2 97%   Breastfeeding No   BMI 34.46 kg/m²     BP Readings from Last 3 Encounters:   06/09/22 118/76   03/23/22 124/72   09/29/21 114/72       Pulse Readings from Last 3 Encounters:   06/09/22 (!) 113   03/23/22 102   09/29/21 66       Wt Readings from Last 3 Encounters:   06/09/22 220 lb (99.8 kg)   06/16/21 217 lb (98.4 kg)   09/16/20 228 lb (103.4 kg)       Physical Exam  Cardiovascular: Rate and Rhythm: Normal rate. Rhythm irregular. Heart sounds: Murmur heard. Pulmonary:      Comments: Decreased bs at bases germán on right side  Musculoskeletal:         General: Swelling (trace feet) present. Assessment/Plan:      Diagnosis Orders   1. Chest tightness  XR CHEST (2 VW)    EKG 12 Lead   2. Irregular heart beat  EKG 12 Lead   3.  Heart murmur  EKG 12 Lead     Check cxr   Check ekg - afib -hr 104  Continue eliquis - consider bb/ diltiazem but bp/ hr generally okay pending cardio appointment  Refer to EP cardiology for tx of afib/ flutter  Hold on tx pending cxr results  Reviewed 3/22 labs and 7/21 labs - stable thyroid - sees endo routinely    Arlyne Gosselin, MD, MD  6/9/2022  1:37 PM

## 2022-06-14 RX ORDER — TRAZODONE HYDROCHLORIDE 150 MG/1
TABLET ORAL
Qty: 180 TABLET | Refills: 0 | Status: SHIPPED | OUTPATIENT
Start: 2022-06-14 | End: 2022-09-13

## 2022-06-16 NOTE — PROGRESS NOTES
Cardiac Electrophysiology Consultation   Date: 6/22/2022   Reason for Consultation: atrial fibrillation  Consult Requesting Physician: Arlyne Gosselin, MD     Chief Complaint:   Chief Complaint   Patient presents with    New Patient     afib        HPI: Meena Ortega is a 61 y.o. patient with a history of TIA (2006), pituitary brain tumors with adrenal insufficiency, chiari malformation, thyroid disease and atrial fibrillation. She was first noted to have atrial fibrillation during her cataract surgery in 2017. Seen on EKG 4/26/2017. EKG 5/8/2017 showed NSR. She has not undergone any procedure for her atrial fibrillation nor has she been tried on any AAD's. Previously followed with Dr. Rodrigo Bianchi     Today, she presents to office for evaluation of atrial fibrillation. She states that she was at her PCP office because she had a cold and EKG showed atrial fibrillation. She states she has been feeling fatigued since she was in atrial fibrillation. Denies any other symptoms when in afib and stated she would not have known if she did not have the EKG done. She recalls her history of brain tumors for which she underwent radiation therapy. She is compliant with her medications and tolerating them well. She denies chest pain/pressure, tightness, edema, shortness of breath, heart racing, palpitations, lightheadedness, dizziness, syncope, presyncope,  PND or orthopnea.        Past Medical History:   Diagnosis Date    Adrenal insufficiency (HCC)     Arthritis     Asthma     Atrial fibrillation ([address]     Brain tumor ([address]     chiari - malformation    Chiari malformation 3/11    Fibromyalgia     History of TIA (transient ischemic attack) 2006    Panhypopituitarism ([address] 8/26/2014    S/P selective transsphenoidal pituitary adenomectomy 2007    Thyroid disease     Total knee replacement status 8/14    right    Unspecified cerebral artery occlusion with cerebral infarction       TIA 2006      Past Surgical History: Procedure Laterality Date    BICEPS TENODESIS Left 11/10/2016    Dr Jassi Pereira  3/24/11    chiari 1 repair/ decompression and C1 laminectomy    BRAIN SURGERY  11/07    adenoma removal, pituitary    CATARACT REMOVAL Bilateral 04/2017    HYSTERECTOMY (CERVIX STATUS UNKNOWN)      KNEE ARTHROPLASTY Right 08/25/2014    right tkr    LAMINECTOMY  C1    chiari    PITUITARY SURGERY  2/15    transphenoidal       Allergies: Allergies   Allergen Reactions    Corn Oil     Levofloxacin      Out of touch w/ world       Medication:   Prior to Admission medications    Medication Sig Start Date End Date Taking? Authorizing Provider   traZODone (DESYREL) 150 MG tablet TAKE 2 TABLETS BY MOUTH EVERY NIGHT AS NEEDED 6/14/22  Yes JERMAINE De La Cruz CNP   albuterol sulfate HFA (PROVENTIL;VENTOLIN;PROAIR) 108 (90 Base) MCG/ACT inhaler INHALE 2 PUFFS INTO THE LUNGS EVERY 6 HOURS AS NEEDED FOR WHEEZING 6/9/22  Yes John Werner MD   levothyroxine (SYNTHROID) 50 MCG tablet TAKE 1 TABLET BY MOUTH FIVE DAYS A WEEK AND 2 TABLETS ON SATURDAY/SUNDAY 5/4/22  Yes JERMAINE Hoyos CNP   apixaban (ELIQUIS) 5 MG TABS tablet TAKE 1 TABLET BY MOUTH TWICE DAILY 9/29/21  Yes JERMAINE Hoyos CNP   hydrocortisone (CORTEF) 10 MG tablet TAKE 2 TABLETS BY MOUTH EVERY DAY AT 8:30 AM AND 1 TABLET EVERY DAY AT 12:15 PM 8/24/20  Yes Mayra Fletcher MD   metFORMIN (GLUCOPHAGE-XR) 500 MG extended release tablet Take 2 tablets by mouth daily (with breakfast)  Patient taking differently: Take 500 mg by mouth daily (with breakfast)  7/20/20  Yes Mayra Fletcher MD   Cholecalciferol (VITAMIN D3) 1000 UNITS TABS Take 1 tablet by mouth daily    Yes Historical Provider, MD   fluticasone-salmeterol (ADVAIR) 100-50 MCG/DOSE diskus inhaler INHALE 1 PUFF INTO THE LUNGS EVERY 12 HOURS 3/23/22   JERMAINE Hoyos CNP       Social History:   reports that she has never smoked.  She has never used smokeless tobacco. She reports that she does not drink alcohol and does not use drugs. Family History:  family history includes Arrhythmia in her mother; Cancer in her father; Diabetes in her paternal grandfather; Hypertension in her mother; No Known Problems in her brother, maternal aunt, maternal grandfather, maternal grandmother, maternal uncle, paternal aunt, paternal grandmother, paternal uncle, sister, and another family member. Reviewed. Denies family history of sudden cardiac death, arrhythmia, premature CAD    Review of System:  Pertinent positive and negatives are in the HPI, the rest are negative. Physical Examination:  /70 (Site: Right Upper Arm, Position: Sitting)   Pulse 70   Ht 5' 7\" (1.702 m)   Wt 218 lb (98.9 kg) Comment: pt reported  SpO2 100%   BMI 34.14 kg/m²      · Constitutional: Oriented. No distress. + fatigue  · Head: Normocephalic and atraumatic. · Mouth/Throat: Oropharynx is clear and moist.   · Eyes: Conjunctivae normal. EOM are normal.   · Neck: Normal range of motion. Neck supple. No rigidity. No JVD present. · Cardiovascular: Normal rate, irregular rhythm, S1&S2 and intact distal pulses. · Pulmonary/Chest: Bilateral respiratory sounds. No wheezes. No rhonchi. · Abdominal: Soft. Bowel sounds present. No distension, No tenderness. · Musculoskeletal: No tenderness. No edema    · Lymphadenopathy: Has no cervical adenopathy. · Neurological: Alert and oriented. Cranial nerve appears intact, No Gross deficit   · Skin: Skin is warm and dry. No rash noted. · Psychiatric: Has a normal mood, affect and behavior     Labs:  Reviewed. ECG: reviewed, atrial fibrillation with v-rate of 93 bpm with QRS duration 78 ms. No ventricular pre-excitation, or QT prolongation. Studies:   1. Event monitor:   none    2. Echo: 5/15/2017   Summary  Normal left ventricular size and wall thickness.  Normal left ventricularsystolic function with an estimated ejection fraction of 55-60% . Normal right ventricular size and function. No significant valvular disease. LA volume/Index: 71 ml /32 ml/m2    3. Stress Test:  5/15/2017  Summary    Normal myocardial perfusion study.    Normal LV size and systolic function.    Overall findings represent a low risk study.        Resting ECG    Normal sinus rhythm. Normal ECG.      Resting HR:59 bpm            Resting BP:121/83 mmHg        Pre-stress physical exam: Walking Lexiscan Stress Test:    Findings on the brief pre-stress cardiopulmonary exam:unremarkable heart and lungs.    Prior to test initiation, patient states she is asymptomatic.       Stress Protocol:Pharmacologic - Lexiscan's        Peak HR:94 bpm                               HR response: Normal    Peak BP:143/83 mmHg                          BP response: Normal    Predicted HR: 161 bpm                        HR/BP product:[id number]    % of predicted HR: 58    Test duration: 1 min and 40 sec    Reason for termination:Completed     4. Cath:   none    I independently reviewed the ECG, MCOT, echocardiogram, stress test, and coronary angiography/PCI results and used them for my plan of care. [id number] Score for Atrial Fibrillation Stroke Risk   Risk   Factors  Component Value   C CHF No 0   H HTN Yes 1   A2 Age >= 76 No,  (64 y.o.) 0   D DM Yes 1   S2 Prior Stroke/TIA yes 2   V Vascular Disease No 0   A Age 74-69 No,  (64 y.o.) 0   Sc Sex female 1    [id number]  Score  5   Score last updated 6/16/22 04:58 AM EDT    Click here for a link to the UpToDate guideline \"Atrial Fibrillation: Anticoagulation therapy to prevent embolization    Disclaimer: Risk Score calculation is dependent on accuracy of patient problem list and past encounter diagnosis. Cardiac Procedures:  1. none    Assessment/Plan:    Atrial fibrillation   -Symptomatic with fatigue.   -First noted during cataract surgery. Seen on EKG 4/26/2017 was noted to be back in rhythm on EKG 5/8/2017.  Return of atrial fibrillation noted on EKG on 6/9/2022 and today.   -She has a [id number] Score 5 (HTN, DM,TIA, GENDER)   -Continue Eliquis 5 mg BID for  thromboembolic risk reduction.   -Tolerating well no signs or symptoms of abnormal bruising or bleeding. -TSH 1.37 (7/9/2021)    Ms. Silvana Enrique had paroxysmal atrial fibrillation now seems persistent with symptoms of fatigue. No recent echocardiogram. We discussed treatment options for rhythm and rate control including medications vs ablation and the risks and benefits of each. She opts for ablation over medications given its higher efficacy and not be on rhythm management medications long term. Continue anticoagulation for now. Will plan for a THANIA before ablation to assess systolic function. AAD with flecainide for 3 months. Has allergy to corn oil hence amiodarone not an option.     - Afib risk factors including age, HTN, obesity, inactivity and WINIFRED were discussed with patient. Risk factor modification recommended      - Treatment options including cardioversion, rate control strategy, antiarrhythmics, anticoagulation and possible ablation were discussed with patient. Risks, benefits and alternative of each treatment options were explained. All questions answered                          ~ Information given regarding ablation for pt to review                          ~ The risks, benefits and alternatives of the ablation procedure were discussed with the patient.  The risks including, but not limited to, the risks of bleeding, infection, radiation exposure, injury to vascular, cardiac and surrounding structures (including pneumothorax), stroke, cardiac perforation, tamponade, need for emergent open heart surgery, need for pacemaker implantation, Injury to the phrenic nerve, injury to the esophagus, myocardial infarction and death were discussed in detail.                 - I explained the success rate considering possible need for a second procedure is about 60-80% and with addition of anti arrhythmics is higher     · Full THANIA day of procedure to assess heart and valvular function. · Patient verbalized understanding of procedure, risks and benefits and wishes to proceed with ablation. · Start Flecainide 50 mg BID. Essential hypertension   -Stable    -Continue current medical therapy. History of TIA   -2006.   -Residual deficit right eye does not close all the way when sleeping but can blink. Type 2 DM   -Continue current medical management.   -Hb A1c 5.9 (7/9/2021)    Follow ups:  -Follow up one month after the procedure with myself and  three months after procedure with Diamante Charlton CNP. Thank you for allowing me to participate in the care of Corey Curran. All questions and concerns were addressed to the patient/family. Alternatives to my treatment were discussed. This note was scribed in the presence of Darlene Christian MD by Kandace Joseph RN. Physician attestation: The scribe's documentation has been prepared under my direction and has been personally reviewed by me in its entirety. I confirm that the note above reflects all work, treatment, procedures, and medical decision making performed by me.      Darlene Christian MD  Cardiac Electrophysiology  [facility]

## 2022-06-22 ENCOUNTER — TELEPHONE (OUTPATIENT)
Dept: CARDIOLOGY CLINIC | Age: 67
End: 2022-06-22

## 2022-06-22 ENCOUNTER — OFFICE VISIT (OUTPATIENT)
Dept: CARDIOLOGY CLINIC | Age: 67
End: 2022-06-22

## 2022-06-22 VITALS
BODY MASS INDEX: 34.21 KG/M2 | DIASTOLIC BLOOD PRESSURE: 70 MMHG | SYSTOLIC BLOOD PRESSURE: 118 MMHG | HEART RATE: 70 BPM | HEIGHT: 67 IN | WEIGHT: 218 LBS | OXYGEN SATURATION: 100 %

## 2022-06-22 DIAGNOSIS — E11.9 CONTROLLED TYPE 2 DIABETES MELLITUS WITHOUT COMPLICATION, WITHOUT LONG-TERM CURRENT USE OF INSULIN (HCC): ICD-10-CM

## 2022-06-22 DIAGNOSIS — I48.0 PAROXYSMAL ATRIAL FIBRILLATION (HCC): Primary | ICD-10-CM

## 2022-06-22 DIAGNOSIS — I10 ESSENTIAL HYPERTENSION: ICD-10-CM

## 2022-06-22 DIAGNOSIS — Z86.73 HISTORY OF TIA (TRANSIENT ISCHEMIC ATTACK): ICD-10-CM

## 2022-06-22 DIAGNOSIS — Z79.01 CURRENT USE OF ANTICOAGULANT THERAPY: ICD-10-CM

## 2022-06-22 PROCEDURE — 99205 OFFICE O/P NEW HI 60 MIN: CPT | Performed by: INTERNAL MEDICINE

## 2022-06-22 RX ORDER — FLECAINIDE ACETATE 50 MG/1
50 TABLET ORAL 2 TIMES DAILY
Qty: 60 TABLET | Refills: 3 | Status: SHIPPED | OUTPATIENT
Start: 2022-06-22 | End: 2022-06-27 | Stop reason: SINTOL

## 2022-06-22 NOTE — PATIENT INSTRUCTIONS
If you have any question regarding your ablation please contact Dr. Janae Felix at [phone]. If you would like to proceed with scheduling please contact  Shawn Carvajal at [phone]. Since you are self pay you will need to reach out to Roya Bryant, Public  with [facility] she will assist you in seeing if you qualify for patient assistnace to help cover the cost of the procedure. She can be reached at [phone]. Atrial Fibrillation Ablation Pre procedure Instructions    Date:______________________________    Arrive at:__________________________    Procedure time:____________________    The morning of your procedure you will park in the hospital parking lot and report directly to the cath lab to check in. At the information desk stay right and go all the way to the end of the fraser, this will take you directly to your check in desk for the cath lab. Pre-Procedure Instructions   1. You will need to fast (nothing to eat or drink) after midnight the day of your procedure  2. Do NOT chew gum or eat mints the day of your procedure. 3. You will need to hold your Eliquis for 12 hours prior to the procedure. 4. You will need to hold all diabetic medications including, Metformin the morning of the procedure. If you take Lantus/Levemir only take ½ your normal dose the evening before. 5. Do not use any lotions, creams or perfume the morning of procedure. 6. You will need to complete pre-procedure lab work 5-7 days prior to your procedure. 7. Please have a responsible adult to drive you home after procedure, you should go home same day, but there is always a possibility of an overnight stay. 8. Cath lab will provide you with all post procedure instructions  9.  A 3 month follow up will be scheduled with Dr. Yazmin Gamboa Nurse practitioner Michael Moody CNP post procedure                                        [facility] Lab services  [address] [address]  Phone: [phone]  The hours are Mon -Fri. 6:30 am - 4:00 pm   Saturday 8:00 am - noon  No appointment necessary              Patient Education        Learning About Atrial Fibrillation  What is atrial fibrillation? Atrial fibrillation (say \"AY-tree-wade zlf-pjdc-OOF-shun\") is a common type of irregular heartbeat (arrhythmia). Normally, the heart beats in a strong, steady rhythm. In atrial fibrillation, a problem with the heart's electrical system causes the two upper chambers of the heart (called the atria) to quiver, orfibrillate. Atrial fibrillation can be dangerous. This is because if the heartbeat isn't strong and steady, blood can collect, or pool, in the atria. And pooled blood is more likely to form clots. Clots can travel to the brain, block blood flow,and cause a stroke. Atrial fibrillation can also lead to heart failure. This condition also upsets the normal rhythm between the atria and the lower chambers of the heart. (These chambers are called the ventricles.) Theventricles may beat fast and without a regular rhythm. What are the symptoms? Some people feel symptoms when they have episodes of atrial fibrillation. Butother people don't notice any symptoms. If you have symptoms, you may feel:   A fluttering, racing, or pounding feeling in your chest called palpitations.  Weak or tired.  Dizzy or lightheaded.  Short of breath.  Chest pain.  Confused. You may notice signs of atrial fibrillation when you check your pulse. Yourpulse may seem uneven or fast.  What can you expect when you have atrial fibrillation? At first, spells of atrial fibrillation may come on suddenly and last a short time. They may go away on their own or with treatment. Over time, the spellsmay last longer and occur more often. They often don't go away on their own. How is it treated?   Treatments can help you feel better and prevent future problems, especiallystroke and heart failure. Your treatment will depend on the cause of your atrial fibrillation, yoursymptoms, and your risk for stroke. Types of treatment include:   Heart rate treatment. Medicine may be used to slow your heart rate. Your heartbeat may still be irregular. But these medicines keep your heart from beating too fast. They may also help relieve symptoms.  Heart rhythm treatment. Different treatments may be used to try to stop atrial fibrillation and keep it from returning. They can also relieve symptoms. These treatments include medicine, electrical cardioversion to shock the heart back to a normal rhythm, a procedure called catheter ablation, and heart surgery.  Stroke prevention. You and your doctor can decide how to lower your risk. You may decide to take a blood-thinning medicine called an anticoagulant. What is a heart-healthy lifestyle for atrial fibrillation? You can live well and help manage atrial fibrillation by having a heart-healthy lifestyle. This lifestyle may help reduce how often you have episodes of atrialfibrillation. Don't smoke. Avoid secondhand smoke too. Quitting smoking is the best thing you can do for your heart. Be active. Talk to your doctor about what type and level of exercise is safe for you. Eat a heart-healthy diet. These foods include vegetables, fruits, nuts, beans, lean meat, fish, and wholegrains. Limit sodium, alcohol, and sugar. Avoid alcohol if it triggers symptoms. Stay at a healthy weight. Lose weight if you need to. Losing weight can help relieve symptoms. Manage other health problems. These problems include diabetes, high blood pressure, and high cholesterol. If you think you may have a problem with alcohol or drug use, talk to your doctor. Manage stress. Options like yoga, biofeedback, and meditation may help. Where can you learn more? Go to https://chong.health-QuantumSphere. org and sign in to your MyChart account. Enter N454 in the [facility] box to learn more about \"Learning About Atrial Fibrillation. \"     If you do not have an account, please click on the \"Sign Up Now\" link. Current as of: January 10, 2022               Content Version: 13.3  © 2006-2022 Healthwise, [employer]. Care instructions adapted under license by [facility] ([facility]). If you have questions about a medical condition or this instruction, always ask your healthcare professional. [address] [place] [address] any warranty or liability for your use of this information. Patient Education        Atrial Fibrillation: Care Instructions  Your Care Instructions     Atrial fibrillation is an irregular and often fast heartbeat. Treating this condition is important for several reasons. It can cause blood clots, which can travel from your heart to your brain and cause a stroke. If you have a fast heartbeat, you may feel lightheaded, dizzy, and weak. An irregular heartbeatcan also increase your risk for heart failure. Atrial fibrillation is often the result of another heart condition, such as high blood pressure or coronary artery disease. Making changes to improve yourheart condition will help you stay healthy and active. Follow-up care is a key part of your treatment and safety. Be sure to make and go to all appointments, and call your doctor if you are having problems. It's also a good idea to know your test results and keep alist of the medicines you take. How can you care for yourself at home? Medicines     Take your medicines exactly as prescribed. Call your doctor if you think you are having a problem with your medicine. You will get more details on the specific medicines your doctor prescribes.      If your doctor has given you a blood thinner to prevent a stroke, be sure you get instructions about how to take your medicine safely.  Blood thinners can cause serious bleeding problems.      Do not take any vitamins, over-the-counter drugs, or herbal products without talking to your doctor first.   Lifestyle changes     Do not smoke. Smoking can increase your chance of a stroke and heart attack. If you need help quitting, talk to your doctor about stop-smoking programs and medicines. These can increase your chances of quitting for good.      Eat a heart-healthy diet.      Stay at a healthy weight. Lose weight if you need to.      Limit alcohol to 2 drinks a day for men and 1 drink a day for women. Too much alcohol can cause health problems.      Avoid colds and flu. Get a pneumococcal vaccine shot. If you have had one before, ask your doctor whether you need another dose. Get a flu shot every year. If you must be around people with colds or flu, wash your hands often. Activity     If your doctor recommends it, get more exercise. Walking is a good choice. Bit by bit, increase the amount you walk every day. Try for at least 30 minutes on most days of the week. You also may want to swim, bike, or do other activities. Your doctor may suggest that you join a cardiac rehabilitation program so that you can have help increasing your physical activity safely.      Start light exercise if your doctor says it is okay. Even a small amount will help you get stronger, have more energy, and manage stress. Walking is an easy way to get exercise. Start out by walking a little more than you did in the hospital. Gradually increase the amount you walk.      When you exercise, watch for signs that your heart is working too hard. You are pushing too hard if you cannot talk while you are exercising. If you become short of breath or dizzy or have chest pain, sit down and rest immediately.      Check your pulse regularly. Place two fingers on the artery at the palm side of your wrist, in line with your thumb. If your heartbeat seems uneven or fast, talk to your doctor. When should you call for help?    Call 911 anytime you think you may need questions about a medical condition or this instruction, always ask your healthcare professional. [address] any warranty or liability for your use of this information. Patient Education        Learning About Catheter Ablation for Heart Rhythm Problems  What is catheter ablation? Catheter ablation is a procedure that treats heart rhythm problems. These problems include atrial fibrillation, supraventricular tachycardia (SVT),atrial flutter, and ventricular tachycardia. Your heart should have a strong, steady beat. That beat is controlled by the heart's electrical system. Sometimes that system misfires. This causes aheartbeat that is too fast and isn't steady. Catheter ablation is a way to get into your heart and fix the problem. Ablationis not surgery. How is catheter ablation done? Your doctor inserts thin tubes called catheters into a blood vessel in your groin, arm, or neck. Then your doctor feeds them into the heart. Wires in the catheters help the doctor find the problem areas. Then the doctor uses the wires to send energy to destroy the tiny areas of heart tissue that are causingthe problems. It may seem like a bad idea to destroy parts of your heart on purpose. But the areas that are destroyed are very tiny. They should not affect your heart'sability to do its job. You may be awake during the procedure. Or you may be asleep. The doctor will give you medicines to help you feel relaxed and to numb the areas where thecatheters go in. What can you expect after catheter ablation? You may stay overnight in the hospital.  You can do light activities at home. Don't do anything strenuous until yourdoctor says it is okay. This may be for several days. You may have swelling, bruising, or a small lump around the site where thecatheters went into your body. These should go away in 3 to 4 weeks. You may have to take some medicines for a while.   Follow-up care is a key part of your treatment and safety. Be sure to make and go to all appointments, and call your doctor if you are having problems. It's also a good idea to know your test results and keep alist of the medicines you take. Where can you learn more? Go to https://chong.Strutta. org and sign in to your [employer] account. Enter K309 in the [employer]oVadis box to learn more about \"Learning About Catheter Ablation for Heart Rhythm Problems. \"     If you do not have an account, please click on the \"Sign Up Now\" link. Current as of: January 10, 2022               Content Version: 13.3  © 2006-2022 [employer] Harry. Care instructions adapted under license by [facility] ([facility]). If you have questions about a medical condition or this instruction, always ask your healthcare professional. [address] any warranty or liability for your use of this information. Patient Education        Catheter Ablation: Before Your Procedure  What is a catheter ablation? A catheter ablation may be done if there is a problem with your heartbeat (heart rhythm). This procedure destroys (ablates) tiny areas of the heart that are causing your heart rhythm problem. This should not affect the heart'sability to do its job. The doctor puts thin tubes called catheters into blood vessels in your groin, arm, or neck. The tubes are guided to your heart. There is an electrode at the tip of each tube. The electrode helps the doctor find the problem areas. Then the doctor uses the electrode to send energy to destroy the areas of hearttissue that are causing the problem. You may get medicine that relaxes you or puts you in a light sleep. Or you might be asleep during the procedure. The places where the catheters go in willbe numb. After an ablation, you may stay overnight in the hospital.  How do you prepare for the procedure? Procedures can be stressful.  This information will help you understand what youcan was put in a blood vessel. This will help prevent bleeding. A small device may also be used to close the blood vessel. You may have a bandage or a compression device on each catheter site.      Nurses will check your heart rate and blood pressure. The nurse will also check the catheter site for bleeding.      If the catheter was put in your groin, you will need to lie still and keep your leg straight for up to a few hours. The nurse may put a weighted bag on your leg to help you keep it still.      If the catheter was put in your neck or arm, you may be able to sit up right away. If it was in your arm, you will need to keep your arm still for at least 1 hour.      You may have a bruise or a small lump where the catheter was put in your blood vessel. This is normal and will go away. When should you call your doctor?  You have questions or concerns.      You don't understand how to prepare for your procedure.      You become ill before the procedure (such as fever, flu, or a cold).      You need to reschedule or have changed your mind about having the procedure. Where can you learn more? Go to https://Grokr.Lizhi. org and sign in to your Getbazza account. Enter C459 in the [employer] box to learn more about \"Catheter Ablation: Before Your Procedure. \"     If you do not have an account, please click on the \"Sign Up Now\" link. Current as of: January 10, 2022               Content Version: 13.3  © 2006-2022 Central Desktop. Care instructions adapted under license by [facility] ([facility]). If you have questions about a medical condition or this instruction, always ask your healthcare professional. [address] [place] [address] any warranty or liability for your use of this information. Patient Education        Catheter Ablation: What to Expect at [address] [place] [address] Drive had a catheter ablation to try to correct a problem with your heartbeat (heart rhythm).  You may have swelling, bruising, or a small lump around the sites where the catheters went into your body. These should go away in 3 to 4weeks. You can do light activities at home. Don't do anything strenuous until yourdoctor says it is okay. This may be for several days. This care sheet gives you a general idea about how long it will take for you to recover. But each person recovers at a different pace. Follow the steps belowto get better as quickly as possible. How can you care for yourself at home? Activity     If the doctor gave you a sedative:  ? For 24 hours, don't do anything that requires attention to detail, such as going to work, making important decisions, or signing any legal documents. It takes time for the medicine's effects to completely wear off.  ? For your safety, do not drive or operate any machinery that could be dangerous. Wait until the medicine wears off and you can think clearly and react easily.      Do not do strenuous exercise and do not lift, pull, or push anything heavy until your doctor says it is okay. This may be for several days.      If the catheter was placed in your groin, try not to walk up stairs for the first couple of days.      If the catheter was placed in your arm near your wrist, do not bend your wrist deeply for the first couple of days. Be careful using your hand to get into and out of a chair or bed.      Ask your doctor when it is okay to have sex. Diet     You can eat your normal diet. If your stomach is upset, try bland, low-fat foods like plain rice, broiled chicken, toast, and yogurt.      Drink plenty of fluids (unless your doctor tells you not to).    Eat heart-healthy foods. These foods include vegetables, fruits, nuts, beans, lean meat, fish, and whole grains. Limit sodium, alcohol, and sugar. Medicines     Your doctor will tell you if and when you can restart your medicines.  You will also get instructions about taking any new medicines.      If you stopped taking aspirin or some other blood thinner, your doctor will tell you when to start taking it again.      Be safe with medicines. Take your medicines exactly as prescribed. Call your doctor if you think you are having a problem with your medicine. Catheter site care     For 1 or 2 days, keep a bandage over the spot where the catheter was inserted. The bandage probably will fall off in this time.      Put ice or a cold pack on the area for 10 to 20 minutes at a time to help with soreness or swelling. Put a thin cloth between the ice and your skin.      You may shower 24 to 48 hours after the procedure, if your doctor okays it. Pat the incision dry.      Do not soak the catheter site until it is healed. Don't take a bath for 1 week, or until your doctor tells you it is okay.      Watch for bleeding from the site. A small amount of blood (up to the size of a quarter) on the bandage can be normal.      If you are bleeding, lie down and press on the area for 15 minutes to try to make it stop. If the bleeding does not stop, call your doctor or seek immediate medical care. Heart-healthy lifestyle     Don't smoke.      Be active. Try for at least 30 minutes of activity on most days of the week. Talk to your doctor about what type and level of exercise is safe for you.      Stay at a healthy weight. Lose weight if you need to.      Manage other health problems such as high blood pressure, high cholesterol, diabetes, and sleep apnea. Follow-up care is a key part of your treatment and safety. Be sure to make and go to all appointments, and call your doctor if you are having problems. It's also a good idea to know your test results and keep alist of the medicines you take. When should you call for help? Call 911  anytime you think you may need emergency care. For example, call if:     You passed out (lost consciousness).      You have symptoms of a heart attack. These may include:  ?  Chest pain or pressure, or a strange feeling in the chest.  ? Sweating. ? Shortness of breath. ? Nausea or vomiting. ? Pain, pressure, or a strange feeling in the back, neck, jaw, or upper belly, or in one or both shoulders or arms. ? Lightheadedness or sudden weakness. ? A fast or irregular heartbeat. After you call 911, the  may tell you to chew 1 adult-strength or 2 to 4 low-dose aspirin. Wait for an ambulance. Do not try to drive yourself.      You have symptoms of a stroke. These may include:  ? Sudden numbness, tingling, weakness, or loss of movement in your face, arm, or leg, especially on only one side of your body. ? Sudden vision changes. ? Sudden trouble speaking. ? Sudden confusion or trouble understanding simple statements. ? Sudden problems with walking or balance. ? A sudden, severe headache that is different from past headaches. Call your doctor now or seek immediate medical care if:     You are bleeding from the area where the catheter was put in your blood vessel.      You have a fast-growing, painful lump at the catheter site.      You have signs of infection, such as:  ? Increased pain, swelling, warmth, or redness. ? Red streaks leading from the catheter site. ? Pus draining from the catheter site. ? A fever.      Your leg, arm, or hand is painful, looks blue, or feels cold, numb, or tingly. Watch closely for any changes in your health, and be sure to contact yourdoctor if you have any problems. Current as of: January 10, 2022               Content Version: 13.3  © 2006-2022 Healthwise, Incorporated. Care instructions adapted under license by [facility] ([facility]). If you have questions about a medical condition or this instruction, always ask your healthcare professional. [address] [place] [address] any warranty or liability for your use of this information.

## 2022-06-22 NOTE — TELEPHONE ENCOUNTER
Please reach out to patient and schedule THANIA atrial fibrillation ablation with Dr. Maureen Dyer. Echo tech needed for THANIA. Patient is self pay and was instructed to call Willi Malcolm, Public  with [facility] to assist in seeing if she qualifies for patient assistance to help cover the cost of the procedure. Atrial Fibrillation Ablation Pre procedure Instructions     Date:______________________________     Arrive at:__________________________     Procedure time:____________________     The morning of your procedure you will park in the hospital parking lot and report directly to the cath lab to check in. At the information desk stay right and go all the way to the end of the fraser, this will take you directly to your check in desk for the cath lab.     Pre-Procedure Instructions   1. You will need to fast (nothing to eat or drink) after midnight the day of your procedure  2. Do NOT chew gum or eat mints the day of your procedure. 3. You will need to hold your Eliquis for 12 hours prior to the procedure. 4. You will need to hold all diabetic medications including, Metformin the morning of the procedure. If you take Lantus/Levemir only take ½ your normal dose the evening before. 5. Do not use any lotions, creams or perfume the morning of procedure. 6. You will need to complete pre-procedure lab work 5-7 days prior to your procedure. 7. Please have a responsible adult to drive you home after procedure, you should go home same day, but there is always a possibility of an overnight stay. 8. Cath lab will provide you with all post procedure instructions  9. A 3 month follow up will be scheduled with Dr. Venkatesh Webster Nurse practitioner Niles Veloz CNP post procedure        [facility]     [facility] services  [address] [place] [address] Drive. [address] [address]  Phone: [phone]  The hours are Mon -Fri.   6:30 am - 4:00 pm   Saturday 8:00 am - noon  No appointment necessary

## 2022-06-23 PROCEDURE — 93000 ELECTROCARDIOGRAM COMPLETE: CPT | Performed by: INTERNAL MEDICINE

## 2022-06-23 NOTE — TELEPHONE ENCOUNTER
Pt called in wanting to schedule procedure. She wanted the week of 7/18. Procedure is scheduled on 7/18/22 at 7:30 am. Arrival time is 6:30 am. Pt is agreeable to date/time. Went over pre-procedure instructions. Pt v/u. Published on Vital Connect and e-mail to Lisa Basilio. While on the phone pt had a question about a shot because she has adrenal insufficiency. Spoke with Owen JARRETT and she is going to check with Dr. Clementine Silver. Pt v/u.

## 2022-06-23 NOTE — TELEPHONE ENCOUNTER
Assumed care of PT A&O 4. Pt resting in bed with no signs of labored breathing. On RA. Tele monitor in place, cardiac rhythm being monitored. Call light within reach, bed in lowest position, upper bed rails up. Pt was updated on plan of care for the day. Will continue to monitor.    Dr. Loni Carroll,    With patient adrenal insufficieny would we want her to see her endocrinologist to get her cortisol shot before her ablation on 7/18/2022 of so how soon before or would this be something you could give right before the procedure.     Please advise,  Thanks,  Joan Rhodes RN

## 2022-06-27 ENCOUNTER — TELEPHONE (OUTPATIENT)
Dept: CARDIOLOGY CLINIC | Age: 67
End: 2022-06-27

## 2022-06-27 NOTE — TELEPHONE ENCOUNTER
Nasreen Pereira called in this afternoon, she states she cannot take the flecainide it makes her ankles swell and she has trouble breathing. She can be reached at [phone].

## 2022-06-27 NOTE — TELEPHONE ENCOUNTER
Dr. Jose Serrano,    Patient was seen in office on 6/22/2022 and started on Flecainide 50 mg BID. The initial thought was to put her on amiodarone but she has a allergy to corn oil and it was popping up a warning for contraindication for use of amio with the allergy. She is reporting symptoms of leg swelling and shortness of breath since starting. She does not have a history of HF and last ECHO 5/15/2017 LVEF 55-60 %. Plan was for Full THANIA at time of her ablation which is scheduled for 7/18/2022. Please advise.   Thanks,  Anna Morales RN

## 2022-07-13 DIAGNOSIS — I48.0 PAROXYSMAL ATRIAL FIBRILLATION (HCC): ICD-10-CM

## 2022-07-13 LAB
ABO/RH: NORMAL
ANION GAP SERPL CALCULATED.3IONS-SCNC: 11 MMOL/L (ref 3–16)
ANTIBODY SCREEN: NORMAL
BUN BLDV-MCNC: 20 MG/DL (ref 7–20)
CALCIUM SERPL-MCNC: 9.2 MG/DL (ref 8.3–10.6)
CHLORIDE BLD-SCNC: 104 MMOL/L (ref 99–110)
CO2: 25 MMOL/L (ref 21–32)
CREAT SERPL-MCNC: 1.2 MG/DL (ref 0.6–1.2)
GFR AFRICAN AMERICAN: 55
GFR NON-AFRICAN AMERICAN: 45
GLUCOSE BLD-MCNC: 89 MG/DL (ref 70–99)
HCT VFR BLD CALC: 37.8 % (ref 36–48)
HEMOGLOBIN: 12.7 G/DL (ref 12–16)
MCH RBC QN AUTO: 29.9 PG (ref 26–34)
MCHC RBC AUTO-ENTMCNC: 33.5 G/DL (ref 31–36)
MCV RBC AUTO: 89.4 FL (ref 80–100)
PDW BLD-RTO: 14.8 % (ref 12.4–15.4)
PLATELET # BLD: 191 K/UL (ref 135–450)
PMV BLD AUTO: 9.5 FL (ref 5–10.5)
POTASSIUM SERPL-SCNC: 4 MMOL/L (ref 3.5–5.1)
RBC # BLD: 4.23 M/UL (ref 4–5.2)
SODIUM BLD-SCNC: 140 MMOL/L (ref 136–145)
WBC # BLD: 6.9 K/UL (ref 4–11)

## 2022-07-15 NOTE — TELEPHONE ENCOUNTER
I spoke with patient and advised that they would give cortisone prior to the procedure. She confirmed that I had previously relayed this information. I review pre procedure instruction and she verbalized understanding.

## 2022-07-18 ENCOUNTER — ANESTHESIA (OUTPATIENT)
Dept: CARDIAC CATH/INVASIVE PROCEDURES | Age: 67
End: 2022-07-18

## 2022-07-18 ENCOUNTER — HOSPITAL ENCOUNTER (OUTPATIENT)
Dept: CARDIAC CATH/INVASIVE PROCEDURES | Age: 67
Discharge: HOME | End: 2022-07-18

## 2022-07-18 ENCOUNTER — ANESTHESIA EVENT (OUTPATIENT)
Dept: CARDIAC CATH/INVASIVE PROCEDURES | Age: 67
End: 2022-07-18

## 2022-07-18 VITALS
OXYGEN SATURATION: 93 % | HEART RATE: 74 BPM | SYSTOLIC BLOOD PRESSURE: 123 MMHG | BODY MASS INDEX: 35.31 KG/M2 | DIASTOLIC BLOOD PRESSURE: 82 MMHG | RESPIRATION RATE: 18 BRPM | HEIGHT: 67 IN | WEIGHT: 225 LBS | TEMPERATURE: 96.1 F

## 2022-07-18 DIAGNOSIS — I48.0 PAROXYSMAL ATRIAL FIBRILLATION (HCC): ICD-10-CM

## 2022-07-18 LAB
ABO/RH: NORMAL
ANTIBODY SCREEN: NORMAL
LV EF: 60 %
LVEF MODALITY: NORMAL
POC ACT LR: 202 SEC
POC ACT LR: 272 SEC
POC ACT LR: 345 SEC
POC ACT LR: 347 SEC
POC ACT LR: 352 SEC
POC ACT LR: 370 SEC
POC ACT LR: 384 SEC
POC ACT LR: 397 SEC
POC ACT LR: >400 SEC

## 2022-07-18 PROCEDURE — 93656 COMPRE EP EVAL ABLTJ ATR FIB: CPT | Performed by: INTERNAL MEDICINE

## 2022-07-18 PROCEDURE — 93005 ELECTROCARDIOGRAM TRACING: CPT | Performed by: INTERNAL MEDICINE

## 2022-07-18 PROCEDURE — 93656 COMPRE EP EVAL ABLTJ ATR FIB: CPT

## 2022-07-18 PROCEDURE — 6360000002 HC RX W HCPCS: Performed by: NURSE ANESTHETIST, CERTIFIED REGISTERED

## 2022-07-18 PROCEDURE — C1760 CLOSURE DEV, VASC: HCPCS

## 2022-07-18 PROCEDURE — 2500000003 HC RX 250 WO HCPCS

## 2022-07-18 PROCEDURE — 93325 DOPPLER ECHO COLOR FLOW MAPG: CPT

## 2022-07-18 PROCEDURE — 93320 DOPPLER ECHO COMPLETE: CPT

## 2022-07-18 PROCEDURE — C1759 CATH, INTRA ECHOCARDIOGRAPHY: HCPCS

## 2022-07-18 PROCEDURE — C1894 INTRO/SHEATH, NON-LASER: HCPCS

## 2022-07-18 PROCEDURE — 93312 ECHO TRANSESOPHAGEAL: CPT

## 2022-07-18 PROCEDURE — A4216 STERILE WATER/SALINE, 10 ML: HCPCS

## 2022-07-18 PROCEDURE — 85347 COAGULATION TIME ACTIVATED: CPT

## 2022-07-18 PROCEDURE — 86850 RBC ANTIBODY SCREEN: CPT

## 2022-07-18 PROCEDURE — 86901 BLOOD TYPING SEROLOGIC RH(D): CPT

## 2022-07-18 PROCEDURE — 93308 TTE F-UP OR LMTD: CPT

## 2022-07-18 PROCEDURE — 2500000003 HC RX 250 WO HCPCS: Performed by: NURSE ANESTHETIST, CERTIFIED REGISTERED

## 2022-07-18 PROCEDURE — C1766 INTRO/SHEATH,STRBLE,NON-PEEL: HCPCS

## 2022-07-18 PROCEDURE — 93623 PRGRMD STIMJ&PACG IV RX NFS: CPT | Performed by: INTERNAL MEDICINE

## 2022-07-18 PROCEDURE — 93321 DOPPLER ECHO F-UP/LMTD STD: CPT

## 2022-07-18 PROCEDURE — 93657 TX L/R ATRIAL FIB ADDL: CPT | Performed by: INTERNAL MEDICINE

## 2022-07-18 PROCEDURE — 2580000003 HC RX 258

## 2022-07-18 PROCEDURE — 3700000000 HC ANESTHESIA ATTENDED CARE

## 2022-07-18 PROCEDURE — 7100000000 HC PACU RECOVERY - FIRST 15 MIN

## 2022-07-18 PROCEDURE — 93623 PRGRMD STIMJ&PACG IV RX NFS: CPT

## 2022-07-18 PROCEDURE — C1730 CATH, EP, 19 OR FEW ELECT: HCPCS

## 2022-07-18 PROCEDURE — 2500000003 HC RX 250 WO HCPCS: Performed by: INTERNAL MEDICINE

## 2022-07-18 PROCEDURE — 2580000003 HC RX 258: Performed by: NURSE ANESTHETIST, CERTIFIED REGISTERED

## 2022-07-18 PROCEDURE — 86900 BLOOD TYPING SEROLOGIC ABO: CPT

## 2022-07-18 PROCEDURE — 2709999900 HC NON-CHARGEABLE SUPPLY

## 2022-07-18 PROCEDURE — 93622 COMP EP EVAL L VENTR PAC&REC: CPT

## 2022-07-18 PROCEDURE — 2580000003 HC RX 258: Performed by: INTERNAL MEDICINE

## 2022-07-18 PROCEDURE — C1732 CATH, EP, DIAG/ABL, 3D/VECT: HCPCS

## 2022-07-18 PROCEDURE — 3700000001 HC ADD 15 MINUTES (ANESTHESIA)

## 2022-07-18 PROCEDURE — 93622 COMP EP EVAL L VENTR PAC&REC: CPT | Performed by: INTERNAL MEDICINE

## 2022-07-18 PROCEDURE — 6360000002 HC RX W HCPCS

## 2022-07-18 RX ORDER — INSULIN ASPART 100 [IU]/ML
4 INJECTION, SOLUTION INTRAVENOUS; SUBCUTANEOUS
COMMUNITY
End: 2022-07-21

## 2022-07-18 RX ORDER — SODIUM CHLORIDE 9 MG/ML
INJECTION, SOLUTION INTRAVENOUS PRN
Status: DISCONTINUED | OUTPATIENT
Start: 2022-07-18 | End: 2022-07-19 | Stop reason: HOSPADM

## 2022-07-18 RX ORDER — MIDAZOLAM HYDROCHLORIDE 1 MG/ML
INJECTION INTRAMUSCULAR; INTRAVENOUS PRN
Status: DISCONTINUED | OUTPATIENT
Start: 2022-07-18 | End: 2022-07-18 | Stop reason: SDUPTHER

## 2022-07-18 RX ORDER — SODIUM CHLORIDE 0.9 % (FLUSH) 0.9 %
5-40 SYRINGE (ML) INJECTION EVERY 12 HOURS SCHEDULED
Status: DISCONTINUED | OUTPATIENT
Start: 2022-07-18 | End: 2022-07-18

## 2022-07-18 RX ORDER — PROPOFOL 10 MG/ML
INJECTION, EMULSION INTRAVENOUS PRN
Status: DISCONTINUED | OUTPATIENT
Start: 2022-07-18 | End: 2022-07-18 | Stop reason: SDUPTHER

## 2022-07-18 RX ORDER — GLYCOPYRROLATE 0.2 MG/ML
INJECTION INTRAMUSCULAR; INTRAVENOUS PRN
Status: DISCONTINUED | OUTPATIENT
Start: 2022-07-18 | End: 2022-07-18 | Stop reason: SDUPTHER

## 2022-07-18 RX ORDER — SODIUM CHLORIDE 0.9 % (FLUSH) 0.9 %
5-40 SYRINGE (ML) INJECTION PRN
Status: DISCONTINUED | OUTPATIENT
Start: 2022-07-18 | End: 2022-07-19 | Stop reason: HOSPADM

## 2022-07-18 RX ORDER — INSULIN GLARGINE 100 [IU]/ML
18 INJECTION, SOLUTION SUBCUTANEOUS NIGHTLY
COMMUNITY
End: 2022-07-21

## 2022-07-18 RX ORDER — PROTAMINE SULFATE 10 MG/ML
INJECTION, SOLUTION INTRAVENOUS PRN
Status: DISCONTINUED | OUTPATIENT
Start: 2022-07-18 | End: 2022-07-18 | Stop reason: SDUPTHER

## 2022-07-18 RX ORDER — ONDANSETRON 2 MG/ML
4 INJECTION INTRAMUSCULAR; INTRAVENOUS
Status: ACTIVE | OUTPATIENT
Start: 2022-07-18 | End: 2022-07-18

## 2022-07-18 RX ORDER — FENTANYL CITRATE 50 UG/ML
INJECTION, SOLUTION INTRAMUSCULAR; INTRAVENOUS PRN
Status: DISCONTINUED | OUTPATIENT
Start: 2022-07-18 | End: 2022-07-18 | Stop reason: SDUPTHER

## 2022-07-18 RX ORDER — SODIUM CHLORIDE 0.9 % (FLUSH) 0.9 %
5-40 SYRINGE (ML) INJECTION EVERY 12 HOURS SCHEDULED
Status: DISCONTINUED | OUTPATIENT
Start: 2022-07-18 | End: 2022-07-19 | Stop reason: HOSPADM

## 2022-07-18 RX ORDER — ONDANSETRON 2 MG/ML
INJECTION INTRAMUSCULAR; INTRAVENOUS PRN
Status: DISCONTINUED | OUTPATIENT
Start: 2022-07-18 | End: 2022-07-18 | Stop reason: SDUPTHER

## 2022-07-18 RX ORDER — SUCCINYLCHOLINE/SOD CL,ISO/PF 200MG/10ML
SYRINGE (ML) INTRAVENOUS PRN
Status: DISCONTINUED | OUTPATIENT
Start: 2022-07-18 | End: 2022-07-18 | Stop reason: SDUPTHER

## 2022-07-18 RX ORDER — ROCURONIUM BROMIDE 10 MG/ML
INJECTION, SOLUTION INTRAVENOUS PRN
Status: DISCONTINUED | OUTPATIENT
Start: 2022-07-18 | End: 2022-07-18 | Stop reason: SDUPTHER

## 2022-07-18 RX ORDER — PANTOPRAZOLE SODIUM 40 MG/1
40 TABLET, DELAYED RELEASE ORAL
Qty: 30 TABLET | Refills: 0 | Status: SHIPPED | OUTPATIENT
Start: 2022-07-18 | End: 2022-07-21 | Stop reason: SINTOL

## 2022-07-18 RX ORDER — PANTOPRAZOLE SODIUM 40 MG/1
40 TABLET, DELAYED RELEASE ORAL
Qty: 30 TABLET | Refills: 0 | Status: CANCELLED | OUTPATIENT
Start: 2022-07-18 | End: 2022-08-17

## 2022-07-18 RX ORDER — HEPARIN SODIUM 1000 [USP'U]/ML
INJECTION, SOLUTION INTRAVENOUS; SUBCUTANEOUS PRN
Status: DISCONTINUED | OUTPATIENT
Start: 2022-07-18 | End: 2022-07-18 | Stop reason: SDUPTHER

## 2022-07-18 RX ADMIN — FENTANYL CITRATE 50 MCG: 50 INJECTION INTRAMUSCULAR; INTRAVENOUS at 07:51

## 2022-07-18 RX ADMIN — ROCURONIUM BROMIDE 20 MG: 10 INJECTION INTRAVENOUS at 08:23

## 2022-07-18 RX ADMIN — HEPARIN SODIUM 6000 UNITS: 1000 INJECTION INTRAVENOUS; SUBCUTANEOUS at 08:49

## 2022-07-18 RX ADMIN — PHENYLEPHRINE HYDROCHLORIDE 200 MCG: 10 INJECTION INTRAVENOUS at 08:34

## 2022-07-18 RX ADMIN — ONDANSETRON 4 MG: 2 INJECTION INTRAMUSCULAR; INTRAVENOUS at 08:38

## 2022-07-18 RX ADMIN — SUGAMMADEX 200 MG: 100 INJECTION, SOLUTION INTRAVENOUS at 12:04

## 2022-07-18 RX ADMIN — ROCURONIUM BROMIDE 5 MG: 10 INJECTION INTRAVENOUS at 07:51

## 2022-07-18 RX ADMIN — Medication 120 MG: at 07:52

## 2022-07-18 RX ADMIN — HEPARIN SODIUM 5000 UNITS: 1000 INJECTION INTRAVENOUS; SUBCUTANEOUS at 08:38

## 2022-07-18 RX ADMIN — PHENYLEPHRINE HYDROCHLORIDE 200 MCG: 10 INJECTION INTRAVENOUS at 09:39

## 2022-07-18 RX ADMIN — HEPARIN SODIUM 6000 UNITS: 1000 INJECTION INTRAVENOUS; SUBCUTANEOUS at 09:09

## 2022-07-18 RX ADMIN — PROPOFOL 100 MG: 10 INJECTION, EMULSION INTRAVENOUS at 07:51

## 2022-07-18 RX ADMIN — PHENYLEPHRINE HYDROCHLORIDE 100 MCG: 10 INJECTION INTRAVENOUS at 08:24

## 2022-07-18 RX ADMIN — HYDROCORTISONE SODIUM SUCCINATE 100 MG: 100 INJECTION, POWDER, FOR SOLUTION INTRAMUSCULAR; INTRAVENOUS at 07:42

## 2022-07-18 RX ADMIN — PHENYLEPHRINE HYDROCHLORIDE 200 MCG: 10 INJECTION INTRAVENOUS at 09:52

## 2022-07-18 RX ADMIN — PHENYLEPHRINE HYDROCHLORIDE 200 MCG: 10 INJECTION INTRAVENOUS at 09:49

## 2022-07-18 RX ADMIN — MIDAZOLAM 2 MG: 1 INJECTION INTRAMUSCULAR; INTRAVENOUS at 07:42

## 2022-07-18 RX ADMIN — PHENYLEPHRINE HYDROCHLORIDE 300 MCG: 10 INJECTION INTRAVENOUS at 11:16

## 2022-07-18 RX ADMIN — PHENYLEPHRINE HYDROCHLORIDE 100 MCG: 10 INJECTION INTRAVENOUS at 09:46

## 2022-07-18 RX ADMIN — PHENYLEPHRINE HYDROCHLORIDE 200 MCG: 10 INJECTION INTRAVENOUS at 10:27

## 2022-07-18 RX ADMIN — PHENYLEPHRINE HYDROCHLORIDE 200 MCG: 10 INJECTION INTRAVENOUS at 12:02

## 2022-07-18 RX ADMIN — ROCURONIUM BROMIDE 25 MG: 10 INJECTION INTRAVENOUS at 07:59

## 2022-07-18 RX ADMIN — GLYCOPYRROLATE 0.2 MG: 0.2 INJECTION, SOLUTION INTRAMUSCULAR; INTRAVENOUS at 07:42

## 2022-07-18 RX ADMIN — HEPARIN SODIUM 2000 UNITS: 1000 INJECTION INTRAVENOUS; SUBCUTANEOUS at 10:35

## 2022-07-18 RX ADMIN — PHENYLEPHRINE HYDROCHLORIDE 200 MCG: 10 INJECTION INTRAVENOUS at 11:14

## 2022-07-18 RX ADMIN — PHENYLEPHRINE HYDROCHLORIDE 200 MCG: 10 INJECTION INTRAVENOUS at 10:56

## 2022-07-18 RX ADMIN — PHENYLEPHRINE HYDROCHLORIDE 200 MCG: 10 INJECTION INTRAVENOUS at 08:39

## 2022-07-18 RX ADMIN — PHENYLEPHRINE HYDROCHLORIDE 200 MCG: 10 INJECTION INTRAVENOUS at 09:00

## 2022-07-18 RX ADMIN — FENTANYL CITRATE 50 MCG: 50 INJECTION INTRAMUSCULAR; INTRAVENOUS at 08:23

## 2022-07-18 RX ADMIN — HEPARIN SODIUM 1000 UNITS: 1000 INJECTION INTRAVENOUS; SUBCUTANEOUS at 10:14

## 2022-07-18 RX ADMIN — PHENYLEPHRINE HYDROCHLORIDE 200 MCG: 10 INJECTION INTRAVENOUS at 11:25

## 2022-07-18 RX ADMIN — PHENYLEPHRINE HYDROCHLORIDE 100 MCG: 10 INJECTION INTRAVENOUS at 08:09

## 2022-07-18 RX ADMIN — PHENYLEPHRINE HYDROCHLORIDE 200 MCG: 10 INJECTION INTRAVENOUS at 10:35

## 2022-07-18 RX ADMIN — HEPARIN SODIUM 2000 UNITS: 1000 INJECTION INTRAVENOUS; SUBCUTANEOUS at 10:56

## 2022-07-18 RX ADMIN — SODIUM CHLORIDE: 9 INJECTION, SOLUTION INTRAVENOUS at 07:00

## 2022-07-18 RX ADMIN — PHENYLEPHRINE HYDROCHLORIDE 200 MCG: 10 INJECTION INTRAVENOUS at 08:55

## 2022-07-18 RX ADMIN — PROTAMINE SULFATE 30 MG: 10 INJECTION, SOLUTION INTRAVENOUS at 12:02

## 2022-07-18 RX ADMIN — PHENYLEPHRINE HYDROCHLORIDE 200 MCG: 10 INJECTION INTRAVENOUS at 08:42

## 2022-07-18 RX ADMIN — PROPOFOL 50 MG: 10 INJECTION, EMULSION INTRAVENOUS at 09:36

## 2022-07-18 RX ADMIN — SODIUM CHLORIDE 2 MCG/MIN: 9 INJECTION, SOLUTION INTRAVENOUS at 11:53

## 2022-07-18 RX ADMIN — PHENYLEPHRINE HYDROCHLORIDE 100 MCG: 10 INJECTION INTRAVENOUS at 08:57

## 2022-07-18 RX ADMIN — PHENYLEPHRINE HYDROCHLORIDE 200 MCG: 10 INJECTION INTRAVENOUS at 08:49

## 2022-07-18 RX ADMIN — PHENYLEPHRINE HYDROCHLORIDE 200 MCG: 10 INJECTION INTRAVENOUS at 08:46

## 2022-07-18 ASSESSMENT — ENCOUNTER SYMPTOMS: SHORTNESS OF BREATH: 0

## 2022-07-18 NOTE — DISCHARGE INSTRUCTIONS
CARDIAC ABLATION    Care of your puncture site:  Remove bandage 24 hours after the procedure. May shower in 24 hours but do not sit in a bathtub/pool of water for 5 days or until the wound is healed. Gently clean groin using soap and water. Dry thoroughly and apply a Band-Aid that covers the entire site. Use Band-Aid until skin heals over in about 3-5 days. Do not apply powder or lotion. Limit walking and stair climbing today. Normal Observations:  Soreness or tenderness which may last one week. Mild oozing from the incision site. Possible bruising that could last 2 weeks. You may experience chest burning that will peak in 2 days of the procedure. The burning will begin to subside the following days. You may take Tylenol for the discomfort    Activity:  You may resume driving 24 hours following the procedure. Do not make important / legal decisions within 24 hours after procedure. Do not drink alcoholic beverages or take any sleeping pills for 24 hours. You may resume normal activity in 3 days or after the wound heals. Avoid lifting more than 10 pounds for 3 days or until the wound heals. Avoid strenuous exercise or activity for 1 week. Nutrition:  Regular diet    Call your doctor immediately if your condition worsens, for any other concerns, for a follow-up appointment or if you experience any of the following:  Increased swelling on the groin or leg. Unusual pain, numbness, or tingling of the groin or down the leg. Any signs of infection such as: redness, yellow drainage at the site, swelling or pain.     IF GROIN STARTS BLEEDING SIGNIFICANTLY:   LAY FLAT, HOLD FIRM DIRECT PRESSURE, AND CALL 911

## 2022-07-18 NOTE — PROGRESS NOTES
Patient to cath recovery. NC Oxygen at 3L weaned to RA. Pt alert and oriented x4. Right groin site clean, dry and intact. No s/s hematoma. Patient complains of pain in right great toe, denies needs for pain medications, states she has an ingrown toenail. No other pain, numbness or tingling. Patient able to move all extremities freely. Patient instructed on bedrest times. Ghada 10. Bedside handoff performed with Marcy Parsons RN to assume care.     Electronically signed by Hernán Llamas RN on 7/18/2022 at 12:48 PM

## 2022-07-18 NOTE — ANESTHESIA PRE PROCEDURE
[facility] Department of Anesthesiology  Pre-Anesthesia Evaluation/Consultation       Name:  Isrrael Quinones  : 1955  Age:  61 y.o. MRN:  [id number]  Date: 2022           Surgeon: * Surgery not found *    Procedure:       Allergies   Allergen Reactions    Flecainide Shortness Of Breath and Swelling    Corn Oil     Levofloxacin      Out of touch w/ world     Patient Active Problem List   Diagnosis    Insomnia    Asthma    Edema    Hypertension    Hypothyroidism    Adrenal insufficiency    Obesity    Right knee DJD    Panhypopituitarism (HCC)    S/P selective transsphenoidal pituitary adenomectomy    Status post total right knee replacement using cement 2014    Sprain of left elbow  BWC  DOI 16    Rupture of left distal biceps tendon    History of TIA (transient ischemic attack)    Multinodular goiter    Controlled type 2 diabetes mellitus without complication, without long-term current use of insulin ([facility])    Hypercholesteremia    Anemia    Benign neoplasm of pituitary gland and craniopharyngeal duct (pouch) ([facility])    Brachial neuritis or radiculitis    Chronic ethmoidal sinusitis    Chronic maxillary sinusitis    Chronic pain disorder    Chronic rhinitis    Constipation    Degeneration of intervertebral disc, site unspecified    Depressive disorder, not elsewhere classified    Disorder of skin or subcutaneous tissue    Displacement of cervical intervertebral disc without myelopathy    Disturbance of skin sensation    Dyshormonogenic goiter    Galactorrhea not associated with childbirth    Hypopotassemia    Iron deficiency anemia    Myalgia and myositis    Other extrapyramidal disease and abnormal movement disorder    Other malaise and fatigue    Pain in joint    Pain in limb    Pituitary adenoma (HCC)    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[address]    Pituitary tumor    Pruritic disorder    Plantar fasciitis of right foot  Tendonitis, Achilles, right    Right foot pain    Posterior tibial tendinitis of right lower extremity    Age-related nuclear cataract    Encntr for f/u exam aft trtmt for cond oth than malig neoplm    Regular astigmatism, right eye    Vitreous degeneration, bilateral    Dyslipidemia associated with type 2 diabetes mellitus (HCC)    Fibromyalgia    Paroxysmal atrial fibrillation (HCC)     Past Medical History:   Diagnosis Date    Adrenal insufficiency (HCC)     Arthritis     Asthma     Atrial fibrillation ([address]     Brain tumor ([address]     chiari - malformation    Chiari malformation 3/11    Fibromyalgia     History of TIA (transient ischemic attack) 2006    Panhypopituitarism ([address] 8/26/2014    S/P selective transsphenoidal pituitary adenomectomy 2007    Thyroid disease     Total knee replacement status 8/14    right    Unspecified cerebral artery occlusion with cerebral infarction       TIA 2006     Past Surgical History:   Procedure Laterality Date    BICEPS TENODESIS Left 11/10/2016    Dr Lakia Gross  3/24/11    chiari 1 repair/ decompression and C1 laminectomy    BRAIN SURGERY  11/07    adenoma removal, pituitary    CATARACT REMOVAL Bilateral 04/2017    HYSTERECTOMY (CERVIX STATUS UNKNOWN)      KNEE ARTHROPLASTY Right 08/25/2014    right tkr    LAMINECTOMY  C1    chiari    PITUITARY SURGERY  2/15    transphenoidal     Social History     Tobacco Use    Smoking status: Never    Smokeless tobacco: Never   Vaping Use    Vaping Use: Never used   Substance Use Topics    Alcohol use: No    Drug use: No     Medications  Current Outpatient Medications on File Prior to Encounter   Medication Sig Dispense Refill    traZODone (DESYREL) 150 MG tablet TAKE 2 TABLETS BY MOUTH EVERY NIGHT AS NEEDED 180 tablet 0    albuterol sulfate HFA (PROVENTIL;VENTOLIN;PROAIR) 108 (90 Base) MCG/ACT inhaler INHALE 2 PUFFS INTO THE LUNGS EVERY 6 HOURS AS NEEDED FOR WHEEZING 8.5 g 3    levothyroxine (SYNTHROID) 50 MCG tablet TAKE 1 TABLET BY MOUTH FIVE DAYS A WEEK AND 2 TABLETS ON SATURDAY/SUNDAY 390 tablet 1    apixaban (ELIQUIS) 5 MG TABS tablet TAKE 1 TABLET BY MOUTH TWICE DAILY 60 tablet 11    hydrocortisone (CORTEF) 10 MG tablet TAKE 2 TABLETS BY MOUTH EVERY DAY AT 8:30 AM AND 1 TABLET EVERY DAY AT 12:15 PM 90 tablet 5    metFORMIN (GLUCOPHAGE-XR) 500 MG extended release tablet Take 2 tablets by mouth daily (with breakfast) (Patient taking differently: Take 500 mg by mouth daily (with breakfast) ) 180 tablet 1    Cholecalciferol (VITAMIN D3) 1000 UNITS TABS Take 1 tablet by mouth daily        No current facility-administered medications on file prior to encounter.      Current Outpatient Medications   Medication Sig Dispense Refill    traZODone (DESYREL) 150 MG tablet TAKE 2 TABLETS BY MOUTH EVERY NIGHT AS NEEDED 180 tablet 0    albuterol sulfate HFA (PROVENTIL;VENTOLIN;PROAIR) 108 (90 Base) MCG/ACT inhaler INHALE 2 PUFFS INTO THE LUNGS EVERY 6 HOURS AS NEEDED FOR WHEEZING 8.5 g 3    levothyroxine (SYNTHROID) 50 MCG tablet TAKE 1 TABLET BY MOUTH FIVE DAYS A WEEK AND 2 TABLETS ON SATURDAY/SUNDAY 390 tablet 1    apixaban (ELIQUIS) 5 MG TABS tablet TAKE 1 TABLET BY MOUTH TWICE DAILY 60 tablet 11    hydrocortisone (CORTEF) 10 MG tablet TAKE 2 TABLETS BY MOUTH EVERY DAY AT 8:30 AM AND 1 TABLET EVERY DAY AT 12:15 PM 90 tablet 5    metFORMIN (GLUCOPHAGE-XR) 500 MG extended release tablet Take 2 tablets by mouth daily (with breakfast) (Patient taking differently: Take 500 mg by mouth daily (with breakfast) ) 180 tablet 1    Cholecalciferol (VITAMIN D3) 1000 UNITS TABS Take 1 tablet by mouth daily        Current Facility-Administered Medications   Medication Dose Route Frequency Provider Last Rate Last Admin    sodium chloride flush 0.9 % injection 5-40 mL  5-40 mL IntraVENous 2 times per day Revonda Boas, MD        sodium chloride flush 0.9 % injection 5-40 mL  5-40 mL IntraVENous PRN Bhupendra Lindsey MD        0.9 % sodium chloride infusion   IntraVENous PRN Bhupendra Lindsey MD        isoproterenol (ISUPREL) 250 mcg in sodium chloride 0.9 % 250 mL infusion  250 mcg IntraVENous Once Bhupendra Lindsey MD         Vital Signs (Current)   There were no vitals filed for this visit. Vital Signs Statistics (for past 48 hrs)     No data recorded  BP Readings from Last 3 Encounters:   06/22/22 118/70   06/09/22 118/76   03/23/22 124/72       BMI  There is no height or weight on file to calculate BMI. Estimated body mass index is 34.14 kg/m² as calculated from the following:    Height as of 6/22/22: 5' 7\" (1.702 m). Weight as of 6/22/22: 218 lb (98.9 kg).     CBC   Lab Results   Component Value Date/Time    WBC 6.9 07/13/2022 08:27 AM    RBC 4.23 07/13/2022 08:27 AM    HGB 12.7 07/13/2022 08:27 AM    HCT 37.8 07/13/2022 08:27 AM    MCV 89.4 07/13/2022 08:27 AM    RDW 14.8 07/13/2022 08:27 AM     07/13/2022 08:27 AM     CMP    Lab Results   Component Value Date/Time     07/13/2022 08:27 AM    K 4.0 07/13/2022 08:27 AM     07/13/2022 08:27 AM    CO2 25 07/13/2022 08:27 AM    BUN 20 07/13/2022 08:27 AM    CREATININE 1.2 07/13/2022 08:27 AM    GFRAA 55 07/13/2022 08:27 AM    GFRAA >60 11/15/2010 01:12 AM    AGRATIO 2.3 09/23/2020 08:23 AM    LABGLOM 45 07/13/2022 08:27 AM    GLUCOSE 89 07/13/2022 08:27 AM    PROT 5.9 09/23/2020 08:23 AM    CALCIUM 9.2 07/13/2022 08:27 AM    BILITOT 0.6 07/09/2021 12:00 AM    ALKPHOS 97 09/23/2020 08:23 AM    AST 11 07/09/2021 12:00 AM    ALT 12 07/09/2021 12:00 AM     BMP    Lab Results   Component Value Date/Time     07/13/2022 08:27 AM    K 4.0 07/13/2022 08:27 AM     07/13/2022 08:27 AM    CO2 25 07/13/2022 08:27 AM    BUN 20 07/13/2022 08:27 AM    CREATININE 1.2 07/13/2022 08:27 AM    CALCIUM 9.2 07/13/2022 08:27 AM    GFRAA 55 07/13/2022 08:27 AM    GFRAA >60 11/15/2010 01:12 AM    LABGLOM 45 07/13/2022 08:27 AM    GLUCOSE 89 07/13/2022 08:27 AM     POCGlucose  No results for input(s): GLUCOSE in the last 72 hours. [facility]    Lab Results   Component Value Date/Time    PROTIME 16.6 10/01/2014 04:58 PM    INR 1.6 10/01/2014 04:58 PM    APTT 30.4 08/08/2014 09:14 AM     HCG (If Applicable) No results found for: PREGTESTUR, PREGSERUM, HCG, HCGQUANT   ABGs No results found for: PHART, PO2ART, TMM4RJR, AOZ3HYH, BEART, D7YSNXVQ   Type & Screen (If Applicable)  No results found for: LABABO, LABRH                         BMI: Wt Readings from Last 3 Encounters:       NPO Status:                          Anesthesia Evaluation  Patient summary reviewed and Nursing notes reviewed  Airway: Mallampati: III  TM distance: >3 FB   Neck ROM: full  Mouth opening: > = 3 FB   Dental:          Pulmonary:   (+) asthma:     (-) COPD and shortness of breath                           Cardiovascular:    (+) hypertension:, dysrhythmias: atrial fibrillation, hyperlipidemia    (-) valvular problems/murmurs, past MI, CAD, CABG/stent and  angina                Neuro/Psych:   (+) CVA:, neuromuscular disease:, psychiatric history:depression/anxiety    (-) seizures and TIA            ROS comment: Brain tumor ([address] chiari - malformation  GI/Hepatic/Renal:        (-) GERD, PUD, liver disease and no renal disease       Endo/Other:    (+) DiabetesType II DM, , hypothyroidism, blood dyscrasia: anemia:., .                  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 Other Findings:           Anesthesia Plan      general     ASA 3     (I discussed with the patient the risks and benefits of PIV, general anesthesia, IV Narcotics, PACU. All questions were answered the patient agrees with the plan. Will give hydrocortisone 100mg IV before induction as planned.)  Induction: intravenous.       Anesthetic plan and risks discussed with patient. Plan discussed with CRNA. This pre-anesthesia assessment may be used as a history and physical.    DOS STAFF ADDENDUM:    Pt seen and examined, chart reviewed (including anesthesia, drug and allergy history). No interval changes to history and physical examination. Anesthetic plan, risks, benefits, alternatives, and personnel involved discussed with patient. Patient verbalized an understanding and agrees to proceed.       Juan Lazar MD  July 18, 2022  7:01 AM

## 2022-07-18 NOTE — PROCEDURES
[facility]     Electrophysiology Procedure Note       Date of Procedure: 7/18/2022  Patient's Name: Sincere Altman  YOB: 1955   Medical Record Number: [id number]  Referring Physician: Phyllis Martinez MD  Procedure Performed by: Deirdre Laurent MD    Procedure performed:  Electrophysiology study with radiofrequency ablation of atrial fibrillation and pulmonary vein isolation   Additional ablation of complex fractionated atrial electrograms (for atrial fibrillation) x 3  3-D electroanatomical mapping of the left atrium    Transseptal puncture through an intact septum using versacross under intracardiac ultrasound guidance without fluoroscopic guidance   Intracardiac echocardiography  Left ventricular pacing and recording  Drug infusion with an attempt to induce atrial tachydysrhythmia  Transesophageal echocardiogram  External cardioversion of the atrial arrhythmias   Anesthesia: General anesthesia provided by the Anesthesia service    Indications for procedure:    Sincere Altman is a 61 y.o. female who has a history of persistent atrial fibrillation who is symptomatic with symptoms of dyspnea with minimal exertion and fatigue who is intolerant to AAD (flecainide side effects), amiodarone (she has corn oil allergies) here for an ablation for persistent atrial fibrillation. Details of Procedure: The risks, benefits and alternatives of the ablation procedure were discussed with the patient. The risks including, but not limited to, the risks of bleeding, infection, radiation exposure, injury to vascular, cardiac and surrounding structures (including pneumothorax), stroke, cardiac perforation, tamponade, need for emergent open heart surgery, need for pacemaker implantation, esophageal injury and fistula, myocardial infarction and death were discussed in detail. The patient opted to proceed with the ablation. Written informed consent was signed and placed in the chart.       Patient was brought to the EP lab in a fasting non-sedate state. Patient underwent general anesthesia by anesthesia team. We initially performed a transesophageal echo that showed no left atrial appendage clot/thrombus. Procedure was done without use of fluoroscopy. Both groins were prepped in a sterile fashion. We gained access in Right femoral vein. A 8-French sheath for ICE and 6F sheath for CS catheter and an agilis sheath for ablation and mapping catheters were  placed in the right femoral vein using modified seldinger technique. Ultrasound was used for femoral venous access. We gained access modified Seldinger technique and ultrasound guidance. The femoral vein was identified with real time visualization of needle passage to the venous lumen. Using 3-D mapping system Carto, the CS catheter was placed inside the coronary sinus  for recording and mapping of the left atrium. Using ICE we delineated the left pulmonary vein, left atrial appendage,  mitral valve, and right superior and right inferior pulmonary veins. Patient received a bolus of heparin prior transseptal puncture followed by continuous monitoring of the ACT and boluses of heparin during the procedure to keep the ACT between 350-400. Also an esophageal temperature probe in addition to Esophastar catheter was advanced into the esophagus for mapping of the esophagus and careful monitoring of the esophageal temperature during ablation. A transseptal puncture through intact septum was done using ICE and  pressure monitoring. Versacross needle inside the baylis sheath was used for the transseptal puncture. The baylis sheath was advanced and left inside the left atrium. Then a pentaray catheter with deflectable curve and an irrigated ablation catheter was advanced into the left atrium sequentially and alternatively as needed.       Using Penta ray and Carto navigation system a three dimensional electro anatomical mapping of the left atrium, in addition to right and left sided pulmonary vein was created. Using Penta ray catheter voltage mapping of the left atrium was created. Maps below. Also an esophageal temperature probe in addition to Esophastar catheter was advanced into the esophagus for mapping of the esophagus and careful monitoring of the esophageal temperature during ablation. The ICE was used to monitor location of temperature probe and move it close to ablation area. We stopped ablation when  temperature  increased 1 degree. Then wide area circumferential ablation for right and left sided pulmonary veins were performed. Linear RF lesions was placed around both superior and inferior pulmonary vein in right and left side well outside the pulmonary vein ostium till all four pulmonary veins were isolated. The power was limited to 28  W on the posterior wall and careful monitoring of esophageal temperature was done to prevent injury to esophagus and lesions near esophagus were given only for 10 seconds. Elsewhere power was 36 W as needed. (posterior)-400(anterior). Prior to ablating the right pulmonary anterior antrum, the ablation catheter was paced at an output of 20mA along the antrum and the renetta for phenic capture confirmed by palpating the right diaphragmatic region. Any area of phenic capture was annotated on the map (marked in till ). Ablation was done while pacing the ablation catheter to ensure the ablated region was away from the phrenic nerve. After isolating the pulmonary veins, the patient persisted in atrial fibrillation. We then evaluated the left atrium for complex fractionated atrial electrogram, a separate mechanism that would initiate and/or perpetuate atrial fibrillation apart from the pulmonary vein fascicles and antral ablations. We found 2 site(s) on the roof aspect of the LA and ablated these foci.     We then evaluated the left atrium for complex fractionated atrial electrogram, a separate mechanism that would initiate and/or perpetuate atrial fibrillation apart from the pulmonary vein fascicles and antral ablations. We found 2 site(s) on the septal aspect of the LA and ablated these foci. We then evaluated the left atrium for complex fractionated atrial electrogram, a separate mechanism that would initiate and/or perpetuate atrial fibrillation apart from the pulmonary vein fascicles and antral ablations. We found 3 site(s) on the anterior aspect of the LA and ablated these foci. Patient was then cardioverted with 200J of synchronized, biphasic shock which successfully converted the patient to sinus rhythm in order to evaluate for exit block across the L and R antrum. We then performed an EP study and programmed stimulation using our CS catheter and ablation catheter to assess the cardiac conduction system and to attempt to induce atrial tachydysrhythmia. The ablation catheter were moved from the left atrium to the left ventricular apex and His bundle position. His bundle potentials was recorded and pacing and recordings were performed from right atrium, coronary sinus and LV apex with the following results:     Sinus cycle length was 946 msec  GA interval was 191 msec  QRS duration was 90 msec  QT interval was 418 msec  AH interval was 80 msec  HV interval was 52 msec  Pacing from left atrium, 1:1 conduction over AV node with (AV wenckebach) was 340  msec  Pacing from left atrium, AV hardik ERP was 600/200 msec   Pacing from LV apex, 1:1 retrograde conduction over AV node (VA wenckebach) was 410 msec    After ablation was complete, catheters were placed in the left and right atrium, His-position, right ventricle, and left ventricle for pacing and recording. Isuprel titrated to at least 25% increase in HR was started. Arrhythmia was attempted to be induced by rapid atrial and ventricular pacing, and there was no induction of atrial tachydysrhythmia.      Then both the ablation and CS catheters were removed from the body. ICE was then used to check if there was new or change in pericardial effusion. Prominent pericardial fat and trivial to small effusion was noted on THANIA and ICE pre-procedure without change after the procedure. ICE catheter was then removed. All the 3 sheaths were removed from the right femoral vein. Since patient was on anticoagulation with heparin with high ACT, we used Perclose device for closure of access sites on the right femoral vein. 30 mg of protamine was given to partially reverse the ACT. The patient tolerated the procedure well and there were no complications. Patient was extubated and transferred to the floor in stable condition. Blood loss <20 cc. No complication     Conclusion:      Pulmonary vein isolations using wide area circumferential radiofrequency ablation with confirmation of exit and entrance block. Plan:   The patient will go for recovery and will receive usual post ablation care. Discharge home later today if still doing well. Pantoprazole for 4 weeks. continue current medications. If has pleuritic chest pain tomorrow, the patient or family member will call us so that we send colchicine prescription. Follow up with me in 1 month then with Russel Grayson NP in 3 months. Thank you for allowing us to participate in the care of your patient. If you have any questions or concerns, please do not hesitate to contact me. Linette Alas MD  Cardiac Electrophysiology  [employer]     Pre-ablation maps. Phrenic nerve very close to the right anterior Antrum. Post ablation mapping.

## 2022-07-18 NOTE — ANESTHESIA POSTPROCEDURE EVALUATION
[facility] Department of Anesthesiology  Post-Anesthesia Note       Name:  Joana Kiran                                  Age:  61 y.o. MRN:  [id number]     Last Vitals & Oxygen Saturation: /82   Pulse 74   Temp (!) 96.1 °F (35.6 °C) (Infrared)   Resp 18   Ht 5' 7\" (1.702 m)   Wt 225 lb (102.1 kg)   SpO2 93%   BMI 35.24 kg/m²   Patient Vitals for the past 4 hrs:   BP Temp Temp src Pulse Resp SpO2   07/18/22 1228 123/82 -- -- 74 18 93 %   07/18/22 1223 122/83 -- -- 72 18 97 %   07/18/22 1218 122/81 (!) 96.1 °F (35.6 °C) Infrared 72 18 98 %       Level of consciousness:  Awake, alert    Respiratory: Respirations easy, no distress. Stable. Cardiovascular: Hemodynamically stable. Hydration: Adequate. PONV: Adequately managed. Post-op pain: Adequately controlled. Post-op assessment: Tolerated anesthetic well without complication. Complications:  None.     Jaymie Man MD  July 18, 2022   2:00 PM

## 2022-07-18 NOTE — H&P
H&P Update    I have reviewed the history and physical that I performed on 22 and examined the patient and find no relevant changes. I have reviewed with the patient and/or family the risks, benefits, and alternatives to the procedure. Pre-sedation Assessment    Patient:  Nancy Fox   :   1955  Intended Procedure: THANIA/afib ablation      Nurses notes reviewed and agreed. Medications reviewed  Allergies:    Allergies   Allergen Reactions    Flecainide Shortness Of Breath and Swelling    Corn Oil     Levofloxacin      Out of touch w/ world         Pre-Procedure Assessment/Plan:  ASA 3 - Patient with moderate systemic disease with functional limitations    Level of Sedation Plan: Per anesthesia    Post Procedure plan: Return to same level of care    Bhupendra Lindsey MD  Cardiac Electrophysiology  [address]

## 2022-07-19 ENCOUNTER — TELEPHONE (OUTPATIENT)
Dept: CARDIOLOGY CLINIC | Age: 67
End: 2022-07-19

## 2022-07-19 LAB
EKG ATRIAL RATE: 375 BPM
EKG ATRIAL RATE: 75 BPM
EKG DIAGNOSIS: NORMAL
EKG DIAGNOSIS: NORMAL
EKG P AXIS: 76 DEGREES
EKG P-R INTERVAL: 188 MS
EKG Q-T INTERVAL: 372 MS
EKG Q-T INTERVAL: 478 MS
EKG QRS DURATION: 68 MS
EKG QRS DURATION: 70 MS
EKG QTC CALCULATION (BAZETT): 484 MS
EKG QTC CALCULATION (BAZETT): 533 MS
EKG R AXIS: 43 DEGREES
EKG R AXIS: 52 DEGREES
EKG T AXIS: 90 DEGREES
EKG T AXIS: 94 DEGREES
EKG VENTRICULAR RATE: 102 BPM
EKG VENTRICULAR RATE: 75 BPM

## 2022-07-19 PROCEDURE — 93010 ELECTROCARDIOGRAM REPORT: CPT | Performed by: INTERNAL MEDICINE

## 2022-07-19 NOTE — TELEPHONE ENCOUNTER
Misty's daughter Diya Gonzales called in this morning, she states her mom had an ablation on 7/18 and her mom states it hurts when she breathes, no other symptoms. She states this was on the list to call if she had this problem. She can be reached at [phone].

## 2022-07-19 NOTE — TELEPHONE ENCOUNTER
Isaac Vieira. She can get colchicine 0.6 mg daily if not allergic. Or can take motrin 600 mg 3x a day with meals.      TORIE

## 2022-07-19 NOTE — TELEPHONE ENCOUNTER
Dr. Bere Andrade,     Patient is post Afib ablation on 7/18/22. Today, she reports chest pain rated 6/10 that she describes as a \"catching\" feeling when breathing. She also has some congestion and a productive cough with light yellow sputum. She also reports chills and a headache at the back of her head/neck area that she states is normal for her and related to sinuses. The symptoms have been consistent since she was discharged yesterday. She has an allergy to Tylenol and has not tried any other pain relieving medications. She did place a heating pad on her chest that \"helped somewhat. \" I did inform her per discharge instructions: You may experience chest burning that will peak in 2 days of the procedure. The burning will begin to subside the following days    She is asking how you feel about her trying CBD drops or if you have other recommendations to help relieve the chest pain.

## 2022-07-20 NOTE — TELEPHONE ENCOUNTER
Spoke with Judy Myers (daughter) who states she is not with her mother at this time and believes she may be sleeping. Therefore, I will call later to check on her.

## 2022-07-21 ENCOUNTER — OFFICE VISIT (OUTPATIENT)
Dept: CARDIOLOGY CLINIC | Age: 67
End: 2022-07-21

## 2022-07-21 VITALS
HEIGHT: 67 IN | OXYGEN SATURATION: 96 % | SYSTOLIC BLOOD PRESSURE: 130 MMHG | HEART RATE: 84 BPM | DIASTOLIC BLOOD PRESSURE: 80 MMHG | BODY MASS INDEX: 36.03 KG/M2 | WEIGHT: 229.6 LBS

## 2022-07-21 DIAGNOSIS — I10 ESSENTIAL HYPERTENSION: ICD-10-CM

## 2022-07-21 DIAGNOSIS — I48.0 PAROXYSMAL ATRIAL FIBRILLATION (HCC): Primary | ICD-10-CM

## 2022-07-21 PROCEDURE — 99214 OFFICE O/P EST MOD 30 MIN: CPT | Performed by: NURSE PRACTITIONER

## 2022-07-21 PROCEDURE — 93000 ELECTROCARDIOGRAM COMPLETE: CPT | Performed by: NURSE PRACTITIONER

## 2022-07-21 ASSESSMENT — ENCOUNTER SYMPTOMS
SHORTNESS OF BREATH: 0
SINUS PRESSURE: 0
CONSTIPATION: 0
DIARRHEA: 0
VOMITING: 0
BLOOD IN STOOL: 0
NAUSEA: 0
WHEEZING: 0
SORE THROAT: 0
ABDOMINAL PAIN: 0
BACK PAIN: 0
COLOR CHANGE: 0
TROUBLE SWALLOWING: 0
COUGH: 0

## 2022-07-21 NOTE — PROGRESS NOTES
[facility]   Electrophysiology      Date: 7/21/2022    Primary Cardiologist: Shavonne Quinn MD  PCP: Ginger Freitas MD     Chief Complaint:   Chief Complaint   Patient presents with    Follow-up     Ablation/    Shortness of Breath     No/chest pains     History of Present Illness:    I saw Kiran Dillard in the office for electrophysiology follow up today. She is a 61 y.o. female with a past medical history of TIA (2006), pituitary brain tumors with adrenal insufficiency, HTN, DM, chiari malformation, thyroid disease and atrial fibrillation. She was first noted to have atrial fibrillation during her cataract surgery in 2017. Seen on EKG 4/26/2017. EKG 5/8/2017 showed NSR. She underwent atrial fibrillation ablation on 7/18/22 with Dr. Daiana Tinajero. Since the evening of her ablation she has felt \"out of rhythm\" and poorly, worse than she did previously with her atrial fibrillation. She also has had swelling but has an allergy to corn which is in multiple medications and thinks the swelling may be secondary to the Protonix. Denies any increased dyspnea. She has gained 4 pounds. Denies any chest pain. No syncope. No bleeding issues or missed doses of Eliquis. Allergies: Allergies   Allergen Reactions    Flecainide Shortness Of Breath and Swelling    Corn Oil     Levofloxacin      Out of touch w/ world     Home Medications:  Prior to Visit Medications    Medication Sig Taking?  Authorizing Provider   traZODone (DESYREL) 150 MG tablet TAKE 2 TABLETS BY MOUTH EVERY NIGHT AS NEEDED Yes JERMAINE Llanes - CNP   albuterol sulfate HFA (PROVENTIL;VENTOLIN;PROAIR) 108 (90 Base) MCG/ACT inhaler INHALE 2 PUFFS INTO THE LUNGS EVERY 6 HOURS AS NEEDED FOR WHEEZING Yes Ginger Freitas MD   levothyroxine (SYNTHROID) 50 MCG tablet TAKE 1 TABLET BY MOUTH FIVE DAYS A WEEK AND 2 TABLETS ON SATURDAY/SUNDAY Yes JERMAINE Burns CNP   apixaban (ELIQUIS) 5 MG TABS tablet TAKE 1 TABLET BY MOUTH TWICE DAILY Yes Lizzette Simon JERMAINE Mueller - CNP   hydrocortisone (CORTEF) 10 MG tablet TAKE 2 TABLETS BY MOUTH EVERY DAY AT 8:30 AM AND 1 TABLET EVERY DAY AT 12:15 PM Yes Francisco Javier King MD   Cholecalciferol (VITAMIN D3) 1000 UNITS TABS Take 1 tablet by mouth daily  Yes Historical Provider, MD        Past Medical History:  Past Medical History:   Diagnosis Date    Adrenal insufficiency ([address]     Arthritis     Asthma     Atrial fibrillation ([address]     Brain tumor ([address]     chiari - malformation    Chiari malformation 3/11    Fibromyalgia     History of TIA (transient ischemic attack) 2006    Panhypopituitarism ([address] 8/26/2014    S/P selective transsphenoidal pituitary adenomectomy 2007    Thyroid disease     Total knee replacement status 8/14    right    Unspecified cerebral artery occlusion with cerebral infarction       TIA 2006       Past Surgical History:   Past Surgical History:   Procedure Laterality Date    BICEPS TENODESIS Left 11/10/2016    Dr Faviola Schreiber  3/24/11    chiari 1 repair/ decompression and C1 laminectomy    BRAIN SURGERY  11/07    adenoma removal, pituitary    CATARACT REMOVAL Bilateral 04/2017    HYSTERECTOMY (CERVIX STATUS UNKNOWN)      KNEE ARTHROPLASTY Right 08/25/2014    right tkr    LAMINECTOMY  C1    chiari    PITUITARY SURGERY  2/15    transphenoidal       Social History:   reports that she has never smoked. She has never used smokeless tobacco. She reports that she does not drink alcohol and does not use drugs. Family History:      Problem Relation Age of Onset    Hypertension Mother     Arrhythmia Mother     Cancer Father         skin?     No Known Problems Sister     Diabetes Paternal Grandfather     No Known Problems Brother     No Known Problems Maternal Aunt     No Known Problems Maternal Uncle     No Known Problems Paternal Aunt     No Known Problems Paternal Uncle     No Known Problems Maternal Grandmother     No Known Problems Maternal Grandfather     No Known Problems Paternal Grandmother     No Known Problems Other     Anesth Problems Neg Hx     Broken Bones Neg Hx     Clotting Disorder Neg Hx     Collagen Disease Neg Hx     Dislocations Neg Hx     Osteoporosis Neg Hx     Rheumatologic Disease Neg Hx     Scoliosis Neg Hx     Severe Sprains Neg Hx        Review of Systems   Constitutional:  Positive for fatigue. Negative for chills, fever and unexpected weight change. HENT:  Negative for congestion, hearing loss, sinus pressure, sore throat and trouble swallowing. Respiratory:  Negative for cough, shortness of breath and wheezing. Cardiovascular:  Positive for palpitations and leg swelling. Negative for chest pain. Gastrointestinal:  Negative for abdominal pain, blood in stool, constipation, diarrhea, nausea and vomiting. Genitourinary:  Negative for hematuria. Musculoskeletal:  Negative for arthralgias, back pain, gait problem and myalgias. Skin:  Negative for color change, rash and wound. Neurological:  Negative for dizziness, seizures, syncope, speech difficulty, weakness and light-headedness. Hematological:  Does not bruise/bleed easily. Physical Examination:  Vitals:    07/21/22 1449   BP: 130/80   Pulse: 84   SpO2: 96%      Wt Readings from Last 3 Encounters:   07/21/22 229 lb 9.6 oz (104.1 kg)   07/18/22 225 lb (102.1 kg)   06/22/22 218 lb (98.9 kg)       Physical Exam  Vitals reviewed. Constitutional:       General: She is not in acute distress. Appearance: Normal appearance. HENT:      Head: Normocephalic and atraumatic. Nose: Nose normal.      Mouth/Throat:      Mouth: Mucous membranes are moist.   Eyes:      Conjunctiva/sclera: Conjunctivae normal.      Pupils: Pupils are equal, round, and reactive to light. Cardiovascular:      Rate and Rhythm: Tachycardia present. Rhythm irregularly irregular. Heart sounds: No murmur heard. No friction rub. No gallop. Pulmonary:      Effort: No respiratory distress. Breath sounds:  No wheezing, rhonchi or rales. Abdominal:      General: Abdomen is flat. Bowel sounds are normal.      Palpations: Abdomen is soft. Musculoskeletal:         General: Normal range of motion. Right lower le+ Pitting Edema present. Left lower le+ Pitting Edema present. Skin:     General: Skin is warm and dry. Findings: No bruising. Neurological:      General: No focal deficit present. Mental Status: She is alert and oriented to person, place, and time. Motor: No weakness. Psychiatric:         Mood and Affect: Mood normal.         Behavior: Behavior normal.        Pertinent labs, diagnostic, device, and imaging results reviewed as a part of this visit    LABS    CBC:   Lab Results   Component Value Date    WBC 6.9 2022    HGB 12.7 2022    HCT 37.8 2022    MCV 89.4 2022     2022     BMP:   Lab Results   Component Value Date    CREATININE 1.2 2022    BUN 20 2022     2022    K 4.0 2022     2022    CO2 25 2022     Estimated Creatinine Clearance: 59 mL/min (based on SCr of 1.2 mg/dL). No results found for: BNP    Thyroid:   Lab Results   Component Value Date    TSH 1.37 2021    C3SXXBJ 1.1 2021     Lipid Panel:   Lab Results   Component Value Date/Time    CHOL 176 10/11/2018 07:41 AM    HDL 63 2020 08:23 AM    TRIG 120 10/11/2018 07:41 AM     LFTs:  Lab Results   Component Value Date    ALT 12 2021    AST 11 2021    ALKPHOS 97 2020    BILITOT 0.6 2021     Coags:   Lab Results   Component Value Date    PROTIME 16.6 (H) 10/01/2014    INR 1.6 (H) 10/01/2014    APTT 30.4 2014       EC2022   Atrial fibrillation/flutter at 117 BPM.     THANIA: 22   Left ventricular function could not be adequately assessed due to atrial   fibrillation rhythm with RVR. Overall ejection fraction appears normal   around 60%. Mild mitral regurgitation.    There is no evidence of mass or thrombus in the left atrium or appendage. Bicuspid aortic valve with fusion of the left and right coronary cusp. Thickening/calcification of the aortic valve leaflets. There appears to be trivial aortic regurgitation best visualized in the   short axis view. Right ventricular systolic function is normal .   Mild to moderate tricuspid regurgitation. Trivial pericardial effusion    Stress test: 5/15/17   Normal myocardial perfusion study. Normal LV size and systolic function. Overall findings represent a low risk study. EP Procedures:  Atrial fibrillation ablation, 7/18/22, Dr. Girma Nair:    Paroxysmal atrial fibrillation   - symptomatic with fatigue   - first noted on EKG on 4/26/17   - s/p A fib ablation on 7/18/22 with Dr. Martin Reyes 5 (gender, HTN, DM, TIA) on Eliquis 5mg BID   - EKG today with A fib/flutter with RVR   - unfortunately, she is unable to tolerate many medications due to her corn allergy including amiodarone, did not tolerate flecainide so limited in medications. Likely in A fib due to inflammation for recent ablation. Offered cardioversion but she wants to wait and see if she converts on her own, if still feeling poorly next week, I advised her to call the office and schedule a cardioversion, would not need THANIA.    - also with swelling, weight gain - she thinks this may be from Protonix which she was instructed to stop. If swelling does not improve, may need short term diuretic therapy and she will call if that's the case    Essential HTN   - controlled   - not currently on medications for this        Thank you for allowing to us to participate in the care of Corey SENA Zhao Curran. Keep scheduled f/u.     JERMAINE March  [facility] [address], [id number] [place] [address]  Phone: [phone]  Fax: [phone]    Electronically signed by JERMAINE Bonilla - GERALDO on 7/21/2022 at 3:59 PM

## 2022-07-21 NOTE — TELEPHONE ENCOUNTER
Jennyfer called back this morning stating that her legs and feet are swollen, her heart is going \"wonko\" and she if very tired.      Spoke to KOLBY Ahmadi and she said to add her on for today with [facility], RN at 2:40 pm

## 2022-08-12 NOTE — PROGRESS NOTES
[facility]   Electrophysiology      Date: 8/15/2022    Primary Cardiologist: Deirdre Laurent MD  PCP: Phyllis Martinez MD     Chief Complaint:   Chief Complaint   Patient presents with    Chest Pain     cc    Follow-up     1mo f/u     History of Present Illness:    I saw Sincere Altman in the office for electrophysiology follow up today. She is a 61 y.o. female with a past medical history of bicuspid aortic valve,TIA (2006), pituitary brain tumors with adrenal insufficiency, HTN, DM, chiari malformation, thyroid disease and atrial fibrillation. She was first noted to have atrial fibrillation during her cataract surgery in 2017. Seen on EKG 4/26/2017. EKG 5/8/2017 showed NSR. She underwent atrial fibrillation ablation on 7/18/22 with Dr. Victor Hugo Katz. She was seen in the office on 7/21/22 as she was feeling poorly, she was in atrial fibrillation but wanted to see if she went back in rhythm on her own. She presents today for follow up. She continues to have fatigue and palpitations along with some chest discomfort. Also with increased edema although it is improved compared to last time. Denies any bleeding issues or missed doses of Eliquis. No syncope. Allergies: Allergies   Allergen Reactions    Flecainide Shortness Of Breath and Swelling    Corn Oil     Levofloxacin      Out of touch w/ world     Home Medications:  Prior to Visit Medications    Medication Sig Taking?  Authorizing Provider   traZODone (DESYREL) 150 MG tablet TAKE 2 TABLETS BY MOUTH EVERY NIGHT AS NEEDED  JERMAINE Dawkins - CNP   albuterol sulfate HFA (PROVENTIL;VENTOLIN;PROAIR) 108 (90 Base) MCG/ACT inhaler INHALE 2 PUFFS INTO THE LUNGS EVERY 6 HOURS AS NEEDED FOR WHEEZING  Phyllis Martinez MD   levothyroxine (SYNTHROID) 50 MCG tablet TAKE 1 TABLET BY MOUTH FIVE DAYS A WEEK AND 2 TABLETS ON SATURDAY/SUNDAY  Peralta Show, JERMAINE - CNP   apixaban (ELIQUIS) 5 MG TABS tablet TAKE 1 TABLET BY MOUTH TWICE DAILY  Peralta Show, APRN - CNP   hydrocortisone (CORTEF) 10 MG tablet TAKE 2 TABLETS BY MOUTH EVERY DAY AT 8:30 AM AND 1 TABLET EVERY DAY AT 12:15 PM  Francisco Javier King MD   Cholecalciferol (VITAMIN D3) 1000 UNITS TABS Take 1 tablet by mouth daily   Historical Provider, MD        Past Medical History:  Past Medical History:   Diagnosis Date    Adrenal insufficiency ([address]     Arthritis     Asthma     Atrial fibrillation ([address]     Brain tumor ([address]     chiari - malformation    Chiari malformation 3/11    Fibromyalgia     History of TIA (transient ischemic attack) 2006    Panhypopituitarism ([address] 8/26/2014    S/P selective transsphenoidal pituitary adenomectomy 2007    Thyroid disease     Total knee replacement status 8/14    right    Unspecified cerebral artery occlusion with cerebral infarction       TIA 2006       Past Surgical History:   Past Surgical History:   Procedure Laterality Date    BICEPS TENODESIS Left 11/10/2016    Dr Faviola Schreiber  3/24/11    chiari 1 repair/ decompression and C1 laminectomy    BRAIN SURGERY  11/07    adenoma removal, pituitary    CATARACT REMOVAL Bilateral 04/2017    HYSTERECTOMY (CERVIX STATUS UNKNOWN)      KNEE ARTHROPLASTY Right 08/25/2014    right tkr    LAMINECTOMY  C1    chiari    PITUITARY SURGERY  2/15    transphenoidal       Social History:   reports that she has never smoked. She has never used smokeless tobacco. She reports that she does not drink alcohol and does not use drugs. Family History:      Problem Relation Age of Onset    Hypertension Mother     Arrhythmia Mother     Cancer Father         skin?     No Known Problems Sister     Diabetes Paternal Grandfather     No Known Problems Brother     No Known Problems Maternal Aunt     No Known Problems Maternal Uncle     No Known Problems Paternal Aunt     No Known Problems Paternal Uncle     No Known Problems Maternal Grandmother     No Known Problems Maternal Grandfather     No Known Problems Paternal Grandmother     No Known Problems Other     Anesth Problems Neg Hx     Broken Bones Neg Hx     Clotting Disorder Neg Hx     Collagen Disease Neg Hx     Dislocations Neg Hx     Osteoporosis Neg Hx     Rheumatologic Disease Neg Hx     Scoliosis Neg Hx     Severe Sprains Neg Hx        Review of Systems   Constitutional:  Positive for fatigue. Negative for chills, fever and unexpected weight change. HENT:  Negative for congestion, hearing loss, sinus pressure, sore throat and trouble swallowing. Respiratory:  Negative for cough, shortness of breath and wheezing. Cardiovascular:  Positive for chest pain, palpitations and leg swelling. Gastrointestinal:  Negative for abdominal pain, blood in stool, constipation, diarrhea, nausea and vomiting. Genitourinary:  Negative for hematuria. Musculoskeletal:  Negative for arthralgias, back pain, gait problem and myalgias. Skin:  Negative for color change, rash and wound. Neurological:  Negative for dizziness, seizures, syncope, speech difficulty, weakness and light-headedness. Hematological:  Does not bruise/bleed easily. Physical Examination:  There were no vitals filed for this visit. Wt Readings from Last 3 Encounters:   07/21/22 229 lb 9.6 oz (104.1 kg)   07/18/22 225 lb (102.1 kg)   06/22/22 218 lb (98.9 kg)       Physical Exam  Vitals reviewed. Constitutional:       General: She is not in acute distress. Appearance: Normal appearance. HENT:      Head: Normocephalic and atraumatic. Nose: Nose normal.      Mouth/Throat:      Mouth: Mucous membranes are moist.   Eyes:      Conjunctiva/sclera: Conjunctivae normal.      Pupils: Pupils are equal, round, and reactive to light. Cardiovascular:      Rate and Rhythm: Tachycardia present. Rhythm irregularly irregular. Heart sounds: No murmur heard. No friction rub. No gallop. Pulmonary:      Effort: No respiratory distress. Breath sounds: No wheezing, rhonchi or rales.    Abdominal:      General: Abdomen is flat. Bowel sounds are normal.      Palpations: Abdomen is soft. Musculoskeletal:         General: Normal range of motion. Right lower leg: No edema. Left lower leg: No edema. Skin:     General: Skin is warm and dry. Findings: No bruising. Neurological:      General: No focal deficit present. Mental Status: She is alert and oriented to person, place, and time. Motor: No weakness. Psychiatric:         Mood and Affect: Mood normal.         Behavior: Behavior normal.        Pertinent labs, diagnostic, device, and imaging results reviewed as a part of this visit    LABS    CBC:   Lab Results   Component Value Date    WBC 6.9 2022    HGB 12.7 2022    HCT 37.8 2022    MCV 89.4 2022     2022     BMP:   Lab Results   Component Value Date    CREATININE 1.2 2022    BUN 20 2022     2022    K 4.0 2022     2022    CO2 25 2022     CrCl cannot be calculated (Unknown ideal weight.). No results found for: BNP    Thyroid:   Lab Results   Component Value Date    TSH 1.37 2021    E2YBQIM 1.1 2021     Lipid Panel:   Lab Results   Component Value Date/Time    CHOL 176 10/11/2018 07:41 AM    HDL 63 2020 08:23 AM    TRIG 120 10/11/2018 07:41 AM     LFTs:  Lab Results   Component Value Date    ALT 12 2021    AST 11 2021    ALKPHOS 97 2020    BILITOT 0.6 2021     Coags:   Lab Results   Component Value Date    PROTIME 16.6 (H) 10/01/2014    INR 1.6 (H) 10/01/2014    APTT 30.4 2014       EC/15/2022   Atrial fibrillation at 144 BPM. Non-specific ST-T wave changes. THANIA: 22   Left ventricular function could not be adequately assessed due to atrial   fibrillation rhythm with RVR. Overall ejection fraction appears normal   around 60%. Mild mitral regurgitation. There is no evidence of mass or thrombus in the left atrium or appendage.    Bicuspid aortic valve with fusion of the left and right coronary cusp. Thickening/calcification of the aortic valve leaflets. There appears to be trivial aortic regurgitation best visualized in the   short axis view. Right ventricular systolic function is normal .   Mild to moderate tricuspid regurgitation. Trivial pericardial effusion    Stress test: 5/15/17   Normal myocardial perfusion study. Normal LV size and systolic function. Overall findings represent a low risk study. EP Procedures:  Atrial fibrillation ablation, 7/18/22, Dr. Tonya Valencia:    Paroxysmal atrial fibrillation   - symptomatic with fatigue   - first noted on EKG on 4/26/17   - s/p A fib ablation on 7/18/22 with Dr. Kalee Santiago 5 (gender, HTN, DM, TIA) on Eliquis 5mg BID   - EKG today with atrial fibrillation withRVR   - has multiple allergies/intolerances, was on diltiazem and metoprolol in the past but could not tolerate. Offerred to start on digoxin for rate control but she would rather just wait for a cardioversion. Also limited with AADs as she cannot have amiodarone and did not tolerate flecainide, would again wait on adding anything for now. - she does have some swelling and will try to use low dose Lasix for 2 days and then PRN, likely secondary to her atrial fibrillation   - will arrange for cardioversion with Dr. Jill Up aortic valve   - seen on recent THANIA   - no significant AS or AR   - will need monitored over time    Essential HTN   - controlled   - not currently on medications for this        Thank you for allowing to us to participate in the care of Corey Curran. Follow up after cardioversion.     JERMAINE Wetzel  [facility], [address] [place] [address], [address] [place] [address]  Phone: [phone]  Fax: [phone]    Electronically signed by Candy Kayser, APRN - CNP on 8/15/2022 at 2:59 PM

## 2022-08-15 ENCOUNTER — TELEPHONE (OUTPATIENT)
Dept: NON INVASIVE DIAGNOSTICS | Age: 67
End: 2022-08-15

## 2022-08-15 ENCOUNTER — OFFICE VISIT (OUTPATIENT)
Dept: CARDIOLOGY CLINIC | Age: 67
End: 2022-08-15

## 2022-08-15 VITALS
BODY MASS INDEX: 35.79 KG/M2 | WEIGHT: 228 LBS | DIASTOLIC BLOOD PRESSURE: 78 MMHG | OXYGEN SATURATION: 98 % | HEIGHT: 67 IN | SYSTOLIC BLOOD PRESSURE: 127 MMHG | HEART RATE: 138 BPM

## 2022-08-15 DIAGNOSIS — I48.0 PAROXYSMAL ATRIAL FIBRILLATION (HCC): Primary | ICD-10-CM

## 2022-08-15 DIAGNOSIS — Q23.1 BICUSPID AORTIC VALVE: ICD-10-CM

## 2022-08-15 DIAGNOSIS — I10 PRIMARY HYPERTENSION: Chronic | ICD-10-CM

## 2022-08-15 PROCEDURE — 99214 OFFICE O/P EST MOD 30 MIN: CPT | Performed by: NURSE PRACTITIONER

## 2022-08-15 PROCEDURE — 93000 ELECTROCARDIOGRAM COMPLETE: CPT | Performed by: NURSE PRACTITIONER

## 2022-08-15 RX ORDER — FUROSEMIDE 20 MG/1
TABLET ORAL
Qty: 15 TABLET | Refills: 1 | Status: SHIPPED | OUTPATIENT
Start: 2022-08-15 | End: 2022-09-20

## 2022-08-15 ASSESSMENT — ENCOUNTER SYMPTOMS
BACK PAIN: 0
SORE THROAT: 0
NAUSEA: 0
DIARRHEA: 0
VOMITING: 0
CONSTIPATION: 0
ABDOMINAL PAIN: 0
COUGH: 0
WHEEZING: 0
TROUBLE SWALLOWING: 0
SINUS PRESSURE: 0
SHORTNESS OF BREATH: 0
BLOOD IN STOOL: 0
COLOR CHANGE: 0

## 2022-08-15 NOTE — TELEPHONE ENCOUNTER
Pt needs scheduled for cardioversion (no THANIA) with Dr. Janina Ordoñez when he gets back from time off. Should f/u with him in office following procedure.

## 2022-08-16 NOTE — TELEPHONE ENCOUNTER
Procedure is scheduled on 8/30/22 at 11:30 am. Arrival time is 10:30 am. Pt is agreeable to date/time. Went over pre-procedure instructions. Pt v/u. Published on [place] and e-mail to Britta Tovar.

## 2022-08-25 DIAGNOSIS — I48.0 PAROXYSMAL ATRIAL FIBRILLATION (HCC): ICD-10-CM

## 2022-08-25 LAB
ANION GAP SERPL CALCULATED.3IONS-SCNC: 10 MMOL/L (ref 3–16)
BUN BLDV-MCNC: 15 MG/DL (ref 7–20)
CALCIUM SERPL-MCNC: 9.2 MG/DL (ref 8.3–10.6)
CHLORIDE BLD-SCNC: 104 MMOL/L (ref 99–110)
CO2: 26 MMOL/L (ref 21–32)
CREAT SERPL-MCNC: 1.2 MG/DL (ref 0.6–1.2)
GFR AFRICAN AMERICAN: 55
GFR NON-AFRICAN AMERICAN: 45
GLUCOSE BLD-MCNC: 92 MG/DL (ref 70–99)
HCT VFR BLD CALC: 38.9 % (ref 36–48)
HEMOGLOBIN: 13 G/DL (ref 12–16)
MCH RBC QN AUTO: 29.7 PG (ref 26–34)
MCHC RBC AUTO-ENTMCNC: 33.3 G/DL (ref 31–36)
MCV RBC AUTO: 89.3 FL (ref 80–100)
PDW BLD-RTO: 14.4 % (ref 12.4–15.4)
PLATELET # BLD: 197 K/UL (ref 135–450)
PMV BLD AUTO: 9.7 FL (ref 5–10.5)
POTASSIUM SERPL-SCNC: 3.8 MMOL/L (ref 3.5–5.1)
RBC # BLD: 4.36 M/UL (ref 4–5.2)
SODIUM BLD-SCNC: 140 MMOL/L (ref 136–145)
WBC # BLD: 7.3 K/UL (ref 4–11)

## 2022-08-30 ENCOUNTER — HOSPITAL ENCOUNTER (OUTPATIENT)
Dept: CARDIAC CATH/INVASIVE PROCEDURES | Age: 67
Discharge: HOME | End: 2022-08-30

## 2022-08-30 VITALS
DIASTOLIC BLOOD PRESSURE: 77 MMHG | RESPIRATION RATE: 16 BRPM | HEART RATE: 149 BPM | WEIGHT: 228 LBS | SYSTOLIC BLOOD PRESSURE: 119 MMHG | OXYGEN SATURATION: 100 % | TEMPERATURE: 97.1 F | BODY MASS INDEX: 35.79 KG/M2 | HEIGHT: 67 IN

## 2022-08-30 LAB
EKG ATRIAL RATE: 117 BPM
EKG ATRIAL RATE: 64 BPM
EKG DIAGNOSIS: NORMAL
EKG DIAGNOSIS: NORMAL
EKG P AXIS: 60 DEGREES
EKG P-R INTERVAL: 192 MS
EKG Q-T INTERVAL: 302 MS
EKG Q-T INTERVAL: 470 MS
EKG QRS DURATION: 60 MS
EKG QRS DURATION: 66 MS
EKG QTC CALCULATION (BAZETT): 454 MS
EKG QTC CALCULATION (BAZETT): 484 MS
EKG R AXIS: 38 DEGREES
EKG R AXIS: 50 DEGREES
EKG T AXIS: 116 DEGREES
EKG T AXIS: 90 DEGREES
EKG VENTRICULAR RATE: 136 BPM
EKG VENTRICULAR RATE: 64 BPM

## 2022-08-30 PROCEDURE — 93010 ELECTROCARDIOGRAM REPORT: CPT | Performed by: INTERNAL MEDICINE

## 2022-08-30 PROCEDURE — 92960 CARDIOVERSION ELECTRIC EXT: CPT | Performed by: INTERNAL MEDICINE

## 2022-08-30 PROCEDURE — 93005 ELECTROCARDIOGRAM TRACING: CPT | Performed by: INTERNAL MEDICINE

## 2022-08-30 PROCEDURE — 99152 MOD SED SAME PHYS/QHP 5/>YRS: CPT | Performed by: INTERNAL MEDICINE

## 2022-08-30 PROCEDURE — 92960 CARDIOVERSION ELECTRIC EXT: CPT

## 2022-08-30 PROCEDURE — 2500000003 HC RX 250 WO HCPCS

## 2022-08-30 PROCEDURE — 2580000003 HC RX 258

## 2022-08-30 RX ORDER — SODIUM CHLORIDE 9 MG/ML
INJECTION, SOLUTION INTRAVENOUS PRN
Status: DISCONTINUED | OUTPATIENT
Start: 2022-08-30 | End: 2022-08-31 | Stop reason: HOSPADM

## 2022-08-30 RX ORDER — SODIUM CHLORIDE 0.9 % (FLUSH) 0.9 %
5-40 SYRINGE (ML) INJECTION PRN
Status: DISCONTINUED | OUTPATIENT
Start: 2022-08-30 | End: 2022-08-31 | Stop reason: HOSPADM

## 2022-08-30 RX ORDER — SODIUM CHLORIDE 0.9 % (FLUSH) 0.9 %
5-40 SYRINGE (ML) INJECTION EVERY 12 HOURS SCHEDULED
Status: DISCONTINUED | OUTPATIENT
Start: 2022-08-30 | End: 2022-08-31 | Stop reason: HOSPADM

## 2022-08-30 RX ORDER — RANOLAZINE 500 MG/1
500 TABLET, EXTENDED RELEASE ORAL 2 TIMES DAILY
Qty: 60 TABLET | Refills: 3 | Status: SHIPPED | OUTPATIENT
Start: 2022-08-30

## 2022-08-30 ASSESSMENT — ENCOUNTER SYMPTOMS
BLOOD IN STOOL: 0
COLOR CHANGE: 0
SORE THROAT: 0
DIARRHEA: 0
BACK PAIN: 0
NAUSEA: 0
WHEEZING: 0
SINUS PRESSURE: 0
VOMITING: 0
SHORTNESS OF BREATH: 0
TROUBLE SWALLOWING: 0
CONSTIPATION: 0
ABDOMINAL PAIN: 0
COUGH: 0

## 2022-08-30 NOTE — PROCEDURES
[facility]     Electrophysiology Procedure Note       Date of Procedure: 8/30/2022  Patient's Name: Jamie Singh  YOB: 1955   Medical Record Number: [id number]  Procedure Performed by: Corrine Laurent MD    Procedures performed:  IV sedation. External Electrical cardioversion   Mallampati 3  ASA 2    Indication of the procedure: Persistent Atrial fibrillation    Details of procedure: The patient was brought to the cath lab area in a fasting and non-sedated state. The risks, benefits and alternatives of the procedure were discussed with the patient. The patient opted to proceed with the procedure. Written informed consent was signed and placed in the chart. A timeout protocol was completed to identify the patient and the procedure being performed. Patient is on chronic anticoagulation therapy. I pushed 50+20 mg Brevital.    We monitored the patient's level of consciousness and vital signs/physiologic status throughout the procedure duration (see start and stop times below). Sedation:  Brevital  Sedation start: 11:15 am  Sedation stop: 11:45 am     DC cardioversion was perfomred using 300J then 360 J, synchronized shock. Patient was converted to sinus rhythm at 70s bpm. The patient tolerated the procedure well and there were no complications. Conclusion:   Successful external DC cardioversion of persistent Atrial fibrillation    Plan:   The patient can be discharged if remains stable. Start ranexa 500 mg bid. Continue rest of medical therapy. F/u with Jorge Chang NP in 1 month.      Corrine Laurent MD  Cardiac Electrophysiology  [facility]

## 2022-08-30 NOTE — DISCHARGE INSTRUCTIONS
CARDIOVERSION DISCHARGE INSTRUCTIONS    No driving for 24 hours. We strongly recommend that a responsible adult stay with you for the next 24 hours. Do not make important / legal decisions within 24 hours after procedures. Do not drink any alcoholic beverages or take sleeping pills for 24 hours    Continue your blood thinner as directed. Hydrocortisone 1% cream or aloe to reddened areas as needed.

## 2022-08-30 NOTE — H&P
[address]   Electrophysiology      Date: 8/30/2022    Primary Cardiologist: Janna Patton MD  PCP: Nick Basilio MD     Chief Complaint:   No chief complaint on file. History of Present Illness:    She is a 61 y.o. female with a past medical history of TIA (2006), pituitary brain tumors with adrenal insufficiency, HTN, DM, chiari malformation, thyroid disease and atrial fibrillation. She was first noted to have atrial fibrillation during her cataract surgery in 2017. Seen on EKG 4/26/2017. EKG 5/8/2017 showed NSR. She underwent atrial fibrillation ablation on 7/18/22 by me. Since the evening of her ablation she has felt \"out of rhythm\" and poorly, worse than she did previously with her atrial fibrillation. She also has had swelling but has an allergy to corn which is in multiple medications and thinks the swelling may be secondary to the Protonix. Denies any increased dyspnea. She has gained 4 pounds. Denies any chest pain. No syncope. No bleeding issues or missed doses of Eliquis. EKG showed that she was in atrial fibrillation. Allergies: Allergies   Allergen Reactions    Flecainide Shortness Of Breath and Swelling    Corn Oil     Levofloxacin      Out of touch w/ world     Home Medications:  Prior to Visit Medications    Medication Sig Taking? Authorizing Provider   furosemide (LASIX) 20 MG tablet Take 1 tab today and tomorrow and then as needed for swelling, weight gain.   JERMAINE Chase CNP   traZODone (DESYREL) 150 MG tablet TAKE 2 TABLETS BY MOUTH EVERY NIGHT AS NEEDED  JERMAINE Arndt CNP   albuterol sulfate HFA (PROVENTIL;VENTOLIN;PROAIR) 108 (90 Base) MCG/ACT inhaler INHALE 2 PUFFS INTO THE LUNGS EVERY 6 HOURS AS NEEDED FOR WHEEZING  Nick Basilio MD   levothyroxine (SYNTHROID) 50 MCG tablet TAKE 1 TABLET BY MOUTH FIVE DAYS A WEEK AND 2 TABLETS ON SATURDAY/SUNDAY  JERMAINE Ruggiero CNP   apixaban (ELIQUIS) 5 MG TABS tablet TAKE 1 TABLET BY MOUTH TWICE DAILY  JERMAINE De Jesus - CNP   hydrocortisone (CORTEF) 10 MG tablet TAKE 2 TABLETS BY MOUTH EVERY DAY AT 8:30 AM AND 1 TABLET EVERY DAY AT 12:15 PM  Narinder Lambert MD   Cholecalciferol (VITAMIN D3) 1000 UNITS TABS Take 1 tablet by mouth daily   Historical Provider, MD        Past Medical History:  Past Medical History:   Diagnosis Date    Adrenal insufficiency ([address]     Arthritis     Asthma     Atrial fibrillation ([address]     Brain tumor ([address]     chiari - malformation    Chiari malformation 3/11    Fibromyalgia     History of TIA (transient ischemic attack) 2006    Panhypopituitarism ([address] 8/26/2014    S/P selective transsphenoidal pituitary adenomectomy 2007    Thyroid disease     Total knee replacement status 8/14    right    Unspecified cerebral artery occlusion with cerebral infarction       TIA 2006       Past Surgical History:   Past Surgical History:   Procedure Laterality Date    BICEPS TENODESIS Left 11/10/2016    Dr Lynn Frances  3/24/11    chiari 1 repair/ decompression and C1 laminectomy    BRAIN SURGERY  11/07    adenoma removal, pituitary    CATARACT REMOVAL Bilateral 04/2017    HYSTERECTOMY (CERVIX STATUS UNKNOWN)      KNEE ARTHROPLASTY Right 08/25/2014    right tkr    LAMINECTOMY  C1    chiari    PITUITARY SURGERY  2/15    transphenoidal       Social History:   reports that she has never smoked. She has never used smokeless tobacco. She reports that she does not drink alcohol and does not use drugs. Family History:      Problem Relation Age of Onset    Hypertension Mother     Arrhythmia Mother     Cancer Father         skin?     No Known Problems Sister     Diabetes Paternal Grandfather     No Known Problems Brother     No Known Problems Maternal Aunt     No Known Problems Maternal Uncle     No Known Problems Paternal Aunt     No Known Problems Paternal Uncle     No Known Problems Maternal Grandmother     No Known Problems Maternal Grandfather     No Known Problems Paternal Grandmother     No Known Problems Other     Anesth Problems Neg Hx     Broken Bones Neg Hx     Clotting Disorder Neg Hx     Collagen Disease Neg Hx     Dislocations Neg Hx     Osteoporosis Neg Hx     Rheumatologic Disease Neg Hx     Scoliosis Neg Hx     Severe Sprains Neg Hx        Review of Systems   Constitutional:  Positive for fatigue. Negative for chills, fever and unexpected weight change. HENT:  Negative for congestion, hearing loss, sinus pressure, sore throat and trouble swallowing. Respiratory:  Negative for cough, shortness of breath and wheezing. Cardiovascular:  Positive for palpitations and leg swelling. Negative for chest pain. Gastrointestinal:  Negative for abdominal pain, blood in stool, constipation, diarrhea, nausea and vomiting. Genitourinary:  Negative for hematuria. Musculoskeletal:  Negative for arthralgias, back pain, gait problem and myalgias. Skin:  Negative for color change, rash and wound. Neurological:  Negative for dizziness, seizures, syncope, speech difficulty, weakness and light-headedness. Hematological:  Does not bruise/bleed easily. Physical Examination:  There were no vitals filed for this visit. Wt Readings from Last 3 Encounters:   08/15/22 228 lb (103.4 kg)   07/21/22 229 lb 9.6 oz (104.1 kg)   07/18/22 225 lb (102.1 kg)       Physical Exam  Vitals reviewed. Constitutional:       General: She is not in acute distress. Appearance: Normal appearance. HENT:      Head: Normocephalic and atraumatic. Nose: Nose normal.      Mouth/Throat:      Mouth: Mucous membranes are moist.   Eyes:      Conjunctiva/sclera: Conjunctivae normal.      Pupils: Pupils are equal, round, and reactive to light. Cardiovascular:      Rate and Rhythm: Tachycardia present. Rhythm irregularly irregular. Heart sounds: No murmur heard. No friction rub. No gallop. Pulmonary:      Effort: No respiratory distress. Breath sounds:  No wheezing, rhonchi or rales. Abdominal:      General: Abdomen is flat. Bowel sounds are normal.      Palpations: Abdomen is soft. Musculoskeletal:         General: Normal range of motion. Right lower le+ Pitting Edema present. Left lower le+ Pitting Edema present. Skin:     General: Skin is warm and dry. Findings: No bruising. Neurological:      General: No focal deficit present. Mental Status: She is alert and oriented to person, place, and time. Motor: No weakness. Psychiatric:         Mood and Affect: Mood normal.         Behavior: Behavior normal.        Pertinent labs, diagnostic, device, and imaging results reviewed as a part of this visit    LABS    CBC:   Lab Results   Component Value Date    WBC 7.3 2022    HGB 13.0 2022    HCT 38.9 2022    MCV 89.3 2022     2022     BMP:   Lab Results   Component Value Date    CREATININE 1.2 2022    BUN 15 2022     2022    K 3.8 2022     2022    CO2 26 2022     CrCl cannot be calculated (Unknown ideal weight.). No results found for: BNP    Thyroid:   Lab Results   Component Value Date    TSH 1.37 2021    U1XVNJD 1.1 2021     Lipid Panel:   Lab Results   Component Value Date/Time    CHOL 176 10/11/2018 07:41 AM    HDL 63 2020 08:23 AM    TRIG 120 10/11/2018 07:41 AM     LFTs:  Lab Results   Component Value Date    ALT 12 2021    AST 11 2021    ALKPHOS 97 2020    BILITOT 0.6 2021     Coags:   Lab Results   Component Value Date    PROTIME 16.6 (H) 10/01/2014    INR 1.6 (H) 10/01/2014    APTT 30.4 2014       EC2022   Atrial fibrillation/flutter at 117 BPM.     THANIA: 22   Left ventricular function could not be adequately assessed due to atrial   fibrillation rhythm with RVR. Overall ejection fraction appears normal   around 60%. Mild mitral regurgitation.    There is no evidence of mass or thrombus in the left atrium or appendage. Bicuspid aortic valve with fusion of the left and right coronary cusp. Thickening/calcification of the aortic valve leaflets. There appears to be trivial aortic regurgitation best visualized in the   short axis view. Right ventricular systolic function is normal .   Mild to moderate tricuspid regurgitation. Trivial pericardial effusion    Stress test: 5/15/17   Normal myocardial perfusion study. Normal LV size and systolic function. Overall findings represent a low risk study. EP Procedures:  Atrial fibrillation ablation, 7/18/22, Dr. Kecia Gross:    Paroxysmal atrial fibrillation   - symptomatic with fatigue   - first noted on EKG on 4/26/17   - s/p A fib ablation on 7/18/22 with Dr. Freddie De La Torre 5 (gender, HTN, DM, TIA) on Eliquis 5mg BID   - EKG today with A fib/flutter with RVR   - unfortunately, she is unable to tolerate many medications due to her corn allergy including amiodarone, did not tolerate flecainide so limited in medications. Likely in A fib due to inflammation for recent ablation. Offered cardioversion but she wants to wait and see if she converts on her own, if still feeling poorly next week, I advised her to call the office and schedule a cardioversion, would not need THANIA.    - also with swelling, weight gain - she thinks this may be from Protonix which she was instructed to stop. If swelling does not improve, may need short term diuretic therapy and she will call if that's the case    She is here for a cardioversion. It is challenging that she is not tolerating any medications to maintain sinus rhythm during this blanking period. Essential HTN   - controlled   - not currently on medications for this    Thank you for allowing to us to participate in the care of Corey [address]. Keep scheduled f/u.     Gracie Hayden MD  Cardiac Electrophysiology  [address]      Electronically signed by Gracie Hayden MD on 8/30/2022 at 7:38 AM

## 2022-08-31 ENCOUNTER — TELEPHONE (OUTPATIENT)
Dept: CARDIOLOGY CLINIC | Age: 67
End: 2022-08-31

## 2022-08-31 NOTE — TELEPHONE ENCOUNTER
Jennyfer called the office following cardioversion yesterday. She felt well after returning home. She took her scheduled dose of Ranexa and then checked her heart rhythm with her [employer] mobile fatuma. This showed she was in afib with a rate in the 140s. This morning she check again and was in NSR with rate in the 70s. Currently her fatuma is stating a rate in 120s but tachycardia not afib. Discussed with Dr. Tashi Borja who recommended she stay on Ranexa for the next 3 days and call with an update Friday. Gave recommendations to patient and she v/u. Instructed not to miss any doses of [address]. She v/u and will return the call Friday.

## 2022-09-06 ENCOUNTER — TELEPHONE (OUTPATIENT)
Dept: FAMILY MEDICINE CLINIC | Age: 67
End: 2022-09-06

## 2022-09-06 DIAGNOSIS — J01.00 ACUTE NON-RECURRENT MAXILLARY SINUSITIS: ICD-10-CM

## 2022-09-06 RX ORDER — AMOXICILLIN AND CLAVULANATE POTASSIUM 875; 125 MG/1; MG/1
1 TABLET, FILM COATED ORAL 2 TIMES DAILY
Qty: 14 TABLET | Refills: 0 | Status: SHIPPED | OUTPATIENT
Start: 2022-09-06 | End: 2022-09-13

## 2022-09-06 NOTE — TELEPHONE ENCOUNTER
Augmentin sent to pharmacy. Please eat yogurt daily or take probiotic supplement while on this antibiotic and for additional week after finishing the antibiotic to protect your GI tract. Can take pseudoephedrine tabs for congestion if blood pressure, heart rate okay or can take mucinex tablets for thick mucus.

## 2022-09-06 NOTE — TELEPHONE ENCOUNTER
The most recent guidelines suggest not using antibiotics for sinusitis unless severe symptoms (fever w/ localized sinus pressure and purulent drainage)or unless symptoms are not responding to symptomatic treatment after 10 days. If unable / unwilling to make appointment, please let me know if either of the above are applicable for antibiotic treatment.

## 2022-09-06 NOTE — TELEPHONE ENCOUNTER
----- Message from [address] sent at 9/6/2022 12:03 PM EDT -----  Subject: Message to Provider    QUESTIONS  Information for Provider? Patient wants the office to call in an   antibiotic for a sinus infection. She didn't want to be scheduled and said   this has been done before. Tested negative for covid on 8/29. Runny nose,   slight cough starting.  ---------------------------------------------------------------------------  --------------  Carol Hernandez [facility]  [phone]; OK to leave message on voicemail  ---------------------------------------------------------------------------  --------------  SCRIPT ANSWERS  Relationship to Patient?  Self

## 2022-09-06 NOTE — TELEPHONE ENCOUNTER
CALLED PT SHE CANNOT TAKE MOST OTC MEDS DUE TO ALLERGY TO CORN, SHE STARTED OVER 2 WEEKS AGO AND SHE WAS TRYING TO RIDE IT OUT. SHE HAD A CARDIO VERSION AND WAS TESTED FOR COVID THEN. THICK YELLOW GREEN MUCUS. NO FEVER SORE THROAT. SHE SAID WHAT EVER  [facility] GAVE HER IN THE PAST REALLY HELPS NOT SURE OF NAME.  Kelly Wray

## 2022-09-07 RX ORDER — ALBUTEROL SULFATE 90 UG/1
AEROSOL, METERED RESPIRATORY (INHALATION)
Qty: 8.5 G | Refills: 0 | Status: SHIPPED | OUTPATIENT
Start: 2022-09-07 | End: 2022-10-11

## 2022-09-11 DIAGNOSIS — I48.0 PAROXYSMAL ATRIAL FIBRILLATION (HCC): ICD-10-CM

## 2022-09-13 RX ORDER — TRAZODONE HYDROCHLORIDE 150 MG/1
TABLET ORAL
Qty: 180 TABLET | Refills: 0 | Status: SHIPPED | OUTPATIENT
Start: 2022-09-13

## 2022-09-20 RX ORDER — FUROSEMIDE 20 MG/1
TABLET ORAL
Qty: 15 TABLET | Refills: 1 | Status: SHIPPED | OUTPATIENT
Start: 2022-09-20

## 2022-09-20 NOTE — TELEPHONE ENCOUNTER
Last OV:8/15/2022  Last Labs:2022  Last Refills:2022  Next Appt:2022    Last EK2022    furosemide (LASIX) 20 MG tablet

## 2022-09-26 NOTE — PROGRESS NOTES
Cardiac Electrophysiology Consultation   Date: 9/30/2022   Reason for Consultation: atrial fibrillation  Consult Requesting Physician: Violet Wood MD     Chief Complaint:   No chief complaint on file. HPI: Js Meza is a 61 y.o. patient with a history of TIA (2006), pituitary brain tumors with adrenal insufficiency, chiari malformation, thyroid disease and atrial fibrillation. She was first noted to have atrial fibrillation during her cataract surgery in 2017. Seen on EKG 4/26/2017. EKG 5/8/2017 showed NSR. She has not undergone any procedure for her atrial fibrillation nor has she been tried on any AAD's. Previously followed with Dr. Power Galeana     Today, she presents to office for evaluation of atrial fibrillation. She states that she was at her PCP office because she had a cold and EKG showed atrial fibrillation. She states she has been feeling fatigued since she was in atrial fibrillation. Denies any other symptoms when in afib and stated she would not have known if she did not have the EKG done. She recalls her history of brain tumors for which she underwent radiation therapy. She is compliant with her medications and tolerating them well. She denies chest pain/pressure, tightness, edema, shortness of breath, heart racing, palpitations, lightheadedness, dizziness, syncope, presyncope,  PND or orthopnea.        Past Medical History:   Diagnosis Date    Adrenal insufficiency ([address]     Arthritis     Asthma     Atrial fibrillation ([address]     Brain tumor ([address]     chiari - malformation    Chiari malformation 3/11    Fibromyalgia     History of TIA (transient ischemic attack) 2006    Panhypopituitarism ([address] 8/26/2014    S/P selective transsphenoidal pituitary adenomectomy 2007    Thyroid disease     Total knee replacement status 8/14    right    Unspecified cerebral artery occlusion with cerebral infarction       TIA 2006      Past Surgical History:   Procedure Laterality Date    BICEPS TENODESIS Left 11/10/2016    Dr Amanda Ribeiro  3/24/11    chiari 1 repair/ decompression and C1 laminectomy    BRAIN SURGERY  11/07    adenoma removal, pituitary    CATARACT REMOVAL Bilateral 04/2017    HYSTERECTOMY (CERVIX STATUS UNKNOWN)      KNEE ARTHROPLASTY Right 08/25/2014    right tkr    LAMINECTOMY  C1    chiari    PITUITARY SURGERY  2/15    transphenoidal       Allergies: Allergies   Allergen Reactions    Flecainide Shortness Of Breath and Swelling    Corn Oil     Levofloxacin      Out of touch w/ world       Medication:   Prior to Admission medications    Medication Sig Start Date End Date Taking? Authorizing Provider   furosemide (LASIX) 20 MG tablet TAKE 1 TABLET BY MOUTH TODAY AND TOMORROW AND THEN AS NEEDED FOR SWELLING, WEIGHT GAIN 9/20/22   Lyndsey Villar MD   traZODone (DESYREL) 150 MG tablet TAKE 2 TABLETS BY MOUTH EVERY NIGHT AS NEEDED 9/13/22   JERMAINE Doyle CNP   apixaban (ELIQUIS) 5 MG TABS tablet TAKE 1 TABLET BY MOUTH TWICE DAILY 9/12/22   JERMAINE Nieto CNP   albuterol sulfate HFA (PROVENTIL;VENTOLIN;PROAIR) 108 (90 Base) MCG/ACT inhaler INHALE 2 PUFFS INTO THE LUNGS EVERY 6 HOURS AS NEEDED FOR WHEEZING 9/7/22   Norma Villela MD   ranolazine (RANEXA) 500 MG extended release tablet Take 1 tablet by mouth 2 times daily 8/30/22   JERMAINE Serrano CNP   levothyroxine (SYNTHROID) 50 MCG tablet TAKE 1 TABLET BY MOUTH FIVE DAYS A WEEK AND 2 TABLETS ON SATURDAY/SUNDAY 5/4/22   JERMAINE Nieto CNP   hydrocortisone (CORTEF) 10 MG tablet TAKE 2 TABLETS BY MOUTH EVERY DAY AT 8:30 AM AND 1 TABLET EVERY DAY AT 12:15 PM 8/24/20   Barbara Dowling MD   Cholecalciferol (VITAMIN D3) 1000 UNITS TABS Take 1 tablet by mouth daily     Historical Provider, MD       Social History:   reports that she has never smoked. She has never used smokeless tobacco. She reports that she does not drink alcohol and does not use drugs.         Family History:  family history includes Arrhythmia in her mother; Cancer in her father; Diabetes in her paternal grandfather; Hypertension in her mother; No Known Problems in her brother, maternal aunt, maternal grandfather, maternal grandmother, maternal uncle, paternal aunt, paternal grandmother, paternal uncle, sister, and another family member. Reviewed. Denies family history of sudden cardiac death, arrhythmia, premature CAD    Review of System:  Pertinent positive and negatives are in the HPI, the rest are negative. Physical Examination:  There were no vitals taken for this visit. Constitutional: Oriented. No distress. + fatigue  Head: Normocephalic and atraumatic. Mouth/Throat: Oropharynx is clear and moist.   Eyes: Conjunctivae normal. EOM are normal.   Neck: Normal range of motion. Neck supple. No rigidity. No JVD present. Cardiovascular: Normal rate, irregular rhythm, S1&S2 and intact distal pulses. Pulmonary/Chest: Bilateral respiratory sounds. No wheezes. No rhonchi. Abdominal: Soft. Bowel sounds present. No distension, No tenderness. Musculoskeletal: No tenderness. No edema    Lymphadenopathy: Has no cervical adenopathy. Neurological: Alert and oriented. Cranial nerve appears intact, No Gross deficit   Skin: Skin is warm and dry. No rash noted. Psychiatric: Has a normal mood, affect and behavior     Labs:  Reviewed. ECG: reviewed, atrial fibrillation with v-rate of 93 bpm with QRS duration 78 ms. No ventricular pre-excitation, or QT prolongation. Studies:   1. Event monitor:   none    2. Echo: 5/15/2017   Summary  Normal left ventricular size and wall thickness. Normal left ventricularsystolic function with an estimated ejection fraction of 55-60% . Normal right ventricular size and function. No significant valvular disease. LA volume/Index: 71 ml /32 ml/m2    3. Stress Test:  5/15/2017  Summary    Normal myocardial perfusion study. Normal LV size and systolic function.     Overall findings represent a low risk study. Resting ECG    Normal sinus rhythm. Normal ECG. Resting HR:59 bpm            Resting BP:121/83 mmHg        Pre-stress physical exam: Walking Lexiscan Stress Test:    Findings on the brief pre-stress cardiopulmonary exam:unremarkable heart and lungs. Prior to test initiation, patient states she is asymptomatic. Stress Protocol:Pharmacologic - Lexiscan's        Peak HR:94 bpm                               HR response: Normal    Peak BP:143/83 mmHg                          BP response: Normal    Predicted HR: 161 bpm                        HR/BP product:[id number]    % of predicted HR: 58    Test duration: 1 min and 40 sec    Reason for termination:Completed     4. Cath:   none    I independently reviewed the ECG, MCOT, echocardiogram, stress test, and coronary angiography/PCI results and used them for my plan of care. [id number] Score for Atrial Fibrillation Stroke Risk   Risk   Factors  Component Value   C CHF No 0   H HTN Yes 1   A2 Age >= 76 No,  (64 y.o.) 0   D DM Yes 1   S2 Prior Stroke/TIA yes 2   V Vascular Disease No 0   A Age 74-69 No,  (64 y.o.) 0   Sc Sex female 1    [id number]  Score  5   Score last updated 6/16/22 75:37 AM EDT    Click here for a link to the UpToDate guideline \"Atrial Fibrillation: Anticoagulation therapy to prevent embolization    Disclaimer: Risk Score calculation is dependent on accuracy of patient problem list and past encounter diagnosis. Cardiac Procedures:  1. none    Assessment/Plan:    Atrial fibrillation   -Symptomatic with fatigue.   -First noted during cataract surgery. Seen on EKG 4/26/2017 was noted to be back in rhythm on EKG 5/8/2017. Return of atrial fibrillation noted on EKG on 6/9/2022 and today.   -She has a [id number] Score 5 (HTN, DM,TIA, GENDER)   -Continue Eliquis 5 mg BID for  thromboembolic risk reduction.   -Tolerating well no signs or symptoms of abnormal bruising or bleeding.     -TSH 1.37 (7/9/2021)    Ms. Renu Tan had paroxysmal atrial fibrillation now seems persistent with symptoms of fatigue. No recent echocardiogram. We discussed treatment options for rhythm and rate control including medications vs ablation and the risks and benefits of each. She opts for ablation over medications given its higher efficacy and not be on rhythm management medications long term. Continue anticoagulation for now. Will plan for a THANIA before ablation to assess systolic function. AAD with flecainide for 3 months. Has allergy to corn oil hence amiodarone not an option.     - Afib risk factors including age, HTN, obesity, inactivity and WINIFRED were discussed with patient. Risk factor modification recommended      - Treatment options including cardioversion, rate control strategy, antiarrhythmics, anticoagulation and possible ablation were discussed with patient. Risks, benefits and alternative of each treatment options were explained. All questions answered                          ~ Information given regarding ablation for pt to review                          ~ The risks, benefits and alternatives of the ablation procedure were discussed with the patient. The risks including, but not limited to, the risks of bleeding, infection, radiation exposure, injury to vascular, cardiac and surrounding structures (including pneumothorax), stroke, cardiac perforation, tamponade, need for emergent open heart surgery, need for pacemaker implantation, Injury to the phrenic nerve, injury to the esophagus, myocardial infarction and death were discussed in detail.                 - I explained the success rate considering possible need for a second procedure is about 60-80% and with addition of anti arrhythmics is higher     Full THANIA day of procedure to assess heart and valvular function. Patient verbalized understanding of procedure, risks and benefits and wishes to proceed with ablation. Start Flecainide 50 mg BID.     Essential hypertension   -Stable -Continue current medical therapy. History of TIA   -2006.   -Residual deficit right eye does not close all the way when sleeping but can blink. Type 2 DM   -Continue current medical management.   -Hb A1c 5.9 (7/9/2021)    Follow ups:  -Follow up one month after the procedure with myself and  three months after procedure with Radha George CNP. Thank you for allowing me to participate in the care of Corey Curran. All questions and concerns were addressed to the patient/family. Alternatives to my treatment were discussed.      This note was scribed in the presence of Vanessa Plummer MD by Alba Leon RN.    ***    Vanessa Plummer MD  Cardiac Electrophysiology  [facility]

## 2022-09-30 ENCOUNTER — OFFICE VISIT (OUTPATIENT)
Dept: CARDIOLOGY CLINIC | Age: 67
End: 2022-09-30

## 2022-09-30 VITALS
DIASTOLIC BLOOD PRESSURE: 69 MMHG | BODY MASS INDEX: 36.26 KG/M2 | OXYGEN SATURATION: 96 % | HEIGHT: 67 IN | SYSTOLIC BLOOD PRESSURE: 115 MMHG | HEART RATE: 66 BPM | WEIGHT: 231 LBS

## 2022-09-30 DIAGNOSIS — Q23.1 BICUSPID AORTIC VALVE: ICD-10-CM

## 2022-09-30 DIAGNOSIS — I10 ESSENTIAL HYPERTENSION: ICD-10-CM

## 2022-09-30 DIAGNOSIS — Z86.73 HISTORY OF TIA (TRANSIENT ISCHEMIC ATTACK): ICD-10-CM

## 2022-09-30 DIAGNOSIS — I48.0 PAROXYSMAL ATRIAL FIBRILLATION (HCC): Primary | ICD-10-CM

## 2022-09-30 DIAGNOSIS — E11.9 CONTROLLED TYPE 2 DIABETES MELLITUS WITHOUT COMPLICATION, WITHOUT LONG-TERM CURRENT USE OF INSULIN (HCC): ICD-10-CM

## 2022-09-30 PROCEDURE — 99215 OFFICE O/P EST HI 40 MIN: CPT | Performed by: INTERNAL MEDICINE

## 2022-09-30 PROCEDURE — 93000 ELECTROCARDIOGRAM COMPLETE: CPT | Performed by: INTERNAL MEDICINE

## 2022-09-30 NOTE — PROGRESS NOTES
Cardiac Electrophysiology Consultation   Date: 9/30/2022   Reason for Consultation: atrial fibrillation  Consult Requesting Physician: Jeri Solomon MD     Chief Complaint:   Chief Complaint   Patient presents with    Follow-up     Afib. HPI: eJsse West is a 61 y.o. patient with a history of TIA (2006), pituitary brain tumors with adrenal insufficiency, chiari malformation, thyroid disease and atrial fibrillation. She was first noted to have atrial fibrillation during her cataract surgery in 2017. Seen on EKG 4/26/2017. EKG 5/8/2017 showed NSR. She underwent atrial fibrillation ablation 7/18/22. She was seen in the office and noted to have recurrence of atrial fibrillation. She underwent successful cardioversion on 8/30/22. She did not tolerated flecainide at all post ablation. After DCCV, ranexa was started 500 mg bid. Previously followed with Dr. Lorri Watkins     She presents today for follow up. She continues to monitor her heart rate during the day. She reports elevated heart rate readings that mostly present in the evenings. She has not been able to tolerate twice daily Ranexa due to feeling poorly. She only takes it at night. She was also unable to tolerate flecainide and reports feeling like \"a zombie\". She reports medication compliance and is tolerating.      Past Medical History:   Diagnosis Date    Adrenal insufficiency ([address]     Arthritis     Asthma     Atrial fibrillation ([address]     Brain tumor ([address]     chiari - malformation    Chiari malformation 3/11    Fibromyalgia     History of TIA (transient ischemic attack) 2006    Panhypopituitarism ([address] 8/26/2014    S/P selective transsphenoidal pituitary adenomectomy 2007    Thyroid disease     Total knee replacement status 8/14    right    Unspecified cerebral artery occlusion with cerebral infarction       TIA 2006      Past Surgical History:   Procedure Laterality Date    BICEPS TENODESIS Left 11/10/2016    Dr Marcus Strickland 3/24/11    chiari 1 repair/ decompression and C1 laminectomy    BRAIN SURGERY  11/07    adenoma removal, pituitary    CATARACT REMOVAL Bilateral 04/2017    HYSTERECTOMY (CERVIX STATUS UNKNOWN)      KNEE ARTHROPLASTY Right 08/25/2014    right tkr    LAMINECTOMY  C1    chiari    PITUITARY SURGERY  2/15    transphenoidal       Allergies: Allergies   Allergen Reactions    Flecainide Shortness Of Breath and Swelling    Corn Oil     Levofloxacin      Out of touch w/ world       Medication:   Prior to Admission medications    Medication Sig Start Date End Date Taking? Authorizing Provider   furosemide (LASIX) 20 MG tablet TAKE 1 TABLET BY MOUTH TODAY AND TOMORROW AND THEN AS NEEDED FOR SWELLING, WEIGHT GAIN 9/20/22  Yes Asher Oliver MD   traZODone (DESYREL) 150 MG tablet TAKE 2 TABLETS BY MOUTH EVERY NIGHT AS NEEDED 9/13/22  Yes JERMAINE Dawkins CNP   apixaban (ELIQUIS) 5 MG TABS tablet TAKE 1 TABLET BY MOUTH TWICE DAILY 9/12/22  Yes JERMAINE Gamble CNP   albuterol sulfate HFA (PROVENTIL;VENTOLIN;PROAIR) 108 (90 Base) MCG/ACT inhaler INHALE 2 PUFFS INTO THE LUNGS EVERY 6 HOURS AS NEEDED FOR WHEEZING 9/7/22  Yes Yamile Lakhani MD   ranolazine (RANEXA) 500 MG extended release tablet Take 1 tablet by mouth 2 times daily 8/30/22  Yes JERMAINE Beyer CNP   levothyroxine (SYNTHROID) 50 MCG tablet TAKE 1 TABLET BY MOUTH FIVE DAYS A WEEK AND 2 TABLETS ON SATURDAY/SUNDAY 5/4/22  Yes JERMAINE Gamble CNP   hydrocortisone (CORTEF) 10 MG tablet TAKE 2 TABLETS BY MOUTH EVERY DAY AT 8:30 AM AND 1 TABLET EVERY DAY AT 12:15 PM 8/24/20  Yes Marlon Barba MD   Cholecalciferol (VITAMIN D3) 1000 UNITS TABS Take 1 tablet by mouth daily    Yes Historical Provider, MD       Social History:   reports that she has never smoked. She has never used smokeless tobacco. She reports that she does not drink alcohol and does not use drugs.         Family History:  family history includes Arrhythmia in her mother; Cancer in her father; Diabetes in her paternal grandfather; Hypertension in her mother; No Known Problems in her brother, maternal aunt, maternal grandfather, maternal grandmother, maternal uncle, paternal aunt, paternal grandmother, paternal uncle, sister, and another family member. Reviewed. Denies family history of sudden cardiac death, arrhythmia, premature CAD    Review of System:  Pertinent positive and negatives are in the HPI, the rest are negative. Physical Examination:  /69 (Site: Right Upper Arm, Position: Sitting)   Pulse 66   Ht 5' 7\" (1.702 m)   Wt 231 lb (104.8 kg)   SpO2 96%   BMI 36.18 kg/m²      Constitutional: Oriented. No distress. + fatigue  Head: Normocephalic and atraumatic. Mouth/Throat: Oropharynx is clear and moist.   Eyes: Conjunctivae normal. EOM are normal.   Neck: Normal range of motion. Neck supple. No rigidity. No JVD present. Cardiovascular: Normal rate, irregular rhythm, S1&S2 and intact distal pulses. Pulmonary/Chest: Bilateral respiratory sounds. No wheezes. No rhonchi. Abdominal: Soft. Bowel sounds present. No distension, No tenderness. Musculoskeletal: No tenderness. No edema    Lymphadenopathy: Has no cervical adenopathy. Neurological: Alert and oriented. Cranial nerve appears intact, No Gross deficit   Skin: Skin is warm and dry. No rash noted. Psychiatric: Has a normal mood, affect and behavior     Labs:  Reviewed. ECG: reviewed, atrial fibrillation with v-rate of 93 bpm with QRS duration 78 ms. No ventricular pre-excitation, or QT prolongation. Studies:   1. Event monitor:   none    2. Echo: 5/15/2017   Summary  Normal left ventricular size and wall thickness. Normal left ventricularsystolic function with an estimated ejection fraction of 55-60% . Normal right ventricular size and function. No significant valvular disease. LA volume/Index: 71 ml /32 ml/m2    3.  Stress Test:  5/15/2017  Summary    Normal myocardial perfusion study.    Normal LV size and systolic function. Overall findings represent a low risk study. Resting ECG    Normal sinus rhythm. Normal ECG. Resting HR:59 bpm            Resting BP:121/83 mmHg        Pre-stress physical exam: Walking Lexiscan Stress Test:    Findings on the brief pre-stress cardiopulmonary exam:unremarkable heart and lungs. Prior to test initiation, patient states she is asymptomatic. Stress Protocol:Pharmacologic - Lexiscan's        Peak HR:94 bpm                               HR response: Normal    Peak BP:143/83 mmHg                          BP response: Normal    Predicted HR: 161 bpm                        HR/BP product:[id number]    % of predicted HR: 58    Test duration: 1 min and 40 sec    Reason for termination:Completed     4. Cath:   none    I independently reviewed the ECG, MCOT, echocardiogram, stress test, and coronary angiography/PCI results and used them for my plan of care. [id number] Score for Atrial Fibrillation Stroke Risk   Risk   Factors  Component Value   C CHF No 0   H HTN Yes 1   A2 Age >= 76 No,  (64 y.o.) 0   D DM Yes 1   S2 Prior Stroke/TIA yes 2   V Vascular Disease No 0   A Age 74-69 No,  (64 y.o.) 0   Sc Sex female 1    [id number]  Score  5   Score last updated 6/16/22 93:25 AM EDT    Click here for a link to the UpToDate guideline \"Atrial Fibrillation: Anticoagulation therapy to prevent embolization    Disclaimer: Risk Score calculation is dependent on accuracy of patient problem list and past encounter diagnosis. Cardiac Procedures:  1. 7/18/22  Atrial fibrillation ablation     2. DCCV 8/30/22  Conclusion:   Successful external DC cardioversion of persistent Atrial fibrillation       Assessment/Plan:    Atrial fibrillation   -Symptomatic with fatigue.   -First noted during cataract surgery. Seen on EKG 4/26/2017 was noted to be back in rhythm on EKG 5/8/2017.  Return of atrial fibrillation noted on EKG on 6/9/2022 and today.   -She has a [id number] Score 5 (HTN, DM,TIA, GENDER)   -Continue Eliquis 5 mg BID for  thromboembolic risk reduction.   -Tolerating well no signs or symptoms of abnormal bruising or bleeding.   -s/p ablation 7/18/22    -successful cardioversion 8/30/22   -EKG today NSR     Ms. Jamal Hairston is doing better from an arrhythmia stand point sometimes feeling a \"void in her heart\" for being very used to having palpitations. I reviewed her cardio mobile which shows still frequent episodes of atrial fibrillation with RVR mostly in the evening and sinus rhythm in the morning and daytime. She presents in NSR. Recommended to take 250 mg of ranolazine in the morning and continue the 500 mg in the evening to see if she can tolerate it better than the 500 mg bid. Follow up in 30 days with Angie Ayala NP. I also discussed a possibility of redo ablation if she still has high afib burden since she can't tolerate most of the medications that can control breakthrough episodes. She is agreeable to this. -TSH 1.37 (7/9/2021)   -unable to tolerate flecainide previously   -reports side effects with twice daily ranolazine    -will have her trial ranolazine 250 mg AM and 500 mg PM       Essential hypertension   -Stable    -Continue current medical therapy. History of TIA   -2006.   -Residual deficit right eye does not close all the way when sleeping but can blink. Type 2 DM   -Continue current medical management.   -Hb A1c 5.9 (7/9/2021)    Follow ups:  -Follow up in 3 months with Angie Ayala CNP     Thank you for allowing me to participate in the care of Corey Curran. All questions and concerns were addressed to the patient/family. Alternatives to my treatment were discussed. This note was scribed in the presence of Laxmi Evans MD by Lizeth Wallis, RN     Physician attestation: The scribe's documentation has been prepared under my direction and has been personally reviewed by me in its entirety.  I confirm that the note above reflects all work, treatment, procedures, and medical decision making performed by me.      Silvio Michael MD  Cardiac Electrophysiology  [facility]

## 2022-10-11 RX ORDER — ALBUTEROL SULFATE 90 UG/1
AEROSOL, METERED RESPIRATORY (INHALATION)
Qty: 8.5 G | Refills: 0 | Status: SHIPPED | OUTPATIENT
Start: 2022-10-11 | End: 2022-11-04

## 2022-11-04 RX ORDER — ALBUTEROL SULFATE 90 UG/1
AEROSOL, METERED RESPIRATORY (INHALATION)
Qty: 8.5 G | Refills: 0 | Status: SHIPPED | OUTPATIENT
Start: 2022-11-04 | End: 2022-12-01

## 2022-11-16 ENCOUNTER — PATIENT MESSAGE (OUTPATIENT)
Dept: CARDIOLOGY CLINIC | Age: 67
End: 2022-11-16

## 2022-11-16 NOTE — TELEPHONE ENCOUNTER
From: Gene Graham  To: Dr. Silvio Michael  Sent: 11/16/2022 1:25 PM EST  Subject: Afib    Hi,  My heart has been in afib since Sunday. I am currently taking a whole pill twice a day. What should I do.  My heart rate is staying around 145

## 2022-11-17 ENCOUNTER — TELEPHONE (OUTPATIENT)
Dept: CARDIOLOGY CLINIC | Age: 67
End: 2022-11-17

## 2022-11-17 NOTE — TELEPHONE ENCOUNTER
Jennyfer called into the office to cancel her appointment with Ed Bazan NP today. She states that her heart returned to normal rhythm and no longer needs her appointment for today. I told Jennyfer if anything changes to call back into the office. Jennyfer v/u.

## 2022-11-27 DIAGNOSIS — J45.30 MILD PERSISTENT ALLERGIC ASTHMA WITHOUT COMPLICATION: ICD-10-CM

## 2022-11-27 RX ORDER — FLUTICASONE PROPIONATE AND SALMETEROL 100; 50 UG/1; UG/1
POWDER RESPIRATORY (INHALATION)
Qty: 60 EACH | Refills: 5 | Status: CANCELLED | OUTPATIENT
Start: 2022-11-27

## 2022-11-29 RX ORDER — RANOLAZINE 500 MG/1
500 TABLET, EXTENDED RELEASE ORAL 2 TIMES DAILY
Qty: 180 TABLET | Refills: 3 | Status: SHIPPED | OUTPATIENT
Start: 2022-11-29 | End: 2022-12-05

## 2022-11-29 NOTE — TELEPHONE ENCOUNTER
Last OV: 09/30/2022  Next OV: 01/06/2023  Most recent Labs: 08/25/2022  Last EKG (if needed): 09/30/2022

## 2022-11-29 NOTE — TELEPHONE ENCOUNTER
Medication:   Requested Prescriptions     Pending Prescriptions Disp Refills    fluticasone-salmeterol (ADVAIR) 100-50 MCG/ACT AEPB diskus inhaler [Pharmacy Med Name: FLUTICASONE/SALM DISK 100/50MCG 60S] 60 each 5     Sig: INHALE 1 PUFF INTO THE LUNGS EVERY 12 HOURS       Last Filled:      Patient Phone Number: [phone] (home)     Last appt: 6/9/2022   Next appt: Visit date not found

## 2022-11-30 DIAGNOSIS — J45.30 MILD PERSISTENT ALLERGIC ASTHMA WITHOUT COMPLICATION: Primary | ICD-10-CM

## 2022-11-30 RX ORDER — FLUTICASONE PROPIONATE AND SALMETEROL 100; 50 UG/1; UG/1
1 POWDER RESPIRATORY (INHALATION) EVERY 12 HOURS
Qty: 60 EACH | Refills: 5 | Status: SHIPPED | OUTPATIENT
Start: 2022-11-30

## 2022-11-30 RX ORDER — FLUTICASONE PROPIONATE AND SALMETEROL 100; 50 UG/1; UG/1
POWDER RESPIRATORY (INHALATION)
Qty: 60 EACH | Refills: 5 | OUTPATIENT
Start: 2022-11-30

## 2022-11-30 NOTE — TELEPHONE ENCOUNTER
fluticasone-salmeterol (ADVAIR) 100-50 MCG/DOSE diskus inhaler (Discontinued) 60 each 5 3/23/2022 6/22/2022    Sig: INHALE 1 PUFF INTO THE LUNGS EVERY 12 HOURS    Sent to pharmacy as: Fluticasone-Salmeterol 100-50 MCG/DOSE Inhalation Aerosol Powder Breath Activated (ADVAIR)    E-Prescribing Status: Receipt confirmed by pharmacy (3/23/2022  1:33 PM EDT)

## 2022-12-01 RX ORDER — ALBUTEROL SULFATE 90 UG/1
AEROSOL, METERED RESPIRATORY (INHALATION)
Qty: 8.5 G | Refills: 0 | Status: SHIPPED | OUTPATIENT
Start: 2022-12-01

## 2022-12-02 ENCOUNTER — TELEPHONE (OUTPATIENT)
Dept: CARDIOLOGY CLINIC | Age: 67
End: 2022-12-02

## 2022-12-02 NOTE — TELEPHONE ENCOUNTER
Dr. Angela Mo,     Please advise on cardiac risk assessment for patient to be scheduled for total knee replacement. Surgeon requesting she hold Eliquis for 2 days prior to surgery. She has a hx of Afib s/p ablation 7/18/22,  HTN, TIA and DM 2, thanks.

## 2022-12-02 NOTE — TELEPHONE ENCOUNTER
MA's, please see Dr. Sharyle Asa response regarding cardiac risk assessment and prepare letter, thanks.

## 2022-12-02 NOTE — TELEPHONE ENCOUNTER
CARDIAC CLEARANCE     What type of procedure are you having? TOTAL KNEE REPLACEMENT     Which physician is performing your procedure? DR. Lor Chase    When is your procedure scheduled for? DATE NOT SET YET    Where are you having this procedure? [facility]    Are you taking Blood Thinners? YES    If so what? (Name/dose/frequesncy) ELIQUMARU    Does the surgeon want you to stop your blood thinner?  YES     If so for how long? 2 DAYS PRIOR    Phone Number and Contact Name for Physicians office:  Roxana Coyle [phone]    Fax number to send information:  [phone]

## 2022-12-05 RX ORDER — RANOLAZINE 500 MG/1
TABLET, EXTENDED RELEASE ORAL
Qty: 60 TABLET | Refills: 5 | Status: ON HOLD | OUTPATIENT
Start: 2022-12-05 | End: 2023-01-31 | Stop reason: HOSPADM

## 2022-12-12 ENCOUNTER — OFFICE VISIT (OUTPATIENT)
Dept: ORTHOPEDIC SURGERY | Age: 67
End: 2022-12-12
Payer: COMMERCIAL

## 2022-12-12 VITALS — RESPIRATION RATE: 16 BRPM | BODY MASS INDEX: 36.26 KG/M2 | WEIGHT: 231 LBS | HEIGHT: 67 IN

## 2022-12-12 DIAGNOSIS — Z96.651 HISTORY OF TOTAL KNEE ARTHROPLASTY, RIGHT: ICD-10-CM

## 2022-12-12 DIAGNOSIS — M17.12 PRIMARY OSTEOARTHRITIS OF LEFT KNEE: Primary | ICD-10-CM

## 2022-12-12 PROCEDURE — 99213 OFFICE O/P EST LOW 20 MIN: CPT | Performed by: ORTHOPAEDIC SURGERY

## 2022-12-12 PROCEDURE — 1123F ACP DISCUSS/DSCN MKR DOCD: CPT | Performed by: ORTHOPAEDIC SURGERY

## 2022-12-12 RX ORDER — TRAZODONE HYDROCHLORIDE 150 MG/1
TABLET ORAL
Qty: 180 TABLET | Refills: 0 | Status: SHIPPED | OUTPATIENT
Start: 2022-12-12

## 2022-12-12 SDOH — HEALTH STABILITY: PHYSICAL HEALTH: ON AVERAGE, HOW MANY DAYS PER WEEK DO YOU ENGAGE IN MODERATE TO STRENUOUS EXERCISE (LIKE A BRISK WALK)?: 0 DAYS

## 2022-12-12 NOTE — PROGRESS NOTES
[facility]  Office Visit    Chief Complaint: Left knee pain    HPI:  Elvia London is a 74 y.o. who is here for initial assessment of left knee pain. She has a history of right total knee arthroplasty with Dr. Benjamin Cabral in 2014. Her right knee is doing well. She has had intermittent left knee pain over the years and has previously been treated by Dr. Chris Delgado.  There is no history of injury or surgery. She has a diagnosis of osteoarthritis and has been treated in the past with steroid injections which helped at the time and viscosupplementation injections that were not helpful. She currently has left knee pain on a daily basis that is worse with getting up from a seated position or walking. She is unable to take Tylenol or NSAIDs due to a corn allergy. The patient has difficulty climbing stairs, difficulty getting out of a chair, difficulty with activities of daily living including hygiene and pain at night. Pain level at rest is 7 and with activities 9-10/10. She does have a history of ablation and takes Eliquis. She also has a cortisol disorder for which she takes cortisone daily. She otherwise denies pulmonary issues, tobacco use, history of blood clots. She has help at home.       Patient Active Problem List   Diagnosis    Insomnia    Asthma    Edema    Hypertension    Hypothyroidism    Adrenal insufficiency    Obesity    Right knee DJD    Panhypopituitarism (HCC)    S/P selective transsphenoidal pituitary adenomectomy    Status post total right knee replacement using cement 8/25/2014    Sprain of left elbow  [facility]  DOI 6/17/16    Rupture of left distal biceps tendon    History of TIA (transient ischemic attack)    Multinodular goiter    Controlled type 2 diabetes mellitus without complication, without long-term current use of insulin ([facility])    Hypercholesteremia    Anemia    Benign neoplasm of pituitary gland and craniopharyngeal duct (pouch) ([facility])    Brachial neuritis or radiculitis    Chronic ethmoidal sinusitis    Chronic maxillary sinusitis    Chronic pain disorder    Chronic rhinitis    Constipation    Degeneration of intervertebral disc, site unspecified    Depressive disorder, not elsewhere classified    Disorder of skin or subcutaneous tissue    Displacement of cervical intervertebral disc without myelopathy    Disturbance of skin sensation    Dyshormonogenic goiter    Galactorrhea not associated with childbirth    Hypopotassemia    Iron deficiency anemia    Myalgia and myositis    Other extrapyramidal disease and abnormal movement disorder    Other malaise and fatigue    Pain in joint    Pain in limb    Pituitary adenoma (HCC)    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    Right foot pain    Posterior tibial tendinitis of right lower extremity    Age-related nuclear cataract    Encntr for f/u exam aft trtmt for cond oth than malig neoplm    Regular astigmatism, right eye    Vitreous degeneration, bilateral    Dyslipidemia associated with type 2 diabetes mellitus (HCC)    Fibromyalgia    Paroxysmal atrial fibrillation (HCC)       ROS:  Constitutional: denies fever, chills, weight loss  MSK: denies pain in other joints, muscle aches  Neurological: denies numbness, tingling, weakness    Exam:  Resp. rate 16, height 5' 7\" (1.702 m), weight 231 lb (104.8 kg)    Appearance: sitting in exam room chair, appears to be in no acute distress, awake and alert  Resp: unlabored breathing on room air  Skin: warm, dry and intact with out erythema or significant increased temperature  Neuro: grossly intact both lower extremities. Intact sensation to light touch. Motor exam 4+ to 5/5 in all major motor groups. Left knee: Examination reveals that knee range of motion is 0 to 130 degrees. There is varus deformity, positive crepitus, positive joint line tenderness, positive antalgic gait.   Neurologically, plantar flexion and dorsiflexion is intact. 5/5 strength. Imagin views of the left knee were performed and interpreted today. Significant for joint space narrowing of the medial and patellofemoral compartments with periarticular osteophytes. Assessment:  Left knee osteoarthritis    Plan:  We discussed the diagnosis and treatment options. She is unable to take NSAIDs. She has been treated with steroid injections and viscosupplementation injections in the past.  She currently has pain on a daily basis. We further discussed left total knee arthroplasty. The operative procedure, alternatives, and risks were discussed in detail with the patient. The risks include but are not limited to: Infection, vessel injury, nerve injury, DVT, pulmonary embolism, implant loosening, need for revision surgery, loss of motion, continued pain. Despite these risks the patient would like to proceed. All questions have been answered and no guarantees have been made. The patient is unable to do further physical therapy due to disabling pain. I discussed with the patient the diagnosis in detail and answered all the questions. The patient verbalized understanding of the plan as it has been described above and is in agreement. Plan for left total knee arthroplasty. She will require preoperative cardiac clearance as well as a plan for management of her steroids around the time of surgery. Total time spent on today's encounter was at least 24 minutes. This time included reviewing prior notes, radiographs, and lab results when available, reviewing history obtained by medical assistant, performing history and physical exam, reviewing tests/radiographs with the patient, counseling the patient, ordering medications or tests, documentation in the electronic health record, and coordination of care. This dictation was done with Dragon dictation and may contain mechanical errors related to translation.

## 2022-12-14 ENCOUNTER — TELEPHONE (OUTPATIENT)
Dept: ORTHOPEDIC SURGERY | Age: 67
End: 2022-12-14

## 2022-12-15 ENCOUNTER — TELEPHONE (OUTPATIENT)
Dept: CARDIOLOGY CLINIC | Age: 67
End: 2022-12-15

## 2022-12-15 NOTE — TELEPHONE ENCOUNTER
Pt called office and she state she is in afib. Pt states her hr is at 141. Pt would like to know if it is ok for her to take 2 and a half tablets of Ranexa 500 mg today. Pt ana any chest pains, palpitations, or discomfort. Pt states she can feel her heart beat. Pt states she already took 1 tablet this morning but not the other one yet. Please advise.

## 2022-12-15 NOTE — TELEPHONE ENCOUNTER
Dr. Romana Mora,  Pt called office and she state she is in afib. Pt states her hr is at 141. Pt would like to know if it is ok for her to take an additional half tablet (250 mg BID of Ranexa) Total 750 mg BID. She reports symptoms of palpitations. Denies chest pain/pressure, tightness, edema, shortness of breath, lightheadedness, dizziness, syncope, presyncope,  PND or orthopnea. Offer patient to come into office for EKG today or tomorrow and she is unable to come into office for EKG on either days. She would like to know if she should increase her Ranexa. Per last office note 10/5/2022.     -reports side effects with twice daily ranolazine   Ms. Devan Akbar is doing better from an arrhythmia stand point sometimes feeling a \"void in her heart\" for being very used to having palpitations. I reviewed her cardio mobile which shows still frequent episodes of atrial fibrillation with RVR mostly in the evening and sinus rhythm in the morning and daytime. She presents in NSR. Recommended to take 250 mg of ranolazine in the morning and continue the 500 mg in the evening to see if she can tolerate it better than the 500 mg bid. Follow up in 30 days with Rohan Moses NP. I also discussed a possibility of redo ablation if she still has high afib burden since she can't tolerate most of the medications that can control breakthrough episodes. She is agreeable to this. She is scheduled to follow up with JERMAINE Rubio - CNP 01/06/2023. Please advise.   Thanks,  Catherine Campos RN

## 2022-12-22 RX ORDER — ALBUTEROL SULFATE 90 UG/1
AEROSOL, METERED RESPIRATORY (INHALATION)
Qty: 8.5 G | Refills: 0 | Status: SHIPPED | OUTPATIENT
Start: 2022-12-22

## 2022-12-28 ENCOUNTER — TELEPHONE (OUTPATIENT)
Dept: CARDIOLOGY CLINIC | Age: 67
End: 2022-12-28

## 2022-12-28 NOTE — TELEPHONE ENCOUNTER
Dr. Priyank Iqbal,    Dr. Laura Mejía is requesting advisement on patient's cardiac risk to undergo Left total knee replacement on 03/01/2023  He is requesting a 7 day hold on Eliquis. She is on Eliquis for atrial fibrillation and has a  [id number] Score 5 (HTN, DM,TIA, GENDER). She was last seen in office on 09/30/2022 and EKG showed SR. History of atrial fibrillation ablation  07/18/2022 and CV on 08/30/2022. Please advise.     Thanks,  Jorden Blanchard RN                              FAX TO [facility] AND [facility] CLEARANCE LETTER [phone]

## 2022-12-30 NOTE — TELEPHONE ENCOUNTER
MD Zachary Jiménez, RN  Caller: Unspecified (2 days ago,  4:52 PM)  It is ok to hold the eliquis with the understanding she has paroxysmal atrial fibrillation and increased risk of stroke. Suggest to her to take the ranoxine 500 mg bid and close to surgery increase to 1000 mg bid to help in keeping her in NSR.

## 2023-01-04 ENCOUNTER — HOSPITAL ENCOUNTER (OUTPATIENT)
Dept: PHYSICAL THERAPY | Age: 68
Setting detail: THERAPIES SERIES
Discharge: HOME | End: 2023-01-04
Payer: COMMERCIAL

## 2023-01-04 PROBLEM — Q23.81 BICUSPID AORTIC VALVE: Status: ACTIVE | Noted: 2023-01-04

## 2023-01-04 PROBLEM — Q23.1 BICUSPID AORTIC VALVE: Status: ACTIVE | Noted: 2023-01-04

## 2023-01-04 PROCEDURE — 97110 THERAPEUTIC EXERCISES: CPT

## 2023-01-04 PROCEDURE — 97530 THERAPEUTIC ACTIVITIES: CPT

## 2023-01-04 PROCEDURE — 97161 PT EVAL LOW COMPLEX 20 MIN: CPT

## 2023-01-04 NOTE — FLOWSHEET NOTE
[facility] - Outpatient [facility] Therapy, [address] [address]  Phone: [phone]   Fax:     [phone]                                                      Physical Therapy Treatment Note/ Progress Report:   Date:  2023    Patient Name:  Misty Womack    :  1955  MRN: [id number]    Pertinent Medical History:Additional Pertinent Hx: asthma, OA, TIA 2006, afib, chiari malformation, fibromyalgia, R TKR (), L biceps tenodesis    Medical/Treatment Diagnosis Information:  Medical Diagnosis: Primary osteoarthritis of left knee [M17.12]  Treatment Diagnosis: Decreased mobility, strength    Insurance/Certification information:  PT Insurance Information: [facility] Medicare  Physician Information:  Da Arellano MD  Plan of care signed (Y/N): sent for cosign     Date of Patient follow up with Physician:      Progress Report: []  Yes  [x]  No     Date Range for reporting period:  Beginnin2023  Ending:      Progress report due (10 Rx/or 30 days whichever is less): at post-op eval     Recertification due (POC duration/ or 90 days whichever is less): at post-op eval     Visit # POC/Insurance Allowable Auth Needed    AIM - eval only  [x]Yes   []No     Latex Allergy:  [x]NO      []YES  Preferred Language for Healthcare:   [x]English       []Other:    Functional Scale:       Date assessed: at eval  Test: FOTO - knee   Score: 47    Pain level:  3-9/10     History of Injury: Pt is scheduled for L TKR on 3/1/2023.  Pt lives with her son that will be around to take care of her during the day and friend with be around in the evening.      SUBJECTIVE:  See eval    OBJECTIVE:   Palpation: no TTP noted  Functional Mobility/Transfers: needs assistance of UEs to get up from a chair    Posture: L LE shorter than R    Bandages/Dressings/Incisions: NA    Gait: (include devices/WB status) Pt ambulates with slight trendelenburg gait pattern.   Test  measurements:    ROM:  Date           Knee Flexion       Knee Extension    Active Passive Active Passive    Eval 1/4/23 123  Lacking 2               Strength:  Date Hip flexion Hip abduction Quad Hamstring Ankle DF Ankle PF   Eval 1/4 4+/5 3/5 4/5 4/5 5/5 4/5                RESTRICTIONS/PRECAUTIONS:     Exercises/Interventions:     Therapeutic Ex (09112)   Min:15 Reps/Resistance Notes/CUES   SLR flexion 0# 10x L    bridge 10x    SL hip abd 10x L         Seated LAQ 3# 20x L    Seated Tband HS curls Red 20x L         Standing HR 20x    Stand hip abd                   ext 0# 10x L/R  0# 10x L/R    NMR re-education (69623)  Min:                                        Therapeutic Activity (68912)  Min:10     Pt education X10 mins - see below for details    Gait Training (67018)  Min:           Manual Intervention (42376) Min:                Modalities  Min:            Other Therapeutic Activities:   Patient was thoroughly educated on this date regarding prehabilitation goals, importance of PT sessions in improving overall ROM, strength and stability prior to surgery, and how prehabilitation will facilitate improved post-operative outcomes. The patient was educated on and instructed in HEP as listed. The patient was given a detailed handout for exercises to initiate in the hospital post-operatively as well as at home. The discharge plan from the hospital was reviewed with the patient; specifically, to reduce length of hospital stay and to minimize time before reinitiating outpatient physical therapy after surgery. Education regarding early mobility post-operatively in the hospital and emphasis on working with both physical therapy and nursing staff to achieve ambulation goal was provided. The patient was highly encouraged to attend joint class in hospital prior to surgery for further instructions on pre and post-surgical care.  Also, education regarding goals and expectations was provided; specifically, knee flexion range of motion greater than or equal to 90 degrees and 0 degrees knee extension within 2 weeks to promote improved gait mechanics and ambulation. The patient was encouraged to utilize ice/cold pack after surgery to address pain, minimize swelling as often as possible. It is in my medical opinion that this patient is clear from all physical barriers prior to consideration for surgery, activity modifications prior to and post operatively have been discussed with this patient as well as discharge planning and is cleared for surgery from physical therapy perspective. Home Exercise Program:  Patient instructed in the above exercises for HEP. Patient verbalized/demonstrated understanding and was issued written handout. Therapeutic Exercise and NMR EXR  [x] (13090) Provided verbal/tactile cueing for activities related to strengthening, flexibility, endurance, ROM for improvements in LE, proximal hip, and core control with self care, mobility, lifting, ambulation.  [] (92571) Provided verbal/tactile cueing for activities related to improving balance, coordination, kinesthetic sense, posture, motor skill, proprioception  to assist with LE, proximal hip, and core control in self care, mobility, lifting, ambulation and eccentric single leg control.  [address] and Therapeutic Activities:    [x] (90816 or 26229) Provided verbal/tactile cueing for activities related to improving balance, coordination, kinesthetic sense, posture, motor skill, proprioception and motor activation to allow for proper function of core, proximal hip and LE with self care and ADLs and functional mobility.   [] (31270) Gait Re-education- Provided training and instruction to the patient for proper LE, core and proximal hip recruitment and positioning and eccentric body weight control with ambulation re-education including up and down stairs     Gait Training:  [] (16187) Provided training and instruction to the patient for proper postural muscle recruitment and positioning with ambulation re-education     Home Exercise Program:    [x] (55131) Reviewed/Progressed HEP activities related to strengthening, flexibility, endurance, ROM of core, proximal hip and LE for functional self-care, mobility, lifting and ambulation/stair navigation   [] (19308)Reviewed/Progressed HEP activities related to improving balance, coordination, kinesthetic sense, posture, motor skill, proprioception of core, proximal hip and LE for self care, mobility, lifting, and ambulation/stair navigation      Manual Treatments:  PROM / STM / Oscillations-Mobs:  G-I, II, III, IV (PA's, Inf., Post.)  [] (66361) Provided manual therapy to mobilize LE, proximal hip and/or LS spine soft tissue/joints for the purpose of modulating pain, promoting relaxation,  increasing ROM, reducing/eliminating soft tissue swelling/inflammation/restriction, improving soft tissue extensibility and allowing for proper ROM for normal function with self care, mobility, lifting and ambulation. Charges:  Timed Code Treatment Minutes: 25   Total Treatment Minutes: 50      [x] EVAL (LOW) 50490 (typically 20 minutes face-to-face)  [] EVAL (MOD) 54822 (typically 30 minutes face-to-face)  [] EVAL (HIGH) 36762 (typically 45 minutes face-to-face)  [] RE-EVAL     [x] BW(21329) x     [] Dry needle 1 or 2 Muscles (20215)  [] NMR (36237) x     [] Dry needle 3+ Muscles (43744)  [] Manual (03460) x     [] Ultrasound (77354) x  [x] TA (71435) x     [] Mech Traction (29597)  [] ES(attended) (59569)     [] ES (un) (27212):   [] Vasopump (43446) [] Ionto (18893)   [] Gait (49117)  [] Other:    GOALS:  Patient stated goal: \"Get exercises to prepare for surgery\"  [] Progressing: [] Met: [] Not Met: [] Adjusted     Therapist goals for Patient:   Short Term Goals: To be achieved in: 1 visit  1. Independent in HEP and progression per patient tolerance, in order to prevent re-injury. [] Progressing: [] Met: [] Not Met: [] Adjusted  2. All patient questions regarding expectations for rehab following upcoming surgery are answered. [] Progressing: [] Met: [] Not Met: [] Adjusted     Long Term Goals: long term goals to be determined at re-eval following upcoming surgery     ASSESSMENT:  See eval    Treatment/Activity Tolerance:  [x] Patient tolerated treatment well [] Patient limited by fatique  [] Patient limited by pain  [] Patient limited by other medical complications  [] Other:     Overall Progression Towards Functional goals/ Treatment Progress Update:  [] Patient is progressing as expected towards functional goals listed. [] Progression is slowed due to complexities/Impairments listed. [] Progression has been slowed due to co-morbidities. [x] Plan just implemented, too soon to assess goals progression <30days   [] Goals require adjustment due to lack of progress  [] Patient is not progressing as expected and requires additional follow up with physician  [] Other    Prognosis for POC: [x] Good [] Fair  [] Poor    Patient requires continued skilled intervention: [] Yes  [x] No        PLAN:Patient seen for 1 visit for HEP. Patient is a good candidate for TKR surgery and would benefit from outpatient rehab following surgery. Re-evaluate patient after upcoming TKR surgery. [] Continue per plan of care [x] Alter current plan (see comments)  [] Plan of care initiated [] Hold pending MD visit [] Discharge    Electronically signed by: Kandy Peter PT , OMT-C,  [id number]      Note: If patient does not return for scheduled/recommended follow up visits, this note will serve as a discharge from care along with the most recent update on progress.

## 2023-01-04 NOTE — PROGRESS NOTES
[address]   Electrophysiology      Date: 1/6/2023    Primary Cardiologist: Gracie Hayden MD  PCP: Atif Fontana MD     Chief Complaint:   Chief Complaint   Patient presents with    Follow-up     Some leg swelling. Went into afib after pill was missed. Dark chocolate sends her into afib right away. History of Present Illness:    I saw Mckayla Coles in the office for electrophysiology follow up today. She is a 74 y.o. female with a past medical history of bicuspid aortic valve,TIA (2006), pituitary brain tumors with adrenal insufficiency, HTN, DM, chiari malformation, thyroid disease and atrial fibrillation. She was first noted to have atrial fibrillation during her cataract surgery in 2017. Seen on EKG 4/26/2017. EKG 5/8/2017 showed NSR. She underwent atrial fibrillation ablation on 7/18/22 with Dr. Joseline Prabhakar. She was seen in the office on 7/21/22 as she was feeling poorly, she was in atrial fibrillation but wanted to see if she went back in rhythm on her own. She was still in atrial fibrillation when seen on 8/15/22. She underwent successful cardioversion on 8/30/33 with Dr. Joseline Prabhakar and was started on Ranexa 500mg BID. This was later decreased to 250mg in the morning and 500mg at night. She presents today for follow up. She has had a couple episodes of atrial fibrillation, some seem to be triggered by eating dark chocolate. She did notice that taking only 250mg of Ranexa in the morning was not working so she has been taking 500mg BID. She continues to have issues with leg swelling, worse in her left leg and she is getting that knee replaced in early March. Denies any chest pain or dyspnea. No syncope. No bleeding issues. Allergies: Allergies   Allergen Reactions    Flecainide Shortness Of Breath and Swelling    Corn Oil     Levofloxacin      Out of touch w/ world     Home Medications:  Prior to Visit Medications    Medication Sig Taking?  Authorizing Provider   albuterol sulfate HFA (PROVENTIL;VENTOLIN;PROAIR) 108 (90 Base) MCG/ACT inhaler INHALE 2 PUFFS INTO THE LUNGS EVERY 6 HOURS AS NEEDED FOR WHEEZING Yes Kaylen Brantley MD   traZODone (DESYREL) 150 MG tablet TAKE 2 TABLETS BY MOUTH EVERY NIGHT AS NEEDED Yes JERMAINE Carvajal CNP   ranolazine (RANEXA) 500 MG extended release tablet TAKE 1 TABLET BY MOUTH TWICE DAILY Yes JERMAINE Beyer CNP   fluticasone-salmeterol (ADVAIR DISKUS) 100-50 MCG/ACT AEPB diskus inhaler Inhale 1 puff into the lungs in the morning and 1 puff in the evening.  Yes JERMAINE Carvajal CNP   furosemide (LASIX) 20 MG tablet TAKE 1 TABLET BY MOUTH TODAY AND TOMORROW AND THEN AS NEEDED FOR SWELLING, WEIGHT GAIN Yes Idalia Chance MD   apixaban (ELIQUIS) 5 MG TABS tablet TAKE 1 TABLET BY MOUTH TWICE DAILY Yes JERMAINE Carvajal CNP   levothyroxine (SYNTHROID) 50 MCG tablet TAKE 1 TABLET BY MOUTH FIVE DAYS A WEEK AND 2 TABLETS ON SATURDAY/SUNDAY Yes JERMAINE Carvajal CNP   hydrocortisone (CORTEF) 10 MG tablet TAKE 2 TABLETS BY MOUTH EVERY DAY AT 8:30 AM AND 1 TABLET EVERY DAY AT 12:15 PM Yes Lexi Hunter MD   Cholecalciferol (VITAMIN D3) 1000 UNITS TABS Take 1 tablet by mouth daily  Yes Historical Provider, MD        Past Medical History:  Past Medical History:   Diagnosis Date    Adrenal insufficiency ([address]     Arthritis     Asthma     Atrial fibrillation ([address]     Brain tumor ([address]     chiari - malformation    Chiari malformation 3/11    Fibromyalgia     History of TIA (transient ischemic attack) 2006    Panhypopituitarism ([address] 8/26/2014    S/P selective transsphenoidal pituitary adenomectomy 2007    Thyroid disease     Total knee replacement status 8/14    right    Unspecified cerebral artery occlusion with cerebral infarction       TIA 2006       Past Surgical History:   Past Surgical History:   Procedure Laterality Date    BICEPS TENODESIS Left 11/10/2016    Dr Myriam Gallego  3/24/11    chiari 1 repair/ decompression and C1 laminectomy    BRAIN SURGERY  11/07    adenoma removal, pituitary    CATARACT REMOVAL Bilateral 04/2017    HYSTERECTOMY (CERVIX STATUS UNKNOWN)      KNEE ARTHROPLASTY Right 08/25/2014    right tkr    LAMINECTOMY  C1    chiari    PITUITARY SURGERY  2/15    transphenoidal       Social History:   reports that she has never smoked. She has never used smokeless tobacco. She reports that she does not drink alcohol and does not use drugs. Family History:      Problem Relation Age of Onset    Hypertension Mother     Arrhythmia Mother     Cancer Father         skin? No Known Problems Sister     Diabetes Paternal Grandfather     No Known Problems Brother     No Known Problems Maternal Aunt     No Known Problems Maternal Uncle     No Known Problems Paternal Aunt     No Known Problems Paternal Uncle     No Known Problems Maternal Grandmother     No Known Problems Maternal Grandfather     No Known Problems Paternal Grandmother     No Known Problems Other     Anesth Problems Neg Hx     Broken Bones Neg Hx     Clotting Disorder Neg Hx     Collagen Disease Neg Hx     Dislocations Neg Hx     Osteoporosis Neg Hx     Rheumatologic Disease Neg Hx     Scoliosis Neg Hx     Severe Sprains Neg Hx        Review of Systems   Constitutional:  Negative for chills, fatigue, fever and unexpected weight change. HENT:  Negative for congestion, hearing loss, sinus pressure, sore throat and trouble swallowing. Respiratory:  Negative for cough, shortness of breath and wheezing. Cardiovascular:  Positive for palpitations and leg swelling. Negative for chest pain. Gastrointestinal:  Negative for abdominal pain, blood in stool, constipation, diarrhea, nausea and vomiting. Genitourinary:  Negative for hematuria. Musculoskeletal:  Negative for arthralgias, back pain, gait problem and myalgias. Skin:  Negative for color change, rash and wound.    Neurological:  Negative for dizziness, seizures, syncope, speech difficulty, weakness and light-headedness. Hematological:  Does not bruise/bleed easily. Physical Examination:  Vitals:    01/06/23 1021   BP: 124/80   Pulse: 72   SpO2: 96%        Wt Readings from Last 3 Encounters:   01/06/23 225 lb (102.1 kg)   12/12/22 231 lb (104.8 kg)   09/30/22 231 lb (104.8 kg)       Physical Exam  Vitals reviewed. Constitutional:       General: She is not in acute distress. Appearance: Normal appearance. HENT:      Head: Normocephalic and atraumatic. Nose: Nose normal.      Mouth/Throat:      Mouth: Mucous membranes are moist.   Eyes:      Conjunctiva/sclera: Conjunctivae normal.      Pupils: Pupils are equal, round, and reactive to light. Cardiovascular:      Rate and Rhythm: Normal rate and regular rhythm. Heart sounds: No murmur heard. No friction rub. No gallop. Pulmonary:      Effort: No respiratory distress. Breath sounds: No wheezing, rhonchi or rales. Abdominal:      General: Abdomen is flat. Bowel sounds are normal.      Palpations: Abdomen is soft. Musculoskeletal:         General: Normal range of motion. Right lower leg: No edema. Left lower leg: No edema. Skin:     General: Skin is warm and dry. Findings: No bruising. Neurological:      General: No focal deficit present. Mental Status: She is alert and oriented to person, place, and time. Motor: No weakness.    Psychiatric:         Mood and Affect: Mood normal.         Behavior: Behavior normal.        Pertinent labs, diagnostic, device, and imaging results reviewed as a part of this visit    LABS    CBC:   Lab Results   Component Value Date    WBC 7.3 08/25/2022    HGB 13.0 08/25/2022    HCT 38.9 08/25/2022    MCV 89.3 08/25/2022     08/25/2022     BMP:   Lab Results   Component Value Date    CREATININE 1.2 08/25/2022    BUN 15 08/25/2022     08/25/2022    K 3.8 08/25/2022     08/25/2022    CO2 26 08/25/2022     Estimated Creatinine Clearance: 57 mL/min (based on SCr of 1.2 mg/dL). No results found for: BNP    Thyroid:   Lab Results   Component Value Date    TSH 1.37 2021    B2RXQOP 1.1 2021     Lipid Panel:   Lab Results   Component Value Date/Time    CHOL 176 10/11/2018 07:41 AM    HDL 63 2020 08:23 AM    TRIG 120 10/11/2018 07:41 AM     LFTs:  Lab Results   Component Value Date    ALT 12 2021    AST 11 2021    ALKPHOS 97 2020    BILITOT 0.6 2021     Coags:   Lab Results   Component Value Date    PROTIME 16.6 (H) 10/01/2014    INR 1.6 (H) 10/01/2014    APTT 30.4 2014       EC2023   SR at 64 BPM.    THANIA: 22   Left ventricular function could not be adequately assessed due to atrial   fibrillation rhythm with RVR. Overall ejection fraction appears normal   around 60%. Mild mitral regurgitation. There is no evidence of mass or thrombus in the left atrium or appendage. Bicuspid aortic valve with fusion of the left and right coronary cusp. Thickening/calcification of the aortic valve leaflets. There appears to be trivial aortic regurgitation best visualized in the   short axis view. Right ventricular systolic function is normal .   Mild to moderate tricuspid regurgitation. Trivial pericardial effusion    Stress test: 5/15/17   Normal myocardial perfusion study. Normal LV size and systolic function. Overall findings represent a low risk study.        EP Procedures:  Atrial fibrillation ablation, 22, Dr. Franklyn Whitneyr:    Persistent atrial fibrillation   - symptomatic with fatigue   - first noted on EKG on 17   - s/p A fib ablation on 22 with Dr. Ag Manriquez 5 (gender, HTN, DM, TIA) on Eliquis 5mg BID   - back in atrial fibrillation when seen 22 and 8/15/22   - s/p successful DCCV on 22- on Ranexa 500mg BID    - EKG today with SR   - did not bring her [facility] in but still with symptomatic episodes of A fib, some triggered by dark chocolate which she is going to avoid. She really wants to be able to stop medication and wants to proceed with repeat ablation, she has a knee replacement 3/1/23 and will plan for ablation in late May/early June with Dr. Atkins    Bicuspid aortic valve   - seen on recent THANIA   - no significant AS or AR   - will need monitored over time    Essential HTN   - controlled   - not currently on medications for this    Thank you for allowing to us to participate in the care of Misty Womack.    Follow up after ablation.    JERMAINE Yarbrough  [facility]  [facility] [facility], [address]  [place]  [address]  Phone: [phone]  Fax: [phone]    Electronically signed by JERMAINE Beyer - CNP on 1/6/2023 at 10:48 AM

## 2023-01-04 NOTE — PLAN OF CARE
[facility], [facility]  [address] [facility], [facility]  Phone: [phone]   Fax:     [phone]                                                     Physical Therapy Certification    Dear Jena García MD,    We had the pleasure of evaluating the following patient for physical therapy services at [facility]. A summary of our findings can be found in the initial assessment below. This includes our plan of care. If you have any questions or concerns regarding these findings, please do not hesitate to contact me at the office phone number checked above. Thank you for the referral.       Physician Signature:_______________________________Date:__________________  By signing above (or electronic signature), therapists plan is approved by physician        Patient: Isrrael Quinones   : 1955   MRN: [id number]  Referring Physician: Jena García MD      Evaluation Date: 2023      Medical Diagnosis Information:  Medical Diagnosis: Primary osteoarthritis of left knee [M17.12]   Treatment Diagnosis: Decreased mobility, strength                                         Insurance information: PT Insurance Information: BCBS Medicare    Precautions/ Contra-indications: none  Latex Allergy:  [x]NO      []YES  Preferred Language for Healthcare:   [x]English       []other:    C-SSRS Triggered by Intake questionnaire (Past 2 wk assessment ):   [x] No, Questionnaire did not trigger screening.   [] Yes, Patient intake triggered C-SSRS Screening      [] C-SSRS Screening completed  [] PCP notified via Epic     SUBJECTIVE: Patient stated complaint: Pt is scheduled for L TKR on 3/1/2023. Pt lives with her son that will be around to take care of her during the day and friend with be around in the evening.       Relevant Medical History:Additional Pertinent Hx: asthma, OA, TIA 2006, afib, chiari malformation, fibromyalgia, R TKR (8/14), L biceps tenodesis  Functional Outcome: FOTO - knee  = 47    Pain Scale: 3-9/10  Easing factors: rest  Provocative factors: prolonged walking     Type: []Constant   [x]Intermittent  []Radiating []Localized []other:     Numbness/Tingling: pt denies    Occupation/School: retired    Hospital:    [] Total Hip Replacement [] Right  [] Left  DOS: 3/1/23   [] Not yet scheduled  [x] Total Knee Replacement [] Right  [x] Left    [x] Prehab Eval  [] Prehab D/C  [] Post - OP Visit             Weeks from sx [] Post-op D/C    DME ASSESSMENT:   Current available equipment:    [] Std. Walker       [x] Rolling walker      [] 4 wheeled walker     [] [place]     [x] Straight cane     [x] Crutches   [] Reacher            [] Sock Aid              [x] Shower chair            [] Leg           [] Long handle shoe horn   [] Other:     Equipment needed at discharge from hospital:   [] Std. [place]       [] Rolling walker      [] 4 wheeled walker     [] [place] M[place]     [] Straight cane     [] Crutches   [x] Reacher            [x] Sock Aid              [] Shower chair            [x] Leg           [x] Long handle shoe horn   [] Other:       SOCIAL ASSESSMENT:  1. Will you live with someone who can care for you after surgery? [x] Yes -son and friend [] No  2. Where is the bathroom located in your post-surgery place of residence? [x] 1st Floor [] 2nd Floor  3. Where is the bedroom located in your post-surgery place of residence? [x] 1st Floor [] 2nd Floor  4. Do you use community supports (home help, meals-on-wheels, district nurse)? [x] None or 1 per week  [] 2 or more per week  5. How many stairs will you have to climb to get in to your place of residence? [] Less than 5  [x] More than 5 - when going in through the front door  6. Have you had a fall in the past year? [] Yes  [x] No     Notes:     Available PT Visits:      BMI: 36.18   Height 5'7     FUNCTIONAL ASSESSMENT:   1. Do you use ambulatory aids?    [x] None [] Single Point Cane  [] Crutches/Walker/Wheelchair  2. How far on average can you walk? [x] 2 Blocks or more  [] 1-2 Blocks  [] House bound most of the time  3. What is your physical activity level? [] Highly Active [] Active  [x] Somewhat active  [] Sedentary     OBJECTIVE:   Palpation: no TTP noted    Functional Mobility/Transfers: needs assistance of UEs to get up from a chair    Posture: L LE shorter than R    Bandages/Dressings/Incisions: NA    Gait: (include devices/WB status) Pt ambulates with slight trendelenburg gait pattern. Reflexes/Sensation:    [x]Dermatomes/Myotomes intact    [x]Reflexes equal and normal bilaterally   []Other:       PROM AROM    L R L R   Knee Flexion   123 125   Knee Extension   Lacking 2 0       Strength (0-5) Left Right   Hip Flexion 4+/5 5/5   Hip Abduction     Hip Adduction     Hip Extension     Quads 4/5 5/5   Hamstrings 4/5 5/5   Ankle DF 5/5 5/5   Ankle PF 4/5 5/5            Joint mobility: patellofemoral    []Normal    []Hypo   []Hyper       Functional testing Prehab        Date   1/4/23 4 week f/u   Date    8 week f/u  Date    D/C  Date          TUG 11.53 secs      30 second sit to stand 9 reps      6 minute walk 106.7 meters             Balance       Narrow MONET 10 sec      Semi tandem 10 sec      Tandem  10 sec       SLS 5 sec              Knee AROM Lacking 2 to 123      Knee Extension MMT R/L L=17.3#  R=23#      Hip aBduction MMT R/L L=6.5#  R= 10#      FOTO-knee                                [x] Patient history, allergies, meds reviewed. Medical chart reviewed. See intake form. Review Of Systems (ROS):  [x]Performed Review of systems (Integumentary, CardioPulmonary, Neurological) by intake and observation. Intake form has been scanned into medical record. Patient has been instructed to contact their primary care physician regarding ROS issues if not already being addressed at this time.       Co-morbidities/Complexities (which will affect course of rehabilitation):   []None           Arthritic conditions   []Rheumatoid arthritis (M05.9)  [x]Osteoarthritis (M19.91)   Cardiovascular conditions   []Hypertension (I10)  []Hyperlipidemia (E78.5)  [x]Afib  [x]TIA   Musculoskeletal conditions   []Disc pathology   []Congenital spine pathologies   [x]Prior surgical intervention  []Osteoporosis (M81.8)  []Osteopenia (M85.8)   Endocrine conditions   []Hypothyroid (E03.9)  []Hyperthyroid Gastrointestinal conditions   []Constipation (K00.13)   Metabolic conditions   []Morbid obesity (E66.01)  []Diabetes type 1(E10.65) or 2 (E11.65)   []Neuropathy (G60.9)     Pulmonary conditions   [x]Asthma (J45)  []Coughing   []COPD (J44.9)   Psychological Disorders  []Anxiety (F41.9)  []Depression (F32.9)   []Other:   []Other:          Barriers to/and or personal factors that will affect rehab potential:              []Age  []Sex    []Smoker              []Motivation/Lack of Motivation                        [x]Co-Morbidities              []Cognitive Function, education/learning barriers              []Environmental, home barriers              []profession/work barriers  []past PT/medical experience  []other:  Justification:     Falls Risk Assessment (30 days):   [x] Falls Risk assessed and no intervention required.   [] Falls Risk assessed and Patient requires intervention due to being higher risk   TUG score (>12s at risk):     [] Falls education provided, including         ASSESSMENT:   Functional Impairments:     []Noted lumbar/proximal hip/LE joint hypomobility   []Decreased LE functional ROM   []Decreased core/proximal hip strength and neuromuscular control   [x]Decreased LE functional strength   [x]Reduced balance/proprioceptive control   []other:      Functional Activity Limitations (from functional questionnaire and intake)   [x]Reduced ability to tolerate prolonged functional positions   []Reduced ability or difficulty with changes of positions or transfers between positions   []Reduced ability to maintain good posture and demonstrate good body mechanics with sitting, bending, and lifting   []Reduced ability to sleep   [] Reduced ability or tolerance with driving and/or computer work   []Reduced ability to perform lifting, carrying tasks   [x]Reduced ability to squat   []Reduced ability to forward bend   [x]Reduced ability to ambulate prolonged functional periods/distances/surfaces   [x]Reduced ability to ascend/descend stairs   []Reduced ability to run, hop, cut or jump   []other:    Participation Restrictions   []Reduced participation in self care activities   [x]Reduced participation in home management activities   []Reduced participation in work activities   [x]Reduced participation in social activities. []Reduced participation in sport/recreation activities. Classification :    []Signs/symptoms consistent with post-surgical status including decreased ROM, strength and function.    []Signs/symptoms consistent with joint sprain/strain   []Signs/symptoms consistent with patella-femoral syndrome   [x]Signs/symptoms consistent with knee OA/hip OA   []Signs/symptoms consistent with internal derangement of knee/Hip   []Signs/symptoms consistent with functional hip weakness/NMR control      []Signs/symptoms consistent with tendinitis/tendinosis    []signs/symptoms consistent with pathology which may benefit from Dry needling      []other:      Prognosis/Rehab Potential:      [x]Excellent   []Good    []Fair   []Poor    Tolerance of evaluation/treatment:    [x]Excellent   []Good    []Fair   []Poor    Physical Therapy Evaluation Complexity Justification  [x] A history of present problem with:  [] no personal factors and/or comorbidities that impact the plan of care;  [x]1-2 personal factors and/or comorbidities that impact the plan of care  []3 personal factors and/or comorbidities that impact the plan of care  [x] An examination of body systems using standardized tests and measures addressing any of the following: body structures and functions (impairments), activity limitations, and/or participation restrictions;:  [x] a total of 1-2 or more elements   [] a total of 3 or more elements   [] a total of 4 or more elements   [x] A clinical presentation with:  [x] stable and/or uncomplicated characteristics   [] evolving clinical presentation with changing characteristics  [] unstable and unpredictable characteristics;   [x] Clinical decision making of [x] low , [] moderate, [] high complexity using standardized patient assessment instrument and/or measurable assessment of functional outcome. [x] EVAL (LOW) 03829 (typically 30 minutes face-to-face)  [] EVAL (MOD) 27468 (typically 30 minutes face-to-face)  [] EVAL (HIGH) 01675 (typically 45 minutes face-to-face)  [] RE-EVAL     PLAN:   Frequency/Duration:  1 visit for HEP  Interventions:  [x]  Therapeutic exercise including: strength training, ROM, for Lower extremity and core   [x]  NMR activation and proprioception for LE, Glutes and Core   [x]  Manual therapy as indicated for LE, Hip and spine to include: Dry Needling/IASTM, STM, PROM, Gr I-IV mobilizations, manipulation. [x] Modalities as needed that may include: thermal agents, E-stim, Biofeedback, US, iontophoresis as indicated  [x] Patient education on joint protection, postural re-education, activity modification, progression of HEP. [x] Aquatic exercise including: strength training, ROM, and balance for lower extremity and core     HEP instruction: written HEP instructions provided and discussed  Access Code: [email or web address]  URL: [email or web address]  Date: 01/04/2023  Prepared by: Ale Gracia    Exercises  Active Straight Leg Raise with Quad Set - 1 x daily - 7 x weekly - 2-3 sets - 10 reps  Bridge - 1 x daily - 7 x weekly - 2-3 sets - 10 reps  Sidelying Hip Abduction - 1 x daily - 7 x weekly - 1-2 sets - 10 reps  Seated Long Arc Quad with Ankle Weight - 1 x daily - 7 x weekly - 3 sets - 10 reps - 3# weight  Seated Hamstring Curls with Resistance - 1 x daily - 7 x weekly - 3 sets - 10 reps  Standing Heel Raise - 1 x daily - 7 x weekly - 1 sets - 20 reps  Standing Hip Abduction with Counter Support - 1 x daily - 7 x weekly - 2 sets - 10 reps  Standing Hip Extension with Counter Support - 1 x daily - 7 x weekly - 2 sets - 10 reps    Patient education:  Patient was thoroughly educated on this date regarding prehabilitation goals, importance of PT sessions in improving overall ROM, strength and stability prior to surgery, and how prehabilitation will facilitate improved post-operative outcomes. The patient was educated on and instructed in HEP as listed. The patient was given a detailed handout for exercises to initiate in the hospital post-operatively as well as at home. The discharge plan from the hospital was reviewed with the patient; specifically, to reduce length of hospital stay and to minimize time before reinitiating outpatient physical therapy after surgery. Education regarding early mobility post-operatively in the hospital and emphasis on working with both physical therapy and nursing staff to achieve ambulation goal  was provided. The patient was highly encouraged to attend joint class in hospital prior to surgery for further instructions on pre and post-surgical care. Also, education regarding goals and expectations was provided; specifically, knee flexion range of motion greater than or equal to 90 degrees and 0 degrees knee extension to promote improved gait mechanics and ambulation. The patient was encouraged to utilize ice/cold pack after surgery to address pain, minimize swelling as often as possible.  It is in my medical opinion that this patient is clear from all physical barriers prior to consideration for surgery, activity modifications prior to and post operatively have been discussed with this patient as well as discharge planning and is cleared for surgery from physical therapy perspective. GOALS:  Patient stated goal: \"Get exercises to prepare for surgery\"  [] Progressing: [] Met: [] Not Met: [] Adjusted    Therapist goals for Patient:   Short Term Goals: To be achieved in: 1 visit  1. Independent in HEP and progression per patient tolerance, in order to prevent re-injury. [] Progressing: [] Met: [] Not Met: [] Adjusted  2. All patient questions regarding expectations for rehab following upcoming surgery are answered.    [] Progressing: [] Met: [] Not Met: [] Adjusted    Long Term Goals: long term goals to be determined at re-eval following upcoming surgery    Electronically signed by:  Kandy Peter [place] , OMT-C,  [id number]

## 2023-01-06 ENCOUNTER — OFFICE VISIT (OUTPATIENT)
Dept: CARDIOLOGY CLINIC | Age: 68
End: 2023-01-06
Payer: MEDICARE

## 2023-01-06 ENCOUNTER — TELEPHONE (OUTPATIENT)
Dept: NON INVASIVE DIAGNOSTICS | Age: 68
End: 2023-01-06

## 2023-01-06 VITALS
DIASTOLIC BLOOD PRESSURE: 80 MMHG | HEART RATE: 72 BPM | OXYGEN SATURATION: 96 % | WEIGHT: 225 LBS | BODY MASS INDEX: 35.31 KG/M2 | HEIGHT: 67 IN | SYSTOLIC BLOOD PRESSURE: 124 MMHG

## 2023-01-06 DIAGNOSIS — I10 ESSENTIAL HYPERTENSION: ICD-10-CM

## 2023-01-06 DIAGNOSIS — I48.19 PERSISTENT ATRIAL FIBRILLATION (HCC): Primary | ICD-10-CM

## 2023-01-06 DIAGNOSIS — I48.0 PAROXYSMAL ATRIAL FIBRILLATION (HCC): Primary | ICD-10-CM

## 2023-01-06 DIAGNOSIS — Q23.1 BICUSPID AORTIC VALVE: ICD-10-CM

## 2023-01-06 PROCEDURE — 3074F SYST BP LT 130 MM HG: CPT | Performed by: NURSE PRACTITIONER

## 2023-01-06 PROCEDURE — 3079F DIAST BP 80-89 MM HG: CPT | Performed by: NURSE PRACTITIONER

## 2023-01-06 PROCEDURE — 1123F ACP DISCUSS/DSCN MKR DOCD: CPT | Performed by: NURSE PRACTITIONER

## 2023-01-06 PROCEDURE — 93000 ELECTROCARDIOGRAM COMPLETE: CPT | Performed by: NURSE PRACTITIONER

## 2023-01-06 PROCEDURE — 99214 OFFICE O/P EST MOD 30 MIN: CPT | Performed by: NURSE PRACTITIONER

## 2023-01-06 ASSESSMENT — ENCOUNTER SYMPTOMS
VOMITING: 0
COLOR CHANGE: 0
DIARRHEA: 0
WHEEZING: 0
CONSTIPATION: 0
SHORTNESS OF BREATH: 0
ABDOMINAL PAIN: 0
COUGH: 0
BACK PAIN: 0
SINUS PRESSURE: 0
SORE THROAT: 0
TROUBLE SWALLOWING: 0
NAUSEA: 0
BLOOD IN STOOL: 0

## 2023-01-06 NOTE — TELEPHONE ENCOUNTER
Jef Jerry, this pt needs scheduled for repeat atrial fibrillation ablation with Dr. Cali De La Torre in late May or early June as she is getting her knee replaced 3/1/23. Thanks.

## 2023-01-06 NOTE — TELEPHONE ENCOUNTER
Please reach out to patient and schedule THANIA atrial fibrillation ablation with Dr. Elsa Cooley. Late May or early June 2023. I would wait to schedule until 2 months prior to late May. Anesthesia is needed   Echo tech is needed for THANIA. Include Carto Rep in email [email or web address]      Atrial Fibrillation Ablation Pre procedure Instructions    Date:______________________________    Arrive at:__________________________    Procedure time:____________________    The morning of your procedure you will park in the hospital parking lot and report directly to the cath lab to check in. At the information desk stay right and go all the way to the end of the fraser, this will take you directly to your check in desk for the cath lab. Pre-Procedure Instructions   You will need to fast (nothing to eat or drink) after midnight the day of your procedure. Do NOT chew gum or eat mints the day of your procedure  You will need to hold your Eliquis for 12 hours prior to the procedure. You may hold your Furosemide the morning of the procedure for comfort to decrease urinary urgency. If you are a diabetic you will need to hold all diabetic medications including, Metformin the morning of the procedure. If you take Lantus/Levemir only take ½ your normal dose the evening before. Do not use any lotions, creams or perfume the morning of procedure. You will need to complete pre-procedure lab work 5-7 days prior to your procedure. Please have a responsible adult to drive you home after procedure, you should go home same day, but there is always a possibility of an overnight stay. Cath lab will provide you with all post procedure instructions                                          [address] [place] [address]/[facility] [address] [address]  Phone: [phone]  The hours are Mon -Fri.   6:30 am - 4:00 pm   Saturday 8:00 am - noon  No appointment necessary

## 2023-01-09 ENCOUNTER — PATIENT MESSAGE (OUTPATIENT)
Dept: CARDIOLOGY CLINIC | Age: 68
End: 2023-01-09

## 2023-01-10 NOTE — TELEPHONE ENCOUNTER
From: Cheli Rucker  To: Abigail Masters  Sent: 1/9/2023 5:21 PM EST  Subject: Afib    Hi Jennyfer Saleem Graves here . .. since I had my last visit . .. 3 days ago, my heart has gone into afib for no apparent reason. Should I increase the ranozine (sp). If so, how much?

## 2023-01-12 ENCOUNTER — TELEPHONE (OUTPATIENT)
Dept: ORTHOPEDIC SURGERY | Age: 68
End: 2023-01-12

## 2023-01-12 DIAGNOSIS — M17.12 PRIMARY OSTEOARTHRITIS OF LEFT KNEE: Primary | ICD-10-CM

## 2023-01-12 NOTE — TELEPHONE ENCOUNTER
Prescription Refill     Medication Name:  tramadol   Pharmacy: [facility]  Patient Contact Number:  [phone]

## 2023-01-13 RX ORDER — TRAMADOL HYDROCHLORIDE 50 MG/1
50 TABLET ORAL EVERY 6 HOURS PRN
Qty: 28 TABLET | Refills: 0 | Status: SHIPPED | OUTPATIENT
Start: 2023-01-13 | End: 2023-01-20

## 2023-01-13 NOTE — TELEPHONE ENCOUNTER
Spoke with the patient so she didn't think I forgot her or anything. I am going to wait to schedule this procedure until closer to the requested time of May/June. I will touch base with her late March/early April.

## 2023-01-18 RX ORDER — ALBUTEROL SULFATE 90 UG/1
AEROSOL, METERED RESPIRATORY (INHALATION)
Qty: 8.5 G | Refills: 0 | Status: SHIPPED | OUTPATIENT
Start: 2023-01-18

## 2023-01-20 NOTE — TELEPHONE ENCOUNTER
Discussed with Dr Armani Malcolm. Have patient start Dofetilide  which will require 3 day hospital admission. I called and spoke with patient advised of Dr. Guzman Sher recommendations. Patient agreeable. Spoke with transfer center set up admission. Email sent. Sotalol / Dofetilide Admission      Date of Admission: 1/31/2023    Time of Arrival: 8 am     Admitting Cardiologist: Venita Apple MD      Arrive at [facility] [place] through the main entrance. Check in at the main desk. Be prepared to show photo ID and insurance card. Be prepared for a 3 day admission to the hospital.  If you do not convert to a normal rhythm with medication alone, you will have an external cardioversion on the 2nd day. Do NOT take your routine medications the morning of the admission with the exception of your blood thinner. Do NOT stop taking aspirin, Plavix, coumadin, Eliquis, Xarelto, or Pradaxa (any blood thinners) leading up to the admission. If fact, you should take your blood thinner the morning of admission. Please bring a list of your medications to the hospital with you. You must have someone available to drive you home when you are discharged (if you need a cardioversion). It is recommended that you do not drive for 24 hours after the potential cardioversion. If you are unable to make this appointment, please call [facility] at [phone].

## 2023-01-25 ENCOUNTER — OFFICE VISIT (OUTPATIENT)
Dept: FAMILY MEDICINE CLINIC | Age: 68
End: 2023-01-25
Payer: MEDICARE

## 2023-01-25 VITALS
SYSTOLIC BLOOD PRESSURE: 120 MMHG | RESPIRATION RATE: 16 BRPM | HEART RATE: 78 BPM | BODY MASS INDEX: 35.31 KG/M2 | OXYGEN SATURATION: 98 % | HEIGHT: 67 IN | DIASTOLIC BLOOD PRESSURE: 78 MMHG | WEIGHT: 225 LBS

## 2023-01-25 DIAGNOSIS — J40 BRONCHITIS: ICD-10-CM

## 2023-01-25 DIAGNOSIS — Z53.20 COLON CANCER SCREENING DECLINED: ICD-10-CM

## 2023-01-25 DIAGNOSIS — Z12.31 ENCOUNTER FOR SCREENING MAMMOGRAM FOR MALIGNANT NEOPLASM OF BREAST: ICD-10-CM

## 2023-01-25 DIAGNOSIS — H10.32 ACUTE BACTERIAL CONJUNCTIVITIS OF LEFT EYE: Primary | ICD-10-CM

## 2023-01-25 DIAGNOSIS — J01.40 ACUTE NON-RECURRENT PANSINUSITIS: ICD-10-CM

## 2023-01-25 PROCEDURE — 3074F SYST BP LT 130 MM HG: CPT | Performed by: NURSE PRACTITIONER

## 2023-01-25 PROCEDURE — 3078F DIAST BP <80 MM HG: CPT | Performed by: NURSE PRACTITIONER

## 2023-01-25 PROCEDURE — 99213 OFFICE O/P EST LOW 20 MIN: CPT | Performed by: NURSE PRACTITIONER

## 2023-01-25 PROCEDURE — 1123F ACP DISCUSS/DSCN MKR DOCD: CPT | Performed by: NURSE PRACTITIONER

## 2023-01-25 RX ORDER — AMOXICILLIN AND CLAVULANATE POTASSIUM 875; 125 MG/1; MG/1
1 TABLET, FILM COATED ORAL 2 TIMES DAILY
Qty: 14 TABLET | Refills: 0 | Status: SHIPPED | OUTPATIENT
Start: 2023-01-25 | End: 2023-02-01

## 2023-01-25 RX ORDER — POLYMYXIN B SULFATE AND TRIMETHOPRIM 1; 10000 MG/ML; [USP'U]/ML
1 SOLUTION OPHTHALMIC
Qty: 1 EACH | Refills: 0 | Status: SHIPPED | OUTPATIENT
Start: 2023-01-25 | End: 2023-02-04

## 2023-01-25 SDOH — ECONOMIC STABILITY: FOOD INSECURITY: WITHIN THE PAST 12 MONTHS, YOU WORRIED THAT YOUR FOOD WOULD RUN OUT BEFORE YOU GOT MONEY TO BUY MORE.: NEVER TRUE

## 2023-01-25 SDOH — ECONOMIC STABILITY: FOOD INSECURITY: WITHIN THE PAST 12 MONTHS, THE FOOD YOU BOUGHT JUST DIDN'T LAST AND YOU DIDN'T HAVE MONEY TO GET MORE.: NEVER TRUE

## 2023-01-25 ASSESSMENT — PATIENT HEALTH QUESTIONNAIRE - PHQ9
1. LITTLE INTEREST OR PLEASURE IN DOING THINGS: 0
9. THOUGHTS THAT YOU WOULD BE BETTER OFF DEAD, OR OF HURTING YOURSELF: 0
SUM OF ALL RESPONSES TO PHQ QUESTIONS 1-9: 0
5. POOR APPETITE OR OVEREATING: 0
SUM OF ALL RESPONSES TO PHQ QUESTIONS 1-9: 0
SUM OF ALL RESPONSES TO PHQ9 QUESTIONS 1 & 2: 0
2. FEELING DOWN, DEPRESSED OR HOPELESS: 0
7. TROUBLE CONCENTRATING ON THINGS, SUCH AS READING THE NEWSPAPER OR WATCHING TELEVISION: 0
4. FEELING TIRED OR HAVING LITTLE ENERGY: 0
3. TROUBLE FALLING OR STAYING ASLEEP: 0
8. MOVING OR SPEAKING SO SLOWLY THAT OTHER PEOPLE COULD HAVE NOTICED. OR THE OPPOSITE, BEING SO FIGETY OR RESTLESS THAT YOU HAVE BEEN MOVING AROUND A LOT MORE THAN USUAL: 0
SUM OF ALL RESPONSES TO PHQ QUESTIONS 1-9: 0
SUM OF ALL RESPONSES TO PHQ QUESTIONS 1-9: 0
6. FEELING BAD ABOUT YOURSELF - OR THAT YOU ARE A FAILURE OR HAVE LET YOURSELF OR YOUR FAMILY DOWN: 0

## 2023-01-25 ASSESSMENT — ENCOUNTER SYMPTOMS
SHORTNESS OF BREATH: 0
CHEST TIGHTNESS: 0
ABDOMINAL DISTENTION: 0
COLOR CHANGE: 0
CONSTIPATION: 0
SINUS PAIN: 1
EYE REDNESS: 1
DIARRHEA: 0
COUGH: 1
BACK PAIN: 0
SINUS PRESSURE: 1
EYE DISCHARGE: 1
ABDOMINAL PAIN: 0
NAUSEA: 0

## 2023-01-25 ASSESSMENT — SOCIAL DETERMINANTS OF HEALTH (SDOH): HOW HARD IS IT FOR YOU TO PAY FOR THE VERY BASICS LIKE FOOD, HOUSING, MEDICAL CARE, AND HEATING?: NOT HARD AT ALL

## 2023-01-25 NOTE — PATIENT INSTRUCTIONS
Please call [phone] to schedule your mammogram or [phone] for mammogram Allan Christie  Call [phone] [phone])  to schedule imaging

## 2023-01-25 NOTE — PROGRESS NOTES
Date of Service:  2023    Marcos Almazan (:  1955) is a 74 y.o. female, here for evaluation of the following medical concerns:    Chief Complaint   Patient presents with    Congestion     Green mucus, eye red and crusted x10 days        HPI    Conjunctivitis  Patient presents for evaluation of blurred vision, discharge, and crusty eyes, burning, feels cloudy  in the left eye. She has noticed the above symptoms for 1 day. Onset was acute. Patient denies foreign body sensation, pain, photophobia, and visual field deficit. There is a history of wearing glasses- just readers. Pt started with URI 10 days ago- congestion, chest congestion, cough, sinus pressure. Had COVID 2020. Eye is red and puffy. Last abx 2022 For sinus issue. Review of Systems   Constitutional:  Negative for activity change, appetite change, fatigue, fever and unexpected weight change. HENT:  Positive for congestion, sinus pressure and sinus pain. Negative for ear pain. Eyes:  Positive for discharge and redness. Negative for visual disturbance. Respiratory:  Positive for cough. Negative for chest tightness and shortness of breath. Cardiovascular:  Negative for chest pain, palpitations and leg swelling. Gastrointestinal:  Negative for abdominal distention, abdominal pain, constipation, diarrhea and nausea. Endocrine: Negative for cold intolerance, heat intolerance, polydipsia, polyphagia and polyuria. Genitourinary:  Negative for decreased urine volume, difficulty urinating, dysuria, flank pain, frequency and urgency. Musculoskeletal:  Negative for arthralgias, back pain, gait problem, joint swelling, myalgias and neck pain. Skin:  Negative for color change, rash and wound. Allergic/Immunologic: Negative for food allergies and immunocompromised state. Neurological:  Negative for dizziness, tremors, speech difficulty, weakness, light-headedness, numbness and headaches.    Hematological: Negative for adenopathy. Does not bruise/bleed easily. Psychiatric/Behavioral:  Negative for confusion, decreased concentration, self-injury, sleep disturbance and suicidal ideas. The patient is not nervous/anxious. Prior to Visit Medications    Medication Sig Taking? Authorizing Provider   trimethoprim-polymyxin b (POLYTRIM) 72987-2.1 UNIT/ML-% ophthalmic solution Place 1 drop into the left eye every 4 hours (while awake) for 10 days Yes JERMAINE Cunha CNP   amoxicillin-clavulanate (AUGMENTIN) 875-125 MG per tablet Take 1 tablet by mouth 2 times daily for 7 days Yes JERMAINE Cunha CNP   albuterol sulfate HFA (PROVENTIL;VENTOLIN;PROAIR) 108 (90 Base) MCG/ACT inhaler INHALE 2 PUFFS INTO THE LUNGS EVERY 6 HOURS AS NEEDED FOR WHEEZING Yes Janet Lima MD   traZODone (DESYREL) 150 MG tablet TAKE 2 TABLETS BY MOUTH EVERY NIGHT AS NEEDED Yes JERMAINE Lehman CNP   ranolazine (RANEXA) 500 MG extended release tablet TAKE 1 TABLET BY MOUTH TWICE DAILY Yes JERMAINE Beyer CNP   fluticasone-salmeterol (ADVAIR DISKUS) 100-50 MCG/ACT AEPB diskus inhaler Inhale 1 puff into the lungs in the morning and 1 puff in the evening.  Yes JERMAINE Lehman CNP   furosemide (LASIX) 20 MG tablet TAKE 1 TABLET BY MOUTH TODAY AND TOMORROW AND THEN AS NEEDED FOR SWELLING, WEIGHT GAIN Yes Raimundo Prather MD   apixaban (ELIQUIS) 5 MG TABS tablet TAKE 1 TABLET BY MOUTH TWICE DAILY Yes JERMAINE Lehman CNP   levothyroxine (SYNTHROID) 50 MCG tablet TAKE 1 TABLET BY MOUTH FIVE DAYS A WEEK AND 2 TABLETS ON SATURDAY/SUNDAY Yes JERMAINE Lehman CNP   hydrocortisone (CORTEF) 10 MG tablet TAKE 2 TABLETS BY MOUTH EVERY DAY AT 8:30 AM AND 1 TABLET EVERY DAY AT 12:15 PM Yes Efe Gil MD   Cholecalciferol (VITAMIN D3) 1000 UNITS TABS Take 1 tablet by mouth daily  Yes Historical Provider, MD        Social History     Tobacco Use    Smoking status: Never    Smokeless tobacco: Never   Substance Use Topics    Alcohol use: No        Vitals:    01/25/23 1412   BP: 120/78   Site: Left Upper Arm   Position: Sitting   Cuff Size: Large Adult   Pulse: 78   Resp: 16   SpO2: 98%   Weight: 225 lb (102.1 kg)  Comment: Pt refused weight, reported 225 #   Height: 5' 7\" (1.702 m)     Estimated body mass index is 35.24 kg/m² as calculated from the following:    Height as of this encounter: 5' 7\" (1.702 m). Weight as of this encounter: 225 lb (102.1 kg). Physical Exam  Vitals reviewed. Constitutional:       General: She is awake. Appearance: Normal appearance. She is well-developed and well-groomed. She is obese. She is not ill-appearing. HENT:      Head: Normocephalic and atraumatic. Right Ear: Tympanic membrane, ear canal and external ear normal. Decreased hearing noted. Left Ear: Tympanic membrane, ear canal and external ear normal. Decreased hearing noted. Ears:      Comments: Ayo hearing aids     Nose: Nose normal.      Mouth/Throat:      Lips: Pink. Mouth: Mucous membranes are moist.      Pharynx: Oropharynx is clear. Eyes:      General: Lids are normal.         Left eye: Discharge present. Extraocular Movements: Extraocular movements intact. Conjunctiva/sclera:      Left eye: Left conjunctiva is injected. Pupils: Pupils are equal, round, and reactive to light. Neck:      Thyroid: No thyromegaly. Vascular: No carotid bruit. Cardiovascular:      Rate and Rhythm: Normal rate. Pulses:           Carotid pulses are 2+ on the right side and 2+ on the left side. Radial pulses are 2+ on the right side and 2+ on the left side. Posterior tibial pulses are 2+ on the right side and 2+ on the left side. Heart sounds: S1 normal and S2 normal. Murmur heard. Pulmonary:      Effort: Pulmonary effort is normal.      Breath sounds: Normal breath sounds.    Abdominal:      General: Bowel sounds are normal. There is no abdominal bruit. Palpations: Abdomen is soft. Tenderness: There is no abdominal tenderness. Genitourinary:     Comments: Deferred  Musculoskeletal:         General: Normal range of motion. Cervical back: Full passive range of motion without pain, normal range of motion and neck supple. Right lower leg: No edema. Left lower leg: No edema. Lymphadenopathy:      Head:      Right side of head: No submental, submandibular, tonsillar, preauricular, posterior auricular or occipital adenopathy. Left side of head: No submental, submandibular, tonsillar, preauricular, posterior auricular or occipital adenopathy. Cervical: No cervical adenopathy. Right cervical: No superficial, deep or posterior cervical adenopathy. Left cervical: No superficial, deep or posterior cervical adenopathy. Upper Body:      Right upper body: No supraclavicular adenopathy. Left upper body: No supraclavicular adenopathy. Skin:     General: Skin is warm and dry. Capillary Refill: Capillary refill takes less than 2 seconds. Comments: Black head   Neurological:      General: No focal deficit present. Mental Status: She is alert and oriented to person, place, and time. Mental status is at baseline. Sensory: Sensation is intact. Motor: Motor function is intact. Coordination: Coordination is intact. Gait: Gait is intact. Psychiatric:         Attention and Perception: Attention and perception normal.         Mood and Affect: Mood and affect normal.         Speech: Speech normal.         Behavior: Behavior normal. Behavior is cooperative. Thought Content: Thought content normal.         Cognition and Memory: Cognition and memory normal.         Judgment: Judgment normal.       ASSESSMENT/PLAN:  1.  Acute bacterial conjunctivitis of left eye  -     trimethoprim-polymyxin b (POLYTRIM) 39512-8.1 UNIT/ML-% ophthalmic solution; Place 1 drop into the left eye every 4 hours (while awake) for 10 days, Disp-1 each, R-0Normal  Take medication in its entirety even if feeling better to avoid a resistance to antibiotics  Discussed warm compress for comfort and to help with crusting especially in morning   Discussed proper hand hygiene, contagious for 24 hours  2. Bronchitis  -     amoxicillin-clavulanate (AUGMENTIN) 875-125 MG per tablet; Take 1 tablet by mouth 2 times daily for 7 days, Disp-14 tablet, R-0Normal   Albuterol inhaler PRN, has current inhaler  3. Acute non-recurrent pansinusitis  -     amoxicillin-clavulanate (AUGMENTIN) 875-125 MG per tablet; Take 1 tablet by mouth 2 times daily for 7 days, Disp-14 tablet, R-0Normal   Take medication in its entirety even if feeling better to avoid a resistance to antibiotics   Recommend a probiotic while on antibiotic, do not take at same time of day as antibiotic as it can decrease efficacy of antibiotic. Probiotics can help to decrease GI side effects of antibiotics. You can also eat a yogurt while on antibiotics to help decrease GI side effects and help keep the healthy gut bacteria present while on antibiotics.  Probiotics can be purchased Over the counter in various forms- pills, powder, gummies, etc.   Mucinex twice daily   Hot showers   Vicks vapo rub to chest, under nares   Humidifier or vaporizer near bed where sleeping   Sinus rinse as needed/Netti pot   Push fluids, increase protein intake   Cough and deep breathe   OTC (over the counter) Cough suppressant at night to aid with sleep- robitussin, delsym   If breathing issues, cough and deep breathe, lay on belly (prone) to aid with oxygenating lungs if possibly COVID   Vitamin C, zinc, and vitamin D supplements to boost immune system   Cepacol/throat lozenges for sore throat and cough   Warm tea/tea with honey for cough and sore throat   Warm water or salt water gargle for sore throat   If given antibiotic, take in its entirety until completion to decrease antibiotic resistance developing   Tylenol and ibuprofen for aches and pains and fever >100 F  4. Encounter for screening mammogram for malignant neoplasm of breast  -     CRIST[facility]BAL DIGITAL SCREEN W OR WO CAD BILATERAL; Future   Discussed with pt she is overdue, her last one was at age 37 yo, 25 years ago   Discussed how to get this scheduled   Please call [address] [place] [address] to schedule your mammogram or [phone] for mammogram Arnold Waller   Discussed mammo vans here  5. Colon cancer screening declined    Discussed colonoscopy is gold standard and pt still declined testing/referral   Refused any stool studies   Pt says she has too much going on right now    Care Gaps Addressed  PNA vaccine recommended  HIV screen not needed  Mammo overdue- discussed scheduling, did not have insurance until 73 yo  Call insurance company to discuss coverage for shingles vaccine (Shingrix) 2 dose series   DEXA scan recommended  Colon cancer screening discussed- FIT test 2017 +, no fam hx colon cancer, declined colon cancer screening  Due for diabetes visit  COVID booster recommended  Lipids due- not fasting today  AWV recommended in 2023  Flu vaccine recommended   PHQ UTD 2023        I have reviewed patient's pertinent medical history, relevant laboratory and imaging studies, and past/future health maintenance. Discussed with the patient the importance of adhering to their current medication regimen as directed. Advised the patient that they should continue to work on eating a healthy balanced diet and staying active by exercising within their personal limits. Orders as listed above. Patient was advised to keep future appointments with their respective specialty care team(s). Patient had the opportunity to ask questions, all of which were answered to the best of my ability and with patient satisfaction. Patient understands and is agreeable with the care plan following today's visit. Patient is to schedule an appointment for any new or worsening symptoms.  Go to ER for significant shortness of breath, chest pain, or uncontrolled pain or fever. I discussed with patient the risk and benefits of any medications that were prescribed today. I verified that the patient understands their medications, labs, and/or procedures. The patient is doing well with current medication regimen and does not have any barriers to adherence. The patient's self-management abilities are good. Return in about 3 months (around 4/25/2023) for Annual Wellness Visit, Fasting Labs, Diabetes Follow Up. An electronic signature was used to authenticate this note.     --JERMAINE Mc - CNP on 1/25/2023 at 2:39 PM

## 2023-01-31 ENCOUNTER — HOSPITAL ENCOUNTER (INPATIENT)
Age: 68
LOS: 1 days | Discharge: HOME | DRG: 309 | End: 2023-01-31
Attending: INTERNAL MEDICINE | Admitting: INTERNAL MEDICINE
Payer: MEDICARE

## 2023-01-31 VITALS
SYSTOLIC BLOOD PRESSURE: 104 MMHG | HEIGHT: 67 IN | TEMPERATURE: 97.8 F | HEART RATE: 61 BPM | OXYGEN SATURATION: 98 % | BODY MASS INDEX: 36.12 KG/M2 | RESPIRATION RATE: 22 BRPM | WEIGHT: 230.16 LBS | DIASTOLIC BLOOD PRESSURE: 59 MMHG

## 2023-01-31 PROBLEM — I48.91 ATRIAL FIBRILLATION (HCC): Status: ACTIVE | Noted: 2023-01-31

## 2023-01-31 LAB
ANION GAP SERPL CALCULATED.3IONS-SCNC: 12 MMOL/L (ref 3–16)
BASOPHILS ABSOLUTE: 0.1 K/UL (ref 0–0.2)
BASOPHILS RELATIVE PERCENT: 0.8 %
BUN BLDV-MCNC: 17 MG/DL (ref 7–20)
CALCIUM SERPL-MCNC: 9 MG/DL (ref 8.3–10.6)
CHLORIDE BLD-SCNC: 102 MMOL/L (ref 99–110)
CO2: 23 MMOL/L (ref 21–32)
CREAT SERPL-MCNC: 1.2 MG/DL (ref 0.6–1.2)
EKG ATRIAL RATE: 61 BPM
EKG DIAGNOSIS: NORMAL
EKG P AXIS: 41 DEGREES
EKG P-R INTERVAL: 186 MS
EKG Q-T INTERVAL: 470 MS
EKG QRS DURATION: 74 MS
EKG QTC CALCULATION (BAZETT): 473 MS
EKG R AXIS: 13 DEGREES
EKG T AXIS: 35 DEGREES
EKG VENTRICULAR RATE: 61 BPM
EOSINOPHILS ABSOLUTE: 0.3 K/UL (ref 0–0.6)
EOSINOPHILS RELATIVE PERCENT: 3.9 %
GFR SERPL CREATININE-BSD FRML MDRD: 50 ML/MIN/{1.73_M2}
GLUCOSE BLD-MCNC: 96 MG/DL (ref 70–99)
HCT VFR BLD CALC: 38 % (ref 36–48)
HEMOGLOBIN: 12.2 G/DL (ref 12–16)
LYMPHOCYTES ABSOLUTE: 1.4 K/UL (ref 1–5.1)
LYMPHOCYTES RELATIVE PERCENT: 18.8 %
MAGNESIUM: 2 MG/DL (ref 1.8–2.4)
MCH RBC QN AUTO: 29.3 PG (ref 26–34)
MCHC RBC AUTO-ENTMCNC: 32.1 G/DL (ref 31–36)
MCV RBC AUTO: 91.4 FL (ref 80–100)
MONOCYTES ABSOLUTE: 0.6 K/UL (ref 0–1.3)
MONOCYTES RELATIVE PERCENT: 7.4 %
NEUTROPHILS ABSOLUTE: 5.2 K/UL (ref 1.7–7.7)
NEUTROPHILS RELATIVE PERCENT: 69.1 %
PDW BLD-RTO: 13.9 % (ref 12.4–15.4)
PLATELET # BLD: 214 K/UL (ref 135–450)
PMV BLD AUTO: 9.1 FL (ref 5–10.5)
POTASSIUM SERPL-SCNC: 4.3 MMOL/L (ref 3.5–5.1)
RBC # BLD: 4.16 M/UL (ref 4–5.2)
SODIUM BLD-SCNC: 137 MMOL/L (ref 136–145)
WBC # BLD: 7.6 K/UL (ref 4–11)

## 2023-01-31 PROCEDURE — 83735 ASSAY OF MAGNESIUM: CPT

## 2023-01-31 PROCEDURE — 94760 N-INVAS EAR/PLS OXIMETRY 1: CPT

## 2023-01-31 PROCEDURE — 80048 BASIC METABOLIC PNL TOTAL CA: CPT

## 2023-01-31 PROCEDURE — 85025 COMPLETE CBC W/AUTO DIFF WBC: CPT

## 2023-01-31 PROCEDURE — 36415 COLL VENOUS BLD VENIPUNCTURE: CPT

## 2023-01-31 PROCEDURE — 6370000000 HC RX 637 (ALT 250 FOR IP): Performed by: NURSE PRACTITIONER

## 2023-01-31 PROCEDURE — 99234 HOSP IP/OBS SM DT SF/LOW 45: CPT | Performed by: NURSE PRACTITIONER

## 2023-01-31 PROCEDURE — 2060000000 HC ICU INTERMEDIATE R&B

## 2023-01-31 PROCEDURE — 93005 ELECTROCARDIOGRAM TRACING: CPT

## 2023-01-31 RX ORDER — TRAZODONE HYDROCHLORIDE 100 MG/1
100 TABLET ORAL NIGHTLY PRN
Status: DISCONTINUED | OUTPATIENT
Start: 2023-01-31 | End: 2023-01-31 | Stop reason: HOSPADM

## 2023-01-31 RX ORDER — ALBUTEROL SULFATE 90 UG/1
2 AEROSOL, METERED RESPIRATORY (INHALATION) EVERY 6 HOURS PRN
Status: DISCONTINUED | OUTPATIENT
Start: 2023-01-31 | End: 2023-01-31 | Stop reason: HOSPADM

## 2023-01-31 RX ORDER — POLYETHYLENE GLYCOL 3350 17 G/17G
17 POWDER, FOR SOLUTION ORAL DAILY PRN
Status: DISCONTINUED | OUTPATIENT
Start: 2023-01-31 | End: 2023-01-31 | Stop reason: HOSPADM

## 2023-01-31 RX ORDER — POLYMYXIN B SULFATE AND TRIMETHOPRIM 1; 10000 MG/ML; [USP'U]/ML
1 SOLUTION OPHTHALMIC
Status: DISCONTINUED | OUTPATIENT
Start: 2023-01-31 | End: 2023-01-31 | Stop reason: HOSPADM

## 2023-01-31 RX ORDER — AMOXICILLIN AND CLAVULANATE POTASSIUM 875; 125 MG/1; MG/1
1 TABLET, FILM COATED ORAL 2 TIMES DAILY
Status: DISCONTINUED | OUTPATIENT
Start: 2023-01-31 | End: 2023-01-31 | Stop reason: HOSPADM

## 2023-01-31 RX ORDER — HYDROCORTISONE 10 MG/1
10 TABLET ORAL DAILY
Status: DISCONTINUED | OUTPATIENT
Start: 2023-01-31 | End: 2023-01-31 | Stop reason: HOSPADM

## 2023-01-31 RX ORDER — VITAMIN B COMPLEX
1 TABLET ORAL DAILY
Status: DISCONTINUED | OUTPATIENT
Start: 2023-01-31 | End: 2023-01-31 | Stop reason: HOSPADM

## 2023-01-31 RX ORDER — LEVOTHYROXINE SODIUM 0.05 MG/1
50 TABLET ORAL DAILY
Status: DISCONTINUED | OUTPATIENT
Start: 2023-02-01 | End: 2023-01-31 | Stop reason: HOSPADM

## 2023-01-31 RX ADMIN — DRONEDARONE 400 MG: 400 TABLET, FILM COATED ORAL at 10:36

## 2023-01-31 ASSESSMENT — PAIN SCALES - GENERAL: PAINLEVEL_OUTOF10: 0

## 2023-01-31 NOTE — PROGRESS NOTES
Pt arrived ambulating to room #[id number]. Pt orientated to room and call light. Pt placed on monitor and verified with [facility].  Electronically signed by Kevin Isaacs RN on 1/31/2023 at 9:03 AM

## 2023-01-31 NOTE — PROGRESS NOTES
Pharmacy Medication Reconciliation Note     List of medications patient is currently taking is complete. Source of information:   1. Patient  2. EMR    Allergies   Allergen Reactions    Flecainide Shortness Of Breath and Swelling    Corn Oil     Levofloxacin      Out of touch w/ world       Notes regarding home medications:   1. Patient admitted for a trial of Multaq. Patient was instructed to stop Ranexa by cardiology after 1/30/23 PM dose (category D interaction with Multaq), and she will not be resuming it if the trial is successful and she goes home on Multaq.       Cassia Cassidy, StevenD, BCPS  1/31/2023  10:00 AM

## 2023-01-31 NOTE — DISCHARGE SUMMARY
DISCHARGE SUMMARY      Patient ID:  Karla Munoz  [id number] 74 y.o. 1955    Admit date: 1/31/2023    Discharge date:      Admitting Physician: Dalia Vance MD     Discharge Physician: JERMAINE Marcelo CNP     Admission Diagnoses: Atrial fibrillation [facility]) [I48.91]    Discharge Diagnoses: same. Discharged Condition: good    Hospital Course: Karla Munoz was admitted was admitted on Atrial fibrillation ([address] [I48.91] for dofetilide loading for recurrent atrial fibrillation. After presenting to the hospital for dofetilide loading, it was noted that there is corn oil in dofetilide so it was decided to not start this given significant allergy. She was instead started on Multaq. She remained in sinus rhythm and will continue to monitor on her [employer] at home. She has plans for repeat atrial fibrillation ablation this summer.      Labs:   Lab Results   Component Value Date    CREATININE 1.2 01/31/2023    BUN 17 01/31/2023     01/31/2023    K 4.3 01/31/2023     01/31/2023    CO2 23 01/31/2023      Lab Results   Component Value Date    WBC 7.6 01/31/2023    HGB 12.2 01/31/2023    HCT 38.0 01/31/2023    MCV 91.4 01/31/2023     01/31/2023      Lab Results   Component Value Date    INR 1.6 (H) 10/01/2014    PROTIME 16.6 (H) 10/01/2014    No results found for: BNP    Disposition: home    Patient Instructions:   Current Discharge Medication List             Details   dronedarone hcl (MULTAQ) 400 MG TABS Take 1 tablet by mouth 2 times daily (with meals)  Qty: 180 tablet, Refills: 1      trimethoprim-polymyxin b (POLYTRIM) 46882-3.1 UNIT/ML-% ophthalmic solution Place 1 drop into the left eye every 4 hours (while awake) for 10 days  Qty: 1 each, Refills: 0    Associated Diagnoses: Acute bacterial conjunctivitis of left eye      amoxicillin-clavulanate (AUGMENTIN) 875-125 MG per tablet Take 1 tablet by mouth 2 times daily for 7 days  Qty: 14 tablet, Refills: 0    Associated Diagnoses: Acute non-recurrent pansinusitis; Bronchitis      albuterol sulfate HFA (PROVENTIL;VENTOLIN;PROAIR) 108 (90 Base) MCG/ACT inhaler INHALE 2 PUFFS INTO THE LUNGS EVERY 6 HOURS AS NEEDED FOR WHEEZING  Qty: 8.5 g, Refills: 0      traZODone (DESYREL) 150 MG tablet TAKE 2 TABLETS BY MOUTH EVERY NIGHT AS NEEDED  Qty: 180 tablet, Refills: 0    Comments: ZERO refills remain on this prescription. Your patient is requesting advance approval of refills for this medication to PREVENT ANY MISSED DOSES      fluticasone-salmeterol (ADVAIR DISKUS) 100-50 MCG/ACT AEPB diskus inhaler Inhale 1 puff into the lungs in the morning and 1 puff in the evening. Qty: 60 each, Refills: 5    Associated Diagnoses: Mild persistent allergic asthma without complication      furosemide (LASIX) 20 MG tablet TAKE 1 TABLET BY MOUTH TODAY AND TOMORROW AND THEN AS NEEDED FOR SWELLING, WEIGHT GAIN  Qty: 15 tablet, Refills: 1      apixaban (ELIQUIS) 5 MG TABS tablet TAKE 1 TABLET BY MOUTH TWICE DAILY  Qty: 60 tablet, Refills: 5    Associated Diagnoses: Paroxysmal atrial fibrillation (HCC)      levothyroxine (SYNTHROID) 50 MCG tablet TAKE 1 TABLET BY MOUTH FIVE DAYS A WEEK AND 2 TABLETS ON SATURDAY/SUNDAY  Qty: 390 tablet, Refills: 1    Comments: ZERO refills remain on this prescription. 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 Gio Viera in 4 weeks.     Total time spent on discharge: 20 minutes    JERMAINE Lewis  [facility], [address] [facility] [address]  Phone: [phone]  Fax: [phone]

## 2023-01-31 NOTE — H&P
Cardiac Electrophysiology H&P    Date: 1/31/2023  Admit Date:  1/31/2023  Admission Diagnosis: Atrial fibrillation [facility]) [I48.91]     Admitting Physician: Belia Dior MD       History of Present Illness  Memo Flor is a 74y.o. year old female with past medical history significant for bicuspid aortic valve,TIA (2006), pituitary brain tumors with adrenal insufficiency, HTN, DM, chiari malformation, thyroid disease and atrial fibrillation. She was first noted to have atrial fibrillation during her cataract surgery in 2017. Seen on EKG 4/26/2017. EKG 5/8/2017 showed NSR. She underwent atrial fibrillation ablation on 7/18/22 with Dr. Mihir Nam. She was seen in the office on 7/21/22 as she was feeling poorly, she was in atrial fibrillation but wanted to see if she went back in rhythm on her own. She was still in atrial fibrillation when seen on 8/15/22. She underwent successful cardioversion on 8/30/33 with Dr. Mihir Nam and was started on Ranexa 500mg BID. She was having difficulty tolerating this but was able to take the 500mg BID. She then had daily breakthroughs of atrial fibrillation and it was increased to 1000mg BID. She has continued to have multiple breakthroughs of atrial fibrillation a week. She has palpitations, increased dyspnea and fatigue. She feels more swollen and admits to gaining 30 pounds over the last few weeks. She wants to pursue repeat ablation to avoid medications due to multiple allergies and intolerances but is waiting until her knee is replaced on 3/1/23. It was decided to admit her for dofetilide loading as an alternative to the Ranexa that may be more effective. When presenting to the hospital today, she is in sinus rhythm in the 60s. Upon further research it was noted that dofetilide does contain corn products which she is allergic to. After discussing further with the patient, her antiarrhythmic options are very limited due to her corn allergy.  She has tried flecainide in the past and did not tolerate it. Only options are Multaq and Ranexa. She can also use digoxin if needed for rate control. Given possible allergy, it was decided to not continue with dofetilide loading and instead start her on Multaq which she agreed to.        Past Medical History:   Diagnosis Date    Adrenal insufficiency (HCC)     Arthritis     Asthma     Atrial fibrillation ([address]     Brain tumor ([address]     chiari - malformation    Chiari malformation 3/11    Fibromyalgia     History of TIA (transient ischemic attack) 2006    Panhypopituitarism ([address] 8/26/2014    S/P selective transsphenoidal pituitary adenomectomy 2007    Thyroid disease     Total knee replacement status 8/14    right    Unspecified cerebral artery occlusion with cerebral infarction       TIA 2006        Past Surgical History:   Procedure Laterality Date    BICEPS TENODESIS Left 11/10/2016    Dr Anglin Jeffery  3/24/11    chiari 1 repair/ decompression and C1 laminectomy    BRAIN SURGERY  11/07    adenoma removal, pituitary    CATARACT REMOVAL Bilateral 04/2017    HYSTERECTOMY (CERVIX STATUS UNKNOWN)      KNEE ARTHROPLASTY Right 08/25/2014    right tkr    LAMINECTOMY  C1    chiari    PITUITARY SURGERY  2/15    transphenoidal       Current Outpatient Medications   Medication Instructions    albuterol sulfate HFA (PROVENTIL;VENTOLIN;PROAIR) 108 (90 Base) MCG/ACT inhaler INHALE 2 PUFFS INTO THE LUNGS EVERY 6 HOURS AS NEEDED FOR WHEEZING    amoxicillin-clavulanate (AUGMENTIN) 875-125 MG per tablet 1 tablet, Oral, 2 TIMES DAILY    apixaban (ELIQUIS) 5 MG TABS tablet TAKE 1 TABLET BY MOUTH TWICE DAILY    Cholecalciferol (VITAMIN D3) 1000 UNITS TABS 1 tablet, Oral, DAILY    dronedarone hcl (MULTAQ) 400 mg, Oral, 2 TIMES DAILY WITH MEALS    dronedarone hcl (MULTAQ) 400 mg, Oral, 2 TIMES DAILY WITH MEALS    fluticasone-salmeterol (ADVAIR DISKUS) 100-50 MCG/ACT AEPB diskus inhaler 1 puff, Inhalation, EVERY 12 HOURS    furosemide (LASIX) 20 MG tablet TAKE 1 TABLET BY MOUTH TODAY AND TOMORROW AND THEN AS NEEDED FOR SWELLING, WEIGHT GAIN    hydrocortisone (CORTEF) 10 MG tablet TAKE 2 TABLETS BY MOUTH EVERY DAY AT 8:30 AM AND 1 TABLET EVERY DAY AT 12:15 PM    levothyroxine (SYNTHROID) 50 MCG tablet TAKE 1 TABLET BY MOUTH FIVE DAYS A WEEK AND 2 TABLETS ON SATURDAY/SUNDAY    ranolazine (RANEXA) 500 MG extended release tablet TAKE 1 TABLET BY MOUTH TWICE DAILY    traZODone (DESYREL) 150 MG tablet TAKE 2 TABLETS BY MOUTH EVERY NIGHT AS NEEDED    trimethoprim-polymyxin b (POLYTRIM) 62744-4.1 UNIT/ML-% ophthalmic solution 1 drop, Left Eye, EVERY 4 HOURS WHILE AWAKE        Allergies   Allergen Reactions    Flecainide Shortness Of Breath and Swelling    Corn Oil     Levofloxacin      Out of touch w/ world       Social History:   reports that she has never smoked. She has never used smokeless tobacco. She reports that she does not drink alcohol and does not use drugs. Family History:  family history includes Arrhythmia in her mother; Cancer in her father; Diabetes in her paternal grandfather; Hypertension in her mother; No Known Problems in her brother, maternal aunt, maternal grandfather, maternal grandmother, maternal uncle, paternal aunt, paternal grandmother, paternal uncle, sister, and another family member.      Review of Systems:  General: positive for fatigue, negative for fever, chills   Ophthalmic ROS: negative for eye pain or loss of vision  ENT ROS: negative for headaches, sore throat, nasal drainage  Respiratory: negative for cough, sputum, SOB  Cardiovascular: positive for leg swelling, palpitations, negative for chest pain  Gastrointestinal: negative for abdominal pain, diarrhea, N/V  Hematology: negative for bleeding, blood clots, bruising or jaundice  Genito-Urinary:  negative for dysuria or incontinence  Musculoskeletal: negative for joint swelling, muscle pain  Neurological: negative for confusion, dizziness, headaches   Psychiatric: negative anxiety, depression  Dermatological: negative for rash    Medications:  Scheduled Meds:   dronedarone hcl  400 mg Oral BID WC      Continuous Infusions:  PRN Meds:.     Physical Examination:  Vitals:    23 0907   BP: 121/68   Pulse: 65   Resp: 11   Temp: 98.2 °F (36.8 °C)        Intake/Output Summary (Last 24 hours) at 2023 1108  Last data filed at 2023 1106  Gross per 24 hour   Intake 360 ml   Output --   Net 360 ml     In: 360 [P.O.:360]  Out: -    Wt Readings from Last 3 Encounters:   23 230 lb 2.6 oz (104.4 kg)   23 225 lb (102.1 kg)   23 225 lb (102.1 kg)     Temp  Av.2 °F (36.8 °C)  Min: 98.2 °F (36.8 °C)  Max: 98.2 °F (36.8 °C)  Pulse  Av  Min: 65  Max: 65  BP  Min: 121/68  Max: 121/68    Telemetry: Sinus rhythm in the 60s. Constitutional: Alert, in no acute distress. Appears stated age. Head: Normocephalic and atraumatic. Eyes: Conjunctivae normal. EOM are normal.   Neck: Neck supple. No lymphadenopathy. No rigidity. No JVD present. Cardiovascular: Normal rate, regular rhythm. No murmurs, rubs or gallops. No S3 or S4.  Pulmonary/Chest: Clear breath sounds bilaterally. No crackles, wheezes or rhonchi. No respiratory accessory muscle use. Abdominal: Soft. Normal bowel sounds present. No distension, No tenderness. Musculoskeletal: No tenderness. No edema    Lymphadenopathy: Has no cervical adenopathy. Neurological: Alert and oriented. No gross deficits. Skin: Skin is warm and dry. No rash, lesions, ulcerations noted. Psychiatric: No anxiety nor agitation. Labs:  Reviewed.    Recent Labs     23  0925      K 4.3      CO2 23   BUN 17   CREATININE 1.2     Recent Labs     23  0925   WBC 7.6   HGB 12.2   HCT 38.0   MCV 91.4        No results found for: CKTOTAL, CKMB, CKMBINDEX, TROPONINI  No results found for: BNP  Lab Results   Component Value Date/Time    PROTIME 16.6 10/01/2014 04:58 PM    PROTIME 12.5 2014 02:31 PM PROTIME 11.4 2014 12:04 PM    INR 1.6 10/01/2014 04:58 PM    INR 1.2 2014 02:31 PM    INR 1.1 2014 12:04 PM     Lab Results   Component Value Date/Time    CHOL 176 10/11/2018 07:41 AM    HDL 63 2020 08:23 AM    TRIG 120 10/11/2018 07:41 AM       Diagnostic and imaging results reviewed. EC23  SR at 61 BPM.     Echo: 22   Left ventricular function could not be adequately assessed due to atrial   fibrillation rhythm with RVR. Overall ejection fraction appears normal   around 60%. Mild mitral regurgitation. There is no evidence of mass or thrombus in the left atrium or appendage. Bicuspid aortic valve with fusion of the left and right coronary cusp. Thickening/calcification of the aortic valve leaflets. There appears to be trivial aortic regurgitation best visualized in the   short axis view. Right ventricular systolic function is normal .   Mild to moderate tricuspid regurgitation. Trivial pericardial effusion     Stress test: 5/15/17   Normal myocardial perfusion study. Normal LV size and systolic function. Overall findings represent a low risk study.          EP Procedures:  Atrial fibrillation ablation, 22, Dr. Jeremiah Hackett:    Persistent atrial fibrillation              - symptomatic with fatigue              - first noted on EKG on 17              - s/p A fib ablation on 22 with Dr. Zarina Claudio 5 (gender, HTN, DM, TIA) on Eliquis 5mg BID              - back in atrial fibrillation when seen 22 and 8/15/22              - s/p successful DCCV on 22- on Ranexa 500mg BID, later increased to 1000mg BID due to frequent recurrence   - EKG, labs   - continue to have multiple breakthrough episodes of atrial fibrillation despite 1000mg of Ranexa BID - very limited AAD options given allergies and intolerances, initially planned to load with dofetilide but after discussing further with pt, will avoid given corn allergy and start Multaq 400mg BID, if tolerates AM dose, can be discharged later today     Bicuspid aortic valve              - seen on recent THANIA              - no significant AS or AR              - will need monitored over time     Essential HTN              - controlled              - not currently on medications for this    Discussed with Dr. Atkins.    JERMAINE Yarbrough  [facility] [place]  [address], [address]  [place]  [address]  Phone: [phone]  Fax: [phone]    Electronically signed by JERMAINE Beyer - CNP on 1/31/2023 at 11:08 AM

## 2023-01-31 NOTE — PROGRESS NOTES
Discharge instructions reviewed with pt. Spoke with pt about medications, follow up appts. Pt with questions about Trazadone and her new medication. Verified with Antonio Williamson NP. Awaiting Multaq to arrive from [facility] to retail pharmacy.  Electronically signed by Vasquez Grajeda RN on 1/31/2023 at 1:48 PM

## 2023-02-01 ENCOUNTER — TELEPHONE (OUTPATIENT)
Dept: FAMILY MEDICINE CLINIC | Age: 68
End: 2023-02-01

## 2023-02-01 NOTE — TELEPHONE ENCOUNTER
Care Transitions Initial Follow Up Call    Outreach made within 2 business days of discharge: Yes    Patient: Jessica Millan Patient : 1955   MRN: [id number]  Reason for Admission: There are no discharge diagnoses documented for the most recent discharge. Discharge Date: 23       Spoke with: PATIENT    Discharge department/facility: [facility]    TCM Interactive Patient Contact:  Was patient able to fill all prescriptions: Yes  Was patient instructed to bring all medications to the follow-up visit: Yes  Is patient taking all medications as directed in the discharge summary? Yes  Does patient understand their discharge instructions: Yes  Does patient have questions or concerns that need addressed prior to 7-14 day follow up office visit: no    Scheduled appointment with PCP within 7-14 days    Follow Up  Future Appointments   Date Time Provider Isabelle Blancoisti   2023  2:00 PM Ambrocio Rabago MD [facility]   2023  2:45 PM MD JELENA Camacho [facility]   3/1/2023  7:50 AM [facility]   3/16/2023 10:30 AM MD JELENA Camacho [facility]     SPOKE [address] 2023.  [address] [place], [place]

## 2023-02-02 ENCOUNTER — TELEPHONE (OUTPATIENT)
Dept: CARDIOLOGY CLINIC | Age: 68
End: 2023-02-02

## 2023-02-02 RX ORDER — RANOLAZINE 500 MG/1
500 TABLET, EXTENDED RELEASE ORAL 2 TIMES DAILY
Qty: 60 TABLET | Refills: 0
Start: 2023-02-02

## 2023-02-02 NOTE — TELEPHONE ENCOUNTER
Dr. Albino Ceron,     Patient admitted 1/2023 for dofetilide loading that was interrupted due to corn allergy. She was instead started on Multaq. Patient is calling to report that the Multaq (400 mg BID) is not working. States her HR this morning was 138 bpm. HR is up and down she symptomatic with palpitations/heart racing and lightheaded. She denies any SOB, CP or fatigue. She did mention that the symptoms were present prior to starting Multaq and the two of you discussed repeat DCCV vs ablation. Please advise further, thanks.

## 2023-02-02 NOTE — TELEPHONE ENCOUNTER
MD Estela Jiménez, KOLBY 16 minutes ago (11:04 AM)     Savanah Lowe takes a few days to kick in during the loading phase. She can take ranolazine 500 mg bid for now until the heart is back in NSR then stop.

## 2023-02-02 NOTE — TELEPHONE ENCOUNTER
Contacted patient to review Dr. Mae Cortes. She has Ranexa 500 mg tablets at home from prior use. Not . Does not need refill at this time. Added to med list with Dr. Gunjan Aj instructions. At this time, she does not have any q/c but instructed to reach out if something arises. She vu.

## 2023-02-02 NOTE — TELEPHONE ENCOUNTER
Cecil Lucero called in this morning,she states she doesn't think the dronedarone is helping, she states she is in AFIB more than before. She can be reached at [phone].

## 2023-02-09 ENCOUNTER — OFFICE VISIT (OUTPATIENT)
Dept: FAMILY MEDICINE CLINIC | Age: 68
End: 2023-02-09
Payer: MEDICARE

## 2023-02-09 VITALS
HEIGHT: 67 IN | OXYGEN SATURATION: 97 % | SYSTOLIC BLOOD PRESSURE: 118 MMHG | DIASTOLIC BLOOD PRESSURE: 76 MMHG | HEART RATE: 64 BPM | WEIGHT: 225 LBS | BODY MASS INDEX: 35.31 KG/M2

## 2023-02-09 DIAGNOSIS — I10 ESSENTIAL HYPERTENSION: Primary | ICD-10-CM

## 2023-02-09 DIAGNOSIS — Q23.1 BICUSPID AORTIC VALVE: ICD-10-CM

## 2023-02-09 DIAGNOSIS — Z96.651 STATUS POST TOTAL RIGHT KNEE REPLACEMENT USING CEMENT: ICD-10-CM

## 2023-02-09 DIAGNOSIS — I48.0 PAROXYSMAL ATRIAL FIBRILLATION (HCC): ICD-10-CM

## 2023-02-09 DIAGNOSIS — J45.909 ASTHMA, UNSPECIFIED ASTHMA SEVERITY, UNSPECIFIED WHETHER COMPLICATED, UNSPECIFIED WHETHER PERSISTENT: Chronic | ICD-10-CM

## 2023-02-09 DIAGNOSIS — Z98.890 S/P SELECTIVE TRANSSPHENOIDAL PITUITARY ADENOMECTOMY: ICD-10-CM

## 2023-02-09 DIAGNOSIS — Z86.018 S/P SELECTIVE TRANSSPHENOIDAL PITUITARY ADENOMECTOMY: ICD-10-CM

## 2023-02-09 DIAGNOSIS — F51.04 PSYCHOPHYSIOLOGICAL INSOMNIA: Chronic | ICD-10-CM

## 2023-02-09 DIAGNOSIS — R42 VERTIGO: ICD-10-CM

## 2023-02-09 DIAGNOSIS — E03.9 HYPOTHYROIDISM, UNSPECIFIED TYPE: Chronic | ICD-10-CM

## 2023-02-09 DIAGNOSIS — E27.40 ADRENAL INSUFFICIENCY (HCC): Chronic | ICD-10-CM

## 2023-02-09 PROBLEM — E78.5 DYSLIPIDEMIA ASSOCIATED WITH TYPE 2 DIABETES MELLITUS (HCC): Status: RESOLVED | Noted: 2019-12-23 | Resolved: 2023-02-09

## 2023-02-09 PROBLEM — E11.69 DYSLIPIDEMIA ASSOCIATED WITH TYPE 2 DIABETES MELLITUS (HCC): Status: RESOLVED | Noted: 2019-12-23 | Resolved: 2023-02-09

## 2023-02-09 PROBLEM — I48.91 ATRIAL FIBRILLATION (HCC): Status: RESOLVED | Noted: 2023-01-31 | Resolved: 2023-02-09

## 2023-02-09 PROCEDURE — 99214 OFFICE O/P EST MOD 30 MIN: CPT | Performed by: FAMILY MEDICINE

## 2023-02-09 PROCEDURE — 1123F ACP DISCUSS/DSCN MKR DOCD: CPT | Performed by: FAMILY MEDICINE

## 2023-02-09 PROCEDURE — 3078F DIAST BP <80 MM HG: CPT | Performed by: FAMILY MEDICINE

## 2023-02-09 PROCEDURE — 3074F SYST BP LT 130 MM HG: CPT | Performed by: FAMILY MEDICINE

## 2023-02-09 SDOH — ECONOMIC STABILITY: FOOD INSECURITY: WITHIN THE PAST 12 MONTHS, THE FOOD YOU BOUGHT JUST DIDN'T LAST AND YOU DIDN'T HAVE MONEY TO GET MORE.: PATIENT DECLINED

## 2023-02-09 SDOH — ECONOMIC STABILITY: INCOME INSECURITY: HOW HARD IS IT FOR YOU TO PAY FOR THE VERY BASICS LIKE FOOD, HOUSING, MEDICAL CARE, AND HEATING?: NOT HARD AT ALL

## 2023-02-09 SDOH — ECONOMIC STABILITY: HOUSING INSECURITY
IN THE LAST 12 MONTHS, WAS THERE A TIME WHEN YOU DID NOT HAVE A STEADY PLACE TO SLEEP OR SLEPT IN A SHELTER (INCLUDING NOW)?: PATIENT REFUSED

## 2023-02-09 SDOH — ECONOMIC STABILITY: TRANSPORTATION INSECURITY
IN THE PAST 12 MONTHS, HAS LACK OF TRANSPORTATION KEPT YOU FROM MEETINGS, WORK, OR FROM GETTING THINGS NEEDED FOR DAILY LIVING?: PATIENT DECLINED

## 2023-02-09 NOTE — TELEPHONE ENCOUNTER
LV 1/25/23 WITH RADHA NV 2/9/23 WITH DR Amos [facility] F/U WITH DR FOREMAN TODAY

## 2023-02-09 NOTE — PROGRESS NOTES
[facility] Medicine  Clinic Note    Date: 2/9/2023                                               Subjective:     Chief Complaint   Patient presents with    Follow-Up from [address], AFIB     HPI    Changed to multaq recently for afib  Unable to take dofetilide 2/2 corn allergy  Plan for tkr left 3/1 w/ dr. Jorge Aguilar if heart behaving then plan on 2nd ablation after tkr  DOING FINE ON MULTAQ - no breakthrough for over a week but had breakthrough in last couple days -   Taking whole ranexa lately in lieu of 1/2 tab   Helping as hr not as high as in past - >140's. Planning on ablation - had before but didn't work - cardioversion didn't help get in NSR  No sob - occ mild pressure but feels like real hard rapid fire beat  No lightheaded/ dizzy usually  Had some vertigo other night -lasted one minute.= gets periodically  Has hx of chiari malformation  Had pink eye right eye this am  Sore throat since Saturday - doesn't feel bad - not had sore throat for awhile  No pnd she's aware of -some nasal congestion but not bad  Tx'd recent for uri w/ abx.   On augmentin  Not needing albuterol very often    No personal/ fam hx of bleed/ clot/ anesthesia rxn    Allergies to corn/ corn starch/ corn syrup  BP Readings from Last 3 Encounters:   02/09/23 118/76   01/31/23 (!) 104/59   01/25/23 120/78     Pulse Readings from Last 3 Encounters:   02/09/23 64   01/31/23 61   01/25/23 78     Wt Readings from Last 3 Encounters:   02/09/23 225 lb (102.1 kg)   01/31/23 230 lb 2.6 oz (104.4 kg)   01/25/23 225 lb (102.1 kg)              Patient Active Problem List    Diagnosis Date Noted    Atrial fibrillation ([address] 01/31/2023    Bicuspid aortic valve 01/04/2023    Fibromyalgia 02/21/2020    Paroxysmal atrial fibrillation ([address] 02/21/2020    Dyslipidemia associated with type 2 diabetes mellitus ([address] 12/23/2019    Age-related nuclear cataract 11/14/2019    Encntr for f/u exam aft trtmt for cond oth than malig neoplm 11/14/2019    Regular astigmatism, right eye 11/14/2019    Vitreous degeneration, bilateral 11/14/2019    Posterior tibial tendinitis of right lower extremity 09/11/2018    Plantar fasciitis of right foot 08/28/2018    Tendonitis, Achilles, right 08/28/2018    Right foot pain 08/28/2018    Pituitary tumor 05/14/2018    Multinodular goiter 02/12/2018    Controlled type 2 diabetes mellitus without complication, without long-term current use of insulin ([address] 02/12/2018    Hypercholesteremia 02/12/2018    History of TIA (transient ischemic attack)     Rupture of left distal biceps tendon 11/10/2016    Sprain of left elbow  [facility]  DOI 6/17/16 08/23/2016    Pituitary adenoma ([address] 10/01/2015    Chronic ethmoidal sinusitis 02/16/2015    Chronic maxillary sinusitis 02/16/2015    Status post total right knee replacement using cement 8/25/2014 09/04/2014    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    S/P selective transsphenoidal pituitary adenomectomy 08/26/2014    Right knee DJD 07/22/2014    Obesity 04/17/2014    Adrenal insufficiency 06/05/2012    Insomnia 03/01/2012    Asthma 03/01/2012    Edema 03/01/2012    Essential hypertension 03/01/2012    Hypothyroidism 03/01/2012    Chronic rhinitis 09/10/2010    Pain in limb 09/10/2010    Pruritic disorder 09/10/2010    Pain in joint 02/23/2010    Anemia 04/24/2009    Depressive disorder, not elsewhere classified 04/24/2009    Disorder of skin or subcutaneous tissue 04/24/2009    Brachial neuritis or radiculitis 12/23/2008    Constipation 12/23/2008    Degeneration of intervertebral disc, site unspecified 12/23/2008    Displacement of cervical intervertebral disc without myelopathy 12/23/2008    Disturbance of skin sensation 12/23/2008    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    Iron deficiency anemia 12/23/2008    Myalgia and myositis 12/23/2008    Other extrapyramidal disease and abnormal movement disorder 12/23/2008    Chronic pain disorder 11/13/2008    Hypopotassemia 08/26/2008 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    Galactorrhea not associated with childbirth 01/31/2008    Other malaise and fatigue 12/20/2007    Benign neoplasm of pituitary gland and craniopharyngeal duct (pouch) ([address] 11/09/2007     Past Medical History:   Diagnosis Date    Adrenal insufficiency ([address]     Arthritis     Asthma     Atrial fibrillation ([address]     Brain tumor ([address]     chiari - malformation    Chiari malformation 3/11    Fibromyalgia     History of TIA (transient ischemic attack) 2006    Panhypopituitarism ([address] 8/26/2014    S/P selective transsphenoidal pituitary adenomectomy 2007    Thyroid disease     Total knee replacement status 8/14    right    Unspecified cerebral artery occlusion with cerebral infarction       TIA 2006     Past Surgical History:   Procedure Laterality Date    BICEPS TENODESIS Left 11/10/2016    Dr Kayli Ro  3/24/11    chiari 1 repair/ decompression and C1 laminectomy    BRAIN SURGERY  11/07    adenoma removal, pituitary    CATARACT REMOVAL Bilateral 04/2017    HYSTERECTOMY (CERVIX STATUS UNKNOWN)      KNEE ARTHROPLASTY Right 08/25/2014    right tkr    LAMINECTOMY  C1    chiari    PITUITARY SURGERY  2/15    transphenoidal     Admission on 01/31/2023, Discharged on 01/31/2023   Component Date Value Ref Range Status    Ventricular Rate 01/31/2023 61  BPM Final    Atrial Rate 01/31/2023 61  BPM Final    P-R Interval 01/31/2023 186  ms Final    QRS Duration 01/31/2023 74  ms Final    Q-T Interval 01/31/2023 470  ms Final    QTc Calculation (Bazett) 01/31/2023 473  ms Final    P Axis 01/31/2023 41  degrees Final    R Axis 01/31/2023 13  degrees Final    T Axis 01/31/2023 35  degrees Final    Diagnosis 01/31/2023 Normal sinus rhythmPoor R wave progressionAbnormal ECGWhen compared with ECG of 30-AUG-2022 12:44,No significant change was foundConfirmed by Nico Moctezuma MD, [facility] ([id number]) on 1/31/2023 12:52:15 PM   Final    Sodium 01/31/2023 137  136 - 145 mmol/L Final    Potassium 01/31/2023 4.3  3.5 - 5.1 mmol/L Final    Chloride 01/31/2023 102  99 - 110 mmol/L Final    CO2 01/31/2023 23  21 - 32 mmol/L Final    Anion Gap 01/31/2023 12  3 - 16 Final    Glucose 01/31/2023 96  70 - 99 mg/dL Final    BUN 01/31/2023 17  7 - 20 mg/dL Final    Creatinine 01/31/2023 1.2  0.6 - 1.2 mg/dL Final    Est, Glom Filt Rate 01/31/2023 50 (A)  >60 Final    Comment: Pediatric calculator link  Chon.at. org/professionals/kdoqi/gfr_calculatorped  Effective Oct 3, 2022  These results are not intended for use in patients  <25years of age. eGFR results are calculated without  a race factor using the 2021 CKD-EPI equation. Careful  clinical correlation is recommended, particularly when  comparing to results calculated using previous equations. The CKD-EPI equation is less accurate in patients with  extremes of muscle mass, extra-renal metabolism of  creatinine, excessive creatinine ingestion, or following  therapy that affects renal tubular secretion.       Calcium 01/31/2023 9.0  8.3 - 10.6 mg/dL Final    Magnesium 01/31/2023 2.00  1.80 - 2.40 mg/dL Final    WBC 01/31/2023 7.6  4.0 - 11.0 K/uL Final    RBC 01/31/2023 4.16  4.00 - 5.20 M/uL Final    Hemoglobin 01/31/2023 12.2  12.0 - 16.0 g/dL Final    Hematocrit 01/31/2023 38.0  36.0 - 48.0 % Final    MCV 01/31/2023 91.4  80.0 - 100.0 fL Final    MCH 01/31/2023 29.3  26.0 - 34.0 pg Final    MCHC 01/31/2023 32.1  31.0 - 36.0 g/dL Final    RDW 01/31/2023 13.9  12.4 - 15.4 % Final    Platelets 01/59/8147 214  135 - 450 K/uL Final    MPV 01/31/2023 9.1  5.0 - 10.5 fL Final    Neutrophils % 01/31/2023 69.1  % Final    Lymphocytes % 01/31/2023 18.8  % Final    Monocytes % 01/31/2023 7.4  % Final    Eosinophils % 01/31/2023 3.9  % Final    Basophils % 01/31/2023 0.8  % Final    Neutrophils Absolute 01/31/2023 5.2  1.7 - 7.7 K/uL Final    Lymphocytes Absolute 01/31/2023 1.4  1.0 - 5.1 K/uL Final    Monocytes Absolute 01/31/2023 0.6  0.0 - 1.3 K/uL Final Eosinophils Absolute 01/31/2023 0.3  0.0 - 0.6 K/uL Final    Basophils Absolute 01/31/2023 0.1  0.0 - 0.2 K/uL Final     Family History   Problem Relation Age of Onset    Hypertension Mother     Arrhythmia Mother     Cancer Father         skin? No Known Problems Sister     Diabetes Paternal Grandfather     No Known Problems Brother     No Known Problems Maternal Aunt     No Known Problems Maternal Uncle     No Known Problems Paternal Aunt     No Known Problems Paternal Uncle     No Known Problems Maternal Grandmother     No Known Problems Maternal Grandfather     No Known Problems Paternal Grandmother     No Known Problems Other     Anesth Problems Neg Hx     Broken Bones Neg Hx     Clotting Disorder Neg Hx     Collagen Disease Neg Hx     Dislocations Neg Hx     Osteoporosis Neg Hx     Rheumatologic Disease Neg Hx     Scoliosis Neg Hx     Severe Sprains Neg Hx      Current Outpatient Medications   Medication Sig Dispense Refill    ranolazine (RANEXA) 500 MG extended release tablet Take 1 tablet by mouth 2 times daily Multaq takes a few days to kick in during the loading phase. She can take ranolazine 500 mg bid for now until the heart is back in NSR then stop per Dr. Jennifer Blake 2/2/2023 60 tablet 0    dronedarone hcl (MULTAQ) 400 MG TABS Take 1 tablet by mouth 2 times daily (with meals) 180 tablet 1    albuterol sulfate HFA (PROVENTIL;VENTOLIN;PROAIR) 108 (90 Base) MCG/ACT inhaler INHALE 2 PUFFS INTO THE LUNGS EVERY 6 HOURS AS NEEDED FOR WHEEZING 8.5 g 0    traZODone (DESYREL) 150 MG tablet TAKE 2 TABLETS BY MOUTH EVERY NIGHT AS NEEDED 180 tablet 0    fluticasone-salmeterol (ADVAIR DISKUS) 100-50 MCG/ACT AEPB diskus inhaler Inhale 1 puff into the lungs in the morning and 1 puff in the evening.  60 each 5    furosemide (LASIX) 20 MG tablet TAKE 1 TABLET BY MOUTH TODAY AND TOMORROW AND THEN AS NEEDED FOR SWELLING, WEIGHT GAIN 15 tablet 1    apixaban (ELIQUIS) 5 MG TABS tablet TAKE 1 TABLET BY MOUTH TWICE DAILY 60 tablet 5    levothyroxine (SYNTHROID) 50 MCG tablet TAKE 1 TABLET BY MOUTH FIVE DAYS A WEEK AND 2 TABLETS ON SATURDAY/SUNDAY 390 tablet 1    hydrocortisone (CORTEF) 10 MG tablet TAKE 2 TABLETS BY MOUTH EVERY DAY AT 8:30 AM AND 1 TABLET EVERY DAY AT 12:15 PM 90 tablet 5    Cholecalciferol (VITAMIN D3) 1000 UNITS TABS Take 1 tablet by mouth daily        No current facility-administered medications for this visit. Allergies   Allergen Reactions    Flecainide Shortness Of Breath and Swelling    Corn Oil     Levofloxacin      Out of touch w/ world       Review of Systems    Objective:  /76 (Site: Right Upper Arm, Position: Sitting, Cuff Size: Large Adult)   Pulse 64   Ht 5' 7\" (1.702 m)   Wt 225 lb (102.1 kg)   SpO2 97%   BMI 35.24 kg/m²     BP Readings from Last 3 Encounters:   02/09/23 118/76   01/31/23 (!) 104/59   01/25/23 120/78       Pulse Readings from Last 3 Encounters:   02/09/23 64   01/31/23 61   01/25/23 78       Wt Readings from Last 3 Encounters:   02/09/23 225 lb (102.1 kg)   01/31/23 230 lb 2.6 oz (104.4 kg)   01/25/23 225 lb (102.1 kg)       Physical Exam  Constitutional:       General: She is not in acute distress. Appearance: She is well-developed. HENT:      Head: Normocephalic and atraumatic. Comments: Tm's slightly distended bilat o/w clear     Mouth/Throat:      Mouth: Mucous membranes are moist.      Pharynx: Oropharynx is clear. No oropharyngeal exudate. Comments: Mild erythema but no exudate  Eyes:      General: No scleral icterus. Conjunctiva/sclera: Conjunctivae normal.   Neck:      Thyroid: No thyromegaly. Cardiovascular:      Rate and Rhythm: Normal rate and regular rhythm. Heart sounds: Murmur heard. Pulmonary:      Effort: Pulmonary effort is normal. No respiratory distress. Breath sounds: Normal breath sounds. No wheezing or rales. Abdominal:      General: Bowel sounds are normal. There is no distension. Palpations: Abdomen is soft. Tenderness: There is no abdominal tenderness. Musculoskeletal:         General: Swelling (mild swelling ankles) present. Lymphadenopathy:      Cervical: No cervical adenopathy. Skin:     General: Skin is warm and dry. Comments: Noninflamed seb cyst left upper back     Neurological:      Mental Status: She is alert and oriented to person, place, and time. Assessment/Plan:      Diagnosis Orders   1. Essential hypertension        2. Hypothyroidism, unspecified type        3. Adrenal insufficiency        4. Paroxysmal atrial fibrillation (HCC)        5. S/P selective transsphenoidal pituitary adenomectomy        6. Status post total right knee replacement using cement 8/25/2014        7. Bicuspid aortic valve        8. Psychophysiological insomnia        9. Asthma, unspecified asthma severity, unspecified whether complicated, unspecified whether persistent          Reviewed hospital note    Plan for tka on left  Presently appears to be in sinus rhythm  On turmeric and low sugar helps knee pain  Afib - on multaq/ doac per cardiology  As long as preop-testing and cardiac clearance okay, clear for surgery.   Sxatic tx for mild viral conjunctivitis right eye  Saline spray for etd/ netipot  Check strep test  Hold on abx if negative unless worsening sxs  Hold on steroids -medrol dose pack -only if ears worsen significantly  Corn allergy d/w pt    Pre-Operative Risk assessment using 2014 ACC/AHA guidelines     Emergent procedure NO  Active Cardiac Condition NO (decompensated HF, Arrhythmia, MI <3 weeks, severe valve disease)  Risk Level of Procedure Intermediate Risk (intraperitoneal, intrathoracic, HENT, orthopedic, or carotid endarterectomy, etc.)  Revised Cardiac Risk Index Risk factors:  valvular heart disease/ afib  Measurement of Exercise Tolerance before Surgery >4 YES    According to the 2014 ACC/AHA pre-operative risk assessment guidelines Jennifer Hungarian is a low risk for major cardiac complications during a intermediate risk procedure and may continue as planned. Specific medication recommendations are listed below. Medications recommended to continue should be taken with a sip of water even when NPO.      Further recommendations from consultants: Cardiology    Medication Recommendations:  Stop doac 1 week prior to surgery - no mvi/ supplements other than D     Rosemary Raman MD, MD  2/9/2023  2:23 PM

## 2023-02-13 ENCOUNTER — TELEPHONE (OUTPATIENT)
Dept: ORTHOPEDIC SURGERY | Age: 68
End: 2023-02-13

## 2023-02-13 ENCOUNTER — TELEPHONE (OUTPATIENT)
Dept: FAMILY MEDICINE CLINIC | Age: 68
End: 2023-02-13

## 2023-02-13 DIAGNOSIS — J40 BRONCHITIS: Primary | ICD-10-CM

## 2023-02-13 RX ORDER — METHYLPREDNISOLONE 4 MG/1
TABLET ORAL
Qty: 1 KIT | Refills: 0 | Status: SHIPPED | OUTPATIENT
Start: 2023-02-13 | End: 2023-02-19

## 2023-02-13 NOTE — TELEPHONE ENCOUNTER
Patient is calling because she did speak with Dr. Tashi Nguyen and she is allow to have a short term 5 day supply of prednisone. Please give her a call back.      [address] Phone no. [phone]

## 2023-02-13 NOTE — TELEPHONE ENCOUNTER
PT CALLING SHE WAS SEEN LAST WEEK 02/09/23 - DR. FOREMAN SAID IF SHE WASN'T ANY BETTER HE WOULD CALL IN PREDNISONE (A SHORT ONE) FOR HER - SHE ISN'T FEELING ANY BETTER TODAY, STILL COUGHING AND RUNNY NOS, SINUS PRESSURE  -BUT SHE IS HAVING KNEE REPLACEMENT ON MARCH 1ST- SHE DOESN'T WANT THE MEDICATION TO INTERFERE WITH HER SURGERY THOUGH - BUT WANTING TO FEEL BETTER      [id number] - [place], [address] [phone]    PT @  [phone] - SHE WOULD LIKE CARISSA TO ANSWER THIS MESSAGE SINCE DR. FOREMAN ISN'T IN TODAY.   PLEASE LET HER KNOW WHAT CARISSA SAYS

## 2023-02-13 NOTE — TELEPHONE ENCOUNTER
General Question     Subject: ISSUE WITH L KNEE   Patient and /or Facility Request: Faby Jimenez \"Jennyfer\"  Contact Number: [phone]    PATIENT CALLED IN TO OFFICE TO SEE IF SHE CAN SPEAK TO SOMEONE IN THE OFFICE. .. PATIENT HAVING ISSUE BREATHING AND HER L KNEE PAIN. .. AND LOOKING FOR AN STEROIDS TO TAKE. .. [facility] [address] [address] [place] [address] [phone]. PLEASE CALL PATIENT BACK AT THE ABOVE NUMBER. ..

## 2023-02-13 NOTE — TELEPHONE ENCOUNTER
1:53 PM  Amie Shane routed this conversation to [facility] Clinical Support   Gracie ROSAS    4:37 PM  Note   I talked to the patient and told her short term steroid is fine.

## 2023-02-13 NOTE — TELEPHONE ENCOUNTER
Looks like Dr. Sergo Tanner was thinking medrol dosepak. I think this is reasonable, but ortho usually does not want you to have any steroids within 3 months of surgery. I would recommend reaching out to Dr. Riley March office first to see if they are fine with her having a medrol dosepak, and if they approve will send to the pharmacy. Like she said, don't want to cause a delay in surgery.

## 2023-02-16 ENCOUNTER — TELEPHONE (OUTPATIENT)
Dept: ORTHOPEDIC SURGERY | Age: 68
End: 2023-02-16

## 2023-02-20 ENCOUNTER — HOSPITAL ENCOUNTER (OUTPATIENT)
Dept: PREADMISSION TESTING | Age: 68
Discharge: HOME | End: 2023-02-24
Payer: MEDICARE

## 2023-02-20 ENCOUNTER — HOSPITAL ENCOUNTER (OUTPATIENT)
Dept: CT IMAGING | Age: 68
Discharge: HOME | End: 2023-02-20
Payer: MEDICARE

## 2023-02-20 DIAGNOSIS — M17.12 PRIMARY OSTEOARTHRITIS OF LEFT KNEE: ICD-10-CM

## 2023-02-20 DIAGNOSIS — I48.0 PAROXYSMAL ATRIAL FIBRILLATION (HCC): ICD-10-CM

## 2023-02-20 LAB
ABO/RH: NORMAL
ALBUMIN SERPL-MCNC: 3.8 G/DL (ref 3.4–5)
ANTIBODY SCREEN: NORMAL
APTT: 29.6 SEC (ref 23–34.3)
BACTERIA: ABNORMAL /HPF
BASOPHILS ABSOLUTE: 0.1 K/UL (ref 0–0.2)
BASOPHILS RELATIVE PERCENT: 0.5 %
BILIRUBIN URINE: ABNORMAL
BLOOD, URINE: NEGATIVE
CALCIUM OXALATE CRYSTALS: PRESENT
CLARITY: ABNORMAL
COLOR: ABNORMAL
EOSINOPHILS ABSOLUTE: 0.2 K/UL (ref 0–0.6)
EOSINOPHILS RELATIVE PERCENT: 1.2 %
EPITHELIAL CELLS, UA: 13 /HPF (ref 0–5)
ESTIMATED AVERAGE GLUCOSE: 105.4 MG/DL
GLUCOSE URINE: NEGATIVE MG/DL
HBA1C MFR BLD: 5.3 %
HCT VFR BLD CALC: 39.2 % (ref 36–48)
HEMOGLOBIN: 13 G/DL (ref 12–16)
HYALINE CASTS: 2 /LPF (ref 0–8)
INR BLD: 1.37 (ref 0.87–1.14)
KETONES, URINE: ABNORMAL MG/DL
LEUKOCYTE ESTERASE, URINE: ABNORMAL
LYMPHOCYTES ABSOLUTE: 2.2 K/UL (ref 1–5.1)
LYMPHOCYTES RELATIVE PERCENT: 14.8 %
MCH RBC QN AUTO: 29.9 PG (ref 26–34)
MCHC RBC AUTO-ENTMCNC: 33.1 G/DL (ref 31–36)
MCV RBC AUTO: 90.2 FL (ref 80–100)
MICROSCOPIC EXAMINATION: YES
MONOCYTES ABSOLUTE: 1.1 K/UL (ref 0–1.3)
MONOCYTES RELATIVE PERCENT: 7.2 %
NEUTROPHILS ABSOLUTE: 11.2 K/UL (ref 1.7–7.7)
NEUTROPHILS RELATIVE PERCENT: 76.3 %
NITRITE, URINE: NEGATIVE
PDW BLD-RTO: 14.2 % (ref 12.4–15.4)
PH UA: 5.5 (ref 5–8)
PLATELET # BLD: 303 K/UL (ref 135–450)
PMV BLD AUTO: 8.3 FL (ref 5–10.5)
PREALBUMIN: 16.6 MG/DL (ref 20–40)
PROTEIN UA: 30 MG/DL
PROTHROMBIN TIME: 16.8 SEC (ref 11.7–14.5)
RBC # BLD: 4.35 M/UL (ref 4–5.2)
RBC UA: 6 /HPF (ref 0–4)
SPECIFIC GRAVITY UA: 1.03 (ref 1–1.03)
URINE REFLEX TO CULTURE: YES
URINE TYPE: ABNORMAL
UROBILINOGEN, URINE: 1 E.U./DL
VITAMIN D 25-HYDROXY: 39.9 NG/ML
WBC # BLD: 14.7 K/UL (ref 4–11)
WBC UA: 15 /HPF (ref 0–5)

## 2023-02-20 PROCEDURE — 82306 VITAMIN D 25 HYDROXY: CPT

## 2023-02-20 PROCEDURE — 81001 URINALYSIS AUTO W/SCOPE: CPT

## 2023-02-20 PROCEDURE — 83036 HEMOGLOBIN GLYCOSYLATED A1C: CPT

## 2023-02-20 PROCEDURE — 73700 CT LOWER EXTREMITY W/O DYE: CPT

## 2023-02-20 PROCEDURE — 85610 PROTHROMBIN TIME: CPT

## 2023-02-20 PROCEDURE — 85730 THROMBOPLASTIN TIME PARTIAL: CPT

## 2023-02-20 PROCEDURE — 86900 BLOOD TYPING SEROLOGIC ABO: CPT

## 2023-02-20 PROCEDURE — 84134 ASSAY OF PREALBUMIN: CPT

## 2023-02-20 PROCEDURE — 87641 MR-STAPH DNA AMP PROBE: CPT

## 2023-02-20 PROCEDURE — 86850 RBC ANTIBODY SCREEN: CPT

## 2023-02-20 PROCEDURE — 82040 ASSAY OF SERUM ALBUMIN: CPT

## 2023-02-20 PROCEDURE — 86901 BLOOD TYPING SEROLOGIC RH(D): CPT

## 2023-02-20 PROCEDURE — 85025 COMPLETE CBC W/AUTO DIFF WBC: CPT

## 2023-02-20 PROCEDURE — 87086 URINE CULTURE/COLONY COUNT: CPT

## 2023-02-20 RX ORDER — ALBUTEROL SULFATE 90 UG/1
AEROSOL, METERED RESPIRATORY (INHALATION)
Qty: 8.5 G | Refills: 0 | Status: SHIPPED | OUTPATIENT
Start: 2023-02-20

## 2023-02-20 NOTE — PROGRESS NOTES
Patient attended JET class on 2/20/2023 . Patient verified surgery for Total Knee replacement. Patient received patient information and educational JET folder including the following handouts: jet powerpoint, covid-19 restrictions, ERAS, incentive spirometry including purpose and how to perform, case management contact information, hand hygiene, preventing constipation, home health care agency list, skilled nursing facility list, pre-operative showering techniques and the use of anti-septic 3 days before surgery. Interviews completed by PT, OT, and Nurse Navigator. Labs and Tests completed as ordered/necessary. Anti-septic bottle given to patient to take home. Patient states no further questions or concerns. Patient provided orthopedic office, case management, and nurse navigator contact information. Date Of Surgery: 3/1/2023  Surgeon:  [place]  Per Patient Will see/Saw Primary care provider on 2/9/2023. Will see/Saw Specialist Cardiology on  12/30/2022 . HISTORY OF JOINT REPLACEMENT(S): Total Knee Replacement    DC Plan: Home    HOME ASSISTANCE - WHO WILL BE STAYING WITH YOU AT HOME FOR FIRST SEVERAL DAYS? adult child noemí and sister burton    DC TRANSPORTATION: caleb manrique    STEPS INTO HOME: 6    STEPS TO BATHROOM/BEDROOM: 15, Able to live on the main level. DME NEEDS:  Needs a rolling walker, agreeable to using [employer]. LENGTH OF STAY HAS BEEN DISCUSSED WITH THE PATIENT, APPROPRIATE TO HIS/ HER PROCEDURE. PATIENT HAS BEEN INFORMED THAT THEY WILL BE DISCHARGED WHEN THE PHYSICIAN DEEMS THEM MEDICALLY READY. MOST PATIENTS CAN EXPECT TO BE IN THE HOSPITAL ONE NIGHT AS AN OBSERVATION ONLY, OR 1-2 DAYS AS AN ADMISSION FOR THOSE WITH MEDICAL HEALTH ISSUES/COMPLICATIONS. CHOICES FOR HHC, DME VENDORS AND SKILLED/ REHAB FACILITIES PROVIDED TO PATIENT DURING THIS INTERVIEW. HOME CARE CHOICE(S): open to any agency, home health care list was provided to patient. .    SNF/REHAB CHOICES (S): Declined a need for skilled nursing facility. MEDS TO BEDS FROM [employer]: Patient is agreeable to have medications filled via meds to beds program with [facility]. The Patient and/or patient representative was provided with a choice of provider and agrees   with the discharge plan. [x] Yes [] No    Freedom of choice list was provided with basic dialogue that supports the patient's individualized plan of care/goals, treatment preferences and shares the quality data associated with the providers. [x] Yes [] No    Facesheet with discharge needs provided to [address] / .

## 2023-02-21 LAB
MRSA SCREEN RT-PCR: NORMAL
URINE CULTURE, ROUTINE: NORMAL

## 2023-02-22 NOTE — PROGRESS NOTES
Notification sent to Dr Syed Porter and medical assistant Eduardo VIEIRA regarding abnormal preoperative labs and pertinent medical history.

## 2023-02-22 NOTE — PROGRESS NOTES
Covid testing to be done @  If positive---Pt instructed to notify MD ASAP   If negative--pt was instructed to bring results DOP/DOS         PRE-OP INSTRUCTIONS     Do not eat or drink anything after 12:00 midnight prior to surgery. This includes water, chewing gum, mints and ice chips. You may brush your teeth and gargle the morning of surgery but DO  NOT SWALLOW THE WATER. Take the following medications with a small sip of water on the morning of procedure. Andersony Zandra De Paz Follow your MD/Surgeons pre-procedure instructions regarding your medications     You may be asked to stop blood thinners such as:  Coumadin, Plavix, Fragmin and lovenox. Please check with your doctor before stopping these or any other medications. Aspirin, ibuprofen, advil and naproxen, any anti-inflammatory products should be stopped for a week prior to your surgery. Do not smoke and do not drink any alcoholic beverages 24 hours prior to your surgery. Please do not wear any jewelry or body piercings on the day of surgery. Please wear something simple, loose fitting clothing to the hospital.  Do not wear any make-up(including eye make-up) or nail polish on your fingers and toes. As part of our patient safety program to minimize surgical infections, we ask you to do the following:    Please notify your surgeon if you develop any illness between now and the day of your surgery. This includes a cough, cold, fever, sore  throat, nausea, vomiting, diarrhea, etc.   Please notify your surgeon if you experience dizziness, shortness of  breath or blurred vision between now and the time of your surgery. Please notify your surgeon of any open or redden areas that may look infected       DO NOT shave your operative site 96 hours(four days) prior to surgery.           Shower the week before surgery with an antibacterial soap such as:dial,safeguard, etc.       Three(3) days prior to your surgery, cleanse the operative site with Hibiclens(anti-microbial soap). This soap may dry your skin, please do not apply any oils or lotions     Please bring your insurance card and picture ID day of surgery    If you have a living will or durable power of attorny. Please bring in a copy of you advanced directives. If you have dentures, they will be removed before going to the OR, we will provide you with a container. If you wear contact lenses/glasses, they will be removes, please bring a case    Have you seen your family doctor for a pre-op history and physical.      Surgery scheduler will call you 48 hours prior to your surgery to notify you of the time of your surgery and the time you will need to be at hospital...patients are asked to arrive 2 1/2 hours prior to surgery. Please call Pre-Admission testing if you have any further questions. [id number] [facility] [place] testing phone number:  [phone]      Thank You for choosing [facility]!!      C-Difficile admission screening and protocol:       * Admitted with diarrhea? [] YES    [x]  NO     *Prior history of C-Diff. In last 3 months? [] YES    [x]  NO     *Antibiotic use in the past 6-8 weeks? []  NO    [x]  YES                 If yes, which ANTIBIOTIC AND REASON_Sinus Infection Augmentin____     *Prior hospitalization or nursing home in the last month? []  YES    [x]  NO        SAFETY FIRST. .call before you fall

## 2023-02-23 ENCOUNTER — OFFICE VISIT (OUTPATIENT)
Dept: ORTHOPEDIC SURGERY | Age: 68
End: 2023-02-23
Payer: MEDICARE

## 2023-02-23 VITALS — WEIGHT: 226 LBS | BODY MASS INDEX: 35.47 KG/M2 | RESPIRATION RATE: 17 BRPM | HEIGHT: 67 IN

## 2023-02-23 DIAGNOSIS — M17.12 PRIMARY OSTEOARTHRITIS OF LEFT KNEE: ICD-10-CM

## 2023-02-23 DIAGNOSIS — M17.12 PRIMARY OSTEOARTHRITIS OF LEFT KNEE: Primary | ICD-10-CM

## 2023-02-23 LAB
BASOPHILS ABSOLUTE: 0.1 K/UL (ref 0–0.2)
BASOPHILS RELATIVE PERCENT: 0.5 %
EOSINOPHILS ABSOLUTE: 0.1 K/UL (ref 0–0.6)
EOSINOPHILS RELATIVE PERCENT: 1.3 %
HCT VFR BLD CALC: 37.6 % (ref 36–48)
HEMOGLOBIN: 12.7 G/DL (ref 12–16)
LYMPHOCYTES ABSOLUTE: 1.5 K/UL (ref 1–5.1)
LYMPHOCYTES RELATIVE PERCENT: 14.8 %
MCH RBC QN AUTO: 30.4 PG (ref 26–34)
MCHC RBC AUTO-ENTMCNC: 33.7 G/DL (ref 31–36)
MCV RBC AUTO: 90.2 FL (ref 80–100)
MONOCYTES ABSOLUTE: 0.7 K/UL (ref 0–1.3)
MONOCYTES RELATIVE PERCENT: 6.2 %
NEUTROPHILS ABSOLUTE: 8.1 K/UL (ref 1.7–7.7)
NEUTROPHILS RELATIVE PERCENT: 77.2 %
PDW BLD-RTO: 14.3 % (ref 12.4–15.4)
PLATELET # BLD: 284 K/UL (ref 135–450)
PMV BLD AUTO: 9.2 FL (ref 5–10.5)
RBC # BLD: 4.17 M/UL (ref 4–5.2)
WBC # BLD: 10.5 K/UL (ref 4–11)

## 2023-02-23 PROCEDURE — 1123F ACP DISCUSS/DSCN MKR DOCD: CPT | Performed by: ORTHOPAEDIC SURGERY

## 2023-02-23 PROCEDURE — 99214 OFFICE O/P EST MOD 30 MIN: CPT | Performed by: ORTHOPAEDIC SURGERY

## 2023-02-23 NOTE — DISCHARGE INSTR - COC
[facility]) Continuity of Care Form    Patient Name:  Naeem Prajapati  : 1955    MRN:  [id number]    Admit date:  (Not on file)  Discharge date:  3/3/2023    Code Status Order: Prior  Advance Directives: Yes    Admitting Physician: Rosealee Spatz, MD  PCP: Judith Blanc MD    Discharging Nurse: [facility] Unit/Room#: No information available for this encounter. Discharging Unit Phone Number: [phone]    Emergency Contact:        Past Surgical History:  Past Surgical History:   Procedure Laterality Date    BICEPS TENODESIS Left 11/10/2016    Dr Isamar Yonug  3/24/11    chiari 1 repair/ decompression and C1 laminectomy    BRAIN SURGERY      adenoma removal, pituitary    CATARACT REMOVAL Bilateral 2017    HYSTERECTOMY (CERVIX STATUS UNKNOWN)      KNEE ARTHROPLASTY Right 2014    right tkr    LAMINECTOMY  C1    chiari    PITUITARY SURGERY  2/15    transphenoidal       Immunization History:   Immunization History   Administered Date(s) Administered    COVID-19, J&J, (age 18y+), IM, 0.5 mL 2021    Tdap (Boostrix, Adacel) 2005       Active Problems:  Active Problems:    * No active hospital problems. *  Resolved Problems:    * No resolved hospital problems. *      Isolation/Infection:       Nurse Assessment:  Last Vital Signs:Ht 5' 7\" (1.702 m)   Wt 226 lb 6.4 oz (102.7 kg)   BMI 35.46 kg/m²   Last documented pain score (0-10 scale):    Last Weight:   Wt Readings from Last 1 Encounters:   23 226 lb (102.5 kg)     Mental Status:  oriented and alert     IV Access:  - None    Nursing Mobility/ADLs:  Walking   Assisted  Transfer  Assisted  Bathing  Assisted  Dressing  Assisted  Toileting  Assisted  Feeding  Independent  Med Admin  Independent  Med Delivery   whole    Wound Care Documentation and Therapy:  Keep glued Prineo dressing clean and intact. DO NOT remove. Prineo is waterproof for showering.  Doctor will evaluated dressing for removal at office visit 2 weeks after surgery  Incision 08/25/14 Knee Right (Active)   Number of days: 3104       Incision 11/10/16 Arm Left (Active)   Number of days: 2296        Elimination:  Urinary Catheter: None   Colostomy/Ileostomy: No  Continence: Bowel: Yes  Bladder: Yes  Date of Last BM: 3/1/2023    Intake/Output Summary (Last 24 hours)   No intake or output data in the 24 hours ending 02/23/23 1500  Safety Concerns: At Risk for Falls    Impairments/Disabilities:      Hearing    Nutrition Therapy:  Current Nutrition Therapy: general  Routes of Feeding: Oral  Liquids: No Restrictions  Daily Fluid Restriction: no  Last Modified Barium Swallow with Video (Video Swallowing Test): not done    Treatments at the Time of Hospital Discharge:   Respiratory Treatments:   Oxygen Therapy:  is not on home oxygen therapy.   Ventilator:    - No ventilator support    Lab orders for discharge:        Rehab Therapies: Physical Therapy, Occupational Therapy   Weight Bearing Status/Restrictions: No weight bearing restirctions  Other Medical Equipment (for information only, NOT a DME order):  Rolling walker  Other Treatments: Resume Eliquis, bilateral CIARA hose for 2 weeks after surgery  HOME HEALTH CARE: LEVEL 3 [address] to establish plan of care for patient over 60 day period   Nursing  Initial home SN evaluation visit to occur within 24-48 hours for:  1)  medication management  2)  VS and clinical assessment  3)  S&S chronic disease exacerbation education + when to contact MD/NP  4)  care coordination  Medication Reconciliation during 1st SN visit  PT/OT/Speech   Evaluations in home within 24-48 hours of discharge to include DME and home safety   Frontload therapy 5 days, then 3x a week   OT to evaluate if patient has [address] needs for personal care    evaluation within 24-48 hours to evaluate resources & insurance for potential AL, [place], LTC, and Medicaid options   Palliative Care referral within 5 days of hospital discharge   PCP Visit scheduled within 3 - 7 days of hospital discharge    [address] (If patient is agreeable and meets guidelines)      Patient's personal belongings (please select all that are sent with patient):  clothing    RN SIGNATURE:  Electronically signed by Jennifer Corrigan RN on 3/1/23 at 1:35 PM EST    PHYSICIAN SECTION    Prognosis: Good    Condition at Discharge: Stable    Rehab Potential (if transferring to Rehab): Good    Physician Certification: I certify the above orders, information, and transfer of Oren De Anda is necessary for the continuing treatment of the diagnosis listed and that he requires [address] for less 30 days. Update Admission H&P: No change in H&P    PHYSICIAN SIGNATURE:  Electronically signed by JERMAINE Vinson CNP on 2/23/23 at 3:01 PM EST/ Dr Sylvia Benavidez Date: ***    [facility] Readmission Risk Assessment Score:    Discharging to Facility/ Agency   Name:     [facility]  Address:  [address] [place] [address],  [place] [address]  Phone:     [phone]  Fax:         [phone]      Dialysis Facility (if applicable)   Name:  Address:  Dialysis Schedule:  Phone:  Fax:    / signature: {Esignature:[id number]}          Followup with Dr Phyllis Dao 2 weeks after surgery in office   [phone]  [facility] and [facility] Medicine, [address] [facility], [address] [place] [address],   [phone]     DISCHARGE INSTRUCTIONS FOR TOTAL KNEE REPLACEMENT  Activity:  Elevate your leg if swelling occurs in your ankle. Use elastic wraps/hose until swelling decreases. Continue the exercise program as prescribed by physical therapists. Take frequent walks. Use walker, crutches, or cane with weight bearing instructions as indicated by the physical therapists. Take rest periods often. Elevate leg during rest period.   Wound Care:  Cover the wound with a sterile gauze dressing and change daily as long as there is drainage. Do not scrub wound. Pat it dry with a soft towel. Dont apply any lotions or creams to your wound. Check the incision every day for redness, swelling, or increase in drainage. Diet:  You can resume your normal diet. There are no limits on your diet due to your surgery. Pain pills and activity changes may lead to constipation. To prevent this, use prune juice or bran cereals liberally. You may need to use a laxative such as Dulcolax, Senokot, or Milk of Magnesia. Drink plenty of fluids. Medications: Take pain pills if needed to maintain comfort. Never drive while taking pain medicine. Avoid over the counter medications until checking with your doctor. Resume previous medications as instructed by your doctor. You will be on aspirin or Eliquis  for 14 days only. Stay off other anti-inflammatory medications (except Celebrex)  Call Your Doctor If:  You have increased pain not controlled by medications. Excessive swelling in your ankle. You develop numbness, tingling, or decreased movement. You have a fever greater than 100 degrees for a day or over 101 degrees at any one time. Your wound becomes more reddened, starts draining, or opens. If you fall. You have any questions about your recovery. Inform your family doctor/dentist or any other doctor who cares for you in the future that you have a joint replacement. You may need antibiotics for dental or surgical procedures if there is any evidence of infection present. If you have required the use of insulin to control your blood sugar after surgery, follow up with your family doctor. Call your surgeons office to schedule your appointment to be seen after surgery. Make your appointment to continue physical therapy per doctors orders.   Smoking cessation assistance can be obtained from your family doctor or by calling [employer] @ [phone]    _______________________________   _____   _______________________  ____                Patient Signature              Date      Witness                               Date

## 2023-02-23 NOTE — PROGRESS NOTES
[facility]  [facility] Visit    Chief Complaint: Left knee pain    HPI:  Georgina Le is a 74 y.o. who is here in follow-up of left knee pain due to osteoarthritis. She is scheduled for left total knee arthroplasty next week. For review, she has a history of right total knee arthroplasty with Dr. Nima Hdez in 2014. Her right knee is doing well. She has had intermittent left knee pain over the years and has previously been treated by Dr. Imelda Lowe.  There is no history of injury or surgery. She has a diagnosis of osteoarthritis and has been treated in the past with steroid injections which helped at the time and viscosupplementation injections that were not helpful. She currently has left knee pain on a daily basis that is worse with getting up from a seated position or walking. She is unable to take Tylenol or NSAIDs due to a corn allergy. The patient has difficulty climbing stairs, difficulty getting out of a chair, difficulty with activities of daily living including hygiene and pain at night. Pain level at rest is 7 and with activities 9-10/10. She does have a history of ablation and takes Eliquis. She also has a cortisol disorder for which she takes cortisone daily. She otherwise denies pulmonary issues, tobacco use, history of blood clots. She has help at home.       Patient Active Problem List   Diagnosis    Insomnia    Asthma    Edema    Essential hypertension    Hypothyroidism    Adrenal insufficiency    Obesity    Right knee DJD    Panhypopituitarism ([facility] [address]    S/P selective transsphenoidal pituitary adenomectomy    Status post total right knee replacement using cement 8/25/2014    Sprain of left elbow  [facility]  DOI 6/17/16    Rupture of left distal biceps tendon    History of TIA (transient ischemic attack)    Multinodular goiter    Controlled type 2 diabetes mellitus without complication, without long-term current use of insulin (HCC)    Hypercholesteremia    Anemia Benign neoplasm of pituitary gland and craniopharyngeal duct (pouch) ([facility])    Brachial neuritis or radiculitis    Chronic ethmoidal sinusitis    Chronic maxillary sinusitis    Chronic pain disorder    Chronic rhinitis    Constipation    Degeneration of intervertebral disc, site unspecified    Depressive disorder, not elsewhere classified    Disorder of skin or subcutaneous tissue    Displacement of cervical intervertebral disc without myelopathy    Disturbance of skin sensation    Dyshormonogenic goiter    Galactorrhea not associated with childbirth    Hypopotassemia    Iron deficiency anemia    Myalgia and myositis    Other extrapyramidal disease and abnormal movement disorder    Other malaise and fatigue    Pain in joint    Pain in limb    Pituitary adenoma (HCC)    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[address]    Pituitary tumor    Pruritic disorder    Plantar fasciitis of right foot    Tendonitis, Achilles, right    Right foot pain    Posterior tibial tendinitis of right lower extremity    Age-related nuclear cataract    Encntr for f/u exam aft trtmt for cond oth than malig neoplm    Regular astigmatism, right eye    Vitreous degeneration, bilateral    Fibromyalgia    Paroxysmal atrial fibrillation ([facility])    Bicuspid aortic valve       ROS:  Constitutional: denies fever, chills, weight loss  MSK: denies pain in other joints, muscle aches  Neurological: denies numbness, tingling, weakness    Exam:  Appearance: sitting in exam room chair, appears to be in no acute distress, awake and alert  Resp: unlabored breathing on room air  Skin: warm, dry and intact with out erythema or significant increased temperature  Neuro: grossly intact both lower extremities. Intact sensation to light touch. Motor exam 4+ to 5/5 in all major motor groups. Left knee: Examination reveals that knee range of motion is 0 to 130 degrees. There is varus deformity, positive crepitus, positive joint line tenderness, positive antalgic gait.   Neurologically, plantar flexion and dorsiflexion is intact. 5/5 strength. Imaging:  Prior left knee radiographs were reviewed today. Significant for joint space narrowing of the medial and patellofemoral compartments with periarticular osteophytes. Assessment:  Left knee osteoarthritis    Plan:  We discussed left total knee arthroplasty. The operative procedure, alternatives, and risks were discussed in detail with the patient. The risks include but are not limited to: Infection, vessel injury, nerve injury, DVT, pulmonary embolism, implant loosening, need for revision surgery, loss of motion, continued pain. Despite these risks the patient would like to proceed. All questions have been answered and no guarantees have been made. The patient is unable to do further physical therapy due to disabling pain. I discussed with the patient the diagnosis in detail and answered all the questions. The patient verbalized understanding of the plan as it has been described above and is in agreement. Plan for left total knee arthroplasty. She will need to come off of her Eliquis for at least 48 hours before surgery. She will also require a plan for management of her steroids around the time of surgery. She will require pain medications that is free of corn products postoperatively. Finally, a repeat CBC will be performed today due to an elevated white blood cell count and her preoperative labs. She reports finishing up a Medrol Dosepak on Sunday for recent URI. Total time spent on today's encounter was at least 31 minutes. This time included reviewing prior notes, radiographs, and lab results when available, reviewing history obtained by medical assistant, performing history and physical exam, reviewing tests/radiographs with the patient, counseling the patient, ordering medications or tests, documentation in the electronic health record, and coordination of care.     This dictation was done with Dragon dictation and may contain mechanical errors related to translation.

## 2023-02-27 NOTE — DISCHARGE INSTRUCTIONS
Wear ainsley hoses (compression stockings) for 2 weeks and to only remove when showering or at bedtime. Please wear Teds to follow up appointment with orthopedic surgeon. If you use a bi-pap/cpap for sleep apnea, please wear when sleeping while taking narcotics. Use your Incentive Spirometer 4 times a day for 1 week after surgery to prevent pneumonia. Use ice packs as needed for swelling and pain. DO NOT apply directly to your skin. Use a clean towel or pillow case as a barrier between your skin and the ice pack. Pain medication and activity changes may lead to constipation. If you stop passing gas or are unable to have a bowel movement, please notify your surgeon for further instructions. Do not go any longer than 2 days without having a bowel movement without notification to your surgeon. When to clean your hands: For patients  In the hospital or in your home, you can come in contact with many harmful germs. To help prevent infection, wash your hands with soap and water often, especially:  Before and After touching or changing a dressing or bandage  After using the bathroom  Before and after eating  After coughing or sneezing  After using a tissue  After touching any object or surface that may be contaminated  After touching an animal during a pet therapy session (hospital)  After touching an animal, cleaning up after a pet, or preparing food for pets (home)    Please contact Mehdi Pepe or the Orthopedic office with any questions or concerns after your discharge. If you have any issues or concerns after hours please call the Orthopedic Office to reach the Surgeon on call first at  [phone]. If you need a pain medication refill please contact your surgeon's office.      [facility]:    Monday-Friday 8am- 5pm      [address] [facility] Nurse Navigator: Monday-Wednesday 8am- 5pm, Thursday 8am-3pm  [phone]    After Hours Clinic Walk in Clinic  [facility]  [address]   [place]    Monday-Friday 5pm-9pm  &  Saturday 9am-1pm          [phone]    If you have a medical emergency please call 911 or seek immediate medical help. [address]   [phone]    Call 911 anytime you think you may need emergency care. For example, call if:  You passed out (lost consciousness). You have severe trouble breathing. You have sudden chest pain and shortness of breath, or you cough up blood. Call your doctor now or seek immediate medical care if:  Your leg or foot turns cold or changes color. You have symptoms of a blood clot in your leg (called a deep vein thrombosis), such as:  Pain in your calf, back of the knee, thigh, or groin. Redness and swelling in your leg or groin. You have symptoms of infection, such as: Increased pain, swelling, warmth, or redness. Red streaks leading from the wound. Pus draining from the wound. A fever. easy bruising, unusual bleeding (nose, mouth, vagina, or rectum), bleeding from wounds or needle injections, any bleeding that will not stop;  heavy menstrual periods;  urine that looks red, pink, or brown; or  black or bloody stools, coughing up blood or vomit that looks like coffee grounds.

## 2023-02-28 ENCOUNTER — ANESTHESIA EVENT (OUTPATIENT)
Dept: OPERATING ROOM | Age: 68
End: 2023-02-28
Payer: MEDICARE

## 2023-03-01 ENCOUNTER — APPOINTMENT (OUTPATIENT)
Dept: GENERAL RADIOLOGY | Age: 68
End: 2023-03-01
Attending: ORTHOPAEDIC SURGERY
Payer: MEDICARE

## 2023-03-01 ENCOUNTER — ANESTHESIA (OUTPATIENT)
Dept: OPERATING ROOM | Age: 68
End: 2023-03-01
Payer: MEDICARE

## 2023-03-01 ENCOUNTER — HOSPITAL ENCOUNTER (OUTPATIENT)
Age: 68
Setting detail: OBSERVATION
Discharge: HOME | End: 2023-03-03
Attending: ORTHOPAEDIC SURGERY | Admitting: ORTHOPAEDIC SURGERY
Payer: MEDICARE

## 2023-03-01 DIAGNOSIS — M17.12 ARTHRITIS OF LEFT KNEE: Primary | ICD-10-CM

## 2023-03-01 DIAGNOSIS — I48.0 PAROXYSMAL ATRIAL FIBRILLATION (HCC): ICD-10-CM

## 2023-03-01 LAB
ABO/RH: NORMAL
ANTIBODY SCREEN: NORMAL
GLUCOSE BLD-MCNC: 83 MG/DL (ref 70–99)
GLUCOSE BLD-MCNC: 86 MG/DL (ref 70–99)
GLUCOSE BLD-MCNC: 98 MG/DL (ref 70–99)
PERFORMED ON: NORMAL

## 2023-03-01 PROCEDURE — 73560 X-RAY EXAM OF KNEE 1 OR 2: CPT

## 2023-03-01 PROCEDURE — G0378 HOSPITAL OBSERVATION PER HR: HCPCS

## 2023-03-01 PROCEDURE — 6370000000 HC RX 637 (ALT 250 FOR IP): Performed by: NURSE PRACTITIONER

## 2023-03-01 PROCEDURE — 94760 N-INVAS EAR/PLS OXIMETRY 1: CPT

## 2023-03-01 PROCEDURE — A4217 STERILE WATER/SALINE, 500 ML: HCPCS | Performed by: ORTHOPAEDIC SURGERY

## 2023-03-01 PROCEDURE — 2580000003 HC RX 258: Performed by: ANESTHESIOLOGY

## 2023-03-01 PROCEDURE — 6360000002 HC RX W HCPCS: Performed by: ORTHOPAEDIC SURGERY

## 2023-03-01 PROCEDURE — 20985 CPTR-ASST DIR MS PX: CPT | Performed by: ORTHOPAEDIC SURGERY

## 2023-03-01 PROCEDURE — 2709999900 HC NON-CHARGEABLE SUPPLY: Performed by: ORTHOPAEDIC SURGERY

## 2023-03-01 PROCEDURE — 97162 PT EVAL MOD COMPLEX 30 MIN: CPT

## 2023-03-01 PROCEDURE — 7100000001 HC PACU RECOVERY - ADDTL 15 MIN: Performed by: ORTHOPAEDIC SURGERY

## 2023-03-01 PROCEDURE — 7100000000 HC PACU RECOVERY - FIRST 15 MIN: Performed by: ORTHOPAEDIC SURGERY

## 2023-03-01 PROCEDURE — 3600000015 HC SURGERY LEVEL 5 ADDTL 15MIN: Performed by: ORTHOPAEDIC SURGERY

## 2023-03-01 PROCEDURE — 3700000001 HC ADD 15 MINUTES (ANESTHESIA): Performed by: ORTHOPAEDIC SURGERY

## 2023-03-01 PROCEDURE — 27447 TOTAL KNEE ARTHROPLASTY: CPT | Performed by: ORTHOPAEDIC SURGERY

## 2023-03-01 PROCEDURE — 2580000003 HC RX 258: Performed by: ORTHOPAEDIC SURGERY

## 2023-03-01 PROCEDURE — 6370000000 HC RX 637 (ALT 250 FOR IP): Performed by: ORTHOPAEDIC SURGERY

## 2023-03-01 PROCEDURE — 2500000003 HC RX 250 WO HCPCS: Performed by: ANESTHESIOLOGY

## 2023-03-01 PROCEDURE — 97530 THERAPEUTIC ACTIVITIES: CPT

## 2023-03-01 PROCEDURE — 6360000002 HC RX W HCPCS

## 2023-03-01 PROCEDURE — 3600000005 HC SURGERY LEVEL 5 BASE: Performed by: ORTHOPAEDIC SURGERY

## 2023-03-01 PROCEDURE — 2720000010 HC SURG SUPPLY STERILE: Performed by: ORTHOPAEDIC SURGERY

## 2023-03-01 PROCEDURE — 27447 TOTAL KNEE ARTHROPLASTY: CPT | Performed by: PHYSICIAN ASSISTANT

## 2023-03-01 PROCEDURE — 97166 OT EVAL MOD COMPLEX 45 MIN: CPT

## 2023-03-01 PROCEDURE — C1776 JOINT DEVICE (IMPLANTABLE): HCPCS | Performed by: ORTHOPAEDIC SURGERY

## 2023-03-01 PROCEDURE — 97535 SELF CARE MNGMENT TRAINING: CPT

## 2023-03-01 PROCEDURE — 6360000002 HC RX W HCPCS: Performed by: ANESTHESIOLOGY

## 2023-03-01 PROCEDURE — 3700000000 HC ANESTHESIA ATTENDED CARE: Performed by: ORTHOPAEDIC SURGERY

## 2023-03-01 PROCEDURE — 86900 BLOOD TYPING SEROLOGIC ABO: CPT

## 2023-03-01 PROCEDURE — 64447 NJX AA&/STRD FEMORAL NRV IMG: CPT | Performed by: ANESTHESIOLOGY

## 2023-03-01 PROCEDURE — 86901 BLOOD TYPING SEROLOGIC RH(D): CPT

## 2023-03-01 PROCEDURE — 94640 AIRWAY INHALATION TREATMENT: CPT

## 2023-03-01 PROCEDURE — 86850 RBC ANTIBODY SCREEN: CPT

## 2023-03-01 DEVICE — INSERT TIB SZ 4 THK9MM KNEE X3 CNDYL STBL BEAR TECHNOLOGY: Type: IMPLANTABLE DEVICE | Site: KNEE | Status: FUNCTIONAL

## 2023-03-01 DEVICE — COMPONENT PAT DIA32MM THK10MM SUPERIOR/INFERIOR KNEE: Type: IMPLANTABLE DEVICE | Site: KNEE | Status: FUNCTIONAL

## 2023-03-01 DEVICE — BASEPLATE TIB SZ 4 AP46MM ML70MM KNEE TRITANIUM 4 CRUCFRM: Type: IMPLANTABLE DEVICE | Site: KNEE | Status: FUNCTIONAL

## 2023-03-01 DEVICE — IMPLANTABLE DEVICE: Type: IMPLANTABLE DEVICE | Site: KNEE | Status: FUNCTIONAL

## 2023-03-01 RX ORDER — MELOXICAM 7.5 MG/1
7.5 TABLET ORAL DAILY
Status: DISCONTINUED | OUTPATIENT
Start: 2023-03-02 | End: 2023-03-03 | Stop reason: HOSPADM

## 2023-03-01 RX ORDER — INSULIN LISPRO 100 [IU]/ML
0-4 INJECTION, SOLUTION INTRAVENOUS; SUBCUTANEOUS NIGHTLY
Status: DISCONTINUED | OUTPATIENT
Start: 2023-03-01 | End: 2023-03-01

## 2023-03-01 RX ORDER — BUPIVACAINE HYDROCHLORIDE 2.5 MG/ML
INJECTION, SOLUTION EPIDURAL; INFILTRATION; INTRACAUDAL
Status: COMPLETED | OUTPATIENT
Start: 2023-03-01 | End: 2023-03-01

## 2023-03-01 RX ORDER — MAGNESIUM HYDROXIDE 1200 MG/15ML
LIQUID ORAL CONTINUOUS PRN
Status: DISCONTINUED | OUTPATIENT
Start: 2023-03-01 | End: 2023-03-01 | Stop reason: HOSPADM

## 2023-03-01 RX ORDER — FENTANYL CITRATE 50 UG/ML
INJECTION, SOLUTION INTRAMUSCULAR; INTRAVENOUS PRN
Status: DISCONTINUED | OUTPATIENT
Start: 2023-03-01 | End: 2023-03-01 | Stop reason: SDUPTHER

## 2023-03-01 RX ORDER — OXYCODONE HYDROCHLORIDE 10 MG/1
10 TABLET ORAL ONCE
Status: COMPLETED | OUTPATIENT
Start: 2023-03-01 | End: 2023-03-01

## 2023-03-01 RX ORDER — BUPIVACAINE HYDROCHLORIDE 7.5 MG/ML
INJECTION, SOLUTION INTRASPINAL
Status: COMPLETED | OUTPATIENT
Start: 2023-03-01 | End: 2023-03-01

## 2023-03-01 RX ORDER — INSULIN LISPRO 100 [IU]/ML
0.08 INJECTION, SOLUTION INTRAVENOUS; SUBCUTANEOUS
Status: DISCONTINUED | OUTPATIENT
Start: 2023-03-01 | End: 2023-03-01

## 2023-03-01 RX ORDER — ONDANSETRON 2 MG/ML
4 INJECTION INTRAMUSCULAR; INTRAVENOUS EVERY 6 HOURS PRN
Status: DISCONTINUED | OUTPATIENT
Start: 2023-03-01 | End: 2023-03-03 | Stop reason: HOSPADM

## 2023-03-01 RX ORDER — TRANEXAMIC ACID 650 MG/1
1950 TABLET ORAL ONCE
Status: COMPLETED | OUTPATIENT
Start: 2023-03-01 | End: 2023-03-01

## 2023-03-01 RX ORDER — KETOROLAC TROMETHAMINE 15 MG/ML
15 INJECTION, SOLUTION INTRAMUSCULAR; INTRAVENOUS
Status: ACTIVE | OUTPATIENT
Start: 2023-03-01 | End: 2023-03-02

## 2023-03-01 RX ORDER — SODIUM CHLORIDE 0.9 % (FLUSH) 0.9 %
5-40 SYRINGE (ML) INJECTION PRN
Status: DISCONTINUED | OUTPATIENT
Start: 2023-03-01 | End: 2023-03-03 | Stop reason: HOSPADM

## 2023-03-01 RX ORDER — RANOLAZINE 500 MG/1
500 TABLET, EXTENDED RELEASE ORAL 2 TIMES DAILY
Status: DISCONTINUED | OUTPATIENT
Start: 2023-03-01 | End: 2023-03-03 | Stop reason: HOSPADM

## 2023-03-01 RX ORDER — GLYCOPYRROLATE 0.2 MG/ML
INJECTION INTRAMUSCULAR; INTRAVENOUS PRN
Status: DISCONTINUED | OUTPATIENT
Start: 2023-03-01 | End: 2023-03-01 | Stop reason: SDUPTHER

## 2023-03-01 RX ORDER — HYDROMORPHONE HYDROCHLORIDE 2 MG/1
1 TABLET ORAL EVERY 4 HOURS PRN
Qty: 30 TABLET | Refills: 0 | Status: SHIPPED | OUTPATIENT
Start: 2023-03-01 | End: 2023-03-03 | Stop reason: SDUPTHER

## 2023-03-01 RX ORDER — ALBUTEROL SULFATE 90 UG/1
2 AEROSOL, METERED RESPIRATORY (INHALATION) EVERY 6 HOURS PRN
Status: DISCONTINUED | OUTPATIENT
Start: 2023-03-01 | End: 2023-03-03 | Stop reason: HOSPADM

## 2023-03-01 RX ORDER — HYDROMORPHONE HYDROCHLORIDE 2 MG/1
1 TABLET ORAL EVERY 4 HOURS PRN
Status: DISCONTINUED | OUTPATIENT
Start: 2023-03-01 | End: 2023-03-03 | Stop reason: HOSPADM

## 2023-03-01 RX ORDER — OXYCODONE HYDROCHLORIDE 5 MG/1
5 TABLET ORAL EVERY 4 HOURS PRN
Status: DISCONTINUED | OUTPATIENT
Start: 2023-03-01 | End: 2023-03-01

## 2023-03-01 RX ORDER — INSULIN LISPRO 100 [IU]/ML
0-4 INJECTION, SOLUTION INTRAVENOUS; SUBCUTANEOUS
Status: DISCONTINUED | OUTPATIENT
Start: 2023-03-01 | End: 2023-03-01

## 2023-03-01 RX ORDER — PROPOFOL 10 MG/ML
INJECTION, EMULSION INTRAVENOUS PRN
Status: DISCONTINUED | OUTPATIENT
Start: 2023-03-01 | End: 2023-03-01

## 2023-03-01 RX ORDER — TRAMADOL HYDROCHLORIDE 50 MG/1
50 TABLET ORAL EVERY 6 HOURS PRN
Status: DISCONTINUED | OUTPATIENT
Start: 2023-03-01 | End: 2023-03-03 | Stop reason: HOSPADM

## 2023-03-01 RX ORDER — MORPHINE SULFATE 4 MG/ML
4 INJECTION, SOLUTION INTRAMUSCULAR; INTRAVENOUS
Status: DISCONTINUED | OUTPATIENT
Start: 2023-03-01 | End: 2023-03-03 | Stop reason: HOSPADM

## 2023-03-01 RX ORDER — ONDANSETRON 2 MG/ML
4 INJECTION INTRAMUSCULAR; INTRAVENOUS
Status: DISCONTINUED | OUTPATIENT
Start: 2023-03-01 | End: 2023-03-01 | Stop reason: HOSPADM

## 2023-03-01 RX ORDER — MIDAZOLAM HYDROCHLORIDE 1 MG/ML
INJECTION INTRAMUSCULAR; INTRAVENOUS PRN
Status: DISCONTINUED | OUTPATIENT
Start: 2023-03-01 | End: 2023-03-01 | Stop reason: SDUPTHER

## 2023-03-01 RX ORDER — FENTANYL CITRATE 50 UG/ML
50 INJECTION, SOLUTION INTRAMUSCULAR; INTRAVENOUS EVERY 5 MIN PRN
Status: DISCONTINUED | OUTPATIENT
Start: 2023-03-01 | End: 2023-03-01 | Stop reason: HOSPADM

## 2023-03-01 RX ORDER — PROPOFOL 10 MG/ML
INJECTION, EMULSION INTRAVENOUS CONTINUOUS PRN
Status: DISCONTINUED | OUTPATIENT
Start: 2023-03-01 | End: 2023-03-01 | Stop reason: SDUPTHER

## 2023-03-01 RX ORDER — HYDROMORPHONE HYDROCHLORIDE 2 MG/1
1 TABLET ORAL EVERY 4 HOURS PRN
Status: DISCONTINUED | OUTPATIENT
Start: 2023-03-01 | End: 2023-03-01

## 2023-03-01 RX ORDER — MORPHINE SULFATE 2 MG/ML
2 INJECTION, SOLUTION INTRAMUSCULAR; INTRAVENOUS
Status: DISCONTINUED | OUTPATIENT
Start: 2023-03-01 | End: 2023-03-03 | Stop reason: HOSPADM

## 2023-03-01 RX ORDER — SODIUM CHLORIDE 9 MG/ML
INJECTION, SOLUTION INTRAVENOUS PRN
Status: DISCONTINUED | OUTPATIENT
Start: 2023-03-01 | End: 2023-03-01 | Stop reason: HOSPADM

## 2023-03-01 RX ORDER — CALCIUM CARBONATE 200(500)MG
500 TABLET,CHEWABLE ORAL 3 TIMES DAILY PRN
Status: DISCONTINUED | OUTPATIENT
Start: 2023-03-01 | End: 2023-03-03 | Stop reason: HOSPADM

## 2023-03-01 RX ORDER — FENTANYL CITRATE 50 UG/ML
25 INJECTION, SOLUTION INTRAMUSCULAR; INTRAVENOUS EVERY 5 MIN PRN
Status: DISCONTINUED | OUTPATIENT
Start: 2023-03-01 | End: 2023-03-01 | Stop reason: HOSPADM

## 2023-03-01 RX ORDER — INSULIN LISPRO 100 [IU]/ML
0-4 INJECTION, SOLUTION INTRAVENOUS; SUBCUTANEOUS NIGHTLY
Status: DISCONTINUED | OUTPATIENT
Start: 2023-03-01 | End: 2023-03-02

## 2023-03-01 RX ORDER — INSULIN GLARGINE 100 [IU]/ML
0.25 INJECTION, SOLUTION SUBCUTANEOUS NIGHTLY
Status: DISCONTINUED | OUTPATIENT
Start: 2023-03-01 | End: 2023-03-01

## 2023-03-01 RX ORDER — PHENYLEPHRINE HCL IN 0.9% NACL 1 MG/10 ML
SYRINGE (ML) INTRAVENOUS PRN
Status: DISCONTINUED | OUTPATIENT
Start: 2023-03-01 | End: 2023-03-01 | Stop reason: SDUPTHER

## 2023-03-01 RX ORDER — LEVOTHYROXINE SODIUM 0.05 MG/1
50 TABLET ORAL DAILY
Status: DISCONTINUED | OUTPATIENT
Start: 2023-03-02 | End: 2023-03-03 | Stop reason: HOSPADM

## 2023-03-01 RX ORDER — SODIUM CHLORIDE 0.9 % (FLUSH) 0.9 %
5-40 SYRINGE (ML) INJECTION EVERY 12 HOURS SCHEDULED
Status: DISCONTINUED | OUTPATIENT
Start: 2023-03-01 | End: 2023-03-03 | Stop reason: HOSPADM

## 2023-03-01 RX ORDER — INSULIN LISPRO 100 [IU]/ML
0-4 INJECTION, SOLUTION INTRAVENOUS; SUBCUTANEOUS
Status: DISCONTINUED | OUTPATIENT
Start: 2023-03-02 | End: 2023-03-02

## 2023-03-01 RX ORDER — SODIUM CHLORIDE 0.9 % (FLUSH) 0.9 %
5-40 SYRINGE (ML) INJECTION EVERY 12 HOURS SCHEDULED
Status: DISCONTINUED | OUTPATIENT
Start: 2023-03-01 | End: 2023-03-01 | Stop reason: HOSPADM

## 2023-03-01 RX ORDER — CEFAZOLIN SODIUM 1 G/3ML
2000 INJECTION, POWDER, FOR SOLUTION INTRAMUSCULAR; INTRAVENOUS ONCE
Status: DISCONTINUED | OUTPATIENT
Start: 2023-03-01 | End: 2023-03-01

## 2023-03-01 RX ORDER — SODIUM CHLORIDE 450 MG/100ML
INJECTION, SOLUTION INTRAVENOUS CONTINUOUS
Status: DISCONTINUED | OUTPATIENT
Start: 2023-03-01 | End: 2023-03-03 | Stop reason: HOSPADM

## 2023-03-01 RX ORDER — DEXTROSE MONOHYDRATE 100 MG/ML
INJECTION, SOLUTION INTRAVENOUS CONTINUOUS PRN
Status: DISCONTINUED | OUTPATIENT
Start: 2023-03-01 | End: 2023-03-03 | Stop reason: HOSPADM

## 2023-03-01 RX ORDER — ONDANSETRON 4 MG/1
4 TABLET, ORALLY DISINTEGRATING ORAL EVERY 8 HOURS PRN
Status: DISCONTINUED | OUTPATIENT
Start: 2023-03-01 | End: 2023-03-03 | Stop reason: HOSPADM

## 2023-03-01 RX ORDER — HYDROCORTISONE 10 MG/1
30 TABLET ORAL EVERY MORNING
Status: DISCONTINUED | OUTPATIENT
Start: 2023-03-02 | End: 2023-03-03 | Stop reason: HOSPADM

## 2023-03-01 RX ORDER — SODIUM CHLORIDE 9 MG/ML
INJECTION, SOLUTION INTRAVENOUS PRN
Status: DISCONTINUED | OUTPATIENT
Start: 2023-03-01 | End: 2023-03-03 | Stop reason: HOSPADM

## 2023-03-01 RX ORDER — LIDOCAINE HYDROCHLORIDE 20 MG/ML
INJECTION, SOLUTION INFILTRATION; PERINEURAL PRN
Status: DISCONTINUED | OUTPATIENT
Start: 2023-03-01 | End: 2023-03-01 | Stop reason: SDUPTHER

## 2023-03-01 RX ORDER — SODIUM CHLORIDE 0.9 % (FLUSH) 0.9 %
5-40 SYRINGE (ML) INJECTION PRN
Status: DISCONTINUED | OUTPATIENT
Start: 2023-03-01 | End: 2023-03-01 | Stop reason: HOSPADM

## 2023-03-01 RX ORDER — OXYCODONE HYDROCHLORIDE 10 MG/1
10 TABLET ORAL EVERY 4 HOURS PRN
Status: DISCONTINUED | OUTPATIENT
Start: 2023-03-01 | End: 2023-03-01

## 2023-03-01 RX ORDER — EPHEDRINE SULFATE/0.9% NACL/PF 50 MG/5 ML
SYRINGE (ML) INTRAVENOUS PRN
Status: DISCONTINUED | OUTPATIENT
Start: 2023-03-01 | End: 2023-03-01 | Stop reason: SDUPTHER

## 2023-03-01 RX ADMIN — Medication 5 MG: at 10:36

## 2023-03-01 RX ADMIN — Medication 200 MCG: at 10:48

## 2023-03-01 RX ADMIN — Medication 10 MG: at 10:33

## 2023-03-01 RX ADMIN — HYDROCORTISONE SODIUM SUCCINATE 100 MG: 100 INJECTION, POWDER, FOR SOLUTION INTRAMUSCULAR; INTRAVENOUS at 09:54

## 2023-03-01 RX ADMIN — Medication 200 MCG: at 10:19

## 2023-03-01 RX ADMIN — CEFAZOLIN 2000 MG: 2 INJECTION, POWDER, FOR SOLUTION INTRAMUSCULAR; INTRAVENOUS at 09:53

## 2023-03-01 RX ADMIN — BUPIVACAINE HYDROCHLORIDE IN DEXTROSE 12 MG: 7.5 INJECTION, SOLUTION SUBARACHNOID at 09:50

## 2023-03-01 RX ADMIN — Medication 200 MCG: at 10:15

## 2023-03-01 RX ADMIN — Medication 10 MG: at 10:08

## 2023-03-01 RX ADMIN — Medication 200 MCG: at 10:27

## 2023-03-01 RX ADMIN — MIDAZOLAM 1 MG: 1 INJECTION INTRAMUSCULAR; INTRAVENOUS at 09:39

## 2023-03-01 RX ADMIN — BUPIVACAINE HYDROCHLORIDE 20 ML: 2.5 INJECTION, SOLUTION EPIDURAL; INFILTRATION; INTRACAUDAL at 09:13

## 2023-03-01 RX ADMIN — GLYCOPYRROLATE 0.2 MG: 0.2 INJECTION INTRAMUSCULAR; INTRAVENOUS at 10:00

## 2023-03-01 RX ADMIN — MORPHINE SULFATE 4 MG: 4 INJECTION INTRAVENOUS at 23:40

## 2023-03-01 RX ADMIN — FENTANYL CITRATE 50 MCG: 50 INJECTION INTRAMUSCULAR; INTRAVENOUS at 09:12

## 2023-03-01 RX ADMIN — TRANEXAMIC ACID 1950 MG: 650 TABLET ORAL at 08:29

## 2023-03-01 RX ADMIN — Medication 10 MG: at 10:19

## 2023-03-01 RX ADMIN — SODIUM CHLORIDE: 4.5 INJECTION, SOLUTION INTRAVENOUS at 13:10

## 2023-03-01 RX ADMIN — HYDROMORPHONE HYDROCHLORIDE 1 MG: 2 TABLET ORAL at 17:39

## 2023-03-01 RX ADMIN — HYDROMORPHONE HYDROCHLORIDE 1 MG: 2 TABLET ORAL at 13:08

## 2023-03-01 RX ADMIN — DRONEDARONE 400 MG: 400 TABLET, FILM COATED ORAL at 22:27

## 2023-03-01 RX ADMIN — ONDANSETRON 4 MG: 2 INJECTION INTRAMUSCULAR; INTRAVENOUS at 15:20

## 2023-03-01 RX ADMIN — Medication 5 MG: at 10:11

## 2023-03-01 RX ADMIN — MIDAZOLAM 1 MG: 1 INJECTION INTRAMUSCULAR; INTRAVENOUS at 09:12

## 2023-03-01 RX ADMIN — Medication 100 MCG: at 10:24

## 2023-03-01 RX ADMIN — CEFAZOLIN 2000 MG: 2 INJECTION, POWDER, FOR SOLUTION INTRAMUSCULAR; INTRAVENOUS at 17:55

## 2023-03-01 RX ADMIN — OXYCODONE HYDROCHLORIDE 10 MG: 10 TABLET ORAL at 08:29

## 2023-03-01 RX ADMIN — Medication 200 MCG: at 10:36

## 2023-03-01 RX ADMIN — BUPIVACAINE HYDROCHLORIDE 20 ML: 2.5 INJECTION, SOLUTION EPIDURAL; INFILTRATION; INTRACAUDAL at 09:12

## 2023-03-01 RX ADMIN — Medication 5 MG: at 10:09

## 2023-03-01 RX ADMIN — PROPOFOL 120 MCG/KG/MIN: 10 INJECTION, EMULSION INTRAVENOUS at 09:53

## 2023-03-01 RX ADMIN — Medication 100 MCG: at 10:33

## 2023-03-01 RX ADMIN — RANOLAZINE 500 MG: 500 TABLET, FILM COATED, EXTENDED RELEASE ORAL at 21:30

## 2023-03-01 RX ADMIN — SODIUM CHLORIDE: 9 INJECTION, SOLUTION INTRAVENOUS at 08:28

## 2023-03-01 RX ADMIN — Medication 5 MG: at 10:27

## 2023-03-01 RX ADMIN — MORPHINE SULFATE 4 MG: 4 INJECTION INTRAVENOUS at 16:12

## 2023-03-01 RX ADMIN — LIDOCAINE HYDROCHLORIDE 100 MG: 20 INJECTION, SOLUTION INFILTRATION; PERINEURAL at 09:53

## 2023-03-01 RX ADMIN — SODIUM CHLORIDE: 9 INJECTION, SOLUTION INTRAVENOUS at 10:33

## 2023-03-01 RX ADMIN — Medication 200 MCG: at 10:47

## 2023-03-01 RX ADMIN — MOMETASONE FUROATE AND FORMOTEROL FUMARATE DIHYDRATE 2 PUFF: 100; 5 AEROSOL RESPIRATORY (INHALATION) at 20:25

## 2023-03-01 RX ADMIN — TRAMADOL HYDROCHLORIDE 50 MG: 50 TABLET ORAL at 22:28

## 2023-03-01 RX ADMIN — Medication 200 MCG: at 10:39

## 2023-03-01 ASSESSMENT — PAIN SCALES - GENERAL
PAINLEVEL_OUTOF10: 4
PAINLEVEL_OUTOF10: 0
PAINLEVEL_OUTOF10: 8
PAINLEVEL_OUTOF10: 8
PAINLEVEL_OUTOF10: 0
PAINLEVEL_OUTOF10: 8
PAINLEVEL_OUTOF10: 1

## 2023-03-01 ASSESSMENT — PAIN DESCRIPTION - LOCATION
LOCATION: KNEE
LOCATION: KNEE

## 2023-03-01 ASSESSMENT — PAIN DESCRIPTION - ORIENTATION
ORIENTATION: LEFT
ORIENTATION: LEFT

## 2023-03-01 ASSESSMENT — PAIN DESCRIPTION - DESCRIPTORS
DESCRIPTORS: ACHING

## 2023-03-01 ASSESSMENT — PAIN - FUNCTIONAL ASSESSMENT
PAIN_FUNCTIONAL_ASSESSMENT: 0-10
PAIN_FUNCTIONAL_ASSESSMENT: PREVENTS OR INTERFERES SOME ACTIVE ACTIVITIES AND ADLS
PAIN_FUNCTIONAL_ASSESSMENT: PREVENTS OR INTERFERES SOME ACTIVE ACTIVITIES AND ADLS

## 2023-03-01 ASSESSMENT — PAIN DESCRIPTION - ONSET
ONSET: ON-GOING
ONSET: ON-GOING

## 2023-03-01 ASSESSMENT — PAIN DESCRIPTION - PAIN TYPE
TYPE: SURGICAL PAIN
TYPE: SURGICAL PAIN

## 2023-03-01 ASSESSMENT — PAIN DESCRIPTION - FREQUENCY
FREQUENCY: CONTINUOUS
FREQUENCY: CONTINUOUS

## 2023-03-01 ASSESSMENT — ENCOUNTER SYMPTOMS: SHORTNESS OF BREATH: 0

## 2023-03-01 NOTE — ANESTHESIA PROCEDURE NOTES
Peripheral Block    Patient location during procedure: pre-op  Reason for block: post-op pain management and at surgeon's request  Start time: 3/1/2023 9:13 AM  End time: 3/1/2023 9:14 AM  Staffing  Performed: anesthesiologist   Anesthesiologist: Dale Queen MD  Preanesthetic Checklist  Completed: patient identified, IV checked, site marked, risks and benefits discussed, surgical/procedural consents, equipment checked, pre-op evaluation, timeout performed, anesthesia consent given, oxygen available and monitors applied/VS acknowledged  Peripheral Block   Patient position: supine  Prep: ChloraPrep  Provider prep: mask and sterile gloves  Patient monitoring: cardiac monitor, continuous pulse ox, frequent blood pressure checks and IV access  Block type: iPacks  Laterality: left  Injection technique: single-shot  Guidance: ultrasound guided  Local infiltration: lidocaine  Infiltration strength: 1 %  Local infiltration: lidocaine  Dose: 3 mL    Needle   Needle gauge: 21 G  Needle localization: ultrasound guidance  Needle insertion depth: 5 cm  Needle length: 4 inch. Assessment   Injection assessment: negative aspiration for heme, no paresthesia on injection and local visualized surrounding nerve on ultrasound  Paresthesia pain: none  Slow fractionated injection: yes  Hemodynamics: stable  Real-time US image taken/store: yes  Outcomes: uncomplicated and patient tolerated procedure well    Additional Notes  Immediately prior to procedure a \"time out\" was called to verify the correct patient, allergies, laterality, procedure and equipment. Time out performed with Ezra Mccoy RN    Local Anesthetic: 0.125 %  Bupivacaine   Amount: 20 ml  in 5 ml increments after negative aspiration each time.         Medications Administered  bupivacaine (PF) 0.25 % - Perineural   20 mL - 3/1/2023 9:13:00 AM

## 2023-03-01 NOTE — ACP (ADVANCE CARE PLANNING)
Advance Care Planning     Advance Care Planning Activator (Inpatient)  Conversation Note      Date of ACP Conversation: 3/1/2023     Conversation Conducted with: Patient with Decision Making Capacity    ACP Activator: CHRISTIANNE Lazo    {Health Care Decision Maker:     Current Designated Health Care Decision Maker:     Primary Decision Maker: Yair Phelan Other - [phone]    Secondary Decision Maker: Chris Bernal - [phone]    Care Preferences    Ventilation: \"If you were in your present state of health and suddenly became very ill and were unable to breathe on your own, what would your preference be about the use of a ventilator (breathing machine) if it were available to you? \"      Would the patient desire the use of ventilator (breathing machine)?: yes    \"If your health worsens and it becomes clear that your chance of recovery is unlikely, what would your preference be about the use of a ventilator (breathing machine) if it were available to you? \"     Would the patient desire the use of ventilator (breathing machine)?: No      Resuscitation  \"CPR works best to restart the heart when there is a sudden event, like a heart attack, in someone who is otherwise healthy. Unfortunately, CPR does not typically restart the heart for people who have serious health conditions or who are very sick. \"    \"In the event your heart stopped as a result of an underlying serious health condition, would you want attempts to be made to restart your heart (answer \"yes\" for attempt to resuscitate) or would you prefer a natural death (answer \"no\" for do not attempt to resuscitate)? \" yes       [] Yes   [x] No   Educated Patient / Benson Crumble regarding differences between Advance Directives and portable DNR orders.     Length of ACP Conversation in minutes:      Conversation Outcomes:  ACP discussion completed    Follow-up plan:    [] Schedule follow-up conversation to continue planning  [] Referred individual to Provider for additional questions/concerns   [] Advised patient/agent/surrogate to review completed ACP document and update if needed with changes in condition, patient preferences or care setting    [x] This note routed to one or more involved healthcare providers    Electronically signed by CHRISTIANNE Ralph on 3/1/2023 at 3:32 PM

## 2023-03-01 NOTE — PROGRESS NOTES
Patient admitted to PACU # 14 from OR at 1100 post [address] - Left per Gaetano Conrad MD.  Attached to PACU monitoring system and report received from anesthesia provider. Patient was reported to be hemodynamically stable during procedure. Patient drowsy on admission and denied pain.

## 2023-03-01 NOTE — PROGRESS NOTES
Pt arrived to unit at 1245 . Pt is alert and oriented X4. Pt oriented to unit and to room. Pt oriented to call light and to phone. White board updated. Pt denies any further needs at this time.  Electronically signed by Bella Hedrick RN on 3/1/2023 at 1:31 PM

## 2023-03-01 NOTE — ANESTHESIA PRE PROCEDURE
[facility] Department of Anesthesiology  Pre-Anesthesia Evaluation/Consultation       Name:  Dominique Fay  : 1955  Age:  74 y.o. MRN:  [id number]  Date: 3/1/2023           Surgeon: Surgeon(s):  Danette Navarrete MD    Procedure:       Allergies   Allergen Reactions    Flecainide Shortness Of Breath and Swelling    Corn Oil     Corn-Containing Products      \"Feel horrible after ingesting anything containing corn\"    Levofloxacin      Out of touch w/ world     Patient Active Problem List   Diagnosis    Insomnia    Asthma    Edema    Essential hypertension    Hypothyroidism    Adrenal insufficiency    Obesity    Right knee DJD    Panhypopituitarism (HCC)    S/P selective transsphenoidal pituitary adenomectomy    Status post total right knee replacement using cement 2014    Sprain of left elbow  BWC  DOI 16    Rupture of left distal biceps tendon    History of TIA (transient ischemic attack)    Multinodular goiter    Controlled type 2 diabetes mellitus without complication, without long-term current use of insulin ([facility])    Hypercholesteremia    Anemia    Benign neoplasm of pituitary gland and craniopharyngeal duct (pouch) ([facility])    Brachial neuritis or radiculitis    Chronic ethmoidal sinusitis    Chronic maxillary sinusitis    Chronic pain disorder    Chronic rhinitis    Constipation    Degeneration of intervertebral disc, site unspecified    Depressive disorder, not elsewhere classified    Disorder of skin or subcutaneous tissue    Displacement of cervical intervertebral disc without myelopathy    Disturbance of skin sensation    Dyshormonogenic goiter    Galactorrhea not associated with childbirth    Hypopotassemia    Iron deficiency anemia    Myalgia and myositis    Other extrapyramidal disease and abnormal movement disorder    Other malaise and fatigue    Pain in joint    Pain in limb    Pituitary adenoma (HCC)  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[address]    Pituitary tumor    Pruritic disorder    Plantar fasciitis of right foot    Tendonitis, Achilles, right    Right foot pain    Posterior tibial tendinitis of right lower extremity    Age-related nuclear cataract    Encntr for f/u exam aft trtmt for cond oth than malig neoplm    Regular astigmatism, right eye    Vitreous degeneration, bilateral    Fibromyalgia    Paroxysmal atrial fibrillation (HCC)    Bicuspid aortic valve     Past Medical History:   Diagnosis Date    Adrenal insufficiency (HCC)     Arthritis     Asthma     Atrial fibrillation ([address]     Brain tumor [facility]     chiari - malformation    Chiari malformation 3/11    Fibromyalgia     History of TIA (transient ischemic attack) 2006    Panhypopituitarism ([facility] 8/26/2014    S/P selective transsphenoidal pituitary adenomectomy 2007    Thyroid disease     Total knee replacement status 8/14    right    Unspecified cerebral artery occlusion with cerebral infarction       TIA 2006     Past Surgical History:   Procedure Laterality Date    BICEPS TENODESIS Left 11/10/2016    Dr Metcalf Dose  3/24/11    chiari 1 repair/ decompression and C1 laminectomy    BRAIN SURGERY  11/07    adenoma removal, pituitary    CATARACT REMOVAL Bilateral 04/2017    HYSTERECTOMY (CERVIX STATUS UNKNOWN)      KNEE ARTHROPLASTY Right 08/25/2014    right tkr    LAMINECTOMY  C1    chiari    PITUITARY SURGERY  2/15    transphenoidal     Social History     Tobacco Use    Smoking status: Never    Smokeless tobacco: Never   Vaping Use    Vaping Use: Never used   Substance Use Topics    Alcohol use: No    Drug use: Never     Medications  Current Facility-Administered Medications on File Prior to Visit   Medication Dose Route Frequency Provider Last Rate Last Admin    oxyCODONE HCl (OXY-IR) immediate release tablet 10 mg  10 mg Oral Once Amairani Govea MD        tranexamic acid (LYSTEDA) tablet 1,950 mg  1,950 mg Oral Once Amairani Govea MD        sodium chloride flush 0.9 % injection 5-40 mL  5-40 mL IntraVENous 2 times per day Eliud Lira MD        sodium chloride flush 0.9 % injection 5-40 mL  5-40 mL IntraVENous PRN Eliud Lira MD        0.9 % sodium chloride infusion   IntraVENous PRN Eliud Lira MD        ceFAZolin (ANCEF) 2,000 mg in sodium chloride 0.9 % 50 mL IVPB (mini-bag)  2,000 mg IntraVENous Once Amairani Govea MD         Current Outpatient Medications on File Prior to Visit   Medication Sig Dispense Refill    albuterol sulfate HFA (PROVENTIL;VENTOLIN;PROAIR) 108 (90 Base) MCG/ACT inhaler INHALE 2 PUFFS INTO THE LUNGS EVERY 6 HOURS AS NEEDED FOR WHEEZING 8.5 g 0    apixaban (ELIQUIS) 5 MG TABS tablet TAKE 1 TABLET BY MOUTH TWICE DAILY 60 tablet 5    ranolazine (RANEXA) 500 MG extended release tablet Take 1 tablet by mouth 2 times daily Multaq takes a few days to kick in during the loading phase. She can take ranolazine 500 mg bid for now until the heart is back in NSR then stop per Dr. Destiny Flores 2/2/2023 60 tablet 0    dronedarone hcl (MULTAQ) 400 MG TABS Take 1 tablet by mouth 2 times daily (with meals) 180 tablet 1    traZODone (DESYREL) 150 MG tablet TAKE 2 TABLETS BY MOUTH EVERY NIGHT AS NEEDED 180 tablet 0    fluticasone-salmeterol (ADVAIR DISKUS) 100-50 MCG/ACT AEPB diskus inhaler Inhale 1 puff into the lungs in the morning and 1 puff in the evening.  60 each 5    furosemide (LASIX) 20 MG tablet TAKE 1 TABLET BY MOUTH TODAY AND TOMORROW AND THEN AS NEEDED FOR SWELLING, WEIGHT GAIN 15 tablet 1    levothyroxine (SYNTHROID) 50 MCG tablet TAKE 1 TABLET BY MOUTH FIVE DAYS A WEEK AND 2 TABLETS ON SATURDAY/SUNDAY 390 tablet 1    hydrocortisone (CORTEF) 10 MG tablet TAKE 2 TABLETS BY MOUTH EVERY DAY AT 8:30 AM AND 1 TABLET EVERY DAY AT 12:15 PM (Patient taking differently: Take 5 mg by mouth TAKE 2- 5mg TABLETS BY MOUTH EVERY DAY AT 8:30 AM AND 1-5mg TABLET EVERY DAY AT 12:15 PM) 90 tablet 5    Cholecalciferol (VITAMIN D3) 1000 UNITS TABS Take 1 tablet by mouth daily        No current facility-administered medications for this visit. No current outpatient medications on file. Facility-Administered Medications Ordered in Other Visits   Medication Dose Route Frequency Provider Last Rate Last Admin    oxyCODONE HCl (OXY-IR) immediate release tablet 10 mg  10 mg Oral Once Danette Navarrete MD        tranexamic acid (LYSTEDA) tablet 1,950 mg  1,950 mg Oral Once Danette Navarrete MD        sodium chloride flush 0.9 % injection 5-40 mL  5-40 mL IntraVENous 2 times per day Elizabeth Cox MD        sodium chloride flush 0.9 % injection 5-40 mL  5-40 mL IntraVENous PRN Elizabeth Cox MD        0.9 % sodium chloride infusion   IntraVENous PRN Elizabeth Cox MD        ceFAZolin (ANCEF) 2,000 mg in sodium chloride 0.9 % 50 mL IVPB (mini-bag)  2,000 mg IntraVENous Once Danette Navarrete MD         Vital Signs (Current)   There were no vitals filed for this visit. Vital Signs Statistics (for past 48 hrs)     Temp  Av °F (36.1 °C)  Min: 97 °F (36.1 °C)   Min taken time: 23  Max: 97 °F (36.1 °C)   Max taken time: 23  Pulse  Av  Min: 62   Min taken time: 23  Max: 62   Max taken time: 23  Resp  Av  Min: 25   Min taken time: 23  Max: 18   Max taken time: 23  BP  Min: 121/75   Min taken time: 23 0812  Max: 121/75   Max taken time: 23 08  SpO2  Av %  Min: 97 %   Min taken time: 23  Max: 97 %   Max taken time: 23  BP Readings from Last 3 Encounters:   23 121/75   23 118/76   23 (!) 104/59       BMI  There is no height or weight on file to calculate BMI. Estimated body mass index is 35.4 kg/m² as calculated from the following:    Height as of 23: 5' 7\" (1.702 m). Weight as of 23: 226 lb (102.5 kg).     CBC   Lab Results   Component Value Date/Time    WBC 10.5 02/23/2023 03:17 PM    RBC 4.17 02/23/2023 03:17 PM    HGB 12.7 02/23/2023 03:17 PM    HCT 37.6 02/23/2023 03:17 PM    MCV 90.2 02/23/2023 03:17 PM    RDW 14.3 02/23/2023 03:17 PM     02/23/2023 03:17 PM     CMP    Lab Results   Component Value Date/Time     01/31/2023 09:25 AM    K 4.3 01/31/2023 09:25 AM     01/31/2023 09:25 AM    CO2 23 01/31/2023 09:25 AM    BUN 17 01/31/2023 09:25 AM    CREATININE 1.2 01/31/2023 09:25 AM    GFRAA 55 08/25/2022 10:11 AM    GFRAA >60 11/15/2010 01:12 AM    AGRATIO 2.3 09/23/2020 08:23 AM    LABGLOM 50 01/31/2023 09:25 AM    GLUCOSE 96 01/31/2023 09:25 AM    PROT 5.9 09/23/2020 08:23 AM    CALCIUM 9.0 01/31/2023 09:25 AM    BILITOT 0.6 07/09/2021 12:00 AM    ALKPHOS 97 09/23/2020 08:23 AM    AST 11 07/09/2021 12:00 AM    ALT 12 07/09/2021 12:00 AM     BMP    Lab Results   Component Value Date/Time     01/31/2023 09:25 AM    K 4.3 01/31/2023 09:25 AM     01/31/2023 09:25 AM    CO2 23 01/31/2023 09:25 AM    BUN 17 01/31/2023 09:25 AM    CREATININE 1.2 01/31/2023 09:25 AM    CALCIUM 9.0 01/31/2023 09:25 AM    GFRAA 55 08/25/2022 10:11 AM    GFRAA >60 11/15/2010 01:12 AM    LABGLOM 50 01/31/2023 09:25 AM    GLUCOSE 96 01/31/2023 09:25 AM     POCGlucose  No results for input(s): GLUCOSE in the last 72 hours.    Coags    Lab Results   Component Value Date/Time    PROTIME 16.8 02/20/2023 01:14 PM    INR 1.37 02/20/2023 01:14 PM    APTT 29.6 02/20/2023 01:14 PM     HCG (If Applicable) No results found for: PREGTESTUR, PREGSERUM, HCG, HCGQUANT   ABGs No results found for: PHART, PO2ART, BMZ8YCG, YRQ0IGG, BEART, P1ELKVIS   Type & Screen (If Applicable)  No results found for: LABABO, LABRH                         BMI: Wt Readings from Last 3 Encounters:       NPO Status:                          Anesthesia Evaluation  Patient summary reviewed and Nursing notes reviewed no history of anesthetic complications:   Airway: Mallampati: III  TM distance: >3 FB   Neck ROM: full  Mouth opening: > = 3 FB   Dental:      Comment: No loose teeth    Pulmonary: breath sounds clear to auscultation  (+) asthma:     (-) COPD and shortness of breath                           Cardiovascular:    (+) hypertension:, valvular problems/murmurs (bicuspid AV):, dysrhythmias: atrial fibrillation, hyperlipidemia    (-) past MI, CAD, CABG/stent and  angina        Rate: normal                 ROS comment: Echo July 2022:  Left ventricular function could not be adequately assessed due to atrial   fibrillation rhythm with RVR. Overall ejection fraction appears normal   around 60%. Mild mitral regurgitation. There is no evidence of mass or thrombus in the left atrium or appendage. Bicuspid aortic valve with fusion of the left and right coronary cusp. Thickening/calcification of the aortic valve leaflets. There appears to be trivial aortic regurgitation best visualized in the   short axis view. Right ventricular systolic function is normal .   Mild to moderate tricuspid regurgitation. Trivial pericardial effusion        Neuro/Psych:   (+) CVA:, neuromuscular disease:, psychiatric history:depression/anxiety    (-) seizures and TIA            ROS comment: Brain tumor ([address] chiari - malformation  GI/Hepatic/Renal:        (-) GERD, PUD, liver disease and no renal disease       Endo/Other:    (+) Diabetes (A1c 5.3% Feb 23)Type II DM, , hypothyroidism, blood dyscrasia: anemia:., .                  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 Other Findings:             Anesthesia Plan      regional, spinal and MAC     ASA 3     (Spinal left ipack left adductor canal)  Induction: intravenous. Anesthetic plan and risks discussed with patient. Plan discussed with CRNA.                     This pre-anesthesia assessment may be used as a history and physical.    DOS STAFF ADDENDUM:    Pt seen and examined, chart reviewed (including anesthesia, drug and allergy history). No interval changes to history and physical examination. Anesthetic plan, risks, benefits, alternatives, and personnel involved discussed with patient. Patient verbalized an understanding and agrees to proceed.       Kushal Escobar MD  March 1, 2023  8:22 AM

## 2023-03-01 NOTE — OP NOTE
Patient: Rosangela Choi  YOB: 1955  MRN: [id number]    DATE OF PROCEDURE: 3/1/2023      PREOPERATIVE DIAGNOSIS:  Tricompartmental degenerative osteoarthritis of the left knee. POSTOPERATIVE DIAGNOSIS:  Tricompartmental degenerative osteoarthritis of the left knee. OPERATION PERFORMED:  Robotic-assisted left total knee arthroplasty. SURGEON:  Olivia Diamond MD     ASSISTANT:  KAYLA Villafuerte    EBL: 100 mL     IMPLANTS:  [employer] [employer] CR cementless femur size 5  [employer] [employer] cementless tibia size 4  9mm CS polyethylene  32mm cementless asymmetric patella     INDICATIONS:  The patient is a 74 y.o. female who presents for left knee replacement. The patient had been treated conservatively with anti-inflammatories, physical therapy, and intra-articular cortisone injections; these treatments were no longer providing significant relief. We discussed total knee arthroplasty. The operative procedure, alternatives, and risks were discussed in detail with the patient. The risks include but are not limited to: Infection, vessel injury, nerve injury, DVT, pulmonary embolism, implant loosening, need for revision surgery, loss of motion, continued pain. Informed consent for surgery was signed by the patient. OPERATIVE PROCEDURE:  The patient was seen in the preoperative holding area where the site of surgery was marked and informed consent was confirmed. The patient was brought back to the operating room by OR personnel. Anesthesia was administered. The patient was positioned supine on the operating table. The left lower extremity was then prepped and draped in a standard and sterile fashion. A final and formal timeout was then performed which confirmed the correct patient, correct position, and correct site of surgery. IV antibiotics were administered within 1 hour of the skin incision. An anterior midline incision was made over the knee.  Skin and subcutaneous tissue were divided down to the extensor mechanism. A medial parapatellar arthrotomy was performed. The knee was exposed by dissecting off of the tibial portion of the deep MCL and excising the fat pad. Following this, two distal femoral pins and two proximal tibial pins were placed with robotic aerials. The knee was then registered with the robot. The implant was virtually manipulated to balance the flexion and extension gaps. Once this was done, the robotic cutting arm was brought in and the femoral and tibial cuts were performed. All bony fragments were removed. The medial and lateral menisci were removed. Posterior osteophytes were then removed. The knee was then reconstructed to accept a #4 tibial baseplate, a #5 femoral cruciate-retaining femur, and a trial polyethylene liner. The knee came to full extension, excellent flexion, and good overall stability. The patella tracked within the trochlea of the femur. All trial implants were then removed. The robotic areas and checkpoints were also removed. The ends of the bones were irrigated. The #4 tibial tray and the #5 [employer] cruciate-retaining femoral component were then placed. A trial reduction with the 9-mm poly was performed. Then the real 9-mm polyethylene liner was placed. The knee came to full extension, excellent flexion, and good overall stability. The patella was flipped, measured, and cut to accept a 32-mm asymmetric patella. The 32-mm asymmetric cementless patella was then placed. The patella tracked well. The wound was irrigated out. Hemostasis was obtained. The wound was injected with local anesthetic. It was also bathed with aqueous iodine. The wound then was closed in layers. The patient tolerated the procedure well. A dressing was applied, and the patient was brought to the recovery room in good condition.     KAYLA Palacio was essential in patient positioning, surgical assistance during the arthroplasty, and in wound closure.     Faye Harris MD  3/1/2023

## 2023-03-01 NOTE — PROGRESS NOTES
Physical Therapy  Facility/Department: [address] ORTHOPEDICS  Physical Therapy Initial Assessment    Name: Patricia Hunter  : 1955  MRN: [id number]  Date of Service: 3/1/2023    Assessment / Discharge Recommendations:    -left TKR wbat  -anticipate discharge to home tomorrow with family assist and Home PT   -will see in AM to assess readiness for home   -mobilized from bed via gerard stedy due to residual effects of spinal anesthesia       Patient Diagnosis(es): The primary encounter diagnosis was Arthritis of left knee. A diagnosis of Paroxysmal atrial fibrillation (HCC) was also pertinent to this visit. Past Medical History:  has a past medical history of Adrenal insufficiency ([address] Arthritis, Asthma, Atrial fibrillation ([address] Brain tumor ([address] Chiari malformation, Fibromyalgia, History of TIA (transient ischemic attack), Panhypopituitarism ([address] S/P selective transsphenoidal pituitary adenomectomy, Thyroid disease, Total knee replacement status, and Unspecified cerebral artery occlusion with cerebral infarction. Past Surgical History:  has a past surgical history that includes brain surgery (3/24/11); brain surgery (); laminectomy (C1); Knee Arthroplasty (Right, 2014); pituitary surgery (2/15); Hysterectomy; Biceps Tenodesis (Left, 11/10/2016); and Cataract removal (Bilateral, 2017). Body Structures, Functions, Activity Limitations Requiring Skilled Therapeutic Intervention: Decreased functional mobility ; Decreased ADL status; Decreased strength; Increased pain;Decreased ROM  Therapy Prognosis: Good  Decision Making: Medium Complexity  Requires PT Follow-Up: Yes  Activity Tolerance  Activity Tolerance: Patient tolerated treatment well     Plan   Physcial Therapy Plan  Current Treatment Recommendations: Strengthening, ROM, Functional mobility training, Transfer training, ADL/Self-care training, Gait training, Stair training, Positioning, Modalities, Home exercise program, Therapeutic activities, Patient/Caregiver education & training  Additional Comments: 1-4 sessions  Safety Devices  Type of Devices: Call light within reach, Chair alarm in place, Gait belt, Patient at risk for falls, Nurse notified, Left in chair     Restrictions  Restrictions/Precautions  Restrictions/Precautions: Fall Risk, Weight Bearing  Lower Extremity Weight Bearing Restrictions  Left Lower Extremity Weight Bearing: Weight Bearing As Tolerated  Position Activity Restriction  Other position/activity restrictions: wbat     Subjective   General  Chart Reviewed: Yes  Patient assessed for rehabilitation services?: Yes  Additional Pertinent Hx: here for elective left TKR    -had right TKR 2017  Response To Previous Treatment: Not applicable  Family / Caregiver Present: No  Follows Commands: Within Functional Limits  Subjective  Subjective: arrived to room along with OT to patient resting in bed - she is alert oriented and agreeable to PTOT assessments and OOB - still with effects of spinal anesthesia so agreeable to using gerard mercedes  Social/Functional History  Social/Functional History  Lives With: Friend(s)  Type of Home: House  Home Layout: Two level  Home Access: Stairs to enter with rails  Entrance Stairs - Number of Steps: 6 steps to enter  Entrance Stairs - Rails: Both  Bathroom Shower/Tub: Walk-in shower  Bathroom Toilet: Handicap height (vanity close)  Bathroom Equipment: Grab bars in shower, Built-in shower seat, Hand-held shower  Bathroom Accessibility: Accessible  Home Equipment: [address]  Has the patient had two or more falls in the past year or any fall with injury in the past year?: No  Receives Help From: Friend(s)  ADL Assistance: Independent  Homemaking Assistance: Independent  Ambulation Assistance: Independent  Transfer Assistance: Independent  Active :  Yes  Additional Comments: Here for elective Left TKR: Rigth TKR 2014  Vision/Hearing  Vision  Vision: Impaired  Vision Exceptions: Wears glasses for reading  Hearing  Hearing: Within functional limits    Cognition   Orientation  Overall Orientation Status: Within Functional Limits  Cognition  Overall Cognitive Status: WFL     Objective   Observation/Palpation  Observation: LLE ace wrap toes to mid thigh. CHARMAINE nuñez  Gross Assessment  Tone: Normal  Sensation:  (residual effects in LEs from spinal anesthesia)   Strength Other  Other: grossly wfl non-surgical limbs  Bed mobility  Supine to Sit: Contact guard assistance  Scooting: Contact guard assistance  Bed Mobility Comments: HOB elevated.  exit to L  Transfers  Sit to Stand: Contact guard assistance (in gerard stedy for travel  to bathroom and on to the recliner)  Stand to Sit: Contact guard assistance  Ambulation  More Ambulation?: No  Stairs/Curb  Stairs?: No  Balance  Comments: midline in sitting at the EOB and in static stance in the gerard stedy with good hold of the crossbar    Goals  Short Term Goals  Time Frame for Short Term Goals: 1-2 days  Short Term Goal 1: bed mobility at [facility]  Short Term Goal 2: transfers at supervision  Short Term Goal 3: ambulation at supervision wbat rolling walker for household distances    -steps as needed for home entry at [address] Goal 4: exercises at supervision  Patient Goals   Patient Goals : relief of chronic left knee pain and good function       Education  Patient Education  Education Given To: Patient  Education Provided: Role of Therapy;Plan of Care;Precautions  Education Method: Demonstration;Verbal  Barriers to Learning: None;Hearing  Education Outcome: Verbalized understanding;Continued education needed      Therapy Time   Individual Concurrent Group Co-treatment   Time In 1510         Time Out 1550         Minutes 111 Cale Hoyt, PT

## 2023-03-01 NOTE — PROGRESS NOTES
PACU Transfer Note    Vitals:    03/01/23 1145   BP: 92/63   Pulse: 63   Resp: 14   Temp: 97.4 °F (36.3 °C)   SpO2: 96%       In: 1300 [I.V.:1300]  Out: -     Pain assessment:  none  Pain Level: 0    Report given to Receiving unit RN.    3/1/2023 11:58 AM

## 2023-03-01 NOTE — PROGRESS NOTES
Met with patient at bedside, patient is alert and orientedx4. discussed role of nurse navigator. Reviewed reasons to call with questions or concerns, importance of TEDS, Incentive spirometer, pain medication, and physical and occupational therapy. 2/4 bed rails up, bed in lowest position, fall precautions in place, call light within reach. Pulses present bilaterally +2 pedal, no drainage or odor noted at surgical dressing left knee. ace Dressing clean, dry, and intact. Ice in place. Jeremías and scds on RLE. Neurovascular checks performed and moderate dorsi and plantar flexions noted bilaterally, toes appear appropriate to ethnicity in color, cool to touch, patient reports numbness and tingling in LLE, had spinal anesthetic. DC Plan: home with son Merline Less and sister burton and [facility]. [place] to transport patient. DME needs:needs rolling walker, agreeable to [employer] and [employer] informed.       Chung Batista  Orthopedic Nurse Navigator  Phone number: [phone]    Future Appointments   Date Time Provider Isabelle Choi   3/16/2023 10:30 AM MD Suleam Solis     Electronically signed by Reese Paredes RN on 3/1/2023 at 1:32 PM

## 2023-03-01 NOTE — PROGRESS NOTES
[facility] Orthopedic Surgery   Progress Note      S/P :  SUBJECTIVE  in bed. Alert and oriented. . Pain is   described in left knee and with the intensity of moderate. Pain is described as aching. OBJECTIVE              Physical                      VITALS:  /67   Pulse 58   Temp 97.6 °F (36.4 °C) (Oral)   Resp 22   Ht 5' 7\" (1.702 m)   Wt 226 lb 6.4 oz (102.7 kg)   SpO2 97%   BMI 35.40 kg/m²                     MUSCULOSKELETAL:  left foot NVI. Wiggles toes to command. Able to plantarflex and dorsiflex ankle Pedal pulses are palpable. NEUROLOGIC:                                  Sensory:  Touch:  Left Lower Extremity:  normal                                                 Surgical wound appears clean and dry left knee with ACE with ice.      Data       CBC:   Lab Results   Component Value Date/Time    WBC 10.5 02/23/2023 03:17 PM    RBC 4.17 02/23/2023 03:17 PM    HGB 12.7 02/23/2023 03:17 PM    HCT 37.6 02/23/2023 03:17 PM    MCV 90.2 02/23/2023 03:17 PM    MCH 30.4 02/23/2023 03:17 PM    MCHC 33.7 02/23/2023 03:17 PM    RDW 14.3 02/23/2023 03:17 PM     02/23/2023 03:17 PM    MPV 9.2 02/23/2023 03:17 PM        WBC:    Lab Results   Component Value Date/Time    WBC 10.5 02/23/2023 03:17 PM        Hemoglobin/Hematocrit:    Lab Results   Component Value Date/Time    HGB 12.7 02/23/2023 03:17 PM    HCT 37.6 02/23/2023 03:17 PM        PT/INR:    Lab Results   Component Value Date/Time    PROTIME 16.8 02/20/2023 01:14 PM    INR 1.37 02/20/2023 01:14 PM              Current Inpatient Medications             Current Facility-Administered Medications: albuterol sulfate HFA (PROVENTIL;VENTOLIN;PROAIR) 108 (90 Base) MCG/ACT inhaler 2 puff, 2 puff, Inhalation, Q6H PRN  dronedarone hcl (MULTAQ) tablet 400 mg, 400 mg, Oral, BID WC  [START ON 3/2/2023] levothyroxine (SYNTHROID) tablet 50 mcg, 50 mcg, Oral, Daily  ranolazine (RANEXA) extended release tablet 500 mg, 500 mg, Oral, BID  insulin glargine (LANTUS) injection vial 26 Units, 0.25 Units/kg, SubCUTAneous, Nightly  insulin lispro (HUMALOG) injection vial 8 Units, 0.08 Units/kg, SubCUTAneous, TID WC  insulin lispro (HUMALOG) injection vial 0-4 Units, 0-4 Units, SubCUTAneous, TID WC  insulin lispro (HUMALOG) injection vial 0-4 Units, 0-4 Units, SubCUTAneous, Nightly  glucose chewable tablet 16 g, 4 tablet, Oral, PRN  dextrose bolus 10% 125 mL, 125 mL, IntraVENous, PRN **OR** dextrose bolus 10% 250 mL, 250 mL, IntraVENous, PRN  glucagon (rDNA) injection 1 mg, 1 mg, SubCUTAneous, PRN  dextrose 10 % infusion, , IntraVENous, Continuous PRN  0.45 % sodium chloride infusion, , IntraVENous, Continuous  sodium chloride flush 0.9 % injection 5-40 mL, 5-40 mL, IntraVENous, 2 times per day  sodium chloride flush 0.9 % injection 5-40 mL, 5-40 mL, IntraVENous, PRN  0.9 % sodium chloride infusion, , IntraVENous, PRN  ketorolac (TORADOL) injection 15 mg, 15 mg, IntraVENous, Once PRN  morphine (PF) injection 2 mg, 2 mg, IntraVENous, Q3H PRN **OR** morphine (PF) injection 4 mg, 4 mg, IntraVENous, Q3H PRN  ceFAZolin (ANCEF) 2,000 mg in sodium chloride 0.9 % 50 mL IVPB (mini-bag), 2,000 mg, IntraVENous, Q8H  ondansetron (ZOFRAN-ODT) disintegrating tablet 4 mg, 4 mg, Oral, Q8H PRN **OR** ondansetron (ZOFRAN) injection 4 mg, 4 mg, IntraVENous, Q6H PRN  calcium carbonate (TUMS) chewable tablet 500 mg, 500 mg, Oral, TID PRN  [START ON 3/2/2023] meloxicam (MOBIC) tablet 7.5 mg, 7.5 mg, Oral, Daily  apixaban (ELIQUIS) tablet 2.5 mg, 2.5 mg, Oral, BID  [START ON 3/2/2023] hydrocortisone (CORTEF) tablet 30 mg, 30 mg, Oral, QAM  [START ON 3/2/2023] hydrocortisone (CORTEF) tablet 15 mg, 15 mg, Oral, Daily  mometasone-formoterol (DULERA) 100-5 MCG/ACT inhaler 2 puff, 2 puff, Inhalation, BID  HYDROmorphone (DILAUDID) tablet 1 mg, 1 mg, Oral, Q4H PRN    ASSESSMENT AND PLAN    Post left TKA, stable exam  DVT prophylaxis ordered,Resume Eliquis at 2.5mg bid for 7 days then prior dose of 5mg bid.    PT OT for ADL's and ambulation as tolerated  SS for DC planning, home with home care tomorrow  IV or PO pain med as ordered       JERMAINE Hardin - CNP  3/1/2023  1:02 PM

## 2023-03-01 NOTE — CARE COORDINATION
Case Management Assessment  Initial Evaluation    Date/Time of Evaluation: 3/1/2023 3:43 PM  Assessment Completed by: [id number] Lorrin Denver, LSW    If patient is discharged prior to next notation, then this note serves as note for discharge by case management. Additional Case Management Notes: spoke with pt at bedside, plan is home with support of Tahmina Hull (like sister to her)  who she lives with. No dc concerns re dc plan. [facility] is following, walker has been delivered to room. Patient Name: Jennifer Melvin                   YOB: 1955  Diagnosis: Arthritis of left knee [M17.12]                   Date / Time: 3/1/2023  7:40 AM    Patient Admission Status: Observation   Readmission Risk (Low < 19, Mod (19-27), High > 27): Readmission Risk Score: 7.2    Current PCP: Flor Dutton MD  PCP verified by CM? Yes    Chart Reviewed: Yes      History Provided by: Patient  Patient Orientation: Alert and Oriented    Patient Cognition: Alert    Hospitalization in the last 30 days (Readmission):  Yes    If yes, Readmission Assessment in CM Navigator will be completed. Advance Directives:      Code Status: Full Code   Patient's Primary Decision Maker is:      Primary Decision Maker: Rosa Bryantmarciano - Other - [phone]    Secondary Decision Maker: Estefanía Bernal - [phone]    Discharge Planning:    Patient lives with: Family Members Type of Home: House  Primary Care Giver: Self  Patient Support Systems include: Friends/Neighbors (Tahmina Hull, like a sister to her, lives together)   Current Financial resources:    Current community resources: ECF/Home Care  Current services prior to admission: None            Current DME:              Type of Home Care services:  OT, PT    ADLS  Prior functional level: Independent in ADLs/IADLs  Current functional level: Independent in ADLs/IADLs    PT AM-PAC:   /24  OT AM-PAC:   /24    Family can provide assistance at DC:  Yes  Would you like Case Management to discuss the discharge plan with any other family members/significant others, and if so, who? No  Plans to Return to Present Housing: Yes  Other Identified Issues/Barriers to RETURNING to current housing: none  Potential Assistance needed at discharge: [address], Home Care            Potential DME: Maxx Garibay (delivered to room already)  Patient expects to discharge to: [facility] for transportation at discharge:      Financial    Payor: [facility] MEDICARE / Plan: [place] ESSENTIAL/PLUS / Product Type: *No Product type* /     Does insurance require precert for SNF: yes    Potential assistance Purchasing Medications: No  Meds-to-Beds request: Yes      Karmen Snow [facility], [address] [place] [address]  [address] [address]  Phone: [phone] Fax: [phone] [address], [place] [phone] Jose Coleman [phone]  [place] [id number]  Phone: [phone] Fax: [phone]      Notes:    Factors facilitating achievement of predicted outcomes: Family support    Barriers to discharge: Lower extremity weakness    Additional Case Management Notes: spoke with pt at bedside, plan is home with support of Alyssa Smalls (like sister to her)  who she lives with. No dc concerns    The Plan for Transition of Care is related to the following treatment goals of Arthritis of left knee [F33.85]    IF APPLICABLE: The Patient and/or patient representative Kee Travis and her family were provided with a choice of provider and agrees with the discharge plan. Freedom of choice list with basic dialogue that supports the patient's individualized plan of care/goals and shares the quality data associated with the providers was provided to: Patient   Patient Representative Name:       The Patient and/or Patient Representative Agree with the Discharge Plan?  Yes    Sandra Richard, [facility]  Case Management Department  Ph: [phone] Fax: [phone]

## 2023-03-01 NOTE — ANESTHESIA PROCEDURE NOTES
Peripheral Block    Patient location during procedure: pre-op  Reason for block: post-op pain management and at surgeon's request  Start time: 3/1/2023 9:12 AM  End time: 3/1/2023 9:13 AM  Staffing  Performed: anesthesiologist   Anesthesiologist: Jame Mchugh MD  Preanesthetic Checklist  Completed: patient identified, IV checked, site marked, risks and benefits discussed, surgical/procedural consents, equipment checked, pre-op evaluation, timeout performed, anesthesia consent given, oxygen available and monitors applied/VS acknowledged  Peripheral Block   Patient position: supine  Prep: ChloraPrep  Provider prep: mask and sterile gloves  Patient monitoring: cardiac monitor, continuous pulse ox, frequent blood pressure checks and IV access  Block type: Femoral  Adductor canal  Laterality: left  Injection technique: single-shot  Guidance: ultrasound guided  Local infiltration: lidocaine  Infiltration strength: 1 %  Local infiltration: lidocaine    Needle   Needle type: combined needle/nerve stimulator   Needle gauge: 22 G  Needle localization: ultrasound guidance  Needle insertion depth: 4 cm  Needle length: 4 inch. Assessment   Injection assessment: negative aspiration for heme, no paresthesia on injection and local visualized surrounding nerve on ultrasound  Slow fractionated injection: yes  Hemodynamics: stable  Real-time US image taken/store: yes  Outcomes: uncomplicated and patient tolerated procedure well    Additional Notes  Sartorius and Vastus Medialis Muscle, Femoral artery and Saphenous nerve are identified; the tip of the needle and the spread of the local anesthetic around the Saphenous nerve are visualized. The Saphenous nerve appeared to be anatomically normal and there were no abnormal pathologically findings seen.    Medications Administered  bupivacaine (PF) 0.25 % - Perineural   20 mL - 3/1/2023 9:12:00 AM

## 2023-03-01 NOTE — CARE COORDINATION
03/01/23 1546   IMM Letter   Observation Status Letter date given: 03/01/23   Observation Status Letter time given: 0315   Observation Status Letter given to Patient/Family/Significant other/Guardian/POA/by: Reena WILSONW at bedside to pt, copy left with pt   Electronically signed by [id number] CHRISTIANNE Gannon on 3/1/2023 at 3:47 PM

## 2023-03-01 NOTE — PROGRESS NOTES
Pt alert and oriented x 4 resting in bed. Assessment complete with changes as documented. Medication administered PO with no complications. Pt complains of bob nausea - Prn Zofran administered at this time. Will monitor. Pt able to make needs known and requesting to rest. Bed locked and in lowest position, alarm on, side rails up x2. Call light and all belongings within reach. Will continue to check on pt.  to monitor pt's safety and assess pt needs.  Electronically signed by Kayy Rodriguez RN on 3/1/2023 at 3:28 PM

## 2023-03-01 NOTE — PROGRESS NOTES
Occupational Therapy  Facility/Department: [address] ORTHOPEDICS  Occupational Therapy Initial Assessment    Name: Maggy Fernández  : 1955  MRN: [id number]  Date of Service: 3/1/2023    Discharge Recommendations:  2-3 sessions per week, Patient would benefit from continued therapy after discharge, Home with Home health OT, 24 hour supervision or assist        Maggy Fernández scored a 18/24 on the AM-PAC ADL Inpatient form. Current research shows that an AM-PAC score of 18 or greater is typically associated with a discharge to the patient's home setting. Based on the patient's AM-PAC score, and their current ADL deficits, it is recommended that the patient have 2-3 sessions per week of Occupational Therapy at d/c to increase the patient's independence. At this time, this patient demonstrates the endurance and safety to discharge home with [address] [place] [address] (home vs OP services) and a follow up treatment frequency of 2-3x/wk. Please see assessment section for further patient specific details. If patient discharges prior to next session this note will serve as a discharge summary. Please see below for the latest assessment towards goals. Patient Diagnosis(es): The primary encounter diagnosis was Arthritis of left knee. A diagnosis of Paroxysmal atrial fibrillation (HCC) was also pertinent to this visit. Past Medical History:  has a past medical history of Adrenal insufficiency ([address] Arthritis, Asthma, Atrial fibrillation ([address] Brain tumor ([address] Chiari malformation, Fibromyalgia, History of TIA (transient ischemic attack), Panhypopituitarism ([address] S/P selective transsphenoidal pituitary adenomectomy, Thyroid disease, Total knee replacement status, and Unspecified cerebral artery occlusion with cerebral infarction.   Past Surgical History:  has a past surgical history that includes brain surgery (3/24/11); brain surgery (); laminectomy (C1); Knee Arthroplasty (Right, 2014); pituitary surgery (2/15); Hysterectomy; Biceps Tenodesis (Left, 11/10/2016); and Cataract removal (Bilateral, 04/2017). Treatment Diagnosis: impaired ADL/fxl mobility    Assessment   Performance deficits / Impairments: Decreased functional mobility ; Decreased balance;Decreased ADL status; Decreased high-level IADLs;Decreased sensation  Assessment: 75 yo female admitted 3/1 for elective L TKA now WBAT. PMH: R TKA, CVA, HTN, Obesity, R TKA, L distal bicep tendon rupture, DM, Depression. PTA, pt lives with son and friend and fully IND no AD use. Today, pt functioning below baseline limited by decreased endurance, L knee pain and spinal anesthesia. Pt required CGA bed mobility and static stance within stedy; Min A toileting, Mod A LB dressing and Total A EOB>toilet>recliner within gerard stedy. BUE WFL for self care. Cont acute OT to address above deficits.  As anesthesia subsides, anticipate able to return home with family for 24 hr SUP and OT 2-3x/week to ensure return to PLOF  Treatment Diagnosis: impaired ADL/fxl mobility  Prognosis: Good;Fair  Decision Making: Medium Complexity  REQUIRES OT FOLLOW-UP: Yes  Activity Tolerance  Activity Tolerance: Patient Tolerated treatment well  Activity Tolerance Comments: mild nausea and dizziness        Plan   Occupational Therapy Plan  Times Per Week: 2-3 sessions  Current Treatment Recommendations: Strengthening, ROM, Balance training, Functional mobility training, Endurance training, Pain management, Safety education & training, Positioning, Patient/Caregiver education & training, Self-Care / ADL, Modalities, Home management training     Restrictions  Restrictions/Precautions  Restrictions/Precautions: Fall Risk, Weight Bearing  Lower Extremity Weight Bearing Restrictions  Left Lower Extremity Weight Bearing: Weight Bearing As Tolerated  Position Activity Restriction  Other position/activity restrictions: wbat    Subjective   General  Chart Reviewed: Yes  Patient assessed for rehabilitation services?: Yes  Additional Pertinent Hx: 73 yo female admitted 3/1 for elective L TKA now WBAT. PMH: R TKA, CVA, HTN, Obesity, R TKA, L distal bicep tendon rupture, DM, Depression,  Family / Caregiver Present: No  Referring Practitioner: Olivia Diamond MD  Diagnosis: L TKA  Subjective  Subjective: Pt resting in bed upon arrival and agreeable to OT/PT eval. Pt reporting L knee pain with flexion  General Comment  Comments: RN ok to see       Social/Functional History  Social/Functional History  Lives With: Friend(s)  Type of Home: House  Home Layout: Two level  Home Access: Stairs to enter with rails  Entrance Stairs - Number of Steps: 6 steps to enter  Entrance Stairs - Rails: Both  Bathroom Shower/Tub: Walk-in shower  Bathroom Toilet: Handicap height (vanity close)  Bathroom Equipment: Grab bars in shower, Built-in shower seat, Hand-held shower  Bathroom Accessibility: Accessible  Home Equipment: John , Crutches  Has the patient had two or more falls in the past year or any fall with injury in the past year?: No  Receives Help From: Friend(s)  ADL Assistance: Independent  Homemaking Assistance: Independent  Ambulation Assistance: Independent  Transfer Assistance: Independent  Active : Yes  Additional Comments: Here for elective Left TKR: Cassandra TKR 2014       Objective           Observation/Palpation  Observation: LLE ace wrap toes to mid thigh. BLE ainsley hose  Safety Devices  Type of Devices: Call light within reach; Chair alarm in place;Gait belt;Patient at risk for falls;Nurse notified; Left in chair (LLE large ice pack, SCDs)       Toilet Transfers  Toilet - Technique:  (stedy)  Equipment Used:  (comfort height toilet)  Toilet Transfer: Minimal assistance    AROM: Within functional limits  PROM: Within functional limits  Strength: Within functional limits  Coordination: Within functional limits  Tone: Normal  Sensation:  (buttock and periara numbness from spinal anesthesia)    ADL  LE Dressing:  Moderate assistance  LE Dressing Skilled Clinical Factors: donning underpants. assist threading BLE and partial pant management standing within stedy  Toileting: Minimal assistance  Toileting Skilled Clinical Factors: pt urinates on commode- pt manages pericare seated- assist partial pant management standing within stedy          Bed mobility  Supine to Sit: Contact guard assistance  Scooting: Contact guard assistance  Bed Mobility Comments: HOB elevated. exit to L    Transfers  Sit to stand: Contact guard assistance;Minimal assistance  Stand to sit: Contact guard assistance;Minimal assistance  Transfer Comments: within stedy.  EOB/toilet/recliner                     Standing balance: CGA within stedy for pant management                Fxl mobility: Total A within stedy EOB>toilet>recliner d/t continued numbness from spinal anesthesia    Vision  Vision: Impaired  Vision Exceptions: Wears glasses for reading  Hearing  Hearing: Within functional limits    Cognition  Overall Cognitive Status: WFL  Orientation  Overall Orientation Status: Within Functional Limits                    Education Given To: Patient  Education Provided: Role of Therapy;Plan of Care;Transfer Training;Precautions  Education Method: Demonstration;Verbal  Barriers to Learning: None  Education Outcome: Verbalized understanding;Demonstrated understanding                      AM-PAC Score        -[facility] Inpatient Daily Activity Raw Score: 18 (03/01/23 1547)  -PAC Inpatient ADL T-Scale Score : 38.66 (03/01/23 1547)  ADL Inpatient CMS 0-100% Score: 46.65 (03/01/23 1547)  ADL Inpatient CMS G-Code Modifier : CK (03/01/23 1547)    Goals  Short Term Goals  Time Frame for Short Term Goals: prior to d/c  Short Term Goal 1: toileting SUP  Short Term Goal 2: LB dressing SUP  Short Term Goal 3: tolerate 5 min fxl standing task SUP  Short Term Goal 4: fxl tx and mobility with RW household distances SUP  Short Term Goal 5: UB bathing/dressing SUP  Patient Goals   Patient goals : return home with family       Therapy Time   Individual Concurrent Group Co-treatment   Time In 1310         Time Out 1350         Minutes 40         Timed Code Treatment Minutes: 25 Minutes (15 eval. 15 ADl 10 TA)       CHARU Lopez, OTR/L

## 2023-03-01 NOTE — ANESTHESIA POSTPROCEDURE EVALUATION
Department of Anesthesiology  Postprocedure Note    Patient: Apurva Rolon  MRN: [id number]  YOB: 1955  Date of evaluation: 3/1/2023      Procedure Summary     Date: 03/01/23 Room / Location: [address]    Anesthesia Start: 3745 Anesthesia Stop: 1104    Procedure: LEFT TOTAL KNEE REPLACEMENT ROBOTIC ASSISTED (Left: Knee) Diagnosis:       Arthritis of left knee      (LEFT ARTHRITIS KNEE)    Surgeons: Warden Poncho MD Responsible Provider: Garland Mojica MD    Anesthesia Type: regional, spinal, MAC ASA Status: 3          Anesthesia Type: No value filed.     Ghada Phase I: Ghada Score: 10    Ghada Phase II:        Anesthesia Post Evaluation    Patient location during evaluation: PACU  Patient participation: complete - patient participated  Level of consciousness: awake and alert  Pain score: 0  Airway patency: patent  Nausea & Vomiting: no nausea and no vomiting  Complications: no  Cardiovascular status: blood pressure returned to baseline  Respiratory status: acceptable  Hydration status: euvolemic

## 2023-03-01 NOTE — CARE COORDINATION
03/01/23 1528   Readmission Assessment   Number of Days since last admission? 8-30 days   Previous Disposition Home with Family   Who is being Interviewed Patient   What was the patient's/caregiver's perception as to why they think they needed to return back to the hospital? Other (Comment)  (came for surgery)   Did you visit your Primary Care Physician after you left the hospital, before you returned this time? Yes   Did you see a specialist, such as Cardiac, Pulmonary, Orthopedic Physician, etc. after you left the hospital? Yes   Who advised the patient to return to the hospital? Physician   Does the patient report anything that got in the way of taking their medications? No   In our efforts to provide the best possible care to you and others like you, can you think of anything that we could have done to help you after you left the hospital the first time, so that you might not have needed to return so soon?  Other (Comment)  (nothing different)   Electronically signed by CHRISTIANNE Dumont on 3/1/2023 at 3:31 PM

## 2023-03-01 NOTE — H&P
Update History & Physical    The patient's History and Physical of February 9, 2023 was reviewed with the patient and I examined the patient. There was no change. The surgical site was confirmed by the patient and me. Plan: The risks, benefits, expected outcome, and alternative to the recommended procedure have been discussed with the patient. Patient understands and wants to proceed with the procedure.      Electronically signed by David Harvey MD on 3/1/2023 at 9:05 AM

## 2023-03-01 NOTE — ANESTHESIA PROCEDURE NOTES
Spinal Block    Patient location during procedure: OR  End time: 3/1/2023 10:55 AM  Reason for block: primary anesthetic  Staffing  Performed: other anesthesia staff   Anesthesiologist: Fatou Seals MD  Resident/CRNA: JERMAINE Gomez - CRNA  Other anesthesia staff: Wilfred Marcelo RN  Spinal Block  Patient position: sitting  Prep: ChloraPrep  Patient monitoring: cardiac monitor, continuous pulse ox, continuous capnometry and frequent blood pressure checks  Approach: midline  Location: L3/L4  Provider prep: mask and sterile gloves  Local infiltration: lidocaine  Needle  Needle type: Pencan   Needle gauge: 25 G  Needle length: 3.5 in  Expiration date: 1/31/2024  Assessment  Sensory level: T6  Swirl obtained: Yes  CSF: clear  Attempts: 1  Hemodynamics: stable  Preanesthetic Checklist  Completed: patient identified, IV checked, site marked, risks and benefits discussed, surgical/procedural consents, equipment checked, pre-op evaluation, timeout performed, anesthesia consent given, oxygen available, monitors applied/VS acknowledged, fire risk safety assessment completed and verbalized and blood product R/B/A discussed and consented

## 2023-03-02 LAB
ANION GAP SERPL CALCULATED.3IONS-SCNC: 12 MMOL/L (ref 3–16)
BUN BLDV-MCNC: 14 MG/DL (ref 7–20)
CALCIUM SERPL-MCNC: 8.4 MG/DL (ref 8.3–10.6)
CHLORIDE BLD-SCNC: 100 MMOL/L (ref 99–110)
CO2: 22 MMOL/L (ref 21–32)
CREAT SERPL-MCNC: 1.2 MG/DL (ref 0.6–1.2)
GFR SERPL CREATININE-BSD FRML MDRD: 50 ML/MIN/{1.73_M2}
GLUCOSE BLD-MCNC: 117 MG/DL (ref 70–99)
GLUCOSE BLD-MCNC: 126 MG/DL (ref 70–99)
GLUCOSE BLD-MCNC: 134 MG/DL (ref 70–99)
PERFORMED ON: ABNORMAL
PERFORMED ON: ABNORMAL
POTASSIUM SERPL-SCNC: 4.6 MMOL/L (ref 3.5–5.1)
SODIUM BLD-SCNC: 134 MMOL/L (ref 136–145)

## 2023-03-02 PROCEDURE — 97535 SELF CARE MNGMENT TRAINING: CPT

## 2023-03-02 PROCEDURE — 80048 BASIC METABOLIC PNL TOTAL CA: CPT

## 2023-03-02 PROCEDURE — 97530 THERAPEUTIC ACTIVITIES: CPT

## 2023-03-02 PROCEDURE — 36415 COLL VENOUS BLD VENIPUNCTURE: CPT

## 2023-03-02 PROCEDURE — 6370000000 HC RX 637 (ALT 250 FOR IP): Performed by: ORTHOPAEDIC SURGERY

## 2023-03-02 PROCEDURE — 97116 GAIT TRAINING THERAPY: CPT

## 2023-03-02 PROCEDURE — 94760 N-INVAS EAR/PLS OXIMETRY 1: CPT

## 2023-03-02 PROCEDURE — 6360000002 HC RX W HCPCS: Performed by: ORTHOPAEDIC SURGERY

## 2023-03-02 PROCEDURE — G0378 HOSPITAL OBSERVATION PER HR: HCPCS

## 2023-03-02 PROCEDURE — 2580000003 HC RX 258: Performed by: ORTHOPAEDIC SURGERY

## 2023-03-02 PROCEDURE — 6370000000 HC RX 637 (ALT 250 FOR IP): Performed by: NURSE PRACTITIONER

## 2023-03-02 PROCEDURE — 94640 AIRWAY INHALATION TREATMENT: CPT

## 2023-03-02 RX ORDER — HYDROCORTISONE 5 MG/1
10 TABLET ORAL EVERY MORNING
Status: ON HOLD | COMMUNITY
End: 2023-03-03 | Stop reason: SDUPTHER

## 2023-03-02 RX ORDER — HYDROCORTISONE 5 MG/1
5 TABLET ORAL
Status: ON HOLD | COMMUNITY
End: 2023-03-03 | Stop reason: SDUPTHER

## 2023-03-02 RX ORDER — SCOLOPAMINE TRANSDERMAL SYSTEM 1 MG/1
1 PATCH, EXTENDED RELEASE TRANSDERMAL
Status: DISCONTINUED | OUTPATIENT
Start: 2023-03-02 | End: 2023-03-03 | Stop reason: HOSPADM

## 2023-03-02 RX ORDER — SCOLOPAMINE TRANSDERMAL SYSTEM 1 MG/1
1 PATCH, EXTENDED RELEASE TRANSDERMAL
Qty: 2 PATCH | Refills: 0 | Status: SHIPPED | OUTPATIENT
Start: 2023-03-02 | End: 2023-03-06 | Stop reason: SDUPTHER

## 2023-03-02 RX ADMIN — MOMETASONE FUROATE AND FORMOTEROL FUMARATE DIHYDRATE 2 PUFF: 100; 5 AEROSOL RESPIRATORY (INHALATION) at 08:05

## 2023-03-02 RX ADMIN — Medication 10 ML: at 08:25

## 2023-03-02 RX ADMIN — RANOLAZINE 500 MG: 500 TABLET, FILM COATED, EXTENDED RELEASE ORAL at 11:31

## 2023-03-02 RX ADMIN — HYDROCORTISONE 15 MG: 10 TABLET ORAL at 11:32

## 2023-03-02 RX ADMIN — TRAMADOL HYDROCHLORIDE 50 MG: 50 TABLET ORAL at 10:50

## 2023-03-02 RX ADMIN — TRAMADOL HYDROCHLORIDE 50 MG: 50 TABLET ORAL at 04:29

## 2023-03-02 RX ADMIN — DRONEDARONE 400 MG: 400 TABLET, FILM COATED ORAL at 11:32

## 2023-03-02 RX ADMIN — ONDANSETRON 4 MG: 2 INJECTION INTRAMUSCULAR; INTRAVENOUS at 08:24

## 2023-03-02 RX ADMIN — TRAMADOL HYDROCHLORIDE 50 MG: 50 TABLET ORAL at 17:04

## 2023-03-02 RX ADMIN — RANOLAZINE 500 MG: 500 TABLET, FILM COATED, EXTENDED RELEASE ORAL at 21:08

## 2023-03-02 RX ADMIN — MORPHINE SULFATE 4 MG: 4 INJECTION INTRAVENOUS at 02:51

## 2023-03-02 RX ADMIN — DRONEDARONE 400 MG: 400 TABLET, FILM COATED ORAL at 17:05

## 2023-03-02 RX ADMIN — APIXABAN 2.5 MG: 2.5 TABLET, FILM COATED ORAL at 21:08

## 2023-03-02 RX ADMIN — CEFAZOLIN 2000 MG: 2 INJECTION, POWDER, FOR SOLUTION INTRAMUSCULAR; INTRAVENOUS at 01:30

## 2023-03-02 RX ADMIN — LEVOTHYROXINE SODIUM 50 MCG: 0.05 TABLET ORAL at 04:29

## 2023-03-02 RX ADMIN — APIXABAN 2.5 MG: 2.5 TABLET, FILM COATED ORAL at 11:31

## 2023-03-02 ASSESSMENT — PAIN DESCRIPTION - DESCRIPTORS
DESCRIPTORS: ACHING
DESCRIPTORS: SHARP
DESCRIPTORS: ACHING
DESCRIPTORS: ACHING
DESCRIPTORS: SHARP
DESCRIPTORS: ACHING;DISCOMFORT
DESCRIPTORS: ACHING;DISCOMFORT

## 2023-03-02 ASSESSMENT — PAIN - FUNCTIONAL ASSESSMENT
PAIN_FUNCTIONAL_ASSESSMENT: PREVENTS OR INTERFERES SOME ACTIVE ACTIVITIES AND ADLS

## 2023-03-02 ASSESSMENT — PAIN DESCRIPTION - ORIENTATION
ORIENTATION: LEFT

## 2023-03-02 ASSESSMENT — PAIN DESCRIPTION - FREQUENCY
FREQUENCY: CONTINUOUS

## 2023-03-02 ASSESSMENT — PAIN DESCRIPTION - LOCATION
LOCATION: KNEE

## 2023-03-02 ASSESSMENT — PAIN DESCRIPTION - ONSET
ONSET: ON-GOING

## 2023-03-02 ASSESSMENT — PAIN SCALES - GENERAL
PAINLEVEL_OUTOF10: 0
PAINLEVEL_OUTOF10: 8
PAINLEVEL_OUTOF10: 7
PAINLEVEL_OUTOF10: 10
PAINLEVEL_OUTOF10: 8
PAINLEVEL_OUTOF10: 4
PAINLEVEL_OUTOF10: 0
PAINLEVEL_OUTOF10: 8
PAINLEVEL_OUTOF10: 2

## 2023-03-02 ASSESSMENT — PAIN DESCRIPTION - PAIN TYPE
TYPE: SURGICAL PAIN

## 2023-03-02 NOTE — PLAN OF CARE
Problem: Chronic Conditions and Co-morbidities  Goal: Patient's chronic conditions and co-morbidity symptoms are monitored and maintained or improved  3/2/2023 1036 by Nighat Correa RN  Outcome: Progressing     Problem: Discharge Planning  Goal: Discharge to home or other facility with appropriate resources  3/2/2023 1036 by Nighat Correa RN  Outcome: Progressing   Assessed patients knowledge of discharge. Will continue to work with patient on discharge planning and answer patient questions. Will consult case management and  as necessary. Problem: Neurosensory - Adult  Goal: Achieves stable or improved neurological status  3/2/2023 1036 by Nighat Correa RN  Outcome: Progressing     Problem: Skin/Tissue Integrity - Adult  Goal: Skin integrity remains intact  3/2/2023 1036 by Nighat Correa RN  Outcome: Progressing  Flowsheets (Taken 3/2/2023 0753)  Skin Integrity Remains Intact: Monitor for areas of redness and/or skin breakdown   Will monitor skin and mucous members. Will turn patient every 2 hours, monitor for friction and sheering, and change dressings as needed. Will preform skin assessment every shift. Problem: Pain  Goal: Verbalizes/displays adequate comfort level or baseline comfort level  3/2/2023 1036 by Nighat Correa RN  Outcome: Progressing   Patient educated on acute pain. Taught patient to use call light to ask for pain medication. PRN pain medication given for acute pain. Will continue to monitor pain per unit protocol. Problem: Safety - Adult  Goal: Free from fall injury  3/2/2023 1036 by Nighat Correa RN  Outcome: Progressing   Will remain free from falls. Fall precautions are in place. Call light, telephone and bedside table are within reach.

## 2023-03-02 NOTE — CARE COORDINATION
[facility]    DC order noted, all docs needed have been faxed to [facility] for home care services.     Home care to see patient within 24-48 hrs    Damian Pozo RN, BSN CTN  [facility] [phone]

## 2023-03-02 NOTE — PROGRESS NOTES
Patient is A&Ox4. Patient is resting in bed with eyes closed with complaints of nausea. PRN Zofran and a Scopolamine patch placed behind let ear. See eMAR. Room air. Side rails are up x3. Fall precautions are in place. Bed alarm on. Bed is in lowest position. Call light, telephone and bedside table are within reach. VSS taken. Shift assessment completed. Patient refusing AM meds r/t nausea. Will continue to monitor patient per unit protocols.  Electronically signed by Lindsay Germain RN on 3/2/2023 at 10:44 AM

## 2023-03-02 NOTE — PLAN OF CARE
Problem: Chronic Conditions and Co-morbidities  Goal: Patient's chronic conditions and co-morbidity symptoms are monitored and maintained or improved  Outcome: Progressing  Flowsheets (Taken 3/1/2023 2254)  Care Plan - Patient's Chronic Conditions and Co-Morbidity Symptoms are Monitored and Maintained or Improved: Monitor and assess patient's chronic conditions and comorbid symptoms for stability, deterioration, or improvement     Problem: Discharge Planning  Goal: Discharge to home or other facility with appropriate resources  Outcome: Progressing  Flowsheets (Taken 3/1/2023 2254)  Discharge to home or other facility with appropriate resources: Identify barriers to discharge with patient and caregiver     Problem: Neurosensory - Adult  Goal: Achieves stable or improved neurological status  Outcome: Progressing  Flowsheets (Taken 3/1/2023 2254)  Achieves stable or improved neurological status: Assess for and report changes in neurological status     Problem: Respiratory - Adult  Goal: Achieves optimal ventilation and oxygenation  Outcome: Progressing  Flowsheets (Taken 3/1/2023 2254)  Achieves optimal ventilation and oxygenation: Assess for changes in respiratory status     Problem: Skin/Tissue Integrity - Adult  Goal: Skin integrity remains intact  Outcome: Progressing  Flowsheets (Taken 3/1/2023 2254)  Skin Integrity Remains Intact: Monitor for areas of redness and/or skin breakdown  Goal: Incisions, wounds, or drain sites healing without S/S of infection  Outcome: Progressing  Flowsheets (Taken 3/1/2023 2254)  Incisions, Wounds, or Drain Sites Healing Without Sign and Symptoms of Infection: TWICE DAILY: Assess and document skin integrity     Problem: Musculoskeletal - Adult  Goal: Return mobility to safest level of function  Outcome: Progressing  Flowsheets (Taken 3/1/2023 2254)  Return Mobility to Safest Level of Function: Assess patient stability and activity tolerance for standing, transferring and ambulating with or without assistive devices  Goal: Maintain proper alignment of affected body part  Outcome: Progressing  Flowsheets (Taken 3/1/2023 2254)  Maintain proper alignment of affected body part: Support and protect limb and body alignment per provider's orders  Goal: Return ADL status to a safe level of function  Outcome: Progressing  Flowsheets (Taken 3/1/2023 2254)  Return ADL Status to a Safe Level of Function: Administer medication as ordered     Problem: Infection - Adult  Goal: Absence of infection at discharge  Outcome: Progressing  Flowsheets (Taken 3/1/2023 2254)  Absence of infection at discharge: Assess and monitor for signs and symptoms of infection  Goal: Absence of infection during hospitalization  Outcome: Progressing  Flowsheets (Taken 3/1/2023 2254)  Absence of infection during hospitalization: Assess and monitor for signs and symptoms of infection     Problem: Metabolic/Fluid and Electrolytes - Adult  Goal: Glucose maintained within prescribed range  Outcome: Progressing  Flowsheets (Taken 3/1/2023 2254)  Glucose maintained within prescribed range: Monitor blood glucose as ordered     Problem: Pain  Goal: Verbalizes/displays adequate comfort level or baseline comfort level  Outcome: Progressing  Flowsheets (Taken 3/1/2023 2254)  Verbalizes/displays adequate comfort level or baseline comfort level: Encourage patient to monitor pain and request assistance     Problem: Safety - Adult  Goal: Free from fall injury  Outcome: Progressing  Flowsheets (Taken 3/1/2023 2254)  Free From Fall Injury: Instruct family/caregiver on patient safety     Problem: Skin/Tissue Integrity  Goal: Absence of new skin breakdown  Description: 1. Monitor for areas of redness and/or skin breakdown  2. Assess vascular access sites hourly  3. Every 4-6 hours minimum:  Change oxygen saturation probe site  4.   Every 4-6 hours:  If on nasal continuous positive airway pressure, respiratory therapy assess nares and determine need for appliance change or resting period. Outcome: Progressing  Note: Will monitor skin and mucous members. Will turn patient every 2 hours, monitor for friction and sheering, and change dressings as needed. Will preform skin assessment every shift.          Problem: ABCDS Injury Assessment  Goal: Absence of physical injury  Outcome: Progressing  Flowsheets (Taken 3/1/2023 3851)  Absence of Physical Injury: Implement safety measures based on patient assessment

## 2023-03-02 NOTE — PROGRESS NOTES
Met with patient at bedside, patient is drowsy but orientedx4, states she feels a little better. discussed role of nurse navigator. Reviewed reasons to call with questions or concerns, importance of TEDS, Incentive spirometer, pain medication, and physical and occupational therapy. 2/4 bed rails up, bed in lowest position, fall precautions in place, call light within reach. Pulses present bilaterally +2 pedal, no drainage or odor noted at surgical dressing left knee. Prineo glue Dressing clean, dry, and intact. Ice in place. Jeremías and scds on BLEs. Neurovascular checks performed and weak dorsi and plantar flexions noted bilaterally, toes appear appropriate to ethnicity in color, warm to touch, patient denies numbness or tingling. Nausea improving with scopolamine patch. DC Plan: home tomorrow if passes therapy session with son Gita Mohan and sister burton and [facility]. burton to transport patient. DME needs:needs rolling walker, agreeable to [employer] and [facility] Rep already informed.       Kiersten Munoz  Orthopedic Nurse Navigator  Phone number: [phone]    Future Appointments   Date Time Provider Isabelle Choi   3/16/2023 10:30 AM MD Adele Cordero     Electronically signed by Sameera Howard RN on 3/2/2023 at 1:29 PM

## 2023-03-02 NOTE — PROGRESS NOTES
Patient is resting in bed, awake and quiet. Fall precautions are in place. Bed alarm on. Bed is in lowest position. Call light, telephone and bedside table are within reach. Patient states that nausea has improved. Patient denies needs at present. Will continue to monitor patient per unit protocols.  Electronically signed by Andrey Zheng RN on 3/2/2023 at 4:18 PM

## 2023-03-02 NOTE — PROGRESS NOTES
Spoke with dr Cb Hair first he ordered oxycodone but pt reports that she cannot tolerate. Pt stated tramadol she can tolerate. Message sent to [facility] for tramadol, orders placed.

## 2023-03-02 NOTE — PROGRESS NOTES
Dressing removed from surgical incision. Pt tolerated well  Home care education provided, pt verbalized understanding.

## 2023-03-02 NOTE — PROGRESS NOTES
Alena performed this morning including Nurse navigator Lazaro Wiley, Physical therapist otoniel, and Occupational therapist raffaele. Discussed plan of care, discharge plan, and dme needs if applicable for orthopedic total joint patient.

## 2023-03-02 NOTE — PROGRESS NOTES
Physical Therapy  Facility/Department: [address] ORTHOPEDICS  Physical Therapy Treatment note    Name: Vikas Garnett  : 1955  MRN: [id number]  Date of Service: 3/2/2023    Assessment / Discharge Recommendations:  --nausea remains   -managed to and from the bathroom using rolling walker and light cga for safety  -anticipate will need to stay overnight so nausea can fully resolve  -will follow and proceed with additional HEP instruction and ambulation as nausea eases      Patient Diagnosis(es): The primary encounter diagnosis was Arthritis of left knee. A diagnosis of Paroxysmal atrial fibrillation (HCC) was also pertinent to this visit. Past Medical History:  has a past medical history of Adrenal insufficiency ([address] Arthritis, Asthma, Atrial fibrillation ([address] Brain tumor ([address] Chiari malformation, Fibromyalgia, History of TIA (transient ischemic attack), Panhypopituitarism ([address] S/P selective transsphenoidal pituitary adenomectomy, Thyroid disease, Total knee replacement status, and Unspecified cerebral artery occlusion with cerebral infarction. Past Surgical History:  has a past surgical history that includes brain surgery (3/24/11); brain surgery (); laminectomy (C1); Knee Arthroplasty (Right, 2014); pituitary surgery (2/15); Hysterectomy; Biceps Tenodesis (Left, 11/10/2016); Cataract removal (Bilateral, 2017); and Total knee arthroplasty (Left, 3/1/2023). Body Structures, Functions, Activity Limitations Requiring Skilled Therapeutic Intervention: Decreased functional mobility ; Decreased ADL status; Decreased strength; Increased pain;Decreased ROM  Requires PT Follow-Up: Yes  Activity Tolerance  Activity Tolerance: Patient tolerated treatment well     Plan   Physcial Therapy Plan  Current Treatment Recommendations: Strengthening, ROM, Functional mobility training, Transfer training, ADL/Self-care training, Gait training, Stair training, Positioning, Modalities, Home exercise program, Therapeutic activities, Patient/Caregiver education & training  Additional Comments: 1-4 sessions  Safety Devices  Type of Devices: Bed alarm in place, All fall risk precautions in place, Call light within reach, Left in bed, Nurse notified     Restrictions  Restrictions/Precautions  Restrictions/Precautions: Fall Risk, Weight Bearing  Lower Extremity Weight Bearing Restrictions  Left Lower Extremity Weight Bearing: Weight Bearing As Tolerated  Position Activity Restriction  Other position/activity restrictions: wbat     Subjective   -to room in tandem of OT to patient sitting to EOB - nausea and lightheadedness - wants to get to bathroom to urinate  -continued nausea   Social/Functional History  Social/Functional History  Lives With: Friend(s)  Type of Home: House  Home Layout: Two level  Home Access: Stairs to enter with rails  Entrance Stairs - Number of Steps: 6 steps to enter  Entrance Stairs - Rails: Both  Bathroom Shower/Tub: Walk-in shower  Bathroom Toilet: Handicap height (vanity close)  Bathroom Equipment: Grab bars in shower, Built-in shower seat, Hand-held shower  Bathroom Accessibility: Accessible  Home Equipment: [address]  Has the patient had two or more falls in the past year or any fall with injury in the past year?: No  Receives Help From: Friend(s)  ADL Assistance: Independent  Homemaking Assistance: Independent  Ambulation Assistance: Independent  Transfer Assistance: Independent  Active :  Yes  Additional Comments: Here for elective Left TKR: Rigth TKR 2014  Objective   Bed mobility  Supine to Sit: Contact guard assistance  Sit to Supine: Contact guard assistance;Minimal assistance  Scooting: Contact guard assistance  Transfers  Sit to Stand: Contact guard assistance  Stand to Sit: Contact guard assistance  Ambulation  Surface: Level tile  Device: Rolling Walker  Assistance: Contact guard assistance  Quality of Gait: step through pattern with good heel contact and fair weight bearing  Distance: to and from the bathroom for ~20 feet  Comments: painful and ongoing nausea  More Ambulation?: No  Stairs/Curb  Stairs?: No  Balance  Comments: steady with transfers and ambulation    Goals  Short Term Goals  Time Frame for Short Term Goals: 1-2 days  Short Term Goal 1: bed mobility at [facility]  Short Term Goal 2: transfers at supervision  Short Term Goal 3: ambulation at supervision wbat rolling walker for household distances    -steps as needed for home entry at [facility] Goal 4: exercises at supervision  Patient Goals   Patient Goals : relief of chronic left knee pain and good function    Therapy Time   Individual Concurrent Group Co-treatment   Time In 1050         Time Out 1120         Minutes Mata Gonzales PT

## 2023-03-02 NOTE — PROGRESS NOTES
[facility] Orthopedic Surgery   Progress Note      S/P :  SUBJECTIVE  in bed. States feeling nauseated and just took medication. Pain is   described in left knee and with the intensity of moderate. Pain is described as aching. OBJECTIVE              Physical                      VITALS:  BP (!) 139/93   Pulse (!) 124   Temp 98.1 °F (36.7 °C) (Oral)   Resp 18   Ht 5' 7\" (1.702 m)   Wt 255 lb 4.7 oz (115.8 kg)   SpO2 94%   BMI 39.98 kg/m²                     MUSCULOSKELETAL:  left foot NVI. Wiggles toes to command. Able to plantarflex and dorsiflex ankle Pedal pulses are palpable. NEUROLOGIC:                                  Sensory:  Touch:  Left Lower Extremity:  normal                                                 Surgical wound appears clean and dry left knee with Prineo dressing. CIARA hose on.      Data       CBC:   Lab Results   Component Value Date/Time    WBC 10.5 02/23/2023 03:17 PM    RBC 4.17 02/23/2023 03:17 PM    HGB 12.7 02/23/2023 03:17 PM    HCT 37.6 02/23/2023 03:17 PM    MCV 90.2 02/23/2023 03:17 PM    MCH 30.4 02/23/2023 03:17 PM    MCHC 33.7 02/23/2023 03:17 PM    RDW 14.3 02/23/2023 03:17 PM     02/23/2023 03:17 PM    MPV 9.2 02/23/2023 03:17 PM        WBC:    Lab Results   Component Value Date/Time    WBC 10.5 02/23/2023 03:17 PM        Hemoglobin/Hematocrit:    Lab Results   Component Value Date/Time    HGB 12.7 02/23/2023 03:17 PM    HCT 37.6 02/23/2023 03:17 PM        PT/INR:    Lab Results   Component Value Date/Time    PROTIME 16.8 02/20/2023 01:14 PM    INR 1.37 02/20/2023 01:14 PM              Current Inpatient Medications             Current Facility-Administered Medications: scopolamine (TRANSDERM-SCOP) transdermal patch 1 patch, 1 patch, TransDERmal, Q72H  albuterol sulfate HFA (PROVENTIL;VENTOLIN;PROAIR) 108 (90 Base) MCG/ACT inhaler 2 puff, 2 puff, Inhalation, Q6H PRN  dronedarone hcl (MULTAQ) tablet 400 mg, 400 mg, Oral, BID WC  levothyroxine (SYNTHROID) tablet 50 mcg, 50 mcg, Oral, Daily  ranolazine (RANEXA) extended release tablet 500 mg, 500 mg, Oral, BID  glucose chewable tablet 16 g, 4 tablet, Oral, PRN  dextrose bolus 10% 125 mL, 125 mL, IntraVENous, PRN **OR** dextrose bolus 10% 250 mL, 250 mL, IntraVENous, PRN  glucagon (rDNA) injection 1 mg, 1 mg, SubCUTAneous, PRN  dextrose 10 % infusion, , IntraVENous, Continuous PRN  0.45 % sodium chloride infusion, , IntraVENous, Continuous  sodium chloride flush 0.9 % injection 5-40 mL, 5-40 mL, IntraVENous, 2 times per day  sodium chloride flush 0.9 % injection 5-40 mL, 5-40 mL, IntraVENous, PRN  0.9 % sodium chloride infusion, , IntraVENous, PRN  ketorolac (TORADOL) injection 15 mg, 15 mg, IntraVENous, Once PRN  morphine (PF) injection 2 mg, 2 mg, IntraVENous, Q3H PRN **OR** morphine (PF) injection 4 mg, 4 mg, IntraVENous, Q3H PRN  ondansetron (ZOFRAN-ODT) disintegrating tablet 4 mg, 4 mg, Oral, Q8H PRN **OR** ondansetron (ZOFRAN) injection 4 mg, 4 mg, IntraVENous, Q6H PRN  calcium carbonate (TUMS) chewable tablet 500 mg, 500 mg, Oral, TID PRN  meloxicam (MOBIC) tablet 7.5 mg, 7.5 mg, Oral, Daily  apixaban (ELIQUIS) tablet 2.5 mg, 2.5 mg, Oral, BID  hydrocortisone (CORTEF) tablet 30 mg, 30 mg, Oral, QAM  hydrocortisone (CORTEF) tablet 15 mg, 15 mg, Oral, Daily  mometasone-formoterol (DULERA) 100-5 MCG/ACT inhaler 2 puff, 2 puff, Inhalation, BID  HYDROmorphone (DILAUDID) tablet 1 mg, 1 mg, Oral, Q4H PRN  traMADol (ULTRAM) tablet 50 mg, 50 mg, Oral, Q6H PRN    ASSESSMENT AND PLAN    Post left TKA, stable exam  DVT prophylaxis ordered, REsume Eliquis at 2.5mg bid for 7 days then prior dose of 5mg bid. PT OT for ADL's and ambulation as tolerated, WBAT  SS for DC planning, home with home care later today if nausea subsides.  Added scopolamine patch  IV or PO pain med as ordered       JERMAINE Antoine - CNP  3/2/2023  8:40 AM

## 2023-03-03 VITALS
HEART RATE: 80 BPM | DIASTOLIC BLOOD PRESSURE: 69 MMHG | RESPIRATION RATE: 16 BRPM | BODY MASS INDEX: 37.2 KG/M2 | OXYGEN SATURATION: 90 % | SYSTOLIC BLOOD PRESSURE: 101 MMHG | WEIGHT: 236.99 LBS | HEIGHT: 67 IN | TEMPERATURE: 98 F

## 2023-03-03 PROCEDURE — 6370000000 HC RX 637 (ALT 250 FOR IP): Performed by: ORTHOPAEDIC SURGERY

## 2023-03-03 PROCEDURE — 94640 AIRWAY INHALATION TREATMENT: CPT

## 2023-03-03 PROCEDURE — G0378 HOSPITAL OBSERVATION PER HR: HCPCS

## 2023-03-03 PROCEDURE — 94760 N-INVAS EAR/PLS OXIMETRY 1: CPT

## 2023-03-03 PROCEDURE — 97530 THERAPEUTIC ACTIVITIES: CPT

## 2023-03-03 PROCEDURE — 97110 THERAPEUTIC EXERCISES: CPT

## 2023-03-03 PROCEDURE — 2580000003 HC RX 258: Performed by: ORTHOPAEDIC SURGERY

## 2023-03-03 PROCEDURE — 97535 SELF CARE MNGMENT TRAINING: CPT

## 2023-03-03 RX ORDER — HYDROCORTISONE 5 MG/1
5 TABLET ORAL
Qty: 30 TABLET | Refills: 1 | Status: SHIPPED
Start: 2023-03-03

## 2023-03-03 RX ORDER — HYDROMORPHONE HYDROCHLORIDE 2 MG/1
1 TABLET ORAL EVERY 4 HOURS PRN
Qty: 30 TABLET | Refills: 0 | Status: SHIPPED | OUTPATIENT
Start: 2023-03-03 | End: 2023-03-08

## 2023-03-03 RX ORDER — HYDROCORTISONE 5 MG/1
10 TABLET ORAL EVERY MORNING
Qty: 30 TABLET | Refills: 1 | Status: SHIPPED
Start: 2023-03-03

## 2023-03-03 RX ADMIN — Medication 10 ML: at 08:14

## 2023-03-03 RX ADMIN — MOMETASONE FUROATE AND FORMOTEROL FUMARATE DIHYDRATE 2 PUFF: 100; 5 AEROSOL RESPIRATORY (INHALATION) at 07:57

## 2023-03-03 RX ADMIN — TRAMADOL HYDROCHLORIDE 50 MG: 50 TABLET ORAL at 00:30

## 2023-03-03 RX ADMIN — HYDROCORTISONE 15 MG: 10 TABLET ORAL at 08:12

## 2023-03-03 RX ADMIN — MELOXICAM 7.5 MG: 7.5 TABLET ORAL at 08:11

## 2023-03-03 RX ADMIN — DRONEDARONE 400 MG: 400 TABLET, FILM COATED ORAL at 08:12

## 2023-03-03 RX ADMIN — HYDROCORTISONE 30 MG: 10 TABLET ORAL at 08:12

## 2023-03-03 RX ADMIN — APIXABAN 2.5 MG: 2.5 TABLET, FILM COATED ORAL at 08:11

## 2023-03-03 RX ADMIN — RANOLAZINE 500 MG: 500 TABLET, FILM COATED, EXTENDED RELEASE ORAL at 08:11

## 2023-03-03 RX ADMIN — LEVOTHYROXINE SODIUM 50 MCG: 0.05 TABLET ORAL at 04:39

## 2023-03-03 ASSESSMENT — PAIN DESCRIPTION - LOCATION
LOCATION: KNEE

## 2023-03-03 ASSESSMENT — PAIN DESCRIPTION - PAIN TYPE
TYPE: SURGICAL PAIN

## 2023-03-03 ASSESSMENT — PAIN DESCRIPTION - DESCRIPTORS
DESCRIPTORS: ACHING;DISCOMFORT

## 2023-03-03 ASSESSMENT — PAIN DESCRIPTION - FREQUENCY
FREQUENCY: CONTINUOUS

## 2023-03-03 ASSESSMENT — PAIN SCALES - GENERAL
PAINLEVEL_OUTOF10: 0
PAINLEVEL_OUTOF10: 8
PAINLEVEL_OUTOF10: 7
PAINLEVEL_OUTOF10: 2

## 2023-03-03 ASSESSMENT — PAIN - FUNCTIONAL ASSESSMENT
PAIN_FUNCTIONAL_ASSESSMENT: PREVENTS OR INTERFERES SOME ACTIVE ACTIVITIES AND ADLS

## 2023-03-03 ASSESSMENT — PAIN DESCRIPTION - ORIENTATION
ORIENTATION: LEFT

## 2023-03-03 ASSESSMENT — PAIN DESCRIPTION - ONSET
ONSET: ON-GOING

## 2023-03-03 NOTE — PROGRESS NOTES
CLINICAL PHARMACY NOTE: MEDS TO BEDS    Total # of Prescriptions Filled: 2   The following medications were delivered to the patient:  Current Discharge Medication List        START taking these medications    Details   HYDROmorphone (DILAUDID) 2 MG tablet Take 0.5 tablets by mouth every 4 hours as needed for Pain for up to 5 days. Max Daily Amount: 6 mg  Qty: 30 tablet, Refills: 0    Comments: Reduce doses taken as pain becomes manageable  Associated Diagnoses: Arthritis of left knee      scopolamine (TRANSDERM-SCOP) transdermal patch Place 1 patch onto the skin every 72 hours  Qty: 2 patch, Refills: 0      !! apixaban (ELIQUIS) 2.5 MG TABS tablet Take 1 tablet by mouth 2 times daily for 7 days  Qty: 14 tablet, Refills: 0       !! - Potential duplicate medications found. Please discuss with provider.             Additional Documentation:  Pt didn't not get transderm patches

## 2023-03-03 NOTE — PROGRESS NOTES
Occupational Therapy  Facility/Department: [address] ORTHOPEDICS  Occupational Therapy Treatment Note    Name: Winston Estrada  : 1955  MRN: [id number]  Date of Service: 3/3/2023    Discharge Recommendations:  2-3 sessions per week, Patient would benefit from continued therapy after discharge, Home with Home health OT, 24 hour supervision or assist        Winston Estrada scored a 18/24 on the AM-PAC ADL Inpatient form. Current research shows that an AM-PAC score of 18 or greater is typically associated with a discharge to the patient's home setting. Based on the patient's AM-PAC score, and their current ADL deficits, it is recommended that the patient have 2-3 sessions per week of Occupational Therapy at d/c to increase the patient's independence. At this time, this patient demonstrates the endurance and safety to discharge home with [address] [place] [address] (home vs OP services) and a follow up treatment frequency of 2-3x/wk. Please see assessment section for further patient specific details. If patient discharges prior to next session this note will serve as a discharge summary. Please see below for the latest assessment towards goals. Patient Diagnosis(es): The primary encounter diagnosis was Arthritis of left knee. A diagnosis of Paroxysmal atrial fibrillation (HCC) was also pertinent to this visit. Past Medical History:  has a past medical history of Adrenal insufficiency ([address] Arthritis, Asthma, Atrial fibrillation ([address] Brain tumor ([address] Chiari malformation, Fibromyalgia, History of TIA (transient ischemic attack), Panhypopituitarism ([address] S/P selective transsphenoidal pituitary adenomectomy, Thyroid disease, Total knee replacement status, and Unspecified cerebral artery occlusion with cerebral infarction.   Past Surgical History:  has a past surgical history that includes brain surgery (3/24/11); brain surgery (); laminectomy (C1); Knee Arthroplasty (Right, 2014); pituitary surgery (2/15); Hysterectomy; Biceps Tenodesis (Left, 11/10/2016); Cataract removal (Bilateral, 04/2017); and Total knee arthroplasty (Left, 3/1/2023). Treatment Diagnosis: impaired ADL/fxl mobility    Assessment   Performance deficits / Impairments: Decreased functional mobility ; Decreased balance;Decreased ADL status; Decreased high-level IADLs;Decreased sensation  Assessment: 75 yo female admitted 3/1 for elective L TKA now WBAT. PMH: R TKA, CVA, HTN, Obesity, R TKA, L distal bicep tendon rupture, DM, Depression. PTA, pt lives with son and friend and fully IND no AD use. Today, 3/3, pt functioning just below baseline limited by L knee pain and mild decreased balance. Pt educated on seated bathing/dressing, LB dressing technique, ainsley hose wear/dressing, and importance of ice/elevation/hourly mobility. Pt completes bed mobility, fxl tx, fxl mobility with RW household distances, donning pants with cues, and sink side grooming with SBA. Pt does require Mod A for donning shoes- sister to assist. Cont acute OT to address above deficits.  Rec return home with family for 24 hr SUP and OT 2-3x/week to ensure return to PLOF  Treatment Diagnosis: impaired ADL/fxl mobility  Prognosis: Good;Fair  REQUIRES OT FOLLOW-UP: Yes  Activity Tolerance  Activity Tolerance: Patient Tolerated treatment well        Plan   Occupational Therapy Plan  Times Per Week: 2-3 sessions  Current Treatment Recommendations: Strengthening, ROM, Balance training, Functional mobility training, Endurance training, Pain management, Safety education & training, Positioning, Patient/Caregiver education & training, Self-Care / ADL, Modalities, Home management training     Restrictions  Restrictions/Precautions  Restrictions/Precautions: Fall Risk, Weight Bearing  Lower Extremity Weight Bearing Restrictions  Left Lower Extremity Weight Bearing: Weight Bearing As Tolerated  Position Activity Restriction  Other position/activity restrictions: wbat    Subjective General  Chart Reviewed: Yes  Patient assessed for rehabilitation services?: Yes  Additional Pertinent Hx: 73 yo female admitted 3/1 for elective L TKA now WBAT. PMH: R TKA, CVA, HTN, Obesity, R TKA, L distal bicep tendon rupture, DM, Depression,  Response to previous treatment: Patient with no complaints from previous session  Family / Caregiver Present: No  Referring Practitioner: Olivia Diamond MD  Diagnosis: L TKA  Subjective  Subjective: Patient supine in bed upon arrival to room. Patient with reports 6-7/10 L knee pain at most with activity, 1 at rest  General Comment  Comments: RN ok to see       Social/Functional History  Social/Functional History  Lives With: Friend(s)  Type of Home: House  Home Layout: Two level  Home Access: Stairs to enter with rails  Entrance Stairs - Number of Steps: 6 steps to enter  Entrance Stairs - Rails: Both  Bathroom Shower/Tub: Walk-in shower  Bathroom Toilet: Handicap height (vanity close)  Bathroom Equipment: Grab bars in shower, Built-in shower seat, Hand-held shower  Bathroom Accessibility: Accessible  Home Equipment: Ami Gin  Has the patient had two or more falls in the past year or any fall with injury in the past year?: No  Receives Help From: Friend(s)  ADL Assistance: Independent  Homemaking Assistance: Independent  Ambulation Assistance: Independent  Transfer Assistance: Independent  Active : Yes  Additional Comments: Here for elective Left TKR: [facility] TKR 2014       Objective           Observation/Palpation  Observation: LLE total knee incision with Prineo dressing, intact. BLE ainsley hose  Safety Devices  Type of Devices: All fall risk precautions in place;Nurse notified;Call light within reach; Chair alarm in place;Gait belt;Left in chair             ADL  Grooming: Stand by assistance  Grooming Skilled Clinical Factors: sink side oral care  LE Dressing: Stand by assistance; Moderate assistance;Verbal cueing; Increased time to complete  LE Dressing Skilled Clinical Factors: SBA donning pants - manages threading and pant management at RW with cues. Mod A donning slippers with backs - assist LLE (pt declines need for shoe horn)          Bed mobility  Supine to Sit: Stand by assistance  Scooting: Stand by assistance  Bed Mobility Comments: HOB elevated. exit to L    Transfers  Sit to stand: Stand by assistance  Stand to sit: Stand by assistance  Transfer Comments: RW. no cues needed for hand placement. EOB/recliner                  Standing balance: SBA sink side grooming x3 min                 Fxl mobility: SBA with RW. EOB>bathroom>recliner with no unsteadiness, however, cues for upright posture and staying within RW. Cognition  Overall Cognitive Status: WFL  Orientation  Overall Orientation Status: Within Functional Limits                    Education Provided: Role of Therapy;Plan of Care;Transfer Training;Precautions; ADL Adaptive Strategies; Fall Prevention Strategies; Equipment  Education Provided Comments: Pt educated on seated bathing/dressing, LB dressing technique, ainsley hose wear/dressing, and importance of ice/elevation/hourly mobility  Education Method: Demonstration;Verbal  Barriers to Learning: None  Education Outcome: Verbalized understanding;Demonstrated understanding                      AM-PAC Score        AM-[facility] Inpatient Daily Activity Raw Score: 18 (03/03/23 1025)  AM-PAC Inpatient ADL T-Scale Score : 38.66 (03/03/23 1025)  ADL Inpatient CMS 0-100% Score: 46.65 (03/03/23 1025)  ADL Inpatient CMS G-Code Modifier : CK (03/03/23 1025)    Goals  Short Term Goals  Time Frame for Short Term Goals: prior to d/c: all goals ongoing/progressing  Short Term Goal 1: toileting SUP  Short Term Goal 2: LB dressing SUP  Short Term Goal 3: tolerate 5 min fxl standing task SUP  Short Term Goal 4: fxl tx and mobility with RW household distances SUP  Short Term Goal 5: UB bathing/dressing SUP  Patient Goals   Patient goals : return home with family       Therapy Time   Individual Concurrent Group Co-treatment   Time In 0930         Time Out 1010         Minutes 40         Timed Code Treatment Minutes: 40 Minutes (10 TA.  30 ADL)       CHARU Lopez, OTR/L

## 2023-03-03 NOTE — CARE COORDINATION
DISCHARGE SUMMARY     DATE OF DISCHARGE: 3/3/2023    DISCHARGE DESTINATION: home      HOME CARE AGENCY:   Discharging to Facility/ Agency   Name:  [facility] care    Address: [address] [facility] [address] [facility] [place] [address]  Phone: [phone]  Fax: [phone]     DME ORDERED: rolling walker through [employer]    TRANSPORTATION:   family    COMMENTS: Patient will discharge to home with home care. Informed [address] liaison regarding discharge today and need to start therapy tomorrow, 3/4/2023.     Electronically signed by SALAZAR Cha, DIANNAW, Case Management on 3/3/2023 at 10:04 AM  [facility] [phone]

## 2023-03-03 NOTE — PLAN OF CARE
Problem: Chronic Conditions and Co-morbidities  Goal: Patient's chronic conditions and co-morbidity symptoms are monitored and maintained or improved  Outcome: Progressing  Flowsheets (Taken 3/1/2023 2254)  Care Plan - Patient's Chronic Conditions and Co-Morbidity Symptoms are Monitored and Maintained or Improved: Monitor and assess patient's chronic conditions and comorbid symptoms for stability, deterioration, or improvement     Problem: Discharge Planning  Goal: Discharge to home or other facility with appropriate resources  Outcome: Progressing  Flowsheets (Taken 3/1/2023 2254)  Discharge to home or other facility with appropriate resources: Identify barriers to discharge with patient and caregiver     Problem: Neurosensory - Adult  Goal: Achieves stable or improved neurological status  Outcome: Progressing  Flowsheets (Taken 3/1/2023 2254)  Achieves stable or improved neurological status: Assess for and report changes in neurological status     Problem: Respiratory - Adult  Goal: Achieves optimal ventilation and oxygenation  Outcome: Progressing  Flowsheets (Taken 3/1/2023 2254)  Achieves optimal ventilation and oxygenation: Assess for changes in respiratory status     Problem: Skin/Tissue Integrity - Adult  Goal: Skin integrity remains intact  Outcome: Progressing  Flowsheets (Taken 3/2/2023 0753 by George Irizarry RN)  Skin Integrity Remains Intact: Monitor for areas of redness and/or skin breakdown  Goal: Incisions, wounds, or drain sites healing without S/S of infection  Outcome: Progressing  Flowsheets (Taken 3/1/2023 2254)  Incisions, Wounds, or Drain Sites Healing Without Sign and Symptoms of Infection: TWICE DAILY: Assess and document skin integrity     Problem: Musculoskeletal - Adult  Goal: Return mobility to safest level of function  Outcome: Progressing  Flowsheets (Taken 3/3/2023 0252)  Return Mobility to Safest Level of Function: Assess patient stability and activity tolerance for standing, transferring and ambulating with or without assistive devices  Goal: Maintain proper alignment of affected body part  Outcome: Progressing  Flowsheets (Taken 3/1/2023 2254)  Maintain proper alignment of affected body part: Support and protect limb and body alignment per provider's orders  Goal: Return ADL status to a safe level of function  Outcome: Progressing  Flowsheets (Taken 3/1/2023 2254)  Return ADL Status to a Safe Level of Function: Administer medication as ordered     Problem: Infection - Adult  Goal: Absence of infection at discharge  Outcome: Progressing  Flowsheets (Taken 3/1/2023 2254)  Absence of infection at discharge: Assess and monitor for signs and symptoms of infection  Goal: Absence of infection during hospitalization  Outcome: Progressing  Flowsheets (Taken 3/1/2023 2254)  Absence of infection during hospitalization: Assess and monitor for signs and symptoms of infection     Problem: Metabolic/Fluid and Electrolytes - Adult  Goal: Glucose maintained within prescribed range  Outcome: Progressing     Problem: Pain  Goal: Verbalizes/displays adequate comfort level or baseline comfort level  Outcome: Progressing  Flowsheets (Taken 3/1/2023 2254)  Verbalizes/displays adequate comfort level or baseline comfort level: Encourage patient to monitor pain and request assistance     Problem: Safety - Adult  Goal: Free from fall injury  Outcome: Progressing  Flowsheets (Taken 3/1/2023 2254)  Free From Fall Injury: Instruct family/caregiver on patient safety     Problem: Skin/Tissue Integrity  Goal: Absence of new skin breakdown  Description: 1. Monitor for areas of redness and/or skin breakdown  2. Assess vascular access sites hourly  3. Every 4-6 hours minimum:  Change oxygen saturation probe site  4. Every 4-6 hours:  If on nasal continuous positive airway pressure, respiratory therapy assess nares and determine need for appliance change or resting period.   Outcome: Progressing  Note: Will monitor skin and mucous members. Will turn patient every 2 hours, monitor for friction and sheering, and change dressings as needed. Will preform skin assessment every shift.          Problem: ABCDS Injury Assessment  Goal: Absence of physical injury  Outcome: Progressing  Flowsheets (Taken 3/1/2023 4305)  Absence of Physical Injury: Implement safety measures based on patient assessment

## 2023-03-03 NOTE — PROGRESS NOTES
Data- discharge order received, pt verbalized agreement to discharge, disposition to previous residence, no needs for HHC/DME. Action- discharge instructions prepared and given to pt, pt verbalized understanding. Medication information packet given r/t NEW and/or CHANGED prescriptions emphasizing name/purpose/side effects, pt verbalized understanding. Discharge instruction summary: Diet- regular, Activity- x1 w/ walker, Primary Care Physician as follows: Braulio Sam -[phone] f/u appointment, immunizations reviewed and prescription medications filled with retail pharmacy. Inpatient surgical procedure precautions reviewed: reviewed discharge instructions with the patient. No further questions at this time. Response- Pt belongings gathered, IV removed without complications. Dry dressing applied. Disposition is home (no HHC/DME needs), transported with family, taken to lobby via w/c w/ belongings and discharge instructions and medications, no complications.  Electronically signed by Carmen Chan RN on 3/3/2023 at 12:35 PM

## 2023-03-03 NOTE — PROGRESS NOTES
Occupational Therapy  Facility/Department: [address] ORTHOPEDICS  Occupational Therapy Treatment Note    Name: Amara Robison  : 1955  MRN: [id number]  Date of Service: 3/2/2023    **Late Entry** Session (3/2/2023 [id number]) and Documentation completed by ADRI Hart- pulled into note form by ASHLEY Velasco OTD **  Electronically signed by CHARU Velasco OTR/L on 3/3/2023 at 3:01 PM    Discharge Recommendations:  2-3 sessions per week, Patient would benefit from continued therapy after discharge, Home with Home health OT, 24 hour supervision or assist        Amara Robison scored a 18/24 on the AM-PAC ADL Inpatient form. Current research shows that an AM-PAC score of 18 or greater is typically associated with a discharge to the patient's home setting. Based on the patient's AM-PAC score, and their current ADL deficits, it is recommended that the patient have 2-3 sessions per week of Occupational Therapy at d/c to increase the patient's independence. At this time, this patient demonstrates the endurance and safety to discharge home with [address] [place] [address] (home vs OP services) and a follow up treatment frequency of 2-3x/wk. Please see assessment section for further patient specific details. If patient discharges prior to next session this note will serve as a discharge summary. Please see below for the latest assessment towards goals. Patient Diagnosis(es): The primary encounter diagnosis was Arthritis of left knee. A diagnosis of Paroxysmal atrial fibrillation (HCC) was also pertinent to this visit.   Past Medical History:  has a past medical history of Adrenal insufficiency ([address] Arthritis, Asthma, Atrial fibrillation ([address] Brain tumor ([address] Chiari malformation, Fibromyalgia, History of TIA (transient ischemic attack), Panhypopituitarism ([address] S/P selective transsphenoidal pituitary adenomectomy, Thyroid disease, Total knee replacement status, and Unspecified cerebral artery occlusion with cerebral infarction. Past Surgical History:  has a past surgical history that includes brain surgery (3/24/11); brain surgery (11/07); laminectomy (C1); Knee Arthroplasty (Right, 08/25/2014); pituitary surgery (2/15); Hysterectomy; Biceps Tenodesis (Left, 11/10/2016); Cataract removal (Bilateral, 04/2017); and Total knee arthroplasty (Left, 3/1/2023). Treatment Diagnosis: impaired ADL/fxl mobility    Assessment   Performance deficits / Impairments: Decreased functional mobility ; Decreased balance;Decreased ADL status; Decreased high-level IADLs;Decreased sensation  Assessment: Discussed with OTR am pac score is 18. Anticipate that patient will be able to return home with family to provide 24/7 assist/supervision and home OT. Patient completed supine to sit with CGA. Sit to supine with CGA/Min A. CGA for sit<>stand from EOB to RW to commode to EOB with mild cues for hand placement. Functional mobility with RW 10 feet x2 with slow steady gait with no overt LOB noted. Completed toileting tasks with CGA/SBA for standing balance. Supine in bed to wash hands. Anticipate that patient would benefit from home OT to maximzie IND to return to PLOF. Will cont with POC.   Treatment Diagnosis: impaired ADL/fxl mobility  Prognosis: Good;Fair  REQUIRES OT FOLLOW-UP: Yes  Activity Tolerance  Activity Tolerance: Patient Tolerated treatment well  Activity Tolerance Comments: mild nausea        Plan   Occupational Therapy Plan  Times Per Week: 2-3 sessions  Current Treatment Recommendations: Strengthening, ROM, Balance training, Functional mobility training, Endurance training, Pain management, Safety education & training, Positioning, Patient/Caregiver education & training, Self-Care / ADL, Modalities, Home management training     Restrictions  Restrictions/Precautions  Restrictions/Precautions: Fall Risk, Weight Bearing  Lower Extremity Weight Bearing Restrictions  Left Lower Extremity Weight Bearing: Weight Bearing As Tolerated  Position Activity Restriction  Other position/activity restrictions: wbat    Subjective   General  Chart Reviewed: Yes  Patient assessed for rehabilitation services?: Yes  Additional Pertinent Hx: 73 yo female admitted 3/1 for elective L TKA now WBAT. PMH: R TKA, CVA, HTN, Obesity, R TKA, L distal bicep tendon rupture, DM, Depression,  Response to previous treatment: Patient with no complaints from previous session  Family / Caregiver Present: No  Referring Practitioner: Rima Tomas MD  Diagnosis: L TKA  Subjective  Subjective: Patient supine in bed upon arrival to room. Patient with reports mod pain and nausea. General Comment  Comments: RN ok to see       Social/Functional History  Social/Functional History  Lives With: Friend(s)  Type of Home: House  Home Layout: Two level  Home Access: Stairs to enter with rails  Entrance Stairs - Number of Steps: 6 steps to enter  Entrance Stairs - Rails: Both  Bathroom Shower/Tub: Walk-in shower  Bathroom Toilet: Handicap height (vanity close)  Bathroom Equipment: Grab bars in shower, Built-in shower seat, Hand-held shower  Bathroom Accessibility: Accessible  Home Equipment: Andrea Sasser  Has the patient had two or more falls in the past year or any fall with injury in the past year?: No  Receives Help From: Friend(s)  ADL Assistance: Independent  Homemaking Assistance: Independent  Ambulation Assistance: Independent  Transfer Assistance: Independent  Active : Yes  Additional Comments: Here for elective Left TKR: [facility] TKR 2014       Objective           Safety Devices  Type of Devices: Bed alarm in place; All fall risk precautions in place;Call light within reach; Left in bed;Nurse notified     Toilet Transfers  Toilet - Technique: Ambulating  Equipment Used: Grab bars  Toilet Transfer: Stand by assistance;Contact guard assistance  Toilet Transfers Comments: cues for hand placement       ADL  Grooming: Setup  Grooming Skilled Clinical Factors: supine in bed to wash hands  Toileting: Stand by assistance;Contact guard assistance  Toileting Skilled Clinical Factors: Seated to void, SBA/CGA for balance during clothing management     Activity Tolerance  Activity Tolerance: Patient tolerated treatment well    Bed mobility  Supine to Sit: Contact guard assistance  Sit to Supine: Contact guard assistance;Minimal assistance  Scooting: Contact guard assistance  Bed Mobility Comments: HOB elevated. exit to L    Transfers  Sit to stand: Contact guard assistance  Stand to sit: Contact guard assistance  Transfer Comments: CGA for sit<>stand from EOB to RW to commode to EOB with mild cues for hand placement. Functional mobility with RW 10 feet x2 with slow steady gait with no overt LOB noted. Cognition  Overall Cognitive Status: WFL  Orientation  Overall Orientation Status: Within Functional Limits                    Education Given To: Patient  Education Provided: Role of Therapy;Plan of Care;Transfer Training;Precautions; ADL Adaptive Strategies                      AM-PAC Score        AM-[facility] Inpatient Daily Activity Raw Score: 18 (03/03/23 1025)  AM-PAC Inpatient ADL T-Scale Score : 38.66 (03/03/23 1025)  ADL Inpatient CMS 0-100% Score: 46.65 (03/03/23 1025)  ADL Inpatient CMS G-Code Modifier : CK (03/03/23 1025)    Goals  Short Term Goals  Time Frame for Short Term Goals: prior to d/c: all goals ongoing/progressing  Short Term Goal 1: toileting SUP  Short Term Goal 2: LB dressing SUP  Short Term Goal 3: tolerate 5 min fxl standing task SUP  Short Term Goal 4: fxl tx and mobility with RW household distances SUP  Short Term Goal 5: UB bathing/dressing SUP  Patient Goals   Patient goals : return home with family       Therapy Time   Individual Concurrent Group Co-treatment   Time In 6980         Time Out 1120         Minutes 40                  CHARU Lopez, OTR/L - [id number]

## 2023-03-03 NOTE — PROGRESS NOTES
[facility] Orthopedic Surgery   Progress Note      S/P :  SUBJECTIVE  up in chair. States feels a lot better today and wants to go home. Has concerns about mo commmunication between endocrine Dr Petra Galicia and Dr Amado Richmond re periop steroid for her adrenal insufficiency. I did review the chart and Meds that were recommended by Dr Petra Galicia per pt phone call to her were all given. The 3/2/23 AM dose as given with the pts lunch dose however due to pt nausea in the AM. Pt plans to continue triple dose of steroid today and begin double dose tomorrow as per Dr Zafar Brown recommendation. Pt then says she wants Tramadol for pain med at home as she has taken this here and in the past. At this time the chart flags her Tramadol as containing corn oil which she is allergic to and the pt asks to take the Dilaudid at home instead. She confirms she has a pill splitter Pain is   described in left knee and with the intensity of moderate. Pain is described as aching. Up to BR to void while I was in room. Moves well with walker. OBJECTIVE              Physical                      VITALS:  /69   Pulse 80   Temp 98 °F (36.7 °C) (Oral)   Resp 16   Ht 5' 7\" (1.702 m)   Wt 236 lb 15.9 oz (107.5 kg)   SpO2 90%   BMI 37.12 kg/m²                     MUSCULOSKELETAL:  left foot NVI. Wiggles toes to command. Able to plantarflex and dorsiflex ankle Pedal pulses are palpable. NEUROLOGIC:                                  Sensory:  Touch:  Left Lower Extremity:  normal                                                 Surgical wound appears clean and dry left knee with Prineo dressing CIARA hose on.  Ice packs on    Data       CBC:   Lab Results   Component Value Date/Time    WBC 10.5 02/23/2023 03:17 PM    RBC 4.17 02/23/2023 03:17 PM    HGB 12.7 02/23/2023 03:17 PM    HCT 37.6 02/23/2023 03:17 PM    MCV 90.2 02/23/2023 03:17 PM    MCH 30.4 02/23/2023 03:17 PM    MCHC 33.7 02/23/2023 03:17 PM    RDW 14.3 02/23/2023 03:17 PM    PLT 284 02/23/2023 03:17 PM    MPV 9.2 02/23/2023 03:17 PM        WBC:    Lab Results   Component Value Date/Time    WBC 10.5 02/23/2023 03:17 PM        Hemoglobin/Hematocrit:    Lab Results   Component Value Date/Time    HGB 12.7 02/23/2023 03:17 PM    HCT 37.6 02/23/2023 03:17 PM        PT/INR:    Lab Results   Component Value Date/Time    PROTIME 16.8 02/20/2023 01:14 PM    INR 1.37 02/20/2023 01:14 PM              Current Inpatient Medications             Current Facility-Administered Medications: scopolamine (TRANSDERM-SCOP) transdermal patch 1 patch, 1 patch, TransDERmal, Q72H  albuterol sulfate HFA (PROVENTIL;VENTOLIN;PROAIR) 108 (90 Base) MCG/ACT inhaler 2 puff, 2 puff, Inhalation, Q6H PRN  dronedarone hcl (MULTAQ) tablet 400 mg, 400 mg, Oral, BID WC  levothyroxine (SYNTHROID) tablet 50 mcg, 50 mcg, Oral, Daily  ranolazine (RANEXA) extended release tablet 500 mg, 500 mg, Oral, BID  glucose chewable tablet 16 g, 4 tablet, Oral, PRN  dextrose bolus 10% 125 mL, 125 mL, IntraVENous, PRN **OR** dextrose bolus 10% 250 mL, 250 mL, IntraVENous, PRN  glucagon (rDNA) injection 1 mg, 1 mg, SubCUTAneous, PRN  dextrose 10 % infusion, , IntraVENous, Continuous PRN  0.45 % sodium chloride infusion, , IntraVENous, Continuous  sodium chloride flush 0.9 % injection 5-40 mL, 5-40 mL, IntraVENous, 2 times per day  sodium chloride flush 0.9 % injection 5-40 mL, 5-40 mL, IntraVENous, PRN  0.9 % sodium chloride infusion, , IntraVENous, PRN  morphine (PF) injection 2 mg, 2 mg, IntraVENous, Q3H PRN **OR** morphine (PF) injection 4 mg, 4 mg, IntraVENous, Q3H PRN  ondansetron (ZOFRAN-ODT) disintegrating tablet 4 mg, 4 mg, Oral, Q8H PRN **OR** ondansetron (ZOFRAN) injection 4 mg, 4 mg, IntraVENous, Q6H PRN  calcium carbonate (TUMS) chewable tablet 500 mg, 500 mg, Oral, TID PRN  meloxicam (MOBIC) tablet 7.5 mg, 7.5 mg, Oral, Daily  apixaban (ELIQUIS) tablet 2.5 mg, 2.5 mg, Oral, BID  hydrocortisone (CORTEF) tablet 30 mg, 30 mg, Oral, QAM  hydrocortisone (CORTEF) tablet 15 mg, 15 mg, Oral, Daily  mometasone-formoterol (DULERA) 100-5 MCG/ACT inhaler 2 puff, 2 puff, Inhalation, BID  HYDROmorphone (DILAUDID) tablet 1 mg, 1 mg, Oral, Q4H PRN  traMADol (ULTRAM) tablet 50 mg, 50 mg, Oral, Q6H PRN    ASSESSMENT AND PLAN    Post left TKA,   Adrenal insufficiency  Plan per DR Freda Villarreal, endocrinology:  02/24/2023 3:34 PM EST  Formatting of this note might be different from the original.  So, hospital will likely do for her. They typically give steroids preop, but we recommend that anesthesia give 100mg iv hydrocortisone prior to induction of anesthesia (fairly routine for them)    -for AFTER surgery, recommend triple dose of hydrocortisone for 2-3days and as long as pain controlled, can resume usual. If still in a lot of pain, do double dose for 1-2days more. REviewed chart and pt has indeed received all of the recommended doses to date. Pt has written instruction from Dr Freda Villarreal regarding further steroid use at home. Added Lyrica and Ultram to allergy list as these both contain corn oil which she is allergic to. Pt aware and agreed. Post left TKA, stable exam  DVT prophylaxis ordered, REsume Eliquis at 2.5mg bid for 7 days then prior dose of 5mg bid. PT OT for ADL's and ambulation as tolerated, WBAT  SS for DC planning, home with home care today   nausea subsided. Added scopolamine patch for home use prn  IV or PO pain med as ordered , Dilaudid po as ordered. Followup Dr Ceasar Lamas in 2 weeks.         Bridgette Bran, JERMAINE - CNP  3/3/2023  9:17 AM

## 2023-03-03 NOTE — PROGRESS NOTES
Physical Therapy  Facility/Department: [address] ORTHOPEDICS  Physical Therapy Treatment note    Name: Rosangela Choi  : 1955  MRN: [id number]  Date of Service: 3/3/2023     Assessment / Discharge Recommendations:  -left TKR wbat   -nausea resolved and she is feeling well for discharge to home   -[employer] issued rolling walker for home use  -instructed in initial HEP and general activity to do until Home PT takes charge of care  -aarom <10 to ~70 degrees   -she declined need to practice steps as well familiar from prior TKR and recent practice taking one step at a time (one rail and SBA)  -reviewed car transfers and she verbalizes understanding       Patient Diagnosis(es): The primary encounter diagnosis was Arthritis of left knee. A diagnosis of Paroxysmal atrial fibrillation (HCC) was also pertinent to this visit. Past Medical History:  has a past medical history of Adrenal insufficiency ([address] Arthritis, Asthma, Atrial fibrillation ([address] Brain tumor ([address] Chiari malformation, Fibromyalgia, History of TIA (transient ischemic attack), Panhypopituitarism ([address] S/P selective transsphenoidal pituitary adenomectomy, Thyroid disease, Total knee replacement status, and Unspecified cerebral artery occlusion with cerebral infarction. Past Surgical History:  has a past surgical history that includes brain surgery (3/24/11); brain surgery (); laminectomy (C1); Knee Arthroplasty (Right, 2014); pituitary surgery (2/15); Hysterectomy; Biceps Tenodesis (Left, 11/10/2016); Cataract removal (Bilateral, 2017); and Total knee arthroplasty (Left, 3/1/2023). Body Structures, Functions, Activity Limitations Requiring Skilled Therapeutic Intervention: Decreased functional mobility ; Decreased ADL status; Decreased strength; Increased pain;Decreased ROM  Activity Tolerance  Activity Tolerance: Patient tolerated treatment well     Plan   Physcial Therapy Plan  Current Treatment Recommendations: Strengthening, ROM, Functional mobility training, Transfer training, ADL/Self-care training, Gait training, Stair training, Positioning, Modalities, Home exercise program, Therapeutic activities, Patient/Caregiver education & training  Additional Comments: 1-4 sessions  Safety Devices  Type of Devices: All fall risk precautions in place, Nurse notified, Call light within reach, Chair alarm in place, Gait belt, Left in chair     Restrictions  Restrictions/Precautions  Restrictions/Precautions: Fall Risk, Weight Bearing  Lower Extremity Weight Bearing Restrictions  Left Lower Extremity Weight Bearing: Weight Bearing As Tolerated  Position Activity Restriction  Other position/activity restrictions: wbat     Subjective   General  Chart Reviewed: Yes  Patient assessed for rehabilitation services?: Yes  Additional Pertinent Hx: here for elective left TKR    -had right TKR 2017  Response To Previous Treatment: Patient with no complaints from previous session.   Family / Caregiver Present: No  Follows Commands: Within Functional Limits  Subjective  Subjective: to room in 2 sessions this morning - at this time is sitting OOB in recliner - states she is feeling well for getting home today - managed well with OT session in between - states she was able to ambulate in room with nursing to and from the bathroom several times overnight and earlier this morning - pain at rest is minimal  Social/Functional History  Social/Functional History  Lives With: Friend(s)  Type of Home: House  Home Layout: Two level  Home Access: Stairs to enter with rails  Entrance Stairs - Number of Steps: 6 steps to enter  Entrance Stairs - Rails: Both  Bathroom Shower/Tub: Walk-in shower  Bathroom Toilet: Handicap height (vanity close)  [employer]: Grab bars in shower, Built-in shower seat, Hand-held shower  Bathroom Accessibility: Accessible  Home Equipment: Weldona Enzo  Has the patient had two or more falls in the past year or any fall with injury in the past year?: No  Receives Help From: Friend(s)  ADL Assistance: Independent  Homemaking Assistance: Independent  Ambulation Assistance: Independent  Transfer Assistance: Independent  Active :  Yes  Additional Comments: Here for elective Left TKR: Cassandra TKR 2014  Objective   Bed mobility  Bed Mobility Comments: not observed at this session  -OT indicates sba  Transfers  Comment: not observed at this session - OT indicates sba  Ambulation  Comments: not up to ambulate at this session - OT and nursing report wbat with good heel contact and good weight bearing using rolling walker safely  Balance  Comments: OT and nursing indicate steady with transfers and ambulation    Goals  Short Term Goals  Time Frame for Short Term Goals: 1-2 days  Short Term Goal 1: bed mobility at [facility]  Short Term Goal 2: transfers at supervision  Short Term Goal 3: ambulation at supervision wbat rolling walker for household distances    -steps as needed for home entry at [address] [place] [address] Goal 4: exercises at supervision  Patient Goals   Patient Goals : relief of chronic left knee pain and good function       Education  Patient Education  Education Given To: Patient  Education Provided: Home Exercise Program  Education Method: Demonstration;Verbal  Barriers to Learning: None  Education Outcome: Demonstrated understanding      Therapy Time:  In 2 sessions for total of 30 minutes      Vanessa Cruz, PT

## 2023-03-03 NOTE — PROGRESS NOTES
Patient is A&Ox4. Patient is resting in chair, awake and quiet. Room air. Chair alarm on and feet elevated while in chair. Call light, telephone and bedside table are within reach. VSS taken. AM meds given. Shift assessment completed. Will continue to monitor patient per unit protocols.  Electronically signed by Andrey Zheng RN on 3/3/2023 at 10:56 AM

## 2023-03-03 NOTE — PLAN OF CARE
Problem: Chronic Conditions and Co-morbidities  Goal: Patient's chronic conditions and co-morbidity symptoms are monitored and maintained or improved  3/3/2023 0718 by Yoyn Gill RN  Outcome: Progressing     Problem: Discharge Planning  Goal: Discharge to home or other facility with appropriate resources  3/3/2023 0718 by Yony Gill RN  Outcome: Progressing   Assessed patients knowledge of discharge. Will continue to work with patient on discharge planning and answer patient questions. Will consult case management and  as necessary. Problem: Neurosensory - Adult  Goal: Achieves stable or improved neurological status  3/3/2023 0718 by Yony Gill RN  Outcome: Progressing     Problem: Respiratory - Adult  Goal: Achieves optimal ventilation and oxygenation  3/3/2023 0718 by Yony Gill RN  Outcome: Progressing     Problem: Skin/Tissue Integrity - Adult  Goal: Skin integrity remains intact  3/3/2023 0718 by Yony Gill RN  Outcome: Progressing  Will monitor skin and mucous members. Will turn patient every 2 hours, monitor for friction and sheering, and change dressings as needed. Will preform skin assessment every shift. Problem: Pain  Goal: Verbalizes/displays adequate comfort level or baseline comfort level  3/3/2023 0718 by Yony Gill RN  Outcome: Progressing   Patient educated on acute pain. Taught patient to use call light to ask for pain medication. PRN pain medication given for acute pain. Will continue to monitor pain per unit protocol. Problem: Safety - Adult  Goal: Free from fall injury  3/3/2023 0718 by Yony Gill RN  Outcome: Progressing   Will remain free from falls. Fall precautions are in place. Call light, telephone and bedside table are within reach.

## 2023-03-05 NOTE — TELEPHONE ENCOUNTER
Patient scheduled for VV on Monday.
Requested Prescriptions     Pending Prescriptions Disp Refills    metFORMIN (GLUCOPHAGE-XR) 500 MG extended release tablet [Pharmacy Med Name: METFORMIN ER 500MG 24HR TABS] 180 tablet 0     Sig: TAKE 2 TABLETS BY MOUTH DAILY WITH BREAKFAST       Last OV 12/23/19  Next OV not scheduled
3 = A little assistance

## 2023-03-06 ENCOUNTER — TELEPHONE (OUTPATIENT)
Dept: FAMILY MEDICINE CLINIC | Age: 68
End: 2023-03-06

## 2023-03-06 ENCOUNTER — TELEPHONE (OUTPATIENT)
Dept: ORTHOPEDICS UNIT | Age: 68
End: 2023-03-06

## 2023-03-06 RX ORDER — SCOLOPAMINE TRANSDERMAL SYSTEM 1 MG/1
1 PATCH, EXTENDED RELEASE TRANSDERMAL
Qty: 2 PATCH | Refills: 0 | Status: SHIPPED | OUTPATIENT
Start: 2023-03-06

## 2023-03-06 NOTE — TELEPHONE ENCOUNTER
Care Transitions Initial Follow Up Call    Outreach made within 2 business days of discharge: Yes    Patient: Dina Seals Patient : 1955   MRN: [id number]  Reason for Admission: There are no discharge diagnoses documented for the most recent discharge. Discharge Date: 3/3/23       Spoke with: PATIENT    Discharge department/facility: [facility]    TCM Interactive Patient Contact:  Was patient able to fill all prescriptions: Yes  Was patient instructed to bring all medications to the follow-up visit: Yes  Is patient taking all medications as directed in the discharge summary? Yes  Does patient understand their discharge instructions: Yes  Does patient have questions or concerns that need addressed prior to 7-14 day follow up office visit: no    Scheduled appointment with PCP within 7-14 days    Follow Up  Future Appointments   Date Time Provider Isabelle Choi   3/16/2023 10:30 AM Marissa Ceron MD [facility]     SPOKE TO PT.  SHE HAS A FOLLOW UP WITH DR Stacy Jennings ON 3/16/2023.   NO NEED FOR FOLLOW UP HERE AT THIS TIME.  [address] [facility] [place], [address] [facility] [place]

## 2023-03-06 NOTE — TELEPHONE ENCOUNTER
Spoke with patient regarding post discharge from hospital.    Incision status: No drainage, odor, or redness noted per patient    Edema/Swelling/Teds: edema noted, wearing teds    Pain level and status: 1/10 at rest, 6/10 with ambulation    Use of pain medications: yes ; Patient stated they are taking their pain medication dilaudid as prescribed. Use of ice therapy: yes     Blood thinner: eliquis 2.5mg ; Verified with patient that they are taking their anticoagulant as prescribed twice a day. Patient stated she thought she was to take her 5mg eliquis bid, educated on prescription instructions at discharge eliquis 2.5mg bid for 7 days then to resume home dose eliquis 5mg bid, patient verbalized understanding and stated she has not had any signs of bleeding noted. Bowels: no constipation    Home Care Agency active: yes ; Claims already worked with home therapist .  Outpatient therapy: n/a    Do you have all of your medications: no - stated she did not receive a prescription for scopolamine and is inquiring if she can have an antinausea medication. Will notify Dr Rufino Leigh in medications: yes    No other questions/concerns at this time. Encouraged patient to call Orthopedic Nurse Navigator Abdifatah Yanez or Orthopedic office if has any questions/concerns.       Follow up appointments:    Future Appointments   Date Time Provider Isabelle Choi   3/16/2023 10:30 AM MD Brandon Garza     Electronically signed by Blaise Rice RN on 3/6/2023 at 4:31 PM

## 2023-03-06 NOTE — DISCHARGE SUMMARY
Physician Discharge Summary     Patient ID:  Lata King  [id number]  74 y.o.  1955    Admit date: 3/1/2023    Discharge date and time: 3/3/2023  1:35 PM     Admitting Physician: Urbano Simms MD     Discharge Physician: Ibis Oliveira    Admission Diagnoses: Arthritis of left knee [M17.12]    Discharge Diagnoses: left knee OA    Admission Condition: good    Discharged Condition: good    Indication for Admission: Failed conservative treatment as outpatient for joint pain including PT and pain meds. This patient was then electively scheduled for total joint replacement surgery    Surgical procedure: left TKA    Consults: PT OT SS    This patient had no postoperative complications. They has PT and OT for ADL's . IV and PO pain med for pain control and was eventually DC in stable condition    Treatments: analgesia,  therapies: PT OT,  and surgery      Disposition: home    Patient Instructions:   [unfilled]  Activity: activity as tolerated  Diet: regular diet  Wound Care: keep wound clean and dry    Follow-up with ISHA Arellano in 2 weeks.     Signed:  JERMAINE Cueva CNP  3/6/2023  11:19 AM

## 2023-03-14 RX ORDER — TRAZODONE HYDROCHLORIDE 150 MG/1
TABLET ORAL
Qty: 180 TABLET | Refills: 0 | Status: SHIPPED | OUTPATIENT
Start: 2023-03-14

## 2023-03-16 ENCOUNTER — OFFICE VISIT (OUTPATIENT)
Dept: ORTHOPEDIC SURGERY | Age: 68
End: 2023-03-16

## 2023-03-16 VITALS — HEIGHT: 67 IN | RESPIRATION RATE: 16 BRPM | BODY MASS INDEX: 37.04 KG/M2 | WEIGHT: 236 LBS

## 2023-03-16 DIAGNOSIS — Z96.652 STATUS POST TOTAL LEFT KNEE REPLACEMENT: Primary | ICD-10-CM

## 2023-03-16 PROCEDURE — 99024 POSTOP FOLLOW-UP VISIT: CPT | Performed by: PHYSICIAN ASSISTANT

## 2023-03-17 RX ORDER — ALBUTEROL SULFATE 90 UG/1
AEROSOL, METERED RESPIRATORY (INHALATION)
Qty: 8.5 G | Refills: 0 | Status: SHIPPED | OUTPATIENT
Start: 2023-03-17

## 2023-03-17 NOTE — PROGRESS NOTES
Subjective:      Patient ID: China Cedeno is a 74 y.o.  female. HPI:  Here for post op visit. S/P left knee arthroplasty. The date of procedure- 3/1/2023  Surgeon: Nikki Fall   Pain: 1-2/10 VAS. No meds      Review of Systems:   Negative for fever or chills. Past Medical History:   Diagnosis Date    Adrenal insufficiency (HCC)     Arthritis     Asthma     Atrial fibrillation ([address]     Brain tumor ([address]     chiari - malformation    Chiari malformation 3/11    Fibromyalgia     History of TIA (transient ischemic attack) 2006    Panhypopituitarism ([address] 8/26/2014    S/P selective transsphenoidal pituitary adenomectomy 2007    Thyroid disease     Total knee replacement status 8/14    right    Unspecified cerebral artery occlusion with cerebral infarction       TIA 2006       Family History   Problem Relation Age of Onset    Hypertension Mother     Arrhythmia Mother     Cancer Father         skin?     No Known Problems Sister     Diabetes Paternal Grandfather     No Known Problems Brother     No Known Problems Maternal Aunt     No Known Problems Maternal Uncle     No Known Problems Paternal Aunt     No Known Problems Paternal Uncle     No Known Problems Maternal Grandmother     No Known Problems Maternal Grandfather     No Known Problems Paternal Grandmother     No Known Problems Other     Anesth Problems Neg Hx     Broken Bones Neg Hx     Clotting Disorder Neg Hx     Collagen Disease Neg Hx     Dislocations Neg Hx     Osteoporosis Neg Hx     Rheumatologic Disease Neg Hx     Scoliosis Neg Hx     Severe Sprains Neg Hx        Past Surgical History:   Procedure Laterality Date    BICEPS TENODESIS Left 11/10/2016    Dr Sam Ped  3/24/11    chiari 1 repair/ decompression and C1 laminectomy    BRAIN SURGERY  11/07    adenoma removal, pituitary    CATARACT REMOVAL Bilateral 04/2017    HYSTERECTOMY (CERVIX STATUS UNKNOWN)      KNEE ARTHROPLASTY Right 08/25/2014    right tkr    LAMINECTOMY  C1    chiari PITUITARY SURGERY  2/15    transphenoidal    TOTAL KNEE ARTHROPLASTY Left 3/1/2023    LEFT TOTAL KNEE REPLACEMENT ROBOTIC ASSISTED performed by Chio Finley MD at [facility] History    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    Occupation:  of Non-Profit   Tobacco Use    Smoking status: Never    Smokeless tobacco: Never   Vaping Use    Vaping Use: Never used   Substance and Sexual Activity    Alcohol use: No    Drug use: Never    Sexual activity: Not on file       Current Outpatient Medications   Medication Sig Dispense Refill    traZODone (DESYREL) 150 MG tablet TAKE 2 TABLETS BY MOUTH EVERY NIGHT AS NEEDED 180 tablet 0    scopolamine (TRANSDERM-SCOP) transdermal patch Place 1 patch onto the skin every 72 hours 2 patch 0    hydrocortisone (CORTEF) 5 MG tablet Take 2 tablets by mouth every morning Postop Dosing per Dr Linnette Klinefelter as per patient discussion 30 tablet 1    hydrocortisone (CORTEF) 5 MG tablet Take 1 tablet by mouth daily (before lunch) Postop dosing as per DR Linnette Klinefelter discussion with patient. 30 tablet 1    apixaban (ELIQUIS) 5 MG TABS tablet See other dose of Eliquis for 7 days after knee surgery then can resume to TAKE 1, 5mg TABLET BY MOUTH TWICE DAILY 60 tablet 5    apixaban (ELIQUIS) 2.5 MG TABS tablet Take 1 tablet by mouth 2 times daily for 7 days 14 tablet 0    albuterol sulfate HFA (PROVENTIL;VENTOLIN;PROAIR) 108 (90 Base) MCG/ACT inhaler INHALE 2 PUFFS INTO THE LUNGS EVERY 6 HOURS AS NEEDED FOR WHEEZING 8.5 g 0    ranolazine (RANEXA) 500 MG extended release tablet Take 1 tablet by mouth 2 times daily Multaq takes a few days to kick in during the loading phase.  She can take ranolazine 500 mg bid for now until the heart is back in NSR then stop per Dr. Kylah Logan 2/2/2023 60 tablet 0    dronedarone hcl (MULTAQ) 400 MG TABS Take 1 tablet by mouth 2 times daily (with meals) 180 tablet 1    fluticasone-salmeterol (ADVAIR DISKUS) 100-50 MCG/ACT AEPB diskus inhaler Inhale 1 puff into the lungs in the morning and 1 puff in the evening. 60 each 5    furosemide (LASIX) 20 MG tablet TAKE 1 TABLET BY MOUTH TODAY AND TOMORROW AND THEN AS NEEDED FOR SWELLING, WEIGHT GAIN 15 tablet 1    levothyroxine (SYNTHROID) 50 MCG tablet TAKE 1 TABLET BY MOUTH FIVE DAYS A WEEK AND 2 TABLETS ON SATURDAY/SUNDAY 390 tablet 1    Cholecalciferol (VITAMIN D3) 1000 UNITS TABS Take 1 tablet by mouth daily        No current facility-administered medications for this visit. Objective:   She is alert, oriented x 3, pleasant, well nourished, developed and in no acute distress. Resp 16   Ht 5' 7\" (1.702 m)   Wt 236 lb (107 kg)   BMI 36.96 kg/m²      Examination of the left knee:   Incision: Healing uneventfully. There is mild swelling. The overall alignment of the knee is neutral.  AROM     Extension 0     Flexion  109   There is no instability with varus/valgus stress testing in full extension or mid flexion. There is no instability with anterior drawer testing. Extensor Mechanism is intact. X Rays:  performed in the office today:   AP Standing, Lateral and Sunrise Left Knee: There is a left uncemeted knee arthroplasty present. The alignment is satisfactory. There are no fractures. Diagnosis:        ICD-10-CM    1. Status post total left knee replacement  Z96.652 XR KNEE LEFT (3 VIEWS)             Assessment/plan:     Clinically and radiographically stable total knee arthroplasty. Continue with physical therapy/ rehab for knee arthroplasty per protocol. Dressing/incision care discussed. Activity restrictions discussed today. Follow Up: 3 weeks. Call or return to clinic prn if these symptoms worsen or fail to improve as anticipated.

## 2023-03-20 ENCOUNTER — HOSPITAL ENCOUNTER (OUTPATIENT)
Dept: PHYSICAL THERAPY | Age: 68
Setting detail: THERAPIES SERIES
Discharge: HOME | End: 2023-03-20
Payer: MEDICARE

## 2023-03-20 DIAGNOSIS — Z96.652 STATUS POST TOTAL LEFT KNEE REPLACEMENT: Primary | ICD-10-CM

## 2023-03-20 PROCEDURE — 97110 THERAPEUTIC EXERCISES: CPT | Performed by: CHIROPRACTOR

## 2023-03-20 PROCEDURE — 97530 THERAPEUTIC ACTIVITIES: CPT | Performed by: CHIROPRACTOR

## 2023-03-20 NOTE — FLOWSHEET NOTE
Update:  [] Patient is progressing as expected towards functional goals listed. [] Progression is slowed due to complexities/Impairments listed. [] Progression has been slowed due to co-morbidities. [x] Plan just implemented, too soon to assess goals progression <30days   [] Goals require adjustment due to lack of progress  [] Patient is not progressing as expected and requires additional follow up with physician  [] Other    Prognosis for POC: [x] Good [] Fair  [] Poor    Patient requires continued skilled intervention: [] Yes  [x] No        PLAN: Post -op Rehab to improve Gait, ROM and strength     [x] Continue per plan of care [] Alter current plan (see comments)  [] Plan of care initiated [] Hold pending MD visit [] Discharge    Electronically signed by: FERNIE Flowers#[id number]      Note: If patient does not return for scheduled/recommended follow up visits, this note will serve as a discharge from care along with the most recent update on progress.

## 2023-03-22 ENCOUNTER — HOSPITAL ENCOUNTER (OUTPATIENT)
Dept: PHYSICAL THERAPY | Age: 68
Setting detail: THERAPIES SERIES
Discharge: HOME | End: 2023-03-22
Payer: MEDICARE

## 2023-03-22 NOTE — FLOWSHEET NOTE
[facility], [facility]  [address] [facility], [facility]  Phone: [phone]   Fax:     [phone]    Physical Therapy  Cancellation/No-show Note  Patient Name:  Winston Estrada  :  1955   Date:  2023  Cancelled visits to date:1  No-shows to date: 0    Patient status for today's appointment patient:  [x]  Cancelled  []  Rescheduled appointment  []  No-show     Reason given by patient:  []  Patient ill  []  Conflicting appointment  []  No transportation    []  Conflict with work  []  No reason given  [x]  Other:     Comments:  Had to help a friend who was in a MVA    Phone call information:   []  Phone call made today to patient at _ time at number provided:      []  Patient answered, conversation as follows:    []  Patient did not answer, message left as follows:  []  Phone call not made today    Electronically signed by:  Pamela Schneider [id number]

## 2023-03-27 ENCOUNTER — HOSPITAL ENCOUNTER (OUTPATIENT)
Dept: PHYSICAL THERAPY | Age: 68
Setting detail: THERAPIES SERIES
Discharge: HOME | End: 2023-03-27
Payer: MEDICARE

## 2023-03-27 PROCEDURE — 97110 THERAPEUTIC EXERCISES: CPT | Performed by: CHIROPRACTOR

## 2023-03-27 PROCEDURE — 97530 THERAPEUTIC ACTIVITIES: CPT | Performed by: CHIROPRACTOR

## 2023-03-27 NOTE — FLOWSHEET NOTE
[facility], [facility]  [address] [facility] [place], [facility]  Phone: [phone]   Fax:     [phone]                                                       Physical Therapy Treatment Note      Physical Therapy Treatment Note/ Progress Report:   Date:  2023    Patient Name:  Amy Alanis    :  1955  MRN: [id number]    Pertinent Medical History:Additional Pertinent Hx: asthma, OA, TIA 2006, afib, chiari malformation, fibromyalgia, R TKR (), L biceps tenodesis    Medical/Treatment Diagnosis Information:  Medical Diagnosis: Primary osteoarthritis of left knee [M17.12]  Treatment Diagnosis: Decreased mobility, strength    Insurance/Certification information:  PT Insurance Information: [employer] Medicare  Physician Information:  Gloria Kan MD  Plan of care signed (Y/N): sent for cosign 3/20    Date of Patient follow up with Physician:      Progress Report: []  Yes  [x]  No     Date Range for reporting period:  Beginning:  3/20/2023  Ending:      Progress report due (10 Rx/or 30 days whichever is less): at post-op eval     Recertification due (POC duration/ or 90 days whichever is less): at post-op eval     Visit # POC/Insurance Allowable Auth Needed   2/  10  3/20/23 - 23 [x]Yes   []No     Latex Allergy:  [x]NO      []YES  Preferred Language for Healthcare:   [x]English       []Other:    Functional Scale:       Date assessed: 3/20/23  Test: [facility]  Score: 38/96    Pain level:  1/10     History of Injury: Pt is scheduled for L TKR on 3/1/2023. Pt lives with her son that will be around to take care of her during the day and friend with be around in the evening.       SUBJECTIVE:  See eval    OBJECTIVE: Updated 3/20/23  Palpation: no TTP noted  Functional Mobility/Transfers: needs assistance of UEs to get up from a chair    Posture: L LE shorter than R    Bandages/Dressings/Incisions: NA    Gait: (include devices/WB status) Pt ambulates with

## 2023-03-30 ENCOUNTER — HOSPITAL ENCOUNTER (OUTPATIENT)
Dept: PHYSICAL THERAPY | Age: 68
Setting detail: THERAPIES SERIES
Discharge: HOME | End: 2023-03-30
Payer: MEDICARE

## 2023-03-30 NOTE — FLOWSHEET NOTE
[facility] [facility], [facility]  [address] [facility] [place], [facility]  Phone: [phone]   Fax:     [phone]    Physical Therapy  Cancellation/No-show Note  Patient Name:  Jacobo Carlos  :  1955   Date:  2023  Cancelled visits to date:2  No-shows to date: 0    Patient status for today's appointment patient:  [x]  Cancelled  []  Rescheduled appointment  []  No-show     Reason given by patient:  [x]  Patient ill  []  Conflicting appointment  []  No transportation    []  Conflict with work  []  No reason given  []  Other:     Comments:     Phone call information:   []  Phone call made today to patient at _ time at number provided:      []  Patient answered, conversation as follows:    []  Patient did not answer, message left as follows:  []  Phone call not made today    Electronically signed by:  Javon Reyes [id number]

## 2023-04-03 ENCOUNTER — TELEPHONE (OUTPATIENT)
Dept: CARDIOLOGY CLINIC | Age: 68
End: 2023-04-03

## 2023-04-03 ENCOUNTER — HOSPITAL ENCOUNTER (OUTPATIENT)
Dept: PHYSICAL THERAPY | Age: 68
Setting detail: THERAPIES SERIES
Discharge: HOME | End: 2023-04-03
Payer: MEDICARE

## 2023-04-03 PROCEDURE — 97530 THERAPEUTIC ACTIVITIES: CPT | Performed by: CHIROPRACTOR

## 2023-04-03 PROCEDURE — 97110 THERAPEUTIC EXERCISES: CPT | Performed by: CHIROPRACTOR

## 2023-04-03 NOTE — TELEPHONE ENCOUNTER
Called pt to see if she wanted to move up to May for her procedure. She just had knee surgery and is still recovering. She is however okay with doing the procedure @ [facility] come June.

## 2023-04-03 NOTE — FLOWSHEET NOTE
[facility] [facility], [facility]  [address] [facility] [place], [facility]  Phone: [phone]   Fax:     [phone]                                                       Physical Therapy Treatment Note      Physical Therapy Treatment Note/ Progress Report:   Date:  2023    Patient Name:  Nicole Lira    :  1955  MRN: [id number]    Pertinent Medical History:Additional Pertinent Hx: asthma, OA, TIA 2006, afib, chiari malformation, fibromyalgia, R TKR (), L biceps tenodesis    Medical/Treatment Diagnosis Information:  Medical Diagnosis: Primary osteoarthritis of left knee [M17.12]  Treatment Diagnosis: Decreased mobility, strength    Insurance/Certification information:  PT Insurance Information: [employer] Medicare  Physician Information:  Rigoberto Herron MD  Plan of care signed (Y/N): sent for cosign 3/20    Date of Patient follow up with Physician:      Progress Report: []  Yes  [x]  No     Date Range for reporting period:  Beginning:  3/20/2023  Ending:      Progress report due (10 Rx/or 30 days whichever is less): at post-op eval     Recertification due (POC duration/ or 90 days whichever is less): at post-op eval     Visit # POC/Insurance Allowable Auth Needed   2/  10  3/20/23 - 23 [x]Yes   []No     Latex Allergy:  [x]NO      []YES  Preferred Language for Healthcare:   [x]English       []Other:    Functional Scale:       Date assessed: 3/20/23  Test: [facility]  Score: 38/96    Pain level:  1/10     History of Injury: Pt is scheduled for L TKR on 3/1/2023. Pt lives with her son that will be around to take care of her during the day and friend with be around in the evening.       SUBJECTIVE:  4/3/22 - C/o mostly of \"stiffness\" feeling    OBJECTIVE: Updated 3/20/23  Palpation: no TTP noted  Functional Mobility/Transfers: needs assistance of UEs to get up from a chair    Posture: L LE shorter than R    Bandages/Dressings/Incisions: NA    Gait: (include

## 2023-04-06 ENCOUNTER — APPOINTMENT (OUTPATIENT)
Dept: PHYSICAL THERAPY | Age: 68
End: 2023-04-06
Payer: MEDICARE

## 2023-04-06 ENCOUNTER — OFFICE VISIT (OUTPATIENT)
Dept: ORTHOPEDIC SURGERY | Age: 68
End: 2023-04-06

## 2023-04-06 VITALS — BODY MASS INDEX: 36.96 KG/M2 | HEIGHT: 67 IN

## 2023-04-06 DIAGNOSIS — Z96.652 STATUS POST TOTAL LEFT KNEE REPLACEMENT: Primary | ICD-10-CM

## 2023-04-06 PROCEDURE — 99024 POSTOP FOLLOW-UP VISIT: CPT | Performed by: ORTHOPAEDIC SURGERY

## 2023-04-06 NOTE — LETTER
April 7, 2023      Meghana Pickard, [address] [address]      Patient: Nicole Lira   MR Number: [id number]   YOB: 1955   Date of Visit: 4/6/2023       Dear Meghana Range: Thank you for referring Halley Moses to me for evaluation/treatment. Below are the relevant portions of my assessment and plan of care. If you have questions, please do not hesitate to call me. I look forward to following [facility] along with you.     Sincerely,        Cheryl Cramer MD

## 2023-04-07 NOTE — PROGRESS NOTES
[facility]  Office Visit    Chief Complaint: Follow-up s/p left total knee arthroplasty    HPI:  Kushal Rubio is a 74 y.o. who is here in follow-up of left total knee arthroplasty performed on March 1, 2023. She is doing well postoperatively. She walks without assistive device. She is not taking any pain medication due to intolerance of these medications. She continues to work with physical therapy.       Patient Active Problem List   Diagnosis    Insomnia    Asthma    Edema    Essential hypertension    Hypothyroidism    Adrenal insufficiency    Obesity    Right knee DJD    Panhypopituitarism (HCC)    S/P selective transsphenoidal pituitary adenomectomy    Status post total left knee replacement    Sprain of left elbow  [facility]  DOI 6/17/16    Rupture of left distal biceps tendon    History of TIA (transient ischemic attack)    Multinodular goiter    Controlled type 2 diabetes mellitus without complication, without long-term current use of insulin ([facility])    Hypercholesteremia    Anemia    Benign neoplasm of pituitary gland and craniopharyngeal duct (pouch) ([facility])    Brachial neuritis or radiculitis    Chronic ethmoidal sinusitis    Chronic maxillary sinusitis    Chronic pain disorder    Chronic rhinitis    Constipation    Degeneration of intervertebral disc, site unspecified    Depressive disorder, not elsewhere classified    Disorder of skin or subcutaneous tissue    Displacement of cervical intervertebral disc without myelopathy    Disturbance of skin sensation    Dyshormonogenic goiter    Galactorrhea not associated with childbirth    Hypopotassemia    Iron deficiency anemia    Myalgia and myositis    Other extrapyramidal disease and abnormal movement disorder    Other malaise and fatigue    Pain in joint    Pain in limb    Pituitary adenoma ([address]    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

## 2023-04-13 ENCOUNTER — APPOINTMENT (OUTPATIENT)
Dept: PHYSICAL THERAPY | Age: 68
End: 2023-04-13
Payer: MEDICARE

## 2023-04-30 DIAGNOSIS — J45.30 MILD PERSISTENT ALLERGIC ASTHMA WITHOUT COMPLICATION: ICD-10-CM

## 2023-05-01 RX ORDER — FLUTICASONE PROPIONATE AND SALMETEROL 100; 50 UG/1; UG/1
POWDER RESPIRATORY (INHALATION)
Qty: 60 EACH | Refills: 5 | Status: SHIPPED | OUTPATIENT
Start: 2023-05-01

## 2023-05-05 ENCOUNTER — TELEPHONE (OUTPATIENT)
Dept: CARDIOLOGY CLINIC | Age: 68
End: 2023-05-05

## 2023-05-05 NOTE — TELEPHONE ENCOUNTER
Spoke with the patient and got her procedure moved over to  on 6/5. She verbalized understanding. Atrial Fibrillation Ablation Pre procedure Instructions     Date: 6/5/2023 [facility] [id number] [facility] at: 10:30 am  Procedure time:  11:30 am      The morning of your procedure you will park in the hospital parking lot and report directly to the cath lab to check in. At the information desk stay right and go all the way to the end of the fraser, this will take you directly to your check in desk for the cath lab. Pre-Procedure Instructions   You will need to fast (nothing to eat or drink) after midnight the day of your procedure. Do NOT chew gum or eat mints the day of your procedure  You will need to hold your Eliquis for 12 hours prior to the procedure. You may hold your Furosemide the morning of the procedure for comfort to decrease urinary urgency. If you are a diabetic you will need to hold all diabetic medications including, Metformin the morning of the procedure. If you take Lantus/Levemir only take ½ your normal dose the evening before. Do not use any lotions, creams or perfume the morning of procedure. You will need to complete pre-procedure lab work 5-7 days prior to your procedure. Please have a responsible adult to drive you home after procedure, you should go home same day, but there is always a possibility of an overnight stay.   Cath lab will provide you with all post procedure instructions       Qjose updated / Added in Epic / emailed cath lab / Romayne Galla added

## 2023-05-08 RX ORDER — RANOLAZINE 500 MG/1
TABLET, EXTENDED RELEASE ORAL
Qty: 60 TABLET | OUTPATIENT
Start: 2023-05-08

## 2023-05-19 ENCOUNTER — OFFICE VISIT (OUTPATIENT)
Dept: ORTHOPEDIC SURGERY | Age: 68
End: 2023-05-19

## 2023-05-19 VITALS — BODY MASS INDEX: 37.04 KG/M2 | HEIGHT: 67 IN | WEIGHT: 236 LBS | RESPIRATION RATE: 16 BRPM

## 2023-05-19 DIAGNOSIS — Z96.652 STATUS POST TOTAL LEFT KNEE REPLACEMENT: Primary | ICD-10-CM

## 2023-05-19 PROCEDURE — 99024 POSTOP FOLLOW-UP VISIT: CPT | Performed by: PHYSICIAN ASSISTANT

## 2023-05-22 RX ORDER — SODIUM CHLORIDE 0.9 % (FLUSH) 0.9 %
5-40 SYRINGE (ML) INJECTION PRN
OUTPATIENT
Start: 2023-05-22

## 2023-05-23 NOTE — PROGRESS NOTES
without any fracture or significant bone abnormality status post 2 months left knee replacement      Assessment:  Healing left total knee replacement with lateral contusion    Plan:  During today's visit, there was approximately 20 minutes of face-to-face discussion in regards to the patient's current condition and treatment options. More than 50 % of the time was counseling and coordination of care as indicated above. We will continue with the compressive support anti-inflammatories and physical therapy per the protocol      PROCEDURE NOTE:  X-rays examination discussion      They will schedule a follow up in follow-up in 1 month.

## 2023-06-01 ENCOUNTER — TELEPHONE (OUTPATIENT)
Dept: CARDIOLOGY CLINIC | Age: 68
End: 2023-06-01

## 2023-06-01 NOTE — TELEPHONE ENCOUNTER
Spoke w/patient and she needed to move procedure. Her mother is ill and she needed to take her to appts. Only spot was in July. She is willing to come in for any cancellations in June in the 11:30 time slot. Atrial Fibrillation Ablation Pre procedure Instructions     Date: 7/262023 @ [address] [facility] at: 9:30 am  Procedure time:  10:30 am      The morning of your procedure you will park in the hospital parking lot and report directly to the cath lab to check in. At the information desk stay right and go all the way to the end of the fraser, this will take you directly to your check in desk for the cath lab. Pre-Procedure Instructions   You will need to fast (nothing to eat or drink) after midnight the day of your procedure. Do NOT chew gum or eat mints the day of your procedure  You will need to hold your Eliquis for 12 hours prior to the procedure. You may hold your Furosemide the morning of the procedure for comfort to decrease urinary urgency. If you are a diabetic you will need to hold all diabetic medications including, Metformin the morning of the procedure. If you take Lantus/Levemir only take ½ your normal dose the evening before. Do not use any lotions, creams or perfume the morning of procedure. You will need to complete pre-procedure lab work 5-7 days prior to your procedure. Please have a responsible adult to drive you home after procedure, you should go home same day, but there is always a possibility of an overnight stay.   Cath lab will provide you with all post procedure instructions      Qjose updated / Updated Epic/ Emailed cath lab / Héctor Rogers

## 2023-06-05 ENCOUNTER — HOSPITAL ENCOUNTER (OUTPATIENT)
Dept: CARDIAC CATH/INVASIVE PROCEDURES | Age: 68
Discharge: HOME | End: 2023-06-05

## 2023-06-05 RX ORDER — TRAZODONE HYDROCHLORIDE 150 MG/1
TABLET ORAL
Qty: 180 TABLET | Refills: 0 | Status: SHIPPED | OUTPATIENT
Start: 2023-06-05

## 2023-06-05 NOTE — PROGRESS NOTES
Education completed. See note in 5/25/23 encounter.    Leanna Nolasco RN on 6/5/2023 at 11:55 AM     LAST VISIT 09/16/2020, NEXT VISIT none.

## 2023-06-19 ENCOUNTER — OFFICE VISIT (OUTPATIENT)
Dept: CARDIOLOGY CLINIC | Age: 68
End: 2023-06-19
Payer: MEDICARE

## 2023-06-19 VITALS
BODY MASS INDEX: 35.31 KG/M2 | HEIGHT: 67 IN | OXYGEN SATURATION: 97 % | SYSTOLIC BLOOD PRESSURE: 122 MMHG | HEART RATE: 58 BPM | DIASTOLIC BLOOD PRESSURE: 72 MMHG | WEIGHT: 225 LBS

## 2023-06-19 DIAGNOSIS — I48.19 PERSISTENT ATRIAL FIBRILLATION (HCC): Primary | ICD-10-CM

## 2023-06-19 DIAGNOSIS — I10 ESSENTIAL HYPERTENSION: ICD-10-CM

## 2023-06-19 DIAGNOSIS — Q23.1 BICUSPID AORTIC VALVE: ICD-10-CM

## 2023-06-19 PROCEDURE — 1123F ACP DISCUSS/DSCN MKR DOCD: CPT | Performed by: NURSE PRACTITIONER

## 2023-06-19 PROCEDURE — 99214 OFFICE O/P EST MOD 30 MIN: CPT | Performed by: NURSE PRACTITIONER

## 2023-06-19 PROCEDURE — 3074F SYST BP LT 130 MM HG: CPT | Performed by: NURSE PRACTITIONER

## 2023-06-19 PROCEDURE — 3078F DIAST BP <80 MM HG: CPT | Performed by: NURSE PRACTITIONER

## 2023-06-19 PROCEDURE — 93000 ELECTROCARDIOGRAM COMPLETE: CPT | Performed by: NURSE PRACTITIONER

## 2023-06-19 ASSESSMENT — ENCOUNTER SYMPTOMS
CONSTIPATION: 0
BLOOD IN STOOL: 0
SHORTNESS OF BREATH: 1
BACK PAIN: 0
VOMITING: 0
SORE THROAT: 0
TROUBLE SWALLOWING: 0
COUGH: 0
NAUSEA: 0
DIARRHEA: 0
COLOR CHANGE: 0
SINUS PRESSURE: 0
ABDOMINAL PAIN: 0
WHEEZING: 0

## 2023-07-14 ENCOUNTER — TELEPHONE (OUTPATIENT)
Dept: ORTHOPEDIC SURGERY | Age: 68
End: 2023-07-14

## 2023-07-20 ENCOUNTER — OFFICE VISIT (OUTPATIENT)
Dept: ORTHOPEDIC SURGERY | Age: 68
End: 2023-07-20

## 2023-07-20 VITALS — RESPIRATION RATE: 16 BRPM | WEIGHT: 225 LBS | HEIGHT: 67 IN | BODY MASS INDEX: 35.31 KG/M2

## 2023-07-20 DIAGNOSIS — Z96.652 STATUS POST TOTAL LEFT KNEE REPLACEMENT: Primary | ICD-10-CM

## 2023-07-20 NOTE — PROGRESS NOTES
[address] [facility]  Office Visit    Chief Complaint: Follow-up s/p left total knee arthroplasty    HPI:  Simon Calvillo is a 74 y.o. who is here in follow-up of left total knee arthroplasty performed on March 1, 2023. She initially did well postoperatively. However, she had a couple of falls and has had issues with posterior and lateral knee pain since that time. She continues to have pain in the knee. Pain is worsened with prolonged sitting and increases with movement. Her medication options are fairly limited by allergies. She does note that she helps the pain. She otherwise walks without assistive devices rates pain as 2/10 in the posterolateral knee.     Patient Active Problem List   Diagnosis    Insomnia    Asthma    Edema    Essential hypertension    Hypothyroidism    Adrenal insufficiency    Obesity    Right knee DJD    Panhypopituitarism ([address])    S/P selective transsphenoidal pituitary adenomectomy    Status post total left knee replacement    Sprain of left elbow  [facility]  DOI 6/17/16    Rupture of left distal biceps tendon    History of TIA (transient ischemic attack)    Multinodular goiter    Controlled type 2 diabetes mellitus without complication, without long-term current use of insulin ([facility])    Hypercholesteremia    Anemia    Benign neoplasm of pituitary gland and craniopharyngeal duct (pouch) ([facility])    Brachial neuritis or radiculitis    Chronic ethmoidal sinusitis    Chronic maxillary sinusitis    Chronic pain disorder    Chronic rhinitis    Constipation    Degeneration of intervertebral disc, site unspecified    Depressive disorder, not elsewhere classified    Disorder of skin or subcutaneous tissue    Displacement of cervical intervertebral disc without myelopathy    Disturbance of skin sensation    Dyshormonogenic goiter    Galactorrhea not associated with childbirth    Hypopotassemia    Iron deficiency anemia    Myalgia and myositis    Other extrapyramidal disease and

## 2023-07-24 ENCOUNTER — HOSPITAL ENCOUNTER (OUTPATIENT)
Age: 68
Discharge: HOME | End: 2023-07-24
Payer: MEDICARE

## 2023-07-24 DIAGNOSIS — I48.0 PAROXYSMAL ATRIAL FIBRILLATION (HCC): ICD-10-CM

## 2023-07-24 LAB
ANION GAP SERPL CALCULATED.3IONS-SCNC: 7 MMOL/L (ref 3–16)
BUN SERPL-MCNC: 24 MG/DL (ref 7–20)
CALCIUM SERPL-MCNC: 8.7 MG/DL (ref 8.3–10.6)
CHLORIDE SERPL-SCNC: 103 MMOL/L (ref 99–110)
CO2 SERPL-SCNC: 30 MMOL/L (ref 21–32)
CREAT SERPL-MCNC: 1.4 MG/DL (ref 0.6–1.2)
DEPRECATED RDW RBC AUTO: 14.4 % (ref 12.4–15.4)
GFR SERPLBLD CREATININE-BSD FMLA CKD-EPI: 42 ML/MIN/{1.73_M2}
GLUCOSE SERPL-MCNC: 92 MG/DL (ref 70–99)
HCT VFR BLD AUTO: 36.8 % (ref 36–48)
HGB BLD-MCNC: 12.1 G/DL (ref 12–16)
MCH RBC QN AUTO: 29.9 PG (ref 26–34)
MCHC RBC AUTO-ENTMCNC: 32.8 G/DL (ref 31–36)
MCV RBC AUTO: 91.3 FL (ref 80–100)
PLATELET # BLD AUTO: 191 K/UL (ref 135–450)
PMV BLD AUTO: 9.5 FL (ref 5–10.5)
POTASSIUM SERPL-SCNC: 3.7 MMOL/L (ref 3.5–5.1)
RBC # BLD AUTO: 4.03 M/UL (ref 4–5.2)
SODIUM SERPL-SCNC: 140 MMOL/L (ref 136–145)
WBC # BLD AUTO: 8.2 K/UL (ref 4–11)

## 2023-07-24 PROCEDURE — 85027 COMPLETE CBC AUTOMATED: CPT

## 2023-07-24 PROCEDURE — 36415 COLL VENOUS BLD VENIPUNCTURE: CPT

## 2023-07-24 PROCEDURE — 80048 BASIC METABOLIC PNL TOTAL CA: CPT

## 2023-07-26 ENCOUNTER — ANESTHESIA EVENT (OUTPATIENT)
Dept: CARDIAC CATH/INVASIVE PROCEDURES | Age: 68
End: 2023-07-26
Payer: MEDICARE

## 2023-07-26 ENCOUNTER — HOSPITAL ENCOUNTER (OUTPATIENT)
Dept: CARDIAC CATH/INVASIVE PROCEDURES | Age: 68
Discharge: HOME | End: 2023-07-26
Attending: INTERNAL MEDICINE | Admitting: INTERNAL MEDICINE
Payer: MEDICARE

## 2023-07-26 ENCOUNTER — ANESTHESIA (OUTPATIENT)
Dept: CARDIAC CATH/INVASIVE PROCEDURES | Age: 68
End: 2023-07-26
Payer: MEDICARE

## 2023-07-26 VITALS
OXYGEN SATURATION: 98 % | RESPIRATION RATE: 14 BRPM | BODY MASS INDEX: 37.04 KG/M2 | TEMPERATURE: 97.3 F | DIASTOLIC BLOOD PRESSURE: 56 MMHG | HEIGHT: 67 IN | HEART RATE: 57 BPM | WEIGHT: 236 LBS | SYSTOLIC BLOOD PRESSURE: 109 MMHG

## 2023-07-26 DIAGNOSIS — I48.0 PAROXYSMAL ATRIAL FIBRILLATION (HCC): ICD-10-CM

## 2023-07-26 LAB
ABO + RH BLD: NORMAL
ANION GAP SERPL CALCULATED.3IONS-SCNC: 8 MMOL/L (ref 3–16)
BLD GP AB SCN SERPL QL: NORMAL
BUN SERPL-MCNC: 22 MG/DL (ref 7–20)
CALCIUM SERPL-MCNC: 9.2 MG/DL (ref 8.3–10.6)
CHLORIDE SERPL-SCNC: 105 MMOL/L (ref 99–110)
CO2 SERPL-SCNC: 27 MMOL/L (ref 21–32)
CREAT SERPL-MCNC: 1.5 MG/DL (ref 0.6–1.2)
DEPRECATED RDW RBC AUTO: 14.6 % (ref 12.4–15.4)
EKG ATRIAL RATE: 59 BPM
EKG ATRIAL RATE: 60 BPM
EKG DIAGNOSIS: NORMAL
EKG DIAGNOSIS: NORMAL
EKG P AXIS: 43 DEGREES
EKG P AXIS: 65 DEGREES
EKG P-R INTERVAL: 178 MS
EKG P-R INTERVAL: 180 MS
EKG Q-T INTERVAL: 470 MS
EKG Q-T INTERVAL: 516 MS
EKG QRS DURATION: 64 MS
EKG QRS DURATION: 76 MS
EKG QTC CALCULATION (BAZETT): 465 MS
EKG QTC CALCULATION (BAZETT): 516 MS
EKG R AXIS: 14 DEGREES
EKG R AXIS: 39 DEGREES
EKG T AXIS: 34 DEGREES
EKG T AXIS: 45 DEGREES
EKG VENTRICULAR RATE: 59 BPM
EKG VENTRICULAR RATE: 60 BPM
GFR SERPLBLD CREATININE-BSD FMLA CKD-EPI: 38 ML/MIN/{1.73_M2}
GLUCOSE SERPL-MCNC: 92 MG/DL (ref 70–99)
HCT VFR BLD AUTO: 38.3 % (ref 36–48)
HGB BLD-MCNC: 12.5 G/DL (ref 12–16)
LV EF: 53 %
LVEF MODALITY: NORMAL
MCH RBC QN AUTO: 29.9 PG (ref 26–34)
MCHC RBC AUTO-ENTMCNC: 32.5 G/DL (ref 31–36)
MCV RBC AUTO: 91.8 FL (ref 80–100)
PLATELET # BLD AUTO: 190 K/UL (ref 135–450)
PMV BLD AUTO: 9.6 FL (ref 5–10.5)
POC ACT LR: 282 SEC
POC ACT LR: 285 SEC
POC ACT LR: 344 SEC
POC ACT LR: 369 SEC
POC ACT LR: 380 SEC
POTASSIUM SERPL-SCNC: 4.2 MMOL/L (ref 3.5–5.1)
RBC # BLD AUTO: 4.17 M/UL (ref 4–5.2)
SODIUM SERPL-SCNC: 140 MMOL/L (ref 136–145)
WBC # BLD AUTO: 7.4 K/UL (ref 4–11)

## 2023-07-26 PROCEDURE — 86850 RBC ANTIBODY SCREEN: CPT

## 2023-07-26 PROCEDURE — C1730 CATH, EP, 19 OR FEW ELECT: HCPCS

## 2023-07-26 PROCEDURE — 3700000001 HC ADD 15 MINUTES (ANESTHESIA)

## 2023-07-26 PROCEDURE — C1732 CATH, EP, DIAG/ABL, 3D/VECT: HCPCS

## 2023-07-26 PROCEDURE — 2580000003 HC RX 258: Performed by: NURSE ANESTHETIST, CERTIFIED REGISTERED

## 2023-07-26 PROCEDURE — 7100000010 HC PHASE II RECOVERY - FIRST 15 MIN

## 2023-07-26 PROCEDURE — 93622 COMP EP EVAL L VENTR PAC&REC: CPT

## 2023-07-26 PROCEDURE — 3700000000 HC ANESTHESIA ATTENDED CARE

## 2023-07-26 PROCEDURE — 85027 COMPLETE CBC AUTOMATED: CPT

## 2023-07-26 PROCEDURE — 2500000003 HC RX 250 WO HCPCS: Performed by: NURSE ANESTHETIST, CERTIFIED REGISTERED

## 2023-07-26 PROCEDURE — 93655 ICAR CATH ABLTJ DSCRT ARRHYT: CPT | Performed by: INTERNAL MEDICINE

## 2023-07-26 PROCEDURE — C1766 INTRO/SHEATH,STRBLE,NON-PEEL: HCPCS

## 2023-07-26 PROCEDURE — 93657 TX L/R ATRIAL FIB ADDL: CPT | Performed by: INTERNAL MEDICINE

## 2023-07-26 PROCEDURE — 93656 COMPRE EP EVAL ABLTJ ATR FIB: CPT | Performed by: INTERNAL MEDICINE

## 2023-07-26 PROCEDURE — 93623 PRGRMD STIMJ&PACG IV RX NFS: CPT

## 2023-07-26 PROCEDURE — 6360000002 HC RX W HCPCS: Performed by: NURSE ANESTHETIST, CERTIFIED REGISTERED

## 2023-07-26 PROCEDURE — 7100000001 HC PACU RECOVERY - ADDTL 15 MIN

## 2023-07-26 PROCEDURE — 93623 PRGRMD STIMJ&PACG IV RX NFS: CPT | Performed by: INTERNAL MEDICINE

## 2023-07-26 PROCEDURE — 85347 COAGULATION TIME ACTIVATED: CPT

## 2023-07-26 PROCEDURE — C1769 GUIDE WIRE: HCPCS

## 2023-07-26 PROCEDURE — 93655 ICAR CATH ABLTJ DSCRT ARRHYT: CPT

## 2023-07-26 PROCEDURE — 93312 ECHO TRANSESOPHAGEAL: CPT

## 2023-07-26 PROCEDURE — 86900 BLOOD TYPING SEROLOGIC ABO: CPT

## 2023-07-26 PROCEDURE — C1894 INTRO/SHEATH, NON-LASER: HCPCS

## 2023-07-26 PROCEDURE — 86901 BLOOD TYPING SEROLOGIC RH(D): CPT

## 2023-07-26 PROCEDURE — 7100000000 HC PACU RECOVERY - FIRST 15 MIN

## 2023-07-26 PROCEDURE — 93010 ELECTROCARDIOGRAM REPORT: CPT | Performed by: INTERNAL MEDICINE

## 2023-07-26 PROCEDURE — 7100000011 HC PHASE II RECOVERY - ADDTL 15 MIN

## 2023-07-26 PROCEDURE — 93656 COMPRE EP EVAL ABLTJ ATR FIB: CPT

## 2023-07-26 PROCEDURE — 93005 ELECTROCARDIOGRAM TRACING: CPT | Performed by: INTERNAL MEDICINE

## 2023-07-26 PROCEDURE — C1759 CATH, INTRA ECHOCARDIOGRAPHY: HCPCS

## 2023-07-26 PROCEDURE — 80048 BASIC METABOLIC PNL TOTAL CA: CPT

## 2023-07-26 PROCEDURE — 93622 COMP EP EVAL L VENTR PAC&REC: CPT | Performed by: INTERNAL MEDICINE

## 2023-07-26 PROCEDURE — 36415 COLL VENOUS BLD VENIPUNCTURE: CPT

## 2023-07-26 PROCEDURE — C1760 CLOSURE DEV, VASC: HCPCS

## 2023-07-26 RX ORDER — ONDANSETRON 2 MG/ML
INJECTION INTRAMUSCULAR; INTRAVENOUS PRN
Status: DISCONTINUED | OUTPATIENT
Start: 2023-07-26 | End: 2023-07-26 | Stop reason: SDUPTHER

## 2023-07-26 RX ORDER — SODIUM CHLORIDE 0.9 % (FLUSH) 0.9 %
5-40 SYRINGE (ML) INJECTION PRN
Status: DISCONTINUED | OUTPATIENT
Start: 2023-07-26 | End: 2023-07-26 | Stop reason: HOSPADM

## 2023-07-26 RX ORDER — PROTAMINE SULFATE 10 MG/ML
INJECTION, SOLUTION INTRAVENOUS PRN
Status: DISCONTINUED | OUTPATIENT
Start: 2023-07-26 | End: 2023-07-26 | Stop reason: SDUPTHER

## 2023-07-26 RX ORDER — SUCCINYLCHOLINE/SOD CL,ISO/PF 200MG/10ML
SYRINGE (ML) INTRAVENOUS PRN
Status: DISCONTINUED | OUTPATIENT
Start: 2023-07-26 | End: 2023-07-26 | Stop reason: SDUPTHER

## 2023-07-26 RX ORDER — ROCURONIUM BROMIDE 10 MG/ML
INJECTION, SOLUTION INTRAVENOUS PRN
Status: DISCONTINUED | OUTPATIENT
Start: 2023-07-26 | End: 2023-07-26 | Stop reason: SDUPTHER

## 2023-07-26 RX ORDER — SODIUM CHLORIDE 0.9 % (FLUSH) 0.9 %
5-40 SYRINGE (ML) INJECTION EVERY 12 HOURS SCHEDULED
Status: DISCONTINUED | OUTPATIENT
Start: 2023-07-26 | End: 2023-07-26 | Stop reason: HOSPADM

## 2023-07-26 RX ORDER — GLYCOPYRROLATE 0.2 MG/ML
INJECTION INTRAMUSCULAR; INTRAVENOUS PRN
Status: DISCONTINUED | OUTPATIENT
Start: 2023-07-26 | End: 2023-07-26 | Stop reason: SDUPTHER

## 2023-07-26 RX ORDER — FUROSEMIDE 10 MG/ML
INJECTION INTRAMUSCULAR; INTRAVENOUS PRN
Status: DISCONTINUED | OUTPATIENT
Start: 2023-07-26 | End: 2023-07-26 | Stop reason: SDUPTHER

## 2023-07-26 RX ORDER — ACETAMINOPHEN 325 MG/1
650 TABLET ORAL EVERY 4 HOURS PRN
Status: CANCELLED | OUTPATIENT
Start: 2023-07-26

## 2023-07-26 RX ORDER — PROPOFOL 10 MG/ML
INJECTION, EMULSION INTRAVENOUS PRN
Status: DISCONTINUED | OUTPATIENT
Start: 2023-07-26 | End: 2023-07-26 | Stop reason: SDUPTHER

## 2023-07-26 RX ORDER — SODIUM CHLORIDE 9 MG/ML
INJECTION, SOLUTION INTRAVENOUS CONTINUOUS PRN
Status: DISCONTINUED | OUTPATIENT
Start: 2023-07-26 | End: 2023-07-26 | Stop reason: SDUPTHER

## 2023-07-26 RX ORDER — HEPARIN SODIUM 1000 [USP'U]/ML
INJECTION, SOLUTION INTRAVENOUS; SUBCUTANEOUS PRN
Status: DISCONTINUED | OUTPATIENT
Start: 2023-07-26 | End: 2023-07-26 | Stop reason: SDUPTHER

## 2023-07-26 RX ORDER — SODIUM CHLORIDE 9 MG/ML
INJECTION, SOLUTION INTRAVENOUS PRN
Status: DISCONTINUED | OUTPATIENT
Start: 2023-07-26 | End: 2023-07-26 | Stop reason: HOSPADM

## 2023-07-26 RX ORDER — LIDOCAINE HYDROCHLORIDE 20 MG/ML
INJECTION, SOLUTION INTRAVENOUS PRN
Status: DISCONTINUED | OUTPATIENT
Start: 2023-07-26 | End: 2023-07-26 | Stop reason: SDUPTHER

## 2023-07-26 RX ORDER — FENTANYL CITRATE 50 UG/ML
INJECTION, SOLUTION INTRAMUSCULAR; INTRAVENOUS PRN
Status: DISCONTINUED | OUTPATIENT
Start: 2023-07-26 | End: 2023-07-26 | Stop reason: SDUPTHER

## 2023-07-26 RX ADMIN — LIDOCAINE HYDROCHLORIDE 100 MG: 20 INJECTION, SOLUTION INTRAVENOUS at 10:43

## 2023-07-26 RX ADMIN — PROTAMINE SULFATE 20 MG: 10 INJECTION, SOLUTION INTRAVENOUS at 13:29

## 2023-07-26 RX ADMIN — PROPOFOL 180 MG: 10 INJECTION, EMULSION INTRAVENOUS at 10:43

## 2023-07-26 RX ADMIN — ONDANSETRON 4 MG: 2 INJECTION INTRAMUSCULAR; INTRAVENOUS at 10:50

## 2023-07-26 RX ADMIN — HEPARIN SODIUM 6000 UNITS: 1000 INJECTION INTRAVENOUS; SUBCUTANEOUS at 11:18

## 2023-07-26 RX ADMIN — HEPARIN SODIUM 4000 UNITS: 1000 INJECTION INTRAVENOUS; SUBCUTANEOUS at 12:27

## 2023-07-26 RX ADMIN — FENTANYL CITRATE 50 MCG: 50 INJECTION, SOLUTION INTRAMUSCULAR; INTRAVENOUS at 12:43

## 2023-07-26 RX ADMIN — FENTANYL CITRATE 50 MCG: 50 INJECTION, SOLUTION INTRAMUSCULAR; INTRAVENOUS at 10:43

## 2023-07-26 RX ADMIN — GLYCOPYRROLATE 0.2 MG: 0.2 INJECTION, SOLUTION INTRAMUSCULAR; INTRAVENOUS at 11:00

## 2023-07-26 RX ADMIN — PHENYLEPHRINE HYDROCHLORIDE 30 MCG/MIN: 10 INJECTION INTRAVENOUS at 10:48

## 2023-07-26 RX ADMIN — HYDROCORTISONE SODIUM SUCCINATE 100 MG: 100 INJECTION, POWDER, FOR SOLUTION INTRAMUSCULAR; INTRAVENOUS at 10:36

## 2023-07-26 RX ADMIN — HEPARIN SODIUM 6000 UNITS: 1000 INJECTION INTRAVENOUS; SUBCUTANEOUS at 11:30

## 2023-07-26 RX ADMIN — SODIUM CHLORIDE: 9 INJECTION, SOLUTION INTRAVENOUS at 10:32

## 2023-07-26 RX ADMIN — HEPARIN SODIUM 1000 UNITS: 1000 INJECTION INTRAVENOUS; SUBCUTANEOUS at 11:46

## 2023-07-26 RX ADMIN — ROCURONIUM BROMIDE 5 MG: 10 INJECTION, SOLUTION INTRAVENOUS at 10:43

## 2023-07-26 RX ADMIN — ISOPROTERENOL HYDROCHLORIDE 4 MCG/MIN: 0.2 INJECTION, SOLUTION INTRAMUSCULAR; INTRAVENOUS at 12:55

## 2023-07-26 RX ADMIN — FUROSEMIDE 20 MG: 10 INJECTION, SOLUTION INTRAMUSCULAR; INTRAVENOUS at 13:25

## 2023-07-26 RX ADMIN — Medication 160 MG: at 10:43

## 2023-07-26 ASSESSMENT — ENCOUNTER SYMPTOMS
DIARRHEA: 0
COLOR CHANGE: 0
SHORTNESS OF BREATH: 1
SINUS PRESSURE: 0
TROUBLE SWALLOWING: 0
CONSTIPATION: 0
NAUSEA: 0
COUGH: 0
VOMITING: 0
BLOOD IN STOOL: 0
SORE THROAT: 0
ABDOMINAL PAIN: 0
WHEEZING: 0
BACK PAIN: 0

## 2023-07-26 NOTE — DISCHARGE INSTRUCTIONS
ABLATION DISCHARGE INSTRUCTIONS    Care of your puncture site:  Remove bandage 24 hours after the procedure. May shower in 24 hours but do not sit in a bathtub/pool of water for 3 days or until the wound is healed. Inspect the site daily and gently clean using soap and water while standing in the shower. Dry thoroughly and apply a Band-Aid that covers the entire site. Do not apply powder or lotion. Normal Observations:  Soreness or tenderness which may last one week. Mild oozing from the incision site. Possible bruising that could last 2 weeks. Activity:  You may resume driving 24 hours following the procedure. You may resume normal activity in 3 days or after the wound heals. Avoid lifting more than 10 pounds for 3 days or until the wound heals. Avoid strenuous exercise or activity for 1 week. Nutrition:  Regular diet       Call your doctor immediately if your condition worsens, for any other concerns, for a follow-up appointment or if you experience any of the following:  Significant bleeding that does not stop after 10 minutes of applying firm pressure on the puncture site. Increased swelling on the groin or leg. Unusual pain, numbness, or tingling of the groin or down the leg. Any signs of infection such as: redness, yellow drainage at the site, swelling or pain. ANESTHESIA DISCHARGE INSTRUCTIONS    Wear your seatbelt home. You are under the influence of drugs-do not drink alcohol, drive, operate machinery, make any important decisions or sign any legal documents for 24 hours. Children should not ride bikes, [employer] or play on gym sets for 24 hours after surgery. A responsible adult needs to be with you for 24 hours. You may experience lightheadedness, dizziness, or sleepiness following surgery. Rest at home today- increase activity as tolerated. Progress slowly to a regular diet unless your physician has instructed you otherwise. Drink plenty of water.   If persistent nausea

## 2023-07-26 NOTE — H&P
[address] [facility]   Electrophysiology      Date: 7/26/2023    Primary Cardiologist: Claudell Ales, MD  PCP: Marquita Bueno MD     Chief Complaint:   Recurrent atrial fibrillation/flutter. History of Present Illness:    I saw Jenny West in the office for electrophysiology follow up today. She is a 74 y.o. female with a past medical history of bicuspid aortic valve,TIA (2006), pituitary brain tumors with adrenal insufficiency, HTN, DM, chiari malformation, thyroid disease and atrial fibrillation. She was first noted to have atrial fibrillation during her cataract surgery in 2017. Seen on EKG 4/26/2017. EKG 5/8/2017 showed NSR. She underwent atrial fibrillation ablation on 7/18/22 with Dr. Sindhu Null. She was seen in the office on 7/21/22 as she was feeling poorly, she was in atrial fibrillation but wanted to see if she went back in rhythm on her own. She was still in atrial fibrillation when seen on 8/15/22. She underwent successful cardioversion on 8/30/33 with Dr. Sindhu Null and was started on Ranexa 500mg BID, later increased to 1000mg BID due to recurrence. She was then started on Multaq 400mg BID in January 2023. She has plans for repeat ablation in July. She continues to have daily occurrences of atrial fibrillation with dyspnea, palpitations and some fatigue. She has been tolerating her medications. Denies any chest pain. No syncope. No edema. No bleeding issues. Answered questions in regards to upcoming ablation. She is here for redo ablation. Allergies:   Allergies   Allergen Reactions    Flecainide Shortness Of Breath and Swelling    Corn Oil      \"Feel horrible after ingesting anything containing corn\"  Ankle swelling    Corn-Containing Products      \"Feel horrible after ingesting anything containing corn\"  Ankle swelling    Levofloxacin      Out of touch w/ world    Lyrica [Pregabalin] Swelling    Tramadol Swelling     \"Feel horrible after ingesting anything containing corn\" Ankle swelling Home Medications:  Prior to Visit Medications    Medication Sig Taking? Authorizing Provider   traZODone (DESYREL) 150 MG tablet TAKE 2 TABLETS BY MOUTH EVERY NIGHT AS NEEDED  JERMAINE Mckeon CNP   fluticasone-salmeterol (ADVAIR) 100-50 MCG/ACT AEPB diskus inhaler INHALE 1 PUFF INTO THE LUNGS IN THE MORNING AND IN THE EVENING  JERMAINE Hatfield CNP   albuterol sulfate HFA (PROVENTIL;VENTOLIN;PROAIR) 108 (90 Base) MCG/ACT inhaler INHALE 2 PUFFS INTO THE LUNGS EVERY 6 HOURS AS NEEDED FOR WHEEZING  Varsha Rich MD   ranolazine (RANEXA) 500 MG extended release tablet Take 1 tablet by mouth 2 times daily  JERMAINE Beyer CNP   hydrocortisone (CORTEF) 5 MG tablet Take 2 tablets by mouth every morning Postop Dosing per Dr Liliana Wang as per patient discussion  JERMAINE Elizalde CNP   hydrocortisone (CORTEF) 5 MG tablet Take 1 tablet by mouth daily (before lunch) Postop dosing as per DR Liliana Wang discussion with patient.   JERMAINE Elizalde CNP   apixaban (ELIQUIS) 5 MG TABS tablet See other dose of Eliquis for 7 days after knee surgery then can resume to TAKE 1, 5mg TABLET BY MOUTH TWICE DAILY  JERMAINE Tinajero CNP   dronedarone hcl (MULTAQ) 400 MG TABS Take 1 tablet by mouth 2 times daily (with meals)  Jaime Arzola MD   furosemide (LASIX) 20 MG tablet TAKE 1 TABLET BY MOUTH TODAY AND TOMORROW AND THEN AS NEEDED FOR SWELLING, WEIGHT GAIN  Jaime Arzola MD   levothyroxine (SYNTHROID) 50 MCG tablet TAKE 1 TABLET BY MOUTH FIVE DAYS A WEEK AND 2 TABLETS ON SATURDAY/SUNDAY  JERMAINE Hatfield CNP   Cholecalciferol (VITAMIN D3) 1000 UNITS TABS Take 1 tablet by mouth daily  Historical Provider, MD        Past Medical History:  Past Medical History:   Diagnosis Date    Adrenal insufficiency ([address])     Arthritis     Asthma     Atrial fibrillation ([address])     Brain tumor ([address])     chiari - malformation    Chiari malformation 3/11    Fibromyalgia     History of

## 2023-07-26 NOTE — PROCEDURES
[address] [facility]     Electrophysiology Procedure Note       Date of Procedure: 7/26/2023  Patient's Name: Simon Calvillo  YOB: 1955   Medical Record Number: [id number]  Referring Physician: Rosalio Sampson MD  Procedure Performed by: Allison Sweet MD    Procedure performed:  Electrophysiology study with radiofrequency ablation of atrial fibrillation and pulmonary vein isolation   Additional ablation with creation of a CTI line for right sided flutter. Additional ablation of complex fractionated atrial electrograms (for atrial fibrillation) in the posterior wall. 3-D electroanatomical mapping of the left atrium    Transseptal puncture through an intact septum using versacross under intracardiac ultrasound guidance without fluoroscopic guidance   Intracardiac echocardiography  Left ventricular pacing and recording  Drug infusion with an attempt to induce atrial tachydysrhythmia  Transesophageal echocardiogram  Anesthesia: General anesthesia provided by the Anesthesia service    Indications for procedure:    Simon Calvillo is a 74 y.o. female who has a history of paroxysmal atrial fibrillation who is symptomatic with symptoms of dyspnea with minimal exertion, fatigue and palpitations, s/p RFA PVI 7/2022 with recurrence despite antiarrhythmic therapy here for a re-ablation for atrial fibrillation and flutter. Details of Procedure: The risks, benefits and alternatives of the ablation procedure were discussed with the patient. The risks including, but not limited to, the risks of bleeding, infection, radiation exposure, injury to vascular, cardiac and surrounding structures (including pneumothorax), stroke, cardiac perforation, tamponade, need for emergent open heart surgery, need for pacemaker implantation, esophageal injury and fistula, myocardial infarction and death were discussed in detail. The patient opted to proceed with the ablation.  Written informed consent was signed and placed in

## 2023-07-26 NOTE — ANESTHESIA POSTPROCEDURE EVALUATION
Department of Anesthesiology  Postprocedure Note    Patient: Emeka Waggoner  MRN: [id number]  YOB: 1955  Date of evaluation: 7/26/2023      Procedure Summary     Date: 07/26/23 Room / Location: [facility] Cath Lab; [facility] Echocardiography    Anesthesia Start: 1032 Anesthesia Stop: 1347    Procedure: ECHOCARDIOGRAM TRANSESOPHAGEAL Diagnosis:       Paroxysmal atrial fibrillation (HCC)      Paroxysmal atrial fibrillation      (To check for clots in the [place] prior to ablation.)    Scheduled Providers: Milli Navarro MD Responsible Provider: Milli Navarro MD    Anesthesia Type: general ASA Status: 3          Anesthesia Type: No value filed.     Ghada Phase I:      Ghada Phase II:        Anesthesia Post Evaluation    Patient location during evaluation: PACU  Airway patency: patent  Complications: no  Cardiovascular status: hemodynamically stable  Respiratory status: acceptable  Hydration status: euvolemic  Multimodal analgesia pain management approach

## 2023-07-26 NOTE — PROGRESS NOTES
Pt arrived from cath lab to PACU, awakens to voice, denies pain. VSS, O2 sats 100% on 6 L simple mask. Dressing to right groin dry and intact, groin soft. Right pedal and posterior tibial pulses palpable, foot warm. Durbin in place draining clear yellow urine. Will monitor.

## 2023-07-26 NOTE — PROGRESS NOTES
1543: Discharge instructions reviewed with patient and friend, paper copy given, all questions answered, and verbalized understanding. 1545: Durbin removed per patient requested before getting out of bed. 250 mL of output    1550: Patient out of bed to ambulate around the unit, R groin site with no change, denying pain, numbness, or tingling.  States they are ready for discharge

## 2023-07-26 NOTE — ANESTHESIA PRE PROCEDURE
atrium is of normal size. The ascending aorta is mildly dilated at 3.9cm. Bicuspid aortic valve with fusion of the left and right coronary cusp. Mild   aortic stenosis with a peak velocity of 2.8 m/s and a peak/mean velocity of   31/21mmHg. Valve area by planimetry is 1.48 cm2. No evidence of aortic valve   regurgitation. Signature      ------------------------------------------------------------------   Electronically signed by Tia Landin MD (Interpreting   physician) on 07/18/2022 at 05:03 PM   ------------------------------------------------------------------                Neuro/Psych:   (+) CVA:, TIA,              ROS comment: Left hamstring tear  Chiari malformation -s/p decompression. Only has residual balance issues. .  Pituitary adenoma GI/Hepatic/Renal:            MICHELE comment: Denies gerd sx or n/v today. Endo/Other:    (+) Diabetes, hypothyroidism::., .                  ROS comment: Adrenal insufficiency. Patient states she needs stress dose steroids during surgery as she did not receive them during knee surgery and had a difficult recovery. She will contact her endocrinologist for recommendations on home taper post op.  in the room also verbalizes agreement with plan. Abdominal:             Vascular: Other Findings:           Anesthesia Plan      ASA 3       Induction: intravenous. MIPS: Postoperative opioids intended and Prophylactic antiemetics administered. Anesthetic plan and risks discussed with patient. Plan discussed with CRNA.                     Stacie Rodney MD   7/26/2023

## 2023-07-26 NOTE — PROGRESS NOTES
Pt resting quietly in bed, awake, denies pain. VSS, O2 sats 99% on room air. Dressing to right groin dry and intact, groin soft. Durbin remains in place draining clear yellow urine. Pt seen by anesthesia, phase 1 criteria met. Will transfer pt to same day for discharge.

## 2023-07-26 NOTE — PROGRESS NOTES
Patient/report received from [facility] PACU RN, VSS, pulses palpable, denying numbness or tingling, R groin site CDI with no drainage, strickland in place with 250mL output, bedrest until 1540, call light in reach

## 2023-07-27 LAB — POC ACT LR: >400 SEC

## 2023-07-31 NOTE — TELEPHONE ENCOUNTER
Medication:   Requested Prescriptions     Pending Prescriptions Disp Refills    MULTAQ 400 MG TABS [Pharmacy Med Name: Carolin Sessions 400MG TABLETS] 180 tablet 1     Sig: TAKE 1 TABLET BY MOUTH TWICE DAILY WITH MEALS        Last Filled:  2023    Patient Phone Number: [phone] (home)     Last appt: 2023   Next appt: 2023  Last ek2023    Last OARRS: No flowsheet data found.

## 2023-08-01 RX ORDER — DRONEDARONE 400 MG/1
TABLET, FILM COATED ORAL
Qty: 180 TABLET | Refills: 1 | Status: SHIPPED | OUTPATIENT
Start: 2023-08-01

## 2023-08-08 ENCOUNTER — HOSPITAL ENCOUNTER (OUTPATIENT)
Dept: PHYSICAL THERAPY | Age: 68
Setting detail: THERAPIES SERIES
Discharge: HOME | End: 2023-08-08
Attending: ORTHOPAEDIC SURGERY
Payer: MEDICARE

## 2023-08-08 PROCEDURE — 97161 PT EVAL LOW COMPLEX 20 MIN: CPT

## 2023-08-08 PROCEDURE — 97110 THERAPEUTIC EXERCISES: CPT

## 2023-08-08 PROCEDURE — 97112 NEUROMUSCULAR REEDUCATION: CPT

## 2023-08-08 NOTE — FLOWSHEET NOTE
[address]  Phone: [phone]   Fax: [phone]    Physical Therapy Treatment Note/ Progress Report:     Date:  2023    Patient Name:  Makenzie Nix    :  1955  MRN: [id number]  Restrictions/Precautions:    Medical/Treatment Diagnosis Information:  Diagnosis: E30.788 (ICD-10-CM) - Status post total left knee replacement  Treatment Diagnosis: M25.562    Pain in left knee, R26.2    Difficulty in walking, not elsewhere classified  Insurance/Certification information:  PT Insurance Information: [id number]  Physician Information:  Referring Provider (secondary): Dr. Neha Wilson  Has the plan of care been signed (Y/N):        []  Yes  [x]  No     Date of Patient follow up with Physician:       Is this a Progress Report:     []  Yes  [x]  No        If Yes:  Date Range for reporting period:  Beginnin/8  Ending    Progress report will be due (10 Rx or 30 days whichever is less):        Recertification will be due (POC Duration  / 90 days whichever is less): see above         Visit # Insurance Allowable Auth Required   1 Auth  bmn []  Yes []  No        Functional Scale:     OUTCOME MEASURE DATE DEFICIT   WOMAC  IE 75% Deficit         Latex Allergy:  [x]NO      []YES  Preferred Language for Healthcare:   [x]English       []other:      Pain level:  2/10     SUBJECTIVE:  See eval    OBJECTIVE:   See eval  Flexibility L* R Comment   Hamstring 50% limitation  25% limitation      Gastroc         ITB 50% limitation  25% limitation      Quad         Hip flexor                         ROM PROM AROM Overpressure Comment     L R L* R L R     Flexion     106 124         Extension     lacking 5 4 hyper                                                   Strength L* R Comment   Quad 4+ 5     Hamstring 4- 5     Gastroc         Hip  flexion 4- 4     Hip abd                   30 second sit to stand     7 reps without HHA   TUG

## 2023-08-10 ENCOUNTER — HOSPITAL ENCOUNTER (OUTPATIENT)
Dept: PHYSICAL THERAPY | Age: 68
Setting detail: THERAPIES SERIES
Discharge: HOME | End: 2023-08-10
Attending: ORTHOPAEDIC SURGERY
Payer: MEDICARE

## 2023-08-10 PROCEDURE — 97530 THERAPEUTIC ACTIVITIES: CPT

## 2023-08-10 PROCEDURE — 97110 THERAPEUTIC EXERCISES: CPT

## 2023-08-10 PROCEDURE — 97112 NEUROMUSCULAR REEDUCATION: CPT

## 2023-08-10 NOTE — FLOWSHEET NOTE
[address]  Phone: [phone]   Fax: [phone]    Physical Therapy Treatment Note/ Progress Report:     Date:  8/10/2023    Patient Name:  Tracey Grady    :  1955  MRN: [id number]  Restrictions/Precautions:    Medical/Treatment Diagnosis Information:  Diagnosis: T42.106 (ICD-10-CM) - Status post total left knee replacement  Treatment Diagnosis: M25.562    Pain in left knee, R26.2    Difficulty in walking, not elsewhere classified  Insurance/Certification information:  PT Insurance Information: [id number]  Physician Information:  Referring Provider (secondary): Dr. Marisol Newsome  Has the plan of care been signed (Y/N):        [x]  Yes  []  No     Date of Patient follow up with Physician:       Is this a Progress Report:     []  Yes  [x]  No        If Yes:  Date Range for reporting period:  Beginnin/8  Ending    Progress report will be due (10 Rx or 30 days whichever is less):        Recertification will be due (POC Duration  / 90 days whichever is less): see above         Visit # Insurance Allowable Auth Required   2/4  Auth  bmn []  Yes []  No        Functional Scale:     OUTCOME MEASURE DATE DEFICIT   WOMAC  IE 75% Deficit         Latex Allergy:  [x]NO      []YES  Preferred Language for Healthcare:   [x]English       []other:      Pain level: 7/10     SUBJECTIVE:  Patient states she got on the floor to do her exercises lat night, and her leg got caught under her when trying to get up. She is sore today.        OBJECTIVE:   See eval  Flexibility L* R Comment   Hamstring 50% limitation  25% limitation      Gastroc         ITB 50% limitation  25% limitation      Quad         Hip flexor             8/10            ROM PROM AROM Overpressure Comment     L R L* R L R     Flexion  115 on ERMI    106 124         Extension     lacking 5 4 hyper                                                   Strength L* R

## 2023-08-15 ENCOUNTER — HOSPITAL ENCOUNTER (OUTPATIENT)
Dept: PHYSICAL THERAPY | Age: 68
Setting detail: THERAPIES SERIES
Discharge: HOME | End: 2023-08-15
Attending: ORTHOPAEDIC SURGERY
Payer: MEDICARE

## 2023-08-15 PROCEDURE — 97112 NEUROMUSCULAR REEDUCATION: CPT

## 2023-08-15 PROCEDURE — 97530 THERAPEUTIC ACTIVITIES: CPT

## 2023-08-15 PROCEDURE — 97110 THERAPEUTIC EXERCISES: CPT

## 2023-08-15 NOTE — FLOWSHEET NOTE
[address] [place] [address]  Phone: [phone]   Fax: [phone]    Physical Therapy Treatment Note/ Progress Report:     Date:  8/15/2023    Patient Name:  Eloina Pompa    :  1955  MRN: [id number]  Restrictions/Precautions:    Medical/Treatment Diagnosis Information:  Diagnosis: Y10.005 (ICD-10-CM) - Status post total left knee replacement  Treatment Diagnosis: M25.562    Pain in left knee, R26.2    Difficulty in walking, not elsewhere classified  Insurance/Certification information:  PT Insurance Information: [employer] Augustin  Physician Information:  Referring Provider (secondary): Dr. Nick Abrams  Has the plan of care been signed (Y/N):        [x]  Yes  []  No     Date of Patient follow up with Physician:       Is this a Progress Report:     []  Yes  [x]  No        If Yes:  Date Range for reporting period:  Beginnin/8  Ending    Progress report will be due (10 Rx or 30 days whichever is less):        Recertification will be due (POC Duration  / 90 days whichever is less): see above         Visit # Insurance Allowable Auth Required   3/4  Auth  bmn []  Yes []  No        Functional Scale:     OUTCOME MEASURE DATE DEFICIT   WOMAC  IE 75% Deficit         Latex Allergy:  [x]NO      []YES  Preferred Language for Healthcare:   [x]English       []other:      Pain level: 0/10     SUBJECTIVE: Patient states the knee is feeling good. She feels the swelling in her leg, but no pain. Patient states she was able to ascend stairs yesterday with a reciprocal pattern.        OBJECTIVE:   See eval  Flexibility L* R Comment   Hamstring 50% limitation  25% limitation      Gastroc         ITB 50% limitation  25% limitation      Quad         Hip flexor             8/15            ROM PROM AROM Overpressure Comment     L R L* R L R     Flexion  125 on ERMI    106 124         Extension     lacking 5 4 hyper

## 2023-08-17 ENCOUNTER — HOSPITAL ENCOUNTER (OUTPATIENT)
Dept: PHYSICAL THERAPY | Age: 68
Setting detail: THERAPIES SERIES
Discharge: HOME | End: 2023-08-17
Attending: ORTHOPAEDIC SURGERY
Payer: MEDICARE

## 2023-08-17 PROCEDURE — 97112 NEUROMUSCULAR REEDUCATION: CPT

## 2023-08-17 PROCEDURE — 97110 THERAPEUTIC EXERCISES: CPT

## 2023-08-17 PROCEDURE — 97530 THERAPEUTIC ACTIVITIES: CPT

## 2023-08-17 NOTE — FLOWSHEET NOTE
Strength L* R Comment   Quad 4+ 5     Hamstring 4- 5     Gastroc         Hip  flexion 4- 4     Hip abd                   30 second sit to stand     7 reps without HHA   TUG test     11.4 sec without AD          RESTRICTIONS/PRECAUTIONS: OA, TIA in 2006 (2 brain tumor removals, 2007 and 2014), chiari decompression surgery (2011),  adrenal insuffiencey, fibromyalgia    Exercises/Interventions:     Exercise/Equipment Resistance/Repetitions Other comments   Stretching     Hamstring 4x20\" EOT   Prone quad     Calf with strap 4x20\"     Hip Flexion- Adriel stretch    ITB     SKTC  ICBS                    SLR     Supine EOT NPV Abduction Standing   2x10 B     Adduction     Prone     Munices  Long sitting        Isometrics     Quad sets 15x5\"    Add ball squeeze          Patellar Glides     Medial     Superior     Inferior          ROM     Standing chair lunge     Hang Weights     Seated EOT knee flex/ext     Supine SB knee flexion     Weight Shift     Ankle Pumps     ERMI              CKC     Calf raises 2x10    Wall sits     Step ups     Lateral step ups     1 leg stand 10x10\"   Fingertip assist     Squatting 2x10 at bar     CC TKE NPV    Balance     Bridges  2x10     FR quad iso     SL KB pass     KB DL     Decline heel taps                    PRE     Extension EOT 2# 3x10  RANGE:   Flexion EOT 2x10  RANGE:        Leg curl               Bike 6' for ROM          Manual interventions                     Therapeutic Exercise and NMR EXR  [x] (99971) Provided verbal/tactile cueing for activities related to strengthening, flexibility, endurance, ROM for improvements in LE, proximal hip, and core control with self care, mobility, lifting, ambulation.   [x] (64130) Provided verbal/tactile cueing for activities related to improving balance, coordination, kinesthetic sense, posture, motor skill, proprioception  to assist with LE, proximal hip, and core control in self care, mobility, lifting,

## 2023-08-22 ENCOUNTER — APPOINTMENT (OUTPATIENT)
Dept: PHYSICAL THERAPY | Age: 68
End: 2023-08-22
Attending: ORTHOPAEDIC SURGERY
Payer: MEDICARE

## 2023-08-23 NOTE — PROGRESS NOTES
Cardiac Electrophysiology Follow Up  Date: 8/30/2023     Chief Complaint:   Chief Complaint   Patient presents with    Follow-up     PT reports no concerns at this time. HPI: Simon Calvillo is a 74 y.o. patient with a history of of TIA (2006), pituitary brain tumors with adrenal insufficiency, chiari malformation, thyroid disease and atrial fibrillation. She was first noted to have atrial fibrillation during her cataract surgery in 2017. Seen on EKG 4/26/2017. EKG 5/8/2017 showed NSR. She underwent atrial fibrillation ablation 7/18/22. She was seen in the office and noted to have recurrence of atrial fibrillation. She underwent successful cardioversion on 8/30/22. She did not tolerated flecainide at all post ablation. After DCCV, ranexa was started 500 mg bid. 11/16/22 Patient contacted the office complaining of HR being 145 bpm. She spontaneously converted to sinus rhythm the following day. 12/15/2022 Patient contacted the office stating she is in atrial fibrillation with rate of 141 bpm. Recommended taking Ranexa 500 mg BID.      01/09/23 Had complaints of atrial fibrillation. HOMA Saleem advised patient to increase Ranexa to 100 mg BID with possible cardioversion if she remained in sinus rhythm. 01/2023 Informed the office that she remained in atrial fibrillation with rapid pounding. Planned for Tikosyn admission. 01/31/23 Admitted to hospital for dofetilide loading. Noted that there is corn oil in dofetilide and was decided not to start given significant allergy. She was started on Multaq.     03/01/23 Underwent left total knee replacement robotic assisted. S/p Successful Pulmonary vein re-isolations using wide area circumferential radiofrequency ablation with confirmation of exit and entrance block. Successful CTI line with block  07/26/23    Previously followed with Dr. Mallika Meza. Interval History:    Today, patient presents in follow up after recent re-ablation for atrial

## 2023-08-24 ENCOUNTER — APPOINTMENT (OUTPATIENT)
Dept: PHYSICAL THERAPY | Age: 68
End: 2023-08-24
Attending: ORTHOPAEDIC SURGERY
Payer: MEDICARE

## 2023-08-29 ENCOUNTER — HOSPITAL ENCOUNTER (OUTPATIENT)
Dept: PHYSICAL THERAPY | Age: 68
Setting detail: THERAPIES SERIES
Discharge: HOME | End: 2023-08-29
Attending: ORTHOPAEDIC SURGERY
Payer: MEDICARE

## 2023-08-29 PROCEDURE — 97530 THERAPEUTIC ACTIVITIES: CPT

## 2023-08-29 PROCEDURE — 97112 NEUROMUSCULAR REEDUCATION: CPT

## 2023-08-29 PROCEDURE — 97110 THERAPEUTIC EXERCISES: CPT

## 2023-08-29 NOTE — FLOWSHEET NOTE
[address]  Phone: [phone]   Fax: [phone]    Physical Therapy Treatment Note/ Progress Report:     Date:  2023    Patient Name:  Galen Poole    :  1955  MRN: [id number]  Restrictions/Precautions:    Medical/Treatment Diagnosis Information:  Diagnosis: F59.818 (ICD-10-CM) - Status post total left knee replacement  Treatment Diagnosis: M25.562    Pain in left knee, R26.2    Difficulty in walking, not elsewhere classified  Insurance/Certification information:  PT Insurance Information: [id number]  Physician Information:  Referring Provider (secondary): Dr. Rogelio English  Has the plan of care been signed (Y/N):        [x]  Yes  []  No     Date of Patient follow up with Physician:       Is this a Progress Report:     []  Yes  [x]  No        If Yes:  Date Range for reporting period:  Beginnin/8  Ending    Progress report will be due (10 Rx or 30 days whichever is less):        Recertification will be due (POC Duration  / 90 days whichever is less): see above         Visit # Insurance Allowable Auth Required     Auth  bmn [x]  Yes []  No        Functional Scale:     OUTCOME MEASURE DATE DEFICIT   WOMAC  68.75% Deficit    WOMAC  IE 75% Deficit         Latex Allergy:  [x]NO      []YES  Preferred Language for Healthcare:   [x]English       []other:      Pain level:  0/10     SUBJECTIVE: Patient states she is feeling good. Ascending stairs remains a challenge. She has not had the sharp shooting pain and notes car negotiation is improving.          OBJECTIVE:   See eval  Flexibility L* R Comment   Hamstring 50% limitation  25% limitation      Gastroc         ITB 50% limitation  25% limitation      Quad         Hip flexor             8/15            ROM PROM AROM Overpressure Comment     L R L* R L R     Flexion  125 on ERMI    106 124         Extension     lacking 5 4 hyper

## 2023-08-30 ENCOUNTER — OFFICE VISIT (OUTPATIENT)
Dept: CARDIOLOGY CLINIC | Age: 68
End: 2023-08-30

## 2023-08-30 VITALS
BODY MASS INDEX: 36.88 KG/M2 | OXYGEN SATURATION: 98 % | HEART RATE: 63 BPM | DIASTOLIC BLOOD PRESSURE: 70 MMHG | HEIGHT: 67 IN | SYSTOLIC BLOOD PRESSURE: 134 MMHG | WEIGHT: 235 LBS

## 2023-08-30 DIAGNOSIS — I48.19 PERSISTENT ATRIAL FIBRILLATION (HCC): Primary | ICD-10-CM

## 2023-08-30 RX ORDER — MAGNESIUM 30 MG
30 TABLET ORAL 2 TIMES DAILY
COMMUNITY

## 2023-09-05 ENCOUNTER — HOSPITAL ENCOUNTER (OUTPATIENT)
Dept: PHYSICAL THERAPY | Age: 68
Setting detail: THERAPIES SERIES
Discharge: HOME | End: 2023-09-05
Attending: ORTHOPAEDIC SURGERY

## 2023-09-05 NOTE — FLOWSHEET NOTE
[address] [place] [address]    Physical Therapy  Cancellation/No-show Note  Patient Name:  Cali Soler  :  1955   Date:  2023  Cancelled visits to date: 1  No-shows to date: 0    For today's appointment patient:  [x]  Cancelled  []  Rescheduled appointment  []  No-show     Reason given by patient:  [x]  Patient ill  []  Conflicting appointment   []  No transportation    []  Conflict with work  []  No reason given  []  Other:     Comments:      Electronically signed by:  Eryn Pal PT, DPT

## 2023-09-07 RX ORDER — TRAZODONE HYDROCHLORIDE 150 MG/1
TABLET ORAL
Qty: 60 TABLET | Refills: 0 | Status: SHIPPED | OUTPATIENT
Start: 2023-09-07

## 2023-09-07 NOTE — TELEPHONE ENCOUNTER
LV 2/9/23 WITH DR FOREMAN FOR HOSP F/U NV NONE  MY CHART MESSAGE SENT TO SCHEDULE AWV/INSOMNIA APPT.   Ritika

## 2023-10-02 DIAGNOSIS — I48.0 PAROXYSMAL ATRIAL FIBRILLATION (HCC): ICD-10-CM

## 2023-10-02 RX ORDER — ALBUTEROL SULFATE 90 UG/1
AEROSOL, METERED RESPIRATORY (INHALATION)
Qty: 8.5 G | Refills: 0 | Status: SHIPPED | OUTPATIENT
Start: 2023-10-02

## 2023-10-06 DIAGNOSIS — I48.0 PAROXYSMAL ATRIAL FIBRILLATION (HCC): ICD-10-CM

## 2023-10-06 NOTE — TELEPHONE ENCOUNTER
Medication Refill    Medication needing refilled:ELIQUIS    Dosage of the medication:5mg    How are you taking this medication (QD, BID, TID, QID, PRN):5mg TABLET BY MOUTH TWICE DAILY    30 or 90 day supply called in:90    When will you run out of your medication:has 3 pills left     Which Pharmacy are we sending the medication to?:[facility] [address] [place] [id number]     Jennyfer#[phone]        Pt needs today please

## 2023-10-12 DIAGNOSIS — Q23.1 BICUSPID AORTIC VALVE: Primary | ICD-10-CM

## 2023-10-12 DIAGNOSIS — I48.0 PAROXYSMAL ATRIAL FIBRILLATION (HCC): ICD-10-CM

## 2023-10-13 ENCOUNTER — TELEPHONE (OUTPATIENT)
Dept: CARDIOLOGY CLINIC | Age: 68
End: 2023-10-13

## 2023-10-13 ENCOUNTER — NURSE ONLY (OUTPATIENT)
Dept: CARDIOLOGY CLINIC | Age: 68
End: 2023-10-13

## 2023-10-13 NOTE — TELEPHONE ENCOUNTER
Monitor placed by Linda Quinn , [address]  Length of monitor 30  Monitor ordered by Dr. Cohen Lary  Serial number [id number]  Kit ID [id number]  Activation successful prior to pt leaving office?  Yes

## 2023-10-25 RX ORDER — ALBUTEROL SULFATE 90 UG/1
AEROSOL, METERED RESPIRATORY (INHALATION)
Qty: 8.5 G | Refills: 0 | Status: SHIPPED | OUTPATIENT
Start: 2023-10-25

## 2023-10-26 PROCEDURE — 93228 REMOTE 30 DAY ECG REV/REPORT: CPT | Performed by: INTERNAL MEDICINE

## 2023-10-31 DIAGNOSIS — I48.0 PAROXYSMAL ATRIAL FIBRILLATION (HCC): Primary | ICD-10-CM

## 2023-10-31 RX ORDER — RANOLAZINE 500 MG/1
500 TABLET, EXTENDED RELEASE ORAL 2 TIMES DAILY
Qty: 180 TABLET | Refills: 3 | Status: SHIPPED | OUTPATIENT
Start: 2023-10-31

## 2023-10-31 NOTE — TELEPHONE ENCOUNTER
Last OV: 8/30/23  Next OV: 12/6/23  Last refill: 4/11/23  #180  3 R/F  Most recent Labs: 7/26/23  Last EKG (if needed): 8/30/23

## 2023-11-22 RX ORDER — ALBUTEROL SULFATE 90 UG/1
AEROSOL, METERED RESPIRATORY (INHALATION)
Qty: 8.5 G | Refills: 0 | OUTPATIENT
Start: 2023-11-22

## 2023-11-27 DIAGNOSIS — J45.30 MILD PERSISTENT ALLERGIC ASTHMA WITHOUT COMPLICATION: ICD-10-CM

## 2023-11-27 RX ORDER — ALBUTEROL SULFATE 90 UG/1
2 AEROSOL, METERED RESPIRATORY (INHALATION) EVERY 6 HOURS PRN
Qty: 8.5 G | Refills: 0 | Status: SHIPPED | OUTPATIENT
Start: 2023-11-27

## 2023-11-27 RX ORDER — FLUTICASONE PROPIONATE AND SALMETEROL 100; 50 UG/1; UG/1
POWDER RESPIRATORY (INHALATION)
Qty: 60 EACH | Refills: 3 | Status: SHIPPED | OUTPATIENT
Start: 2023-11-27

## 2023-11-28 RX ORDER — TRAZODONE HYDROCHLORIDE 150 MG/1
TABLET ORAL
Qty: 60 TABLET | Refills: 0 | Status: SHIPPED | OUTPATIENT
Start: 2023-11-28

## 2023-12-06 ENCOUNTER — OFFICE VISIT (OUTPATIENT)
Dept: CARDIOLOGY CLINIC | Age: 68
End: 2023-12-06
Payer: MEDICARE

## 2023-12-06 VITALS
HEART RATE: 69 BPM | WEIGHT: 240 LBS | SYSTOLIC BLOOD PRESSURE: 122 MMHG | HEIGHT: 67 IN | BODY MASS INDEX: 37.67 KG/M2 | DIASTOLIC BLOOD PRESSURE: 68 MMHG | OXYGEN SATURATION: 98 %

## 2023-12-06 DIAGNOSIS — I48.19 PERSISTENT ATRIAL FIBRILLATION (HCC): Primary | ICD-10-CM

## 2023-12-06 DIAGNOSIS — I77.810 ASCENDING AORTA DILATATION (HCC): ICD-10-CM

## 2023-12-06 DIAGNOSIS — I10 ESSENTIAL HYPERTENSION: ICD-10-CM

## 2023-12-06 DIAGNOSIS — Z86.73 HISTORY OF TIA (TRANSIENT ISCHEMIC ATTACK): ICD-10-CM

## 2023-12-06 DIAGNOSIS — Q23.1 BICUSPID AORTIC VALVE: ICD-10-CM

## 2023-12-06 PROCEDURE — 1123F ACP DISCUSS/DSCN MKR DOCD: CPT | Performed by: INTERNAL MEDICINE

## 2023-12-06 PROCEDURE — 3074F SYST BP LT 130 MM HG: CPT | Performed by: INTERNAL MEDICINE

## 2023-12-06 PROCEDURE — 93000 ELECTROCARDIOGRAM COMPLETE: CPT | Performed by: INTERNAL MEDICINE

## 2023-12-06 PROCEDURE — 3078F DIAST BP <80 MM HG: CPT | Performed by: INTERNAL MEDICINE

## 2023-12-06 PROCEDURE — 99215 OFFICE O/P EST HI 40 MIN: CPT | Performed by: INTERNAL MEDICINE

## 2023-12-06 NOTE — PROGRESS NOTES
Cardiac Electrophysiology       Reason for consultation: Atrial fibrillation  Consult Requesting Physician: Leah Brand MD    Date: 12/6/2023     Chief Complaint:   Chief Complaint   Patient presents with    Follow-up     PT reports no new issues at this time. HPI: Karine Kumar is a 79 y.o. patient with a history of of TIA (2006), pituitary brain tumors with adrenal insufficiency, chiari malformation, thyroid disease and atrial fibrillation. She was first noted to have atrial fibrillation during her cataract surgery in 2017. Seen on EKG 4/26/2017. EKG 5/8/2017 showed NSR. She underwent atrial fibrillation ablation 7/18/22. She was seen in the office and noted to have recurrence of atrial fibrillation. She underwent successful cardioversion on 8/30/22. She did not tolerated flecainide at all post ablation. After DCCV, ranexa was started 500 mg bid. 11/16/22 Patient contacted the office complaining of HR being 145 bpm. She spontaneously converted to sinus rhythm the following day. 12/15/2022 Patient contacted the office stating she is in atrial fibrillation with rate of 141 bpm. Recommended taking Ranexa 500 mg BID.      01/09/23 Had complaints of atrial fibrillation. HOMA Saleem advised patient to increase Ranexa to 100 mg BID with possible cardioversion if she remained in sinus rhythm. 01/2023 Informed the office that she remained in atrial fibrillation with rapid pounding. Planned for Tikosyn admission. 01/31/23 Admitted to hospital for dofetilide loading. Noted that there is corn oil in dofetilide and was decided not to start given significant allergy. She was started on Multaq.     03/01/23 Underwent left total knee replacement robotic assisted. S/p Successful Pulmonary vein re-isolations using wide area circumferential radiofrequency ablation with confirmation of exit and entrance block.  Successful CTI line with block 07/26/23    Seen in clinic for 1 month follow up after
blink.      Type 2 DM    - Continue current medical management    - Hb A1c 5.3 02/20/23  ***    Thank you for allowing me to participate in the care of Alvarado SENA. All questions and concerns were addressed to the patient/family. Alternatives to my treatment were discussed.      ***    Nadara Schlatter, MD  Cardiac Electrophysiology  [address] [place] [address]

## 2023-12-11 ENCOUNTER — E-VISIT (OUTPATIENT)
Dept: FAMILY MEDICINE CLINIC | Age: 68
End: 2023-12-11
Payer: MEDICARE

## 2023-12-11 DIAGNOSIS — B96.89 ACUTE BACTERIAL SINUSITIS: Primary | ICD-10-CM

## 2023-12-11 DIAGNOSIS — J01.90 ACUTE BACTERIAL SINUSITIS: Primary | ICD-10-CM

## 2023-12-11 PROCEDURE — 99422 OL DIG E/M SVC 11-20 MIN: CPT | Performed by: NURSE PRACTITIONER

## 2023-12-11 ASSESSMENT — LIFESTYLE VARIABLES: SMOKING_STATUS: NO, I'VE NEVER SMOKED

## 2023-12-12 RX ORDER — METHYLPREDNISOLONE 4 MG/1
TABLET ORAL
Qty: 1 KIT | Refills: 0 | Status: SHIPPED | OUTPATIENT
Start: 2023-12-12 | End: 2023-12-18

## 2023-12-12 RX ORDER — ALBUTEROL SULFATE 90 UG/1
2 AEROSOL, METERED RESPIRATORY (INHALATION) EVERY 6 HOURS PRN
Qty: 8.5 G | Refills: 0 | Status: SHIPPED | OUTPATIENT
Start: 2023-12-12

## 2023-12-12 RX ORDER — AZITHROMYCIN 250 MG/1
250 TABLET, FILM COATED ORAL SEE ADMIN INSTRUCTIONS
Qty: 6 TABLET | Refills: 0 | Status: SHIPPED | OUTPATIENT
Start: 2023-12-12 | End: 2023-12-17

## 2023-12-12 NOTE — PROGRESS NOTES
11-20 minutes were dedicated to the visit. This includes review questionnaire, review of chart, prescription of medication, and messaging the patient.

## 2024-01-02 RX ORDER — TRAZODONE HYDROCHLORIDE 150 MG/1
TABLET ORAL
Qty: 60 TABLET | Refills: 0 | Status: SHIPPED | OUTPATIENT
Start: 2024-01-02

## 2024-01-10 RX ORDER — ALBUTEROL SULFATE 90 UG/1
2 AEROSOL, METERED RESPIRATORY (INHALATION) EVERY 6 HOURS PRN
Qty: 8.5 G | Refills: 0 | Status: SHIPPED | OUTPATIENT
Start: 2024-01-10

## 2024-01-11 NOTE — PATIENT INSTRUCTIONS

## 2024-01-15 SDOH — HEALTH STABILITY: PHYSICAL HEALTH: ON AVERAGE, HOW MANY DAYS PER WEEK DO YOU ENGAGE IN MODERATE TO STRENUOUS EXERCISE (LIKE A BRISK WALK)?: 1 DAY

## 2024-01-15 SDOH — HEALTH STABILITY: PHYSICAL HEALTH: ON AVERAGE, HOW MANY MINUTES DO YOU ENGAGE IN EXERCISE AT THIS LEVEL?: 10 MIN

## 2024-01-15 ASSESSMENT — LIFESTYLE VARIABLES
HOW MANY STANDARD DRINKS CONTAINING ALCOHOL DO YOU HAVE ON A TYPICAL DAY: 0
HOW OFTEN DO YOU HAVE SIX OR MORE DRINKS ON ONE OCCASION: 1
HOW OFTEN DO YOU HAVE A DRINK CONTAINING ALCOHOL: 1
HOW MANY STANDARD DRINKS CONTAINING ALCOHOL DO YOU HAVE ON A TYPICAL DAY: PATIENT DOES NOT DRINK
HOW OFTEN DO YOU HAVE A DRINK CONTAINING ALCOHOL: NEVER

## 2024-01-15 ASSESSMENT — PATIENT HEALTH QUESTIONNAIRE - PHQ9
SUM OF ALL RESPONSES TO PHQ QUESTIONS 1-9: 0
1. LITTLE INTEREST OR PLEASURE IN DOING THINGS: 0
2. FEELING DOWN, DEPRESSED OR HOPELESS: 0
SUM OF ALL RESPONSES TO PHQ9 QUESTIONS 1 & 2: 0

## 2024-01-16 ENCOUNTER — OFFICE VISIT (OUTPATIENT)
Dept: FAMILY MEDICINE CLINIC | Age: 69
End: 2024-01-16
Payer: MEDICARE

## 2024-01-16 VITALS
WEIGHT: 245 LBS | DIASTOLIC BLOOD PRESSURE: 86 MMHG | BODY MASS INDEX: 38.45 KG/M2 | HEART RATE: 64 BPM | HEIGHT: 67 IN | SYSTOLIC BLOOD PRESSURE: 122 MMHG | OXYGEN SATURATION: 97 %

## 2024-01-16 DIAGNOSIS — N18.31 STAGE 3A CHRONIC KIDNEY DISEASE (HCC): ICD-10-CM

## 2024-01-16 DIAGNOSIS — E66.01 SEVERE OBESITY (BMI 35.0-39.9) WITH COMORBIDITY (HCC): ICD-10-CM

## 2024-01-16 DIAGNOSIS — Z13.1 DIABETES MELLITUS SCREENING: ICD-10-CM

## 2024-01-16 DIAGNOSIS — I77.810 ASCENDING AORTA DILATATION (HCC): ICD-10-CM

## 2024-01-16 DIAGNOSIS — Z12.11 COLON CANCER SCREENING: ICD-10-CM

## 2024-01-16 DIAGNOSIS — F51.04 PSYCHOPHYSIOLOGICAL INSOMNIA: ICD-10-CM

## 2024-01-16 DIAGNOSIS — E03.9 HYPOTHYROIDISM, UNSPECIFIED TYPE: ICD-10-CM

## 2024-01-16 DIAGNOSIS — Z13.220 LIPID SCREENING: ICD-10-CM

## 2024-01-16 DIAGNOSIS — E27.40 ADRENAL INSUFFICIENCY (HCC): ICD-10-CM

## 2024-01-16 DIAGNOSIS — I48.0 PAROXYSMAL ATRIAL FIBRILLATION (HCC): ICD-10-CM

## 2024-01-16 DIAGNOSIS — Z00.00 INITIAL MEDICARE ANNUAL WELLNESS VISIT: Primary | ICD-10-CM

## 2024-01-16 DIAGNOSIS — D35.2 PITUITARY ADENOMA (HCC): ICD-10-CM

## 2024-01-16 DIAGNOSIS — Z12.31 ENCOUNTER FOR SCREENING MAMMOGRAM FOR MALIGNANT NEOPLASM OF BREAST: ICD-10-CM

## 2024-01-16 PROBLEM — N18.30 CHRONIC RENAL DISEASE, STAGE III (HCC): Status: ACTIVE | Noted: 2024-01-16

## 2024-01-16 PROBLEM — E11.9 TYPE 2 DIABETES MELLITUS (HCC): Status: RESOLVED | Noted: 2024-01-16 | Resolved: 2024-01-16

## 2024-01-16 PROBLEM — E11.9 TYPE 2 DIABETES MELLITUS (HCC): Status: ACTIVE | Noted: 2024-01-16

## 2024-01-16 LAB
ALBUMIN SERPL-MCNC: 4.3 G/DL (ref 3.4–5)
ALBUMIN/GLOB SERPL: 2.9 {RATIO} (ref 1.1–2.2)
ALP SERPL-CCNC: 101 U/L (ref 40–129)
ALT SERPL-CCNC: 12 U/L (ref 10–40)
ANION GAP SERPL CALCULATED.3IONS-SCNC: 13 MMOL/L (ref 3–16)
AST SERPL-CCNC: 13 U/L (ref 15–37)
BILIRUB SERPL-MCNC: 0.4 MG/DL (ref 0–1)
BUN SERPL-MCNC: 17 MG/DL (ref 7–20)
CALCIUM SERPL-MCNC: 9.1 MG/DL (ref 8.3–10.6)
CHLORIDE SERPL-SCNC: 104 MMOL/L (ref 99–110)
CHOLEST SERPL-MCNC: 212 MG/DL (ref 0–199)
CO2 SERPL-SCNC: 25 MMOL/L (ref 21–32)
CREAT SERPL-MCNC: 1.3 MG/DL (ref 0.6–1.2)
GFR SERPLBLD CREATININE-BSD FMLA CKD-EPI: 45 ML/MIN/{1.73_M2}
GLUCOSE SERPL-MCNC: 102 MG/DL (ref 70–99)
HDLC SERPL-MCNC: 68 MG/DL (ref 40–60)
LDLC SERPL CALC-MCNC: 112 MG/DL
POTASSIUM SERPL-SCNC: 4.1 MMOL/L (ref 3.5–5.1)
PROT SERPL-MCNC: 5.8 G/DL (ref 6.4–8.2)
SODIUM SERPL-SCNC: 142 MMOL/L (ref 136–145)
T4 FREE SERPL-MCNC: 1.2 NG/DL (ref 0.9–1.8)
TRIGL SERPL-MCNC: 158 MG/DL (ref 0–150)
TSH SERPL DL<=0.005 MIU/L-ACNC: 1.57 UIU/ML (ref 0.27–4.2)
VLDLC SERPL CALC-MCNC: 32 MG/DL

## 2024-01-16 PROCEDURE — G0438 PPPS, INITIAL VISIT: HCPCS | Performed by: NURSE PRACTITIONER

## 2024-01-16 PROCEDURE — 3074F SYST BP LT 130 MM HG: CPT | Performed by: NURSE PRACTITIONER

## 2024-01-16 PROCEDURE — 3079F DIAST BP 80-89 MM HG: CPT | Performed by: NURSE PRACTITIONER

## 2024-01-16 PROCEDURE — 36415 COLL VENOUS BLD VENIPUNCTURE: CPT | Performed by: NURSE PRACTITIONER

## 2024-01-16 PROCEDURE — 1123F ACP DISCUSS/DSCN MKR DOCD: CPT | Performed by: NURSE PRACTITIONER

## 2024-01-16 RX ORDER — ZOLPIDEM TARTRATE 10 MG/1
10 TABLET ORAL NIGHTLY PRN
Qty: 30 TABLET | Refills: 0 | Status: SHIPPED | OUTPATIENT
Start: 2024-01-16 | End: 2024-02-15

## 2024-01-16 ASSESSMENT — PATIENT HEALTH QUESTIONNAIRE - PHQ9
7. TROUBLE CONCENTRATING ON THINGS, SUCH AS READING THE NEWSPAPER OR WATCHING TELEVISION: 0
SUM OF ALL RESPONSES TO PHQ QUESTIONS 1-9: 0
4. FEELING TIRED OR HAVING LITTLE ENERGY: 0
10. IF YOU CHECKED OFF ANY PROBLEMS, HOW DIFFICULT HAVE THESE PROBLEMS MADE IT FOR YOU TO DO YOUR WORK, TAKE CARE OF THINGS AT HOME, OR GET ALONG WITH OTHER PEOPLE: 0
8. MOVING OR SPEAKING SO SLOWLY THAT OTHER PEOPLE COULD HAVE NOTICED. OR THE OPPOSITE, BEING SO FIGETY OR RESTLESS THAT YOU HAVE BEEN MOVING AROUND A LOT MORE THAN USUAL: 0
SUM OF ALL RESPONSES TO PHQ QUESTIONS 1-9: 0
2. FEELING DOWN, DEPRESSED OR HOPELESS: 0
1. LITTLE INTEREST OR PLEASURE IN DOING THINGS: 0
9. THOUGHTS THAT YOU WOULD BE BETTER OFF DEAD, OR OF HURTING YOURSELF: 0
6. FEELING BAD ABOUT YOURSELF - OR THAT YOU ARE A FAILURE OR HAVE LET YOURSELF OR YOUR FAMILY DOWN: 0
3. TROUBLE FALLING OR STAYING ASLEEP: 0
SUM OF ALL RESPONSES TO PHQ QUESTIONS 1-9: 0
SUM OF ALL RESPONSES TO PHQ QUESTIONS 1-9: 0
SUM OF ALL RESPONSES TO PHQ9 QUESTIONS 1 & 2: 0
5. POOR APPETITE OR OVEREATING: 0

## 2024-01-16 NOTE — PROGRESS NOTES
Medicare Annual Wellness Visit    Misty Womack is here for Medicare AWV (Annual wellness, A1c?/Declined flu shot)        Initial Medicare annual wellness visit  Recommendations for Preventive Services Due: see orders and patient instructions/AVS.  Recommended screening schedule for the next 5-10 years is provided to the patient in written form: see Patient Instructions/AVS.   Misty was seen today for medicare awv.  Paroxysmal atrial fibrillation (HCC)  Followed by cardiology  Psychophysiological insomnia  Trazodone discontinued due to corn allergy  Controlled substances monitoring: possible medication side effects, risk of tolerance and/or dependence, and alternative treatments discussed and no signs of potential drug abuse or diversion identified.   -     zolpidem (AMBIEN) 10 MG tablet; Take 1 tablet by mouth nightly as needed for Sleep for up to 30 days. Max Daily Amount: 10 mg se dw pt instructed to start with 0.5 tablet    Severe obesity (BMI 35.0-39.9) with comorbidity (HCC)  ENCOURAGED TO INCREASE CARDIO ACTIVITY TO AT LEAST  30 MIN3-4 X WEEKLY      Hypothyroidism, unspecified type  Adrenal insufficiency (HCC)  Pituitary adenoma (HCC)  -     TSH; Future  -     T4, Free; Future  Follow up with Dr Mendiola    Stage 3a chronic kidney disease (HCC)  CMP  Lipid screening  -     Lipid Panel; Future  -     Lipid Panel    Diabetes mellitus screening  -     Comprehensive Metabolic Panel; Future  -      Encounter for screening mammogram for malignant neoplasm of breast  -     CRISTÓBAL DIGITAL SCREEN W OR WO CAD BILATERAL; Future    Colon cancer screening  -     Cancel: Fecal DNA Colorectal cancer screening (Cologuard)    Ascending aorta dilatation (HCC)            Subjective   The following acute and/or chronic problems were also addressed today:      Insomnia - trazodone works well for her she has to take 2.5 tabs at times  Pituitary tumor hypothyroidism Dr Mendiola at [facility]  Has an appointment with cardiology Dr Fontana-

## 2024-01-29 RX ORDER — TRAZODONE HYDROCHLORIDE 150 MG/1
TABLET ORAL
Qty: 60 TABLET | Refills: 0 | OUTPATIENT
Start: 2024-01-29

## 2024-02-01 RX ORDER — DRONEDARONE 400 MG/1
TABLET, FILM COATED ORAL
Qty: 180 TABLET | Refills: 1 | OUTPATIENT
Start: 2024-02-01

## 2024-02-05 ENCOUNTER — TELEPHONE (OUTPATIENT)
Dept: CARDIOLOGY CLINIC | Age: 69
End: 2024-02-05

## 2024-02-05 RX ORDER — ALBUTEROL SULFATE 90 UG/1
2 AEROSOL, METERED RESPIRATORY (INHALATION) EVERY 6 HOURS PRN
Qty: 8.5 G | Refills: 0 | Status: SHIPPED | OUTPATIENT
Start: 2024-02-05

## 2024-02-05 NOTE — TELEPHONE ENCOUNTER
Spoke with the patient and she reports recently changing thyroid medication. States the brand of medication changed. Reports she missed taking two doses of medications on Saturday but took usual two doses yesterday. She is unsure if this is a result of medication change. Only complaint is ongoing fatigue.     States she took 1 dose of Multaq yesterday and 1 dose today.     Reports she is able to come to the office tomorrow for an ECG. Scheduled for 930 am

## 2024-02-05 NOTE — TELEPHONE ENCOUNTER
Patient called the office to report that she is in Tachycardia, and has been for ~18 hours. She shared that her watch informed her of her HR being in the 130's. She only has fatigue, no other major symptoms noted.     Jennyfer shared that she took Maalox in the evening last night and this morning and is waiting for it to take effect.    Jennyfer is wanting to know what else she needs to do?    Jennyfer's callback: [phone]

## 2024-02-06 ENCOUNTER — TELEPHONE (OUTPATIENT)
Dept: CARDIOLOGY CLINIC | Age: 69
End: 2024-02-06

## 2024-02-06 ENCOUNTER — NURSE ONLY (OUTPATIENT)
Dept: CARDIOLOGY CLINIC | Age: 69
End: 2024-02-06
Payer: MEDICARE

## 2024-02-06 VITALS — SYSTOLIC BLOOD PRESSURE: 126 MMHG | OXYGEN SATURATION: 100 % | DIASTOLIC BLOOD PRESSURE: 84 MMHG | HEART RATE: 136 BPM

## 2024-02-06 DIAGNOSIS — R00.0 TACHYCARDIA: Primary | ICD-10-CM

## 2024-02-06 PROCEDURE — 93000 ELECTROCARDIOGRAM COMPLETE: CPT | Performed by: INTERNAL MEDICINE

## 2024-02-06 RX ORDER — LEVOTHYROXINE SODIUM 50 UG/1
CAPSULE ORAL
COMMUNITY
Start: 2024-01-30

## 2024-02-06 NOTE — TELEPHONE ENCOUNTER
Patient came into office today for EKG which appears to atrial flutter with RVR, v-rate 135 bpm.     She called into the office on 02/05/2024 reporting elevated heart rate and fatigue.     Please review EKG and advise of any recommendations.

## 2024-02-07 ENCOUNTER — OFFICE VISIT (OUTPATIENT)
Dept: CARDIOLOGY CLINIC | Age: 69
End: 2024-02-07
Payer: MEDICARE

## 2024-02-07 ENCOUNTER — APPOINTMENT (OUTPATIENT)
Dept: GENERAL RADIOLOGY | Age: 69
End: 2024-02-07
Payer: MEDICARE

## 2024-02-07 ENCOUNTER — HOSPITAL ENCOUNTER (OUTPATIENT)
Age: 69
Setting detail: OBSERVATION
Discharge: HOME | End: 2024-02-09
Attending: EMERGENCY MEDICINE | Admitting: INTERNAL MEDICINE
Payer: MEDICARE

## 2024-02-07 VITALS
SYSTOLIC BLOOD PRESSURE: 118 MMHG | OXYGEN SATURATION: 96 % | HEIGHT: 67 IN | HEART RATE: 133 BPM | TEMPERATURE: 96.8 F | BODY MASS INDEX: 36.41 KG/M2 | WEIGHT: 232 LBS | DIASTOLIC BLOOD PRESSURE: 78 MMHG

## 2024-02-07 DIAGNOSIS — R79.89 ELEVATED TROPONIN: ICD-10-CM

## 2024-02-07 DIAGNOSIS — Q23.1 BICUSPID AORTIC VALVE: ICD-10-CM

## 2024-02-07 DIAGNOSIS — I77.810 ASCENDING AORTA DILATATION (HCC): ICD-10-CM

## 2024-02-07 DIAGNOSIS — I48.19 PERSISTENT ATRIAL FIBRILLATION (HCC): Primary | ICD-10-CM

## 2024-02-07 DIAGNOSIS — I48.92 ATRIAL FLUTTER, UNSPECIFIED TYPE (HCC): Primary | ICD-10-CM

## 2024-02-07 DIAGNOSIS — Z86.73 HISTORY OF TIA (TRANSIENT ISCHEMIC ATTACK): ICD-10-CM

## 2024-02-07 DIAGNOSIS — I48.3 TYPICAL ATRIAL FLUTTER (HCC): ICD-10-CM

## 2024-02-07 DIAGNOSIS — I10 ESSENTIAL HYPERTENSION: ICD-10-CM

## 2024-02-07 PROBLEM — R73.9 HYPERGLYCEMIA: Status: ACTIVE | Noted: 2024-02-07

## 2024-02-07 LAB
ALBUMIN SERPL-MCNC: 3.9 G/DL (ref 3.4–5)
ALBUMIN/GLOB SERPL: 1.5 {RATIO} (ref 1.1–2.2)
ALP SERPL-CCNC: 88 U/L (ref 40–129)
ALT SERPL-CCNC: 20 U/L (ref 10–40)
ANION GAP SERPL CALCULATED.3IONS-SCNC: 13 MMOL/L (ref 3–16)
AST SERPL-CCNC: 18 U/L (ref 15–37)
BASOPHILS # BLD: 0.1 K/UL (ref 0–0.2)
BASOPHILS NFR BLD: 1.2 %
BILIRUB SERPL-MCNC: 0.4 MG/DL (ref 0–1)
BUN SERPL-MCNC: 26 MG/DL (ref 7–20)
CALCIUM SERPL-MCNC: 8.8 MG/DL (ref 8.3–10.6)
CHLORIDE SERPL-SCNC: 104 MMOL/L (ref 99–110)
CO2 SERPL-SCNC: 22 MMOL/L (ref 21–32)
CREAT SERPL-MCNC: 1.3 MG/DL (ref 0.6–1.2)
DEPRECATED RDW RBC AUTO: 15.8 % (ref 12.4–15.4)
EOSINOPHIL # BLD: 0.3 K/UL (ref 0–0.6)
EOSINOPHIL NFR BLD: 3.4 %
GFR SERPLBLD CREATININE-BSD FMLA CKD-EPI: 45 ML/MIN/{1.73_M2}
GLUCOSE SERPL-MCNC: 190 MG/DL (ref 70–99)
HCT VFR BLD AUTO: 40.2 % (ref 36–48)
HGB BLD-MCNC: 13.8 G/DL (ref 12–16)
LYMPHOCYTES # BLD: 2.3 K/UL (ref 1–5.1)
LYMPHOCYTES NFR BLD: 26.4 %
MCH RBC QN AUTO: 29.5 PG (ref 26–34)
MCHC RBC AUTO-ENTMCNC: 34.3 G/DL (ref 31–36)
MCV RBC AUTO: 86 FL (ref 80–100)
MONOCYTES # BLD: 0.6 K/UL (ref 0–1.3)
MONOCYTES NFR BLD: 7.4 %
NEUTROPHILS # BLD: 5.3 K/UL (ref 1.7–7.7)
NEUTROPHILS NFR BLD: 61.6 %
NT-PROBNP SERPL-MCNC: 1455 PG/ML (ref 0–124)
PLATELET # BLD AUTO: 206 K/UL (ref 135–450)
PMV BLD AUTO: 9.4 FL (ref 5–10.5)
POTASSIUM SERPL-SCNC: 3.9 MMOL/L (ref 3.5–5.1)
PROT SERPL-MCNC: 6.5 G/DL (ref 6.4–8.2)
RBC # BLD AUTO: 4.67 M/UL (ref 4–5.2)
SODIUM SERPL-SCNC: 139 MMOL/L (ref 136–145)
TROPONIN, HIGH SENSITIVITY: 38 NG/L (ref 0–14)
WBC # BLD AUTO: 8.7 K/UL (ref 4–11)

## 2024-02-07 PROCEDURE — 2580000003 HC RX 258: Performed by: INTERNAL MEDICINE

## 2024-02-07 PROCEDURE — 71046 X-RAY EXAM CHEST 2 VIEWS: CPT

## 2024-02-07 PROCEDURE — G0378 HOSPITAL OBSERVATION PER HR: HCPCS

## 2024-02-07 PROCEDURE — 84484 ASSAY OF TROPONIN QUANT: CPT

## 2024-02-07 PROCEDURE — 3074F SYST BP LT 130 MM HG: CPT | Performed by: INTERNAL MEDICINE

## 2024-02-07 PROCEDURE — 83880 ASSAY OF NATRIURETIC PEPTIDE: CPT

## 2024-02-07 PROCEDURE — 1123F ACP DISCUSS/DSCN MKR DOCD: CPT | Performed by: INTERNAL MEDICINE

## 2024-02-07 PROCEDURE — 93005 ELECTROCARDIOGRAM TRACING: CPT | Performed by: EMERGENCY MEDICINE

## 2024-02-07 PROCEDURE — 85025 COMPLETE CBC W/AUTO DIFF WBC: CPT

## 2024-02-07 PROCEDURE — 99215 OFFICE O/P EST HI 40 MIN: CPT | Performed by: INTERNAL MEDICINE

## 2024-02-07 PROCEDURE — 93000 ELECTROCARDIOGRAM COMPLETE: CPT | Performed by: INTERNAL MEDICINE

## 2024-02-07 PROCEDURE — 3078F DIAST BP <80 MM HG: CPT | Performed by: INTERNAL MEDICINE

## 2024-02-07 PROCEDURE — 80053 COMPREHEN METABOLIC PANEL: CPT

## 2024-02-07 PROCEDURE — 99285 EMERGENCY DEPT VISIT HI MDM: CPT

## 2024-02-07 RX ORDER — SODIUM CHLORIDE 0.9 % (FLUSH) 0.9 %
5-40 SYRINGE (ML) INJECTION EVERY 12 HOURS SCHEDULED
Status: DISCONTINUED | OUTPATIENT
Start: 2024-02-07 | End: 2024-02-09 | Stop reason: HOSPADM

## 2024-02-07 RX ORDER — ONDANSETRON 4 MG/1
4 TABLET, ORALLY DISINTEGRATING ORAL EVERY 8 HOURS PRN
Status: DISCONTINUED | OUTPATIENT
Start: 2024-02-07 | End: 2024-02-09 | Stop reason: HOSPADM

## 2024-02-07 RX ORDER — POLYETHYLENE GLYCOL 3350 17 G/17G
17 POWDER, FOR SOLUTION ORAL DAILY PRN
Status: DISCONTINUED | OUTPATIENT
Start: 2024-02-07 | End: 2024-02-09 | Stop reason: HOSPADM

## 2024-02-07 RX ORDER — ASPIRIN 81 MG/1
81 TABLET, CHEWABLE ORAL DAILY
Status: DISCONTINUED | OUTPATIENT
Start: 2024-02-08 | End: 2024-02-09 | Stop reason: HOSPADM

## 2024-02-07 RX ORDER — NITROGLYCERIN 0.4 MG/1
0.4 TABLET SUBLINGUAL EVERY 5 MIN PRN
Status: DISCONTINUED | OUTPATIENT
Start: 2024-02-07 | End: 2024-02-09 | Stop reason: HOSPADM

## 2024-02-07 RX ORDER — MAGNESIUM SULFATE IN WATER 40 MG/ML
2000 INJECTION, SOLUTION INTRAVENOUS PRN
Status: DISCONTINUED | OUTPATIENT
Start: 2024-02-07 | End: 2024-02-09 | Stop reason: HOSPADM

## 2024-02-07 RX ORDER — ACETAMINOPHEN 650 MG/1
650 SUPPOSITORY RECTAL EVERY 6 HOURS PRN
Status: DISCONTINUED | OUTPATIENT
Start: 2024-02-07 | End: 2024-02-09 | Stop reason: HOSPADM

## 2024-02-07 RX ORDER — ACETAMINOPHEN 325 MG/1
650 TABLET ORAL EVERY 6 HOURS PRN
Status: DISCONTINUED | OUTPATIENT
Start: 2024-02-07 | End: 2024-02-09 | Stop reason: HOSPADM

## 2024-02-07 RX ORDER — SODIUM CHLORIDE 0.9 % (FLUSH) 0.9 %
5-40 SYRINGE (ML) INJECTION PRN
Status: DISCONTINUED | OUTPATIENT
Start: 2024-02-07 | End: 2024-02-09 | Stop reason: HOSPADM

## 2024-02-07 RX ORDER — SODIUM CHLORIDE 9 MG/ML
INJECTION, SOLUTION INTRAVENOUS PRN
Status: DISCONTINUED | OUTPATIENT
Start: 2024-02-07 | End: 2024-02-09 | Stop reason: HOSPADM

## 2024-02-07 RX ORDER — ALBUTEROL SULFATE 90 UG/1
2 AEROSOL, METERED RESPIRATORY (INHALATION) EVERY 6 HOURS PRN
Status: DISCONTINUED | OUTPATIENT
Start: 2024-02-07 | End: 2024-02-09 | Stop reason: HOSPADM

## 2024-02-07 RX ORDER — MAGNESIUM 30 MG
30 TABLET ORAL 2 TIMES DAILY
Status: DISCONTINUED | OUTPATIENT
Start: 2024-02-07 | End: 2024-02-07

## 2024-02-07 RX ORDER — POTASSIUM CHLORIDE 7.45 MG/ML
10 INJECTION INTRAVENOUS PRN
Status: DISCONTINUED | OUTPATIENT
Start: 2024-02-07 | End: 2024-02-09 | Stop reason: HOSPADM

## 2024-02-07 RX ORDER — SODIUM CHLORIDE, SODIUM LACTATE, POTASSIUM CHLORIDE, CALCIUM CHLORIDE 600; 310; 30; 20 MG/100ML; MG/100ML; MG/100ML; MG/100ML
INJECTION, SOLUTION INTRAVENOUS CONTINUOUS
Status: DISCONTINUED | OUTPATIENT
Start: 2024-02-07 | End: 2024-02-09 | Stop reason: HOSPADM

## 2024-02-07 RX ORDER — ONDANSETRON 2 MG/ML
4 INJECTION INTRAMUSCULAR; INTRAVENOUS EVERY 6 HOURS PRN
Status: DISCONTINUED | OUTPATIENT
Start: 2024-02-07 | End: 2024-02-09 | Stop reason: HOSPADM

## 2024-02-07 RX ORDER — MAGNESIUM HYDROXIDE/ALUMINUM HYDROXICE/SIMETHICONE 120; 1200; 1200 MG/30ML; MG/30ML; MG/30ML
30 SUSPENSION ORAL EVERY 6 HOURS PRN
Status: DISCONTINUED | OUTPATIENT
Start: 2024-02-07 | End: 2024-02-09 | Stop reason: HOSPADM

## 2024-02-07 RX ORDER — HYDROCORTISONE 10 MG/1
10 TABLET ORAL EVERY MORNING
Status: DISCONTINUED | OUTPATIENT
Start: 2024-02-08 | End: 2024-02-09 | Stop reason: HOSPADM

## 2024-02-07 RX ORDER — POTASSIUM CHLORIDE 20 MEQ/1
40 TABLET, EXTENDED RELEASE ORAL PRN
Status: DISCONTINUED | OUTPATIENT
Start: 2024-02-07 | End: 2024-02-09 | Stop reason: HOSPADM

## 2024-02-07 RX ORDER — ENOXAPARIN SODIUM 100 MG/ML
30 INJECTION SUBCUTANEOUS 2 TIMES DAILY
Status: DISCONTINUED | OUTPATIENT
Start: 2024-02-08 | End: 2024-02-07

## 2024-02-07 RX ORDER — VITAMIN B COMPLEX
1000 TABLET ORAL DAILY
Status: DISCONTINUED | OUTPATIENT
Start: 2024-02-08 | End: 2024-02-09 | Stop reason: HOSPADM

## 2024-02-07 RX ADMIN — SODIUM CHLORIDE, POTASSIUM CHLORIDE, SODIUM LACTATE AND CALCIUM CHLORIDE: 600; 310; 30; 20 INJECTION, SOLUTION INTRAVENOUS at 23:16

## 2024-02-07 ASSESSMENT — PAIN SCALES - GENERAL: PAINLEVEL_OUTOF10: 0

## 2024-02-07 NOTE — PROGRESS NOTES
Aminah JARRETT said that she will route EKG to Dr. Atkins who is currently in [place].  She requested that I make patient aware and let her know that we will get back with her regarding EKG.   Relayed message to patient who verbalized and confirmed understanding      Patient was see in office today with Dr. Atkins.  Will close this encounter.

## 2024-02-07 NOTE — PROGRESS NOTES
atrial flutter above.    Ascending aorta dilatation   Bicuspid aortic valve - raphe between right and left coronary cusp.  - Dilated ascending aorta at 4.0 cm. Per THANIA 07/2023. BP is well controlled.  -Following with  John Fontana MD for management.    Essential hypertension  - Controlled:   - BP goal <130/80  - Continue current medical management.     TIA   - 2006 - most likely had atrial fibrillation then.   - Residual deficit right eye does not close all the way when sleeping but can blink.      Type 2 DM    - Continue current medical management    - Hb A1c 5.3 02/20/23      Follow ups:  Follow up one month after the procedure with myself and three months after procedure with Electrophysiology Nurse Practitioner.  .    Thank you for allowing me to participate in the care of Misty Womack. All questions and concerns were addressed to the patient/family. Alternatives to my treatment were discussed.     This note was scribed in the presence of Giancarlo Atkins MD by Aminah Molina RN.    Physician attestation: The scribe's documentation has been prepared under my direction and has been personally reviewed by me in its entirety. I confirm that the note above reflects all work, treatment, procedures, and medical decision making performed by me.     Giancarlo Atkins MD  Cardiac Electrophysiology  [facility]

## 2024-02-08 ENCOUNTER — ANESTHESIA (OUTPATIENT)
Dept: CARDIAC CATH/INVASIVE PROCEDURES | Age: 69
End: 2024-02-08
Payer: MEDICARE

## 2024-02-08 ENCOUNTER — TELEPHONE (OUTPATIENT)
Dept: CARDIOLOGY CLINIC | Age: 69
End: 2024-02-08

## 2024-02-08 ENCOUNTER — ANESTHESIA EVENT (OUTPATIENT)
Dept: CARDIAC CATH/INVASIVE PROCEDURES | Age: 69
End: 2024-02-08
Payer: MEDICARE

## 2024-02-08 ENCOUNTER — HOSPITAL ENCOUNTER (INPATIENT)
Dept: CARDIAC CATH/INVASIVE PROCEDURES | Age: 69
Discharge: HOME | End: 2024-02-08
Payer: MEDICARE

## 2024-02-08 VITALS
WEIGHT: 241.84 LBS | DIASTOLIC BLOOD PRESSURE: 96 MMHG | RESPIRATION RATE: 14 BRPM | SYSTOLIC BLOOD PRESSURE: 127 MMHG | TEMPERATURE: 97.4 F | HEIGHT: 67 IN | BODY MASS INDEX: 37.96 KG/M2 | OXYGEN SATURATION: 97 % | HEART RATE: 112 BPM

## 2024-02-08 VITALS
WEIGHT: 232 LBS | RESPIRATION RATE: 14 BRPM | BODY MASS INDEX: 36.41 KG/M2 | HEIGHT: 67 IN | HEART RATE: 75 BPM | TEMPERATURE: 96.9 F | SYSTOLIC BLOOD PRESSURE: 129 MMHG | OXYGEN SATURATION: 98 % | DIASTOLIC BLOOD PRESSURE: 87 MMHG

## 2024-02-08 LAB
ANION GAP SERPL CALCULATED.3IONS-SCNC: 12 MMOL/L (ref 3–16)
BUN SERPL-MCNC: 23 MG/DL (ref 7–20)
CALCIUM SERPL-MCNC: 9 MG/DL (ref 8.3–10.6)
CHLORIDE SERPL-SCNC: 108 MMOL/L (ref 99–110)
CO2 SERPL-SCNC: 20 MMOL/L (ref 21–32)
CREAT SERPL-MCNC: 1.2 MG/DL (ref 0.6–1.2)
DEPRECATED RDW RBC AUTO: 15.5 % (ref 12.4–15.4)
EKG ATRIAL RATE: 270 BPM
EKG ATRIAL RATE: 76 BPM
EKG DIAGNOSIS: NORMAL
EKG DIAGNOSIS: NORMAL
EKG P AXIS: 259 DEGREES
EKG P AXIS: 57 DEGREES
EKG P-R INTERVAL: 164 MS
EKG Q-T INTERVAL: 254 MS
EKG Q-T INTERVAL: 426 MS
EKG QRS DURATION: 66 MS
EKG QRS DURATION: 74 MS
EKG QTC CALCULATION (BAZETT): 381 MS
EKG QTC CALCULATION (BAZETT): 479 MS
EKG R AXIS: 11 DEGREES
EKG R AXIS: 31 DEGREES
EKG T AXIS: 144 DEGREES
EKG T AXIS: 76 DEGREES
EKG VENTRICULAR RATE: 135 BPM
EKG VENTRICULAR RATE: 76 BPM
GFR SERPLBLD CREATININE-BSD FMLA CKD-EPI: 50 ML/MIN/{1.73_M2}
GLUCOSE SERPL-MCNC: 106 MG/DL (ref 70–99)
HCT VFR BLD AUTO: 37.7 % (ref 36–48)
HGB BLD-MCNC: 13 G/DL (ref 12–16)
MCH RBC QN AUTO: 29.7 PG (ref 26–34)
MCHC RBC AUTO-ENTMCNC: 34.5 G/DL (ref 31–36)
MCV RBC AUTO: 86.1 FL (ref 80–100)
PLATELET # BLD AUTO: 167 K/UL (ref 135–450)
PMV BLD AUTO: 9.3 FL (ref 5–10.5)
POC ACT LR: 212 SEC
POC ACT LR: 276 SEC
POTASSIUM SERPL-SCNC: 3.8 MMOL/L (ref 3.5–5.1)
RBC # BLD AUTO: 4.37 M/UL (ref 4–5.2)
SODIUM SERPL-SCNC: 140 MMOL/L (ref 136–145)
TROPONIN, HIGH SENSITIVITY: 43 NG/L (ref 0–14)
WBC # BLD AUTO: 8.2 K/UL (ref 4–11)

## 2024-02-08 PROCEDURE — A4216 STERILE WATER/SALINE, 10 ML: HCPCS

## 2024-02-08 PROCEDURE — 93312 ECHO TRANSESOPHAGEAL: CPT

## 2024-02-08 PROCEDURE — C1769 GUIDE WIRE: HCPCS | Performed by: INTERNAL MEDICINE

## 2024-02-08 PROCEDURE — A4216 STERILE WATER/SALINE, 10 ML: HCPCS | Performed by: NURSE ANESTHETIST, CERTIFIED REGISTERED

## 2024-02-08 PROCEDURE — 93623 PRGRMD STIMJ&PACG IV RX NFS: CPT | Performed by: INTERNAL MEDICINE

## 2024-02-08 PROCEDURE — C1730 CATH, EP, 19 OR FEW ELECT: HCPCS | Performed by: INTERNAL MEDICINE

## 2024-02-08 PROCEDURE — 93462 L HRT CATH TRNSPTL PUNCTURE: CPT

## 2024-02-08 PROCEDURE — 93662 INTRACARDIAC ECG (ICE): CPT

## 2024-02-08 PROCEDURE — 93010 ELECTROCARDIOGRAM REPORT: CPT | Performed by: INTERNAL MEDICINE

## 2024-02-08 PROCEDURE — 93320 DOPPLER ECHO COMPLETE: CPT

## 2024-02-08 PROCEDURE — C1759 CATH, INTRA ECHOCARDIOGRAPHY: HCPCS | Performed by: INTERNAL MEDICINE

## 2024-02-08 PROCEDURE — 3700000001 HC ADD 15 MINUTES (ANESTHESIA): Performed by: ANESTHESIOLOGY

## 2024-02-08 PROCEDURE — 85027 COMPLETE CBC AUTOMATED: CPT

## 2024-02-08 PROCEDURE — 93462 L HRT CATH TRNSPTL PUNCTURE: CPT | Performed by: INTERNAL MEDICINE

## 2024-02-08 PROCEDURE — 93005 ELECTROCARDIOGRAM TRACING: CPT | Performed by: INTERNAL MEDICINE

## 2024-02-08 PROCEDURE — 93655 ICAR CATH ABLTJ DSCRT ARRHYT: CPT | Performed by: INTERNAL MEDICINE

## 2024-02-08 PROCEDURE — 2500000003 HC RX 250 WO HCPCS: Performed by: INTERNAL MEDICINE

## 2024-02-08 PROCEDURE — 7100000000 HC PACU RECOVERY - FIRST 15 MIN

## 2024-02-08 PROCEDURE — 93653 COMPRE EP EVAL TX SVT: CPT | Performed by: INTERNAL MEDICINE

## 2024-02-08 PROCEDURE — C1766 INTRO/SHEATH,STRBLE,NON-PEEL: HCPCS | Performed by: INTERNAL MEDICINE

## 2024-02-08 PROCEDURE — 3700000000 HC ANESTHESIA ATTENDED CARE: Performed by: ANESTHESIOLOGY

## 2024-02-08 PROCEDURE — 2500000003 HC RX 250 WO HCPCS: Performed by: NURSE ANESTHETIST, CERTIFIED REGISTERED

## 2024-02-08 PROCEDURE — 2709999900 HC NON-CHARGEABLE SUPPLY: Performed by: INTERNAL MEDICINE

## 2024-02-08 PROCEDURE — G0378 HOSPITAL OBSERVATION PER HR: HCPCS

## 2024-02-08 PROCEDURE — 2580000003 HC RX 258: Performed by: INTERNAL MEDICINE

## 2024-02-08 PROCEDURE — 2500000003 HC RX 250 WO HCPCS

## 2024-02-08 PROCEDURE — 6360000002 HC RX W HCPCS: Performed by: ANESTHESIOLOGY

## 2024-02-08 PROCEDURE — C1732 CATH, EP, DIAG/ABL, 3D/VECT: HCPCS | Performed by: INTERNAL MEDICINE

## 2024-02-08 PROCEDURE — 93653 COMPRE EP EVAL TX SVT: CPT

## 2024-02-08 PROCEDURE — 80048 BASIC METABOLIC PNL TOTAL CA: CPT

## 2024-02-08 PROCEDURE — 84484 ASSAY OF TROPONIN QUANT: CPT

## 2024-02-08 PROCEDURE — C1760 CLOSURE DEV, VASC: HCPCS | Performed by: INTERNAL MEDICINE

## 2024-02-08 PROCEDURE — 2580000003 HC RX 258

## 2024-02-08 PROCEDURE — 6360000002 HC RX W HCPCS

## 2024-02-08 PROCEDURE — 2580000003 HC RX 258: Performed by: NURSE ANESTHETIST, CERTIFIED REGISTERED

## 2024-02-08 PROCEDURE — C1894 INTRO/SHEATH, NON-LASER: HCPCS | Performed by: INTERNAL MEDICINE

## 2024-02-08 PROCEDURE — 85347 COAGULATION TIME ACTIVATED: CPT

## 2024-02-08 PROCEDURE — 6360000002 HC RX W HCPCS: Performed by: NURSE ANESTHETIST, CERTIFIED REGISTERED

## 2024-02-08 PROCEDURE — 93325 DOPPLER ECHO COLOR FLOW MAPG: CPT

## 2024-02-08 RX ORDER — HEPARIN SODIUM 1000 [USP'U]/ML
INJECTION, SOLUTION INTRAVENOUS; SUBCUTANEOUS PRN
Status: DISCONTINUED | OUTPATIENT
Start: 2024-02-08 | End: 2024-02-08 | Stop reason: SDUPTHER

## 2024-02-08 RX ORDER — MIDAZOLAM HYDROCHLORIDE 1 MG/ML
INJECTION INTRAMUSCULAR; INTRAVENOUS PRN
Status: DISCONTINUED | OUTPATIENT
Start: 2024-02-08 | End: 2024-02-08 | Stop reason: SDUPTHER

## 2024-02-08 RX ORDER — SODIUM CHLORIDE 0.9 % (FLUSH) 0.9 %
5-40 SYRINGE (ML) INJECTION PRN
Status: DISCONTINUED | OUTPATIENT
Start: 2024-02-08 | End: 2024-02-09 | Stop reason: HOSPADM

## 2024-02-08 RX ORDER — VECURONIUM BROMIDE 1 MG/ML
INJECTION, POWDER, LYOPHILIZED, FOR SOLUTION INTRAVENOUS PRN
Status: DISCONTINUED | OUTPATIENT
Start: 2024-02-08 | End: 2024-02-08 | Stop reason: SDUPTHER

## 2024-02-08 RX ORDER — DROPERIDOL 2.5 MG/ML
0.62 INJECTION, SOLUTION INTRAMUSCULAR; INTRAVENOUS
Status: DISCONTINUED | OUTPATIENT
Start: 2024-02-08 | End: 2024-02-09 | Stop reason: HOSPADM

## 2024-02-08 RX ORDER — SODIUM CHLORIDE 0.9 % (FLUSH) 0.9 %
5-40 SYRINGE (ML) INJECTION EVERY 12 HOURS SCHEDULED
Status: DISCONTINUED | OUTPATIENT
Start: 2024-02-08 | End: 2024-02-09 | Stop reason: HOSPADM

## 2024-02-08 RX ORDER — ONDANSETRON 2 MG/ML
INJECTION INTRAMUSCULAR; INTRAVENOUS PRN
Status: DISCONTINUED | OUTPATIENT
Start: 2024-02-08 | End: 2024-02-08 | Stop reason: SDUPTHER

## 2024-02-08 RX ORDER — FENTANYL CITRATE 0.05 MG/ML
25 INJECTION, SOLUTION INTRAMUSCULAR; INTRAVENOUS EVERY 5 MIN PRN
Status: DISCONTINUED | OUTPATIENT
Start: 2024-02-08 | End: 2024-02-09 | Stop reason: HOSPADM

## 2024-02-08 RX ORDER — FENTANYL CITRATE 50 UG/ML
INJECTION, SOLUTION INTRAMUSCULAR; INTRAVENOUS PRN
Status: DISCONTINUED | OUTPATIENT
Start: 2024-02-08 | End: 2024-02-08 | Stop reason: SDUPTHER

## 2024-02-08 RX ORDER — SODIUM CHLORIDE 9 MG/ML
INJECTION INTRAVENOUS PRN
Status: DISCONTINUED | OUTPATIENT
Start: 2024-02-08 | End: 2024-02-08 | Stop reason: SDUPTHER

## 2024-02-08 RX ORDER — ONDANSETRON 2 MG/ML
4 INJECTION INTRAMUSCULAR; INTRAVENOUS
Status: DISCONTINUED | OUTPATIENT
Start: 2024-02-08 | End: 2024-02-09 | Stop reason: HOSPADM

## 2024-02-08 RX ORDER — GLYCOPYRROLATE 0.2 MG/ML
INJECTION INTRAMUSCULAR; INTRAVENOUS PRN
Status: DISCONTINUED | OUTPATIENT
Start: 2024-02-08 | End: 2024-02-08 | Stop reason: SDUPTHER

## 2024-02-08 RX ORDER — SODIUM CHLORIDE 9 MG/ML
INJECTION, SOLUTION INTRAVENOUS PRN
Status: DISCONTINUED | OUTPATIENT
Start: 2024-02-08 | End: 2024-02-09 | Stop reason: HOSPADM

## 2024-02-08 RX ORDER — SODIUM CHLORIDE 9 MG/ML
INJECTION, SOLUTION INTRAVENOUS CONTINUOUS PRN
Status: DISCONTINUED | OUTPATIENT
Start: 2024-02-08 | End: 2024-02-08 | Stop reason: SDUPTHER

## 2024-02-08 RX ORDER — SUCCINYLCHOLINE/SOD CL,ISO/PF 200MG/10ML
SYRINGE (ML) INTRAVENOUS PRN
Status: DISCONTINUED | OUTPATIENT
Start: 2024-02-08 | End: 2024-02-08 | Stop reason: SDUPTHER

## 2024-02-08 RX ORDER — PROPOFOL 10 MG/ML
INJECTION, EMULSION INTRAVENOUS PRN
Status: DISCONTINUED | OUTPATIENT
Start: 2024-02-08 | End: 2024-02-08 | Stop reason: SDUPTHER

## 2024-02-08 RX ORDER — PHENYLEPHRINE HCL IN 0.9% NACL 1 MG/10 ML
SYRINGE (ML) INTRAVENOUS PRN
Status: DISCONTINUED | OUTPATIENT
Start: 2024-02-08 | End: 2024-02-08 | Stop reason: SDUPTHER

## 2024-02-08 RX ADMIN — VECURONIUM BROMIDE 2 MG: 1 INJECTION, POWDER, LYOPHILIZED, FOR SOLUTION INTRAVENOUS at 08:57

## 2024-02-08 RX ADMIN — FENTANYL CITRATE 100 MCG: 50 INJECTION INTRAMUSCULAR; INTRAVENOUS at 07:46

## 2024-02-08 RX ADMIN — VECURONIUM BROMIDE 5 MG: 1 INJECTION, POWDER, LYOPHILIZED, FOR SOLUTION INTRAVENOUS at 07:53

## 2024-02-08 RX ADMIN — Medication 200 MCG: at 08:42

## 2024-02-08 RX ADMIN — SODIUM CHLORIDE: 9 INJECTION, SOLUTION INTRAVENOUS at 07:45

## 2024-02-08 RX ADMIN — SODIUM CHLORIDE 5 ML: 9 INJECTION INTRAMUSCULAR; INTRAVENOUS; SUBCUTANEOUS at 07:53

## 2024-02-08 RX ADMIN — SODIUM CHLORIDE, PRESERVATIVE FREE 10 ML: 5 INJECTION INTRAVENOUS at 05:36

## 2024-02-08 RX ADMIN — MIDAZOLAM 2 MG: 1 INJECTION INTRAMUSCULAR; INTRAVENOUS at 07:45

## 2024-02-08 RX ADMIN — PROPOFOL 40 MG: 10 INJECTION, EMULSION INTRAVENOUS at 08:57

## 2024-02-08 RX ADMIN — ONDANSETRON 4 MG: 2 INJECTION INTRAMUSCULAR; INTRAVENOUS at 07:55

## 2024-02-08 RX ADMIN — Medication 120 MG: at 07:49

## 2024-02-08 RX ADMIN — ISOPROTERENOL HYDROCHLORIDE 2 MCG/MIN: 0.2 INJECTION, SOLUTION INTRACARDIAC; INTRAMUSCULAR; INTRAVENOUS; SUBCUTANEOUS at 09:09

## 2024-02-08 RX ADMIN — PROPOFOL 100 MG: 10 INJECTION, EMULSION INTRAVENOUS at 07:49

## 2024-02-08 RX ADMIN — SUGAMMADEX 200 MG: 100 INJECTION, SOLUTION INTRAVENOUS at 09:36

## 2024-02-08 RX ADMIN — HYDROCORTISONE SODIUM SUCCINATE 25 MG: 100 INJECTION, POWDER, FOR SOLUTION INTRAMUSCULAR; INTRAVENOUS at 07:45

## 2024-02-08 RX ADMIN — Medication 200 MCG: at 08:26

## 2024-02-08 RX ADMIN — GLYCOPYRROLATE 0.2 MG: 0.2 INJECTION, SOLUTION INTRAMUSCULAR; INTRAVENOUS at 07:55

## 2024-02-08 RX ADMIN — HEPARIN SODIUM 6000 UNITS: 1000 INJECTION INTRAVENOUS; SUBCUTANEOUS at 08:21

## 2024-02-08 RX ADMIN — SODIUM CHLORIDE 2 ML: 9 INJECTION INTRAMUSCULAR; INTRAVENOUS; SUBCUTANEOUS at 08:57

## 2024-02-08 RX ADMIN — SODIUM CHLORIDE: 9 INJECTION, SOLUTION INTRAVENOUS at 07:55

## 2024-02-08 ASSESSMENT — LIFESTYLE VARIABLES
HOW MANY STANDARD DRINKS CONTAINING ALCOHOL DO YOU HAVE ON A TYPICAL DAY: PATIENT DOES NOT DRINK
HOW OFTEN DO YOU HAVE A DRINK CONTAINING ALCOHOL: NEVER

## 2024-02-08 NOTE — PROGRESS NOTES
1015  Returned from PACU . Complaining of restless legs, states was present when she woke from anesthesia.  1030  Dr Atkins notified of complaints  1040  Dr. Morales, anesthesia notified of complaints.  1045  Now having right shoulder pain in addition to restless legs.  States hurts to move right arm pain 8/10.    1100 Dr Morales by to see patient.  Seems more comfortable than when she first returned to room.  1130  Ambulated to bathroom without difficulty.  Right groin remains soft, dressing dry and intact.    1140 Discharge instructions given. IV removed.  1150  Transferred to discharge bridge via wheelchair.

## 2024-02-08 NOTE — PROGRESS NOTES
PT received from cath lab. Pt placed on monitor, pt presents with NSR. Pt on RA. Pt miky score is 9 at the time of handoff. Handoff performed with Joanie JARRETT.

## 2024-02-08 NOTE — H&P
V2.0  History and Physical      Name:  Misty Womack /Age/Sex: 1955  (68 y.o. female)   MRN & CSN:  [id number] & [id number] Encounter Date/Time: 2024 10:19 PM EST   [facility]:  [facility] PCP: Portillo Odom MD       Hospital Day: 1    Assessment and Plan:   Misty Womack is a 68 y.o. obese female non-smoker with a pmh of paroxysmal atrial fibrillation on chronic anticoagulation, asthma, panhypopituitarism, hypertension, hypercholesterolemia, CKD 3 who presents with Atrial flutter with rapid ventricular response (HCC).    Hospital Problems             Last Modified POA    * (Principal) Atrial flutter with rapid ventricular response (HCC) 2024 Yes    Essential hypertension 2024 Yes    Hyperglycemia 2024 Yes    Asthma (Chronic) 2024 Yes    Overview Signed 2018  8:25 AM by Viktoriya Combs MA     Overview:   ICD-10 Transition         Class 2 obesity in adult 2024 Unknown    Overview Signed 2018  8:25 AM by Viktoriya Combs MA     Overview:   ICD-10 Transition         Panhypopituitarism (HCC) 2024 Yes    Hypercholesteremia 2024 Yes    Fibromyalgia 2024 Yes    Paroxysmal atrial fibrillation (HCC) 2024 Yes    Chronic renal disease, stage III (HCC) [473699] 2024 Yes       Plan:  Admit to PCU on telemetry  Electrophysiology consult and n.p.o. after midnight for ablation procedure  No rate controlled medications as per electrophysiologist  Continue home regimen for chronic stable conditions  Recommend sleep study as an outpatient    Disposition:   Current Living situation: Home  Expected Disposition: Home  Estimated D/C: 1 to 2 days    Diet ADULT DIET; Regular; Low Fat/Low Chol/High Fiber/FLORY; No Caffeine  Diet NPO Exceptions are: Sips of Water with Meds   DVT Prophylaxis [] Lovenox, []  Heparin, [] SCDs, [] Ambulation,  [x] Eliquis, [] Xarelto, [] Coumadin   Code Status Full Code   Surrogate Decision Maker/ POA Liza Hall     Personally reviewed Lab Studies  40 mEq, PRN   Or  potassium chloride, 10 mEq, PRN  magnesium sulfate, 2,000 mg, PRN  ondansetron, 4 mg, Q8H PRN   Or  ondansetron, 4 mg, Q6H PRN  acetaminophen, 650 mg, Q6H PRN   Or  acetaminophen, 650 mg, Q6H PRN  polyethylene glycol, 17 g, Daily PRN  aluminum & magnesium hydroxide-simethicone, 30 mL, Q6H PRN  nitroGLYCERIN, 0.4 mg, Q5 Min PRN        Labs      CBC:   Recent Labs     02/07/24 2053   WBC 8.7   HGB 13.8        BMP:    Recent Labs     02/07/24 2053      K 3.9      CO2 22   BUN 26*   CREATININE 1.3*   GLUCOSE 190*     Hepatic:   Recent Labs     02/07/24 2053   AST 18   ALT 20   BILITOT 0.4   ALKPHOS 88     Lipids:   Lab Results   Component Value Date/Time    CHOL 212 01/16/2024 11:12 AM    HDL 68 01/16/2024 11:12 AM    TRIG 158 01/16/2024 11:12 AM     Hemoglobin A1C:   Lab Results   Component Value Date/Time    LABA1C 5.3 02/20/2023 01:14 PM     TSH:   Lab Results   Component Value Date/Time    TSH 1.57 01/16/2024 11:12 AM     Troponin: No results found for: \"TROPONINT\"  Lactic Acid: No results for input(s): \"LACTA\" in the last 72 hours.  BNP:   Recent Labs     02/07/24 2053   PROBNP 1,455*     UA:  Lab Results   Component Value Date/Time    NITRU Negative 02/20/2023 01:20 PM    COLORU DARK YELLOW 02/20/2023 01:20 PM    PHUR 5.5 02/20/2023 01:20 PM    WBCUA 15 02/20/2023 01:20 PM    RBCUA 6 02/20/2023 01:20 PM    BACTERIA Rare 02/20/2023 01:20 PM    CLARITYU CLOUDY 02/20/2023 01:20 PM    SPECGRAV 1.029 02/20/2023 01:20 PM    LEUKOCYTESUR MODERATE 02/20/2023 01:20 PM    UROBILINOGEN 1.0 02/20/2023 01:20 PM    BILIRUBINUR SMALL 02/20/2023 01:20 PM    BLOODU Negative 02/20/2023 01:20 PM    GLUCOSEU Negative 02/20/2023 01:20 PM    KETUA TRACE 02/20/2023 01:20 PM     Urine Cultures:   Lab Results   Component Value Date/Time    LABURIN  02/20/2023 01:20 PM     <50,000 CFU/ml mixed skin/urogenital reji. No further workup     Blood Cultures: No results found for: \"BC\"  No results found

## 2024-02-08 NOTE — DISCHARGE INSTRUCTIONS
CARDIAC ABLATION    Care of your puncture site:  Remove bandage 24 hours after the procedure.  May shower in 24 hours but do not sit in a bathtub/pool of water for 5 days or until the wound is healed.  Gently clean groin using soap and water.  Dry thoroughly and apply a Band-Aid that covers the entire site. Use Band-Aid until skin heals over in about 3-5 days.  Do not apply powder or lotion.    Limit walking and stair climbing today.      Normal Observations:  Soreness or tenderness which may last one week.  Mild oozing from the incision site.  Possible bruising that could last 2 weeks.   (Atrial Fib Ablations Only)  You may experience chest burning that will peak in 2 days of the procedure. The burning will begin to subside the following days.  You may take Tylenol for the discomfort    Activity:  You may resume driving 24 hours following the procedure.  Do not make important / legal decisions within 24 hours after procedure.  Do not drink alcoholic beverages or take any sleeping pills for 24 hours.  You may resume normal activity in 3 days or after the wound heals.  Avoid lifting more than 10 pounds for 3 days or until the wound heals.  Avoid strenuous exercise or activity for 1 week.    Nutrition:  Regular diet.    Call your doctor immediately if your condition worsens, for any other concerns, for a follow-up appointment or if you experience any of the following:  Increased swelling on the groin or leg.  Unusual pain, numbness, or tingling of the groin or down the leg.  Any signs of infection such as: redness, yellow drainage at the site, swelling or pain.    IF GROIN STARTS BLEEDING SIGNIFICANTLY:   LAY FLAT, HOLD FIRM DIRECT PRESSURE, AND CALL 911

## 2024-02-08 NOTE — ED NOTES
This RN introduced self to pt. Pt a/ox4, placed on tele with VS obtained. Call light in reach, pt educated on use, and to alert staff for needs. All questions answered at this time.

## 2024-02-08 NOTE — PROCEDURES
[facility]     Electrophysiology Procedure Note       Date of Procedure: 2/8/2024  Patient's Name: Misty Womack  YOB: 1955   Medical Record Number: [id number]  Referring Physician: Portillo Odom MD   Procedure Performed by: Giancarlo Atkins MD    Procedure performed:  Comprehensive electrophysiological study with attempted induction of arrhythmia at baseline.  Attempted induction of arrhythmia after IV drug infusion.  Three-dimensional electroanatomic mapping of the right atrium  Left atrial recording and mapping via coronary sinus   Sedation was provided by anesthesiologist.   Transseptal through an intact septum.  Radiofrequency ablation of Cavo tricuspid isthmus dependent atrial flutter  Intracardiac echo  Transesophageal echocardiogram before start of the procedure.     Mallampati3  ASA 3    Indications for procedure: Atrial flutter   Misty Womack 68 y.o. female with PMH of multiple co morbidities including persistent atrial fibrillation s/p ablation x2, and typical atrial flutter who presented to the ED with shortness of breath. Patient noted to be in atrial flutter. Due to recurrence despite AAD, ablation was discussed and we agreed to proceed.    Details of procedure:  The risks, benefits and alternatives of the ablation procedure were discussed with the patient. The risks including, but not limited to, the risks of bleeding, infection, radiation exposure, injury to vascular, cardiac and surrounding structures (including pneumothorax), stroke, cardiac perforation, tamponade, need for emergent open heart surgery, need for pacemaker implantation, myocardial infarction and death were discussed in detail. The patient opted to proceed with the ablation. Written informed consent was signed and placed in the chart.      The patient was brought to the electrophysiology lab in a fasting nonsedated state. The patient was prepped and draped in a sterile fashion. A timeout protocol was  also attempted to induce arrhythmia by programmed stimulation and burst pacing.     Sinus node cycle length 1134 ms   QRS interval 80 ms   A-H interval: 56 msec   H-V interval of 48 msec .  1:1 antegrade conduction over AV node (AV node wenckebach) was 260 msec.   AV hardik effective refractory period (ERP) was < 500/200 ms     Following ablation, programmed stimulation and burst pacing was performed in the right atrium in an attempt to induce any arrhythmia. There was no inducible tachyarrhythmias.     IV drug infusion was given( Isoproterenol) and programmed stim and burst pacing was repeated. No sustained arrhythmia was induced.    After at least 20 minutes the CTI line was again checked. There was still bidirectional block.    Then catheters were removed.  Sheaths were removed and hemostasis achieved by Perclose closure devices. The patient tolerated the procedure well and there were no complications. Post-sedation evaluation was completed.  Patient was transported to the holding area in stable condition.     Blood loss <20 cc  No complication    Conclusion:  Successful ablation of cavotricuspid isthmus dependant atrial flutter    Plan:   Patient will go back to recovery area.   Ok to discharge form an EP stand point.   Continue anticoagulation.   Patient needs follow up with electrophysiology within one to two months.      Giancarlo Atkins MD,   Cardiac Electrophysiology  [facility]  [address], [address]  [place] [address] [phone]

## 2024-02-08 NOTE — ANESTHESIA PRE PROCEDURE
aorta is mildly dilated at 3.9cm.   Bicuspid aortic valve with fusion of the left and right coronary cusp. Mild   aortic stenosis with a peak velocity of 2.8 m/s and a peak/mean velocity of   31/21mmHg. Valve area by planimetry is 1.48 cm2. No evidence of aortic valve   regurgitation.       Neuro/Psych:   (+) CVA:depression/anxiety             GI/Hepatic/Renal:   (+) renal disease: CRI     (-) GERD and liver disease       Endo/Other:    (+) hypothyroidism: arthritis: OA..                 Abdominal:             Vascular:          Other Findings:             Anesthesia Plan      general     ASA 3     (Has C5-C7 fusion, AFIB s/p ablations in aflutter with tachycardia, hx of hypothyroid, CKD, fibromyalgia presents for aflutter ablation.    Has adrenal insufficiency and multiple allergies. Will give hydrocortisone IV 25 mg for the procedure.)  Induction: intravenous.    MIPS: Prophylactic antiemetics administered.  Anesthetic plan and risks discussed with patient.      Plan discussed with CRNA.                    Perez Morales MD   2/8/2024    This pre-anesthesia assessment may be used as a history and physical.    DOS STAFF ADDENDUM:    Pt seen and examined, chart reviewed (including anesthesia, drug and allergy history).  No interval changes to history and physical examination.  Anesthetic plan, risks, benefits, alternatives, and personnel involved discussed with patient.  Patient verbalized an understanding and agrees to proceed.      Perez Morales MD  February 8, 2024  7:33 AM

## 2024-02-08 NOTE — ED NOTES
Shift handoff report given to  KOLBY Costello . VSS and call light within reach. All questions answered at this time.

## 2024-02-08 NOTE — ED NOTES
Pt ambulating to bathroom at this time without assistance needed. Pt denies chest pain, increased fluttering, and SOB with ambulation. Proper footwear.

## 2024-02-08 NOTE — H&P
H&P Update    I have reviewed the history and physical from yesterday and examined the patient and find no relevant changes.   I have reviewed with the patient and/or family the risks, benefits, and alternatives to the procedure.    Pre-sedation Assessment    Patient:  Misty Womack   :   1955  Intended Procedure: THANIA, If no DALLAS thrombus, ablation for atrial flutter and check the pulmonary veins.     Nurses notes reviewed and agreed.  Medications reviewed  Allergies:   Allergies   Allergen Reactions    Flecainide Shortness Of Breath and Swelling    Corn Oil      \"Feel horrible after ingesting anything containing corn\"  Ankle swelling    Corn-Containing Products      \"Feel horrible after ingesting anything containing corn\"    Ankle swelling    Levofloxacin      Out of touch w/ world    Levothyroxine Itching     Has corn in it and I am allergic to corn.    Lyrica [Pregabalin] Swelling    Tramadol Swelling     \"Feel horrible after ingesting anything containing corn\" Ankle swelling    Wheat Swelling    Ambien [Zolpidem] Anxiety and Other (See Comments)     Made me shaky and more awake.     Pre-Procedure Assessment/Plan:  ASA 3 - Patient with moderate systemic disease with functional limitations    Level of Sedation Plan: Per anesthesia    Post Procedure plan: Return to same level of care    Giancarlo Atkins MD  Cardiac Electrophysiology  [facility]

## 2024-02-08 NOTE — ANESTHESIA POSTPROCEDURE EVALUATION
Department of Anesthesiology  Postprocedure Note    Patient: Misty Womack  MRN: [id number]  YOB: 1955  Date of evaluation: 2/8/2024    Procedure Summary       Date: 02/08/24 Room / Location: [facility] Cath Lab; [facility] Echo    Anesthesia Start: 0745 Anesthesia Stop: 0952    Procedure: THANIA AFLUTTER ABLATION W/ ANES Diagnosis:     Scheduled Providers:  Responsible Provider: Perez Morales MD    Anesthesia Type: General ASA Status: 3            Anesthesia Type: General    Ghada Phase I: Ghada Score: 10    Ghada Phase II:      Anesthesia Post Evaluation    Patient location during evaluation: PACU  Patient participation: complete - patient participated  Level of consciousness: awake  Airway patency: patent  Nausea & Vomiting: no nausea and no vomiting  Cardiovascular status: blood pressure returned to baseline  Respiratory status: acceptable  Hydration status: stable  Comments: Vital signs stable  OK to discharge from Stage I post anesthesia care.  Care transferred from Anesthesiology department on discharge from perioperative area     She had some issues with restless legs. Improved with hydration and time. Did not need medication for treatment.  Multimodal analgesia pain management approach  Pain management: satisfactory to patient    No notable events documented.

## 2024-02-08 NOTE — ED NOTES
Pt using call light to alert RN of need to ambulate to bathroom. Pt disconnected from fluids, and monitor. Pt able to ambulate to bathroom w/o assistance. Pt denies chest pain or increased 'flutter' feeling with movement. Proper footwear in place with transfer.

## 2024-02-08 NOTE — PROGRESS NOTES
V2.0    [facility] [place]      Name:  Misty Womack /Age/Sex: 1955  (68 y.o. female)   MRN & CSN:  [id number] & [id number] Encounter Date/Time: 2024 7:53 AM EST   Location:  [address] [place] [address] PCP: Portillo Odom MD     Attending:Adelaida Meraz MD       Hospital Day: 2    Assessment and Recommendations   Misty Womack is a 68 y.o. female with pmh of paroxysmal atrial fibrillation on chronic anticoagulation, asthma, panhypopituitarism, hypertension, hypercholesterolemia, CKD stage III who presents with Atrial flutter with rapid ventricular response (HCC)    Atrial flutter with rapid ventricular rate  Essential hypertension  Hyperglycemia  Asthma, not in acute exacerbation  Panhypopituitarism  Hypercholesterolemia  Fibromyalgia  CKD stage III    Plan:   EP cardiology consult appreciated  Patient for cardiac ablation today  Continue present meds as ordered, Eliquis was placed on hold for procedure today  Monitor lab  Supportive care  Full code  Further management based on hospital course and consultant recommendations      Diet Diet NPO Exceptions are: Sips of Water with Meds   DVT Prophylaxis [] Lovenox, []  Heparin, [] SCDs, [] Ambulation,  [x] Eliquis, [] Xarelto  [] Coumadin   Code Status Full Code   Disposition From: Home  Expected Disposition: Home  Estimated Date of Discharge: 1 to 2 days  Patient requires continued admission due to atrial flutter with RVR   Surrogate Decision Maker/ POA  yes     Personally reviewed Lab Studies and Imaging     Discussed management of the case with patient and she verbalized understanding        Subjective:     Chief Complaint: Patient seen and examined earlier today.  Patient here with atrial flutter with RVR, denies any chest pain, shortness of breath or any other complaints at this time      Review of Systems:      Pertinent positives and negatives discussed in HPI    Objective:   No intake or output data in the 24 hours ending 24 0753   Vitals:   Vitals:  MD Mariya on 2/8/2024 at 7:53 AM

## 2024-02-08 NOTE — FLOWSHEET NOTE
PRE-PROCEDURE- Cardiac Ablation for right atrial flutter      DATE: 2/8/2024 ARRIVAL TO CATH LAB: 7:11 AM    ADMIT SOURCE: Inpatient; patient in the emergency department    ID & ALLERGY BAND: On    CONSENT: Yes    NPO SINCE: Midnight    LABS: Yes, in chart  PREGNANCY TEST: NA    LABS:  BUN/CREAT:  23 / 1.2  H/H: 13 / 37.7  PLT: 167    IV SITE : Patent in right hand .  with NS fluids infusing at  50mL/hr  7:11 AM      LAST DOSE (if applicable):  ASA: NONE  P2Y12 (Plavix, Effient, Brilinta): NONE  Anticoagulants (Coumadin, Xarelto, Eliquis): Eliquis 2/7/24 AM  Other Blood Thinners: NONE      OTHER MEDICATIONS TAKEN AT HOME:      MEDICATION COMPLIANCE: Yes    Patient awake, alert and oriented x4. No current distress noted. No current complaints. All questions answered at present time. Consents signed. Call light within reach. No family current present. Dr. Atkins may call Reema Rafael (sister) at [phone].    Electronically signed by Leyla Ellison RN on 2/8/2024 at 7:24 AM

## 2024-02-08 NOTE — ED NOTES
Pt picked up at this time by transport staff. Taken to cath lab for ablation procedure. Pt's belongings remain in ED boarding room. Pt ambulated to transport stretcher w/o difficulty.

## 2024-02-08 NOTE — ED PROVIDER NOTES
(NITROSTAT) SL tablet 0.4 mg (has no administration in time range)   aspirin chewable tablet 81 mg (has no administration in time range)        Lab results were reviewed.  White blood cell count is 8.7.  Hemoglobin 13.8.  Platelets 206.    Glucose 190.  Creatinine 1.3 which is at baseline.  High-sensitivity troponin is 38.  BNP is 1455.  She denies any current or recent chest discomfort.  No current shortness of breath.  EKG does not show any ischemic changes.    Imaging results were reviewed.  Chest x-ray was reviewed.  No active disease.  No evidence of CHF or pulmonary edema.    A call was placed to the hospitalist on-call for admission.    I am the primary attending of record.    CRITICAL CARE TIME   Total Critical Care time was 0 minutes, excluding separately reportable procedures.  There was a high probability of clinically significant/life threatening deterioration in the patient's condition which required my urgent intervention.      CONSULTS:  IP CONSULT TO CARDIOLOGY    PROCEDURES:  Unless otherwise noted below, none     Procedures        FINAL IMPRESSION      1. Atrial flutter, unspecified type (HCC)    2. Elevated troponin          DISPOSITION/PLAN   DISPOSITION Admitted 02/07/2024 09:31:42 PM      PATIENT REFERRED TO:  No follow-up provider specified.    DISCHARGE MEDICATIONS:  New Prescriptions    No medications on file     Controlled Substances Monitoring:          No data to display                (Please note that portions of this note were completed with a voice recognition program.  Efforts were made to edit the dictations but occasionally words are mis-transcribed.)    SANKET DICKINSON DO (electronically signed)  Attending Emergency Physician           Sanket Dickinson DO  02/07/24 [id number]

## 2024-02-08 NOTE — ED NOTES
Pt unhappy about being placed on cardiac monitor and being given fluids through her PIV. Pt asked multiple times for this RN to turn monitor off due to beeping and not take pt's BP. Pt educated on the importance of being on the cardiac monitor d/t having irregular heart rhythm. Pt remains on cardiac monitor at this time.

## 2024-02-09 PROCEDURE — G0378 HOSPITAL OBSERVATION PER HR: HCPCS

## 2024-02-09 NOTE — DISCHARGE SUMMARY
V2.0  Discharge Summary    Name:  Misty Womack /Age/Sex: 1955 (68 y.o. female)   Admit Date: 2024  Discharge Date: 24    MRN & CSN:  [id number] & [id number] Encounter Date and Time 24 8:34 AM EST    Attending:  Adelaida Meraz MD Discharging Provider: Adelaida Meraz MD       Hospital Course:     Brief HPI: Misty Womack is a 68 y.o. female with pmh of paroxysmal atrial fibrillation on chronic anticoagulation, asthma, panhypopituitarism, hypertension, hypercholesterolemia, CKD stage III who presents with Atrial flutter with rapid ventricular response (HCC) .  She was admitted for further management.  Cardiologist was consulted.  She underwent ablation as per EP cardiologist on 2024 and was discharged by cardiology team after the procedure as she had improved and stable for discharge.        The patient expressed appropriate understanding of, and agreement with the discharge recommendations, medications, and plan.     Consults this admission:  IP CONSULT TO CARDIOLOGY    Discharge Diagnosis:   Atrial flutter with rapid ventricular response (HCC)  Essential hypertension  Hyperglycemia  Asthma, not in acute exacerbation  Panhypopituitarism  Hypercholesterolemia  Fibromyalgia  CKD stage III    Discharge Instruction:   Follow up appointments: With PCP within 1 week  Primary care physician: Portillo Odom MD within 2 weeks  Diet: cardiac diet   Activity: activity as tolerated  Disposition: Discharged to:   [x]Home, [][facility], []SNF, []Acute Rehab, []Hospice   Condition on discharge: Stable  Labs and Tests to be Followed up as an outpatient by PCP or Specialist: PCP and cardiology team    Discharge Medications:        Medication List        ASK your doctor about these medications      albuterol sulfate  (90 Base) MCG/ACT inhaler  Commonly known as: PROVENTIL;VENTOLIN;PROAIR  INHALE 2 PUFFS INTO THE LUNGS EVERY 6 HOURS AS NEEDED FOR WHEEZING     apixaban 5 MG Tabs tablet  Commonly

## 2024-02-12 ENCOUNTER — CARE COORDINATION (OUTPATIENT)
Dept: CASE MANAGEMENT | Age: 69
End: 2024-02-12

## 2024-02-12 NOTE — CARE COORDINATION
Care Transitions Initial Follow Up Call    Call within 2 business days of discharge: Yes      Patient: Misty Womack Patient : 1955   MRN: [id number]  Reason for Admission: sent per cardiac MD  Discharge Date: 24 RARS: No data recorded    Last Discharge Facility       Date Complaint Diagnosis Description Type Department Provider    24 Atrial Flutter Atrial flutter, unspecified type (HCC) ... ED (DISCHARGE) [facility] Adelaida Vegas MD; Roly Ravi...            Was this an external facility discharge? No Discharge Facility: [facility]    Challenges to be reviewed by the provider   Additional needs identified to be addressed with provider: No                 Method of communication with provider: none.    Initial attempt at CT discharge phone call. Unable to reach patient. Left message. Contact info provided. Requested return call to CTN.       Care Transitions 24 Hour Call    Care Transitions Interventions           Follow Up  Future Appointments   Date Time Provider Department Center   3/5/2024 10:00 AM John Fontana MD [facility]   3/8/2024 10:40 AM Bri Leal APRN - CNP [facility]   2024  9:15 AM Giancarlo Atkins MD [facility]     Harmony Delgado RN BSN  Care Transition Nurse  [phone]

## 2024-02-13 ENCOUNTER — CARE COORDINATION (OUTPATIENT)
Dept: CASE MANAGEMENT | Age: 69
End: 2024-02-13

## 2024-02-13 DIAGNOSIS — I48.92 ATRIAL FLUTTER WITH RAPID VENTRICULAR RESPONSE (HCC): Primary | ICD-10-CM

## 2024-02-13 PROCEDURE — 1111F DSCHRG MED/CURRENT MED MERGE: CPT

## 2024-02-13 NOTE — CARE COORDINATION
Care Transitions Initial Follow Up Call    Call within 2 business days of discharge: Yes    Patient Current Location:  Home: [address] [place] [id number]    Care Transition Nurse contacted the patient by telephone to perform post hospital discharge assessment. Verified name and  with patient as identifiers. Provided introduction to self, and explanation of the Care Transition Nurse role.     Patient: Misty Womack Patient : 1955   MRN: [id number]  Reason for Admission: Sent per cardiac office  Discharge Date: 24 RARS: No data recorded    Last Discharge Facility       Date Complaint Diagnosis Description Type Department Provider    24 Atrial Flutter Atrial flutter, unspecified type (HCC) ... ED (DISCHARGE) [facility] Adelaida Vegas MD; Roly Ravi...            Was this an external facility discharge? No Discharge Facility: [facility]    Challenges to be reviewed by the provider   Additional needs identified to be addressed with provider: No                 Method of communication with provider: none.    2nd attempt at CT discharge phone call. Misty states she is \"doing well.\" She confirmed her new patient visit with  on 2024 and her appt with Bri Leal on 2024. Misty states she will provide her own transportation. Misty states her legs are doing \"ok - not great.\" She denies any right shoulder pain at this time.  Misty states her right groin puncture site  is great - no issues. She states her bowels are moving and she is urinating without difficulty. Misty denies any fever at this time. She states her gait is fine - she is ambulating without difficulty. Misty states she has felt \"just a few\" palpitations. She denies any needs at this time.    Care Transition Nurse reviewed discharge instructions with patient who verbalized understanding. The patient was given an opportunity to ask questions and does not have any further questions or concerns at this

## 2024-02-23 DIAGNOSIS — J45.30 MILD PERSISTENT ALLERGIC ASTHMA WITHOUT COMPLICATION: ICD-10-CM

## 2024-02-23 RX ORDER — FLUTICASONE PROPIONATE AND SALMETEROL 100; 50 UG/1; UG/1
POWDER RESPIRATORY (INHALATION)
Qty: 60 EACH | Refills: 3 | Status: SHIPPED | OUTPATIENT
Start: 2024-02-23

## 2024-02-29 NOTE — PROGRESS NOTES
[facility]    Misty Womack  1955 February 29, 2024    Reason for Consult:    CC:    HPI:  The patient is 68 y.o. female with a past medical history significant for TIA (2006), chiaria malformation, pituitary tumors with adrenal insuffiencey, hypertension and atrial fibrillation s/p DCCV 8/30/22 and ablation 7/26/23, and atrial flutter who presents as a referral from Dr. Atkins for bicuspid aortic valve, mitral regurgitation and dilation of aorta noted on THANIA 7/27/23.     She was noted to be in atrial flutter with RVR in office 2/7/24 and was admitted for further management. She underwent an ablation on 2/8/24.     Review of Systems:  Constitutional: No fatigue, weakness, night sweats or fever.   HEENT: No new vision difficulties or ringing in the ears.  Respiratory: No new SOB, PND, orthopnea or cough.   Cardiovascular: See HPI   GI: No n/v, diarrhea, constipation, abdominal pain or changes in bowel habits.  No melena, no hematochezia  : No urinary frequency, urgency, incontinence, hematuria or dysuria.  Skin: No cyanosis or skin lesions.  Musculoskeletal: No new muscle or joint pain.  Neurological: No syncope or TIA-like symptoms.  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

## 2024-03-04 RX ORDER — ALBUTEROL SULFATE 90 UG/1
2 AEROSOL, METERED RESPIRATORY (INHALATION) EVERY 6 HOURS PRN
Qty: 8.5 G | Refills: 0 | Status: SHIPPED | OUTPATIENT
Start: 2024-03-04

## 2024-03-05 ENCOUNTER — OFFICE VISIT (OUTPATIENT)
Dept: CARDIOLOGY CLINIC | Age: 69
End: 2024-03-05
Payer: MEDICARE

## 2024-03-05 VITALS
SYSTOLIC BLOOD PRESSURE: 120 MMHG | DIASTOLIC BLOOD PRESSURE: 70 MMHG | HEIGHT: 67 IN | OXYGEN SATURATION: 97 % | BODY MASS INDEX: 36.88 KG/M2 | HEART RATE: 68 BPM | WEIGHT: 235 LBS

## 2024-03-05 DIAGNOSIS — I34.0 MITRAL VALVE INSUFFICIENCY, UNSPECIFIED ETIOLOGY: ICD-10-CM

## 2024-03-05 DIAGNOSIS — I48.0 PAF (PAROXYSMAL ATRIAL FIBRILLATION) (HCC): ICD-10-CM

## 2024-03-05 DIAGNOSIS — I77.810 ASCENDING AORTA DILATION (HCC): Primary | ICD-10-CM

## 2024-03-05 DIAGNOSIS — Q23.1 BICUSPID AORTIC VALVE: ICD-10-CM

## 2024-03-05 PROCEDURE — 3074F SYST BP LT 130 MM HG: CPT | Performed by: INTERNAL MEDICINE

## 2024-03-05 PROCEDURE — 99204 OFFICE O/P NEW MOD 45 MIN: CPT | Performed by: INTERNAL MEDICINE

## 2024-03-05 PROCEDURE — 1123F ACP DISCUSS/DSCN MKR DOCD: CPT | Performed by: INTERNAL MEDICINE

## 2024-03-05 PROCEDURE — 3078F DIAST BP <80 MM HG: CPT | Performed by: INTERNAL MEDICINE

## 2024-03-05 NOTE — PROGRESS NOTES
[facility]    Misty Womack  1955 March 5, 2024    Reason for Consult:    CC:\"I am doing well here for a murmur\"    HPI:  The patient is 68 y.o. female with a past medical history significant for TIA (2006), chiaria malformation, pituitary tumors with adrenal insuffiencey, hypertension and atrial fibrillation s/p DCCV 8/30/22 and ablation 7/26/23, and atrial flutter who presents as a referral from Dr. Atkins for bicuspid aortic valve, mitral regurgitation and dilation of aorta noted on THANIA 7/27/23.   She was noted to be in atrial flutter with RVR in office 2/7/24 and was admitted for further management. She underwent an ablation on 2/8/24.   Today she presents for follow up and states that overall she is feeling well.  She reports had a stroke due to a pituitary tumor in the past.She denies any new sounding cardiac complaints. She denies any chest pains or worsening shortness of breath. She is doing well since her ablation.  She reports she does have a murmur. She reports medication compliance and is tolerating. She denies any abnormal bleeding or bruising. She denies exertional chest pain/pressure, dyspnea at rest, worsening GRIFFITHS, PND, orthopnea, palpitations, lightheadedness, weight changes, changes in LE edema, and syncope.     Review of Systems:  Constitutional: No fatigue, weakness, night sweats or fever.   HEENT: No new vision difficulties or ringing in the ears.  Respiratory: No new SOB, PND, orthopnea or cough.   Cardiovascular: See HPI   GI: No n/v, diarrhea, constipation, abdominal pain or changes in bowel habits.  No melena, no hematochezia  : No urinary frequency, urgency, incontinence, hematuria or dysuria.  Skin: No cyanosis or skin lesions.  Musculoskeletal: No new muscle or joint pain.  Neurological: No syncope or TIA-like symptoms.  Psychiatric: No anxiety, insomnia or depression     Past Medical History:   Diagnosis Date    Adrenal insufficiency (HCC)

## 2024-03-08 ENCOUNTER — OFFICE VISIT (OUTPATIENT)
Dept: CARDIOLOGY CLINIC | Age: 69
End: 2024-03-08
Payer: MEDICARE

## 2024-03-08 VITALS
WEIGHT: 235 LBS | HEART RATE: 62 BPM | OXYGEN SATURATION: 96 % | HEIGHT: 67 IN | DIASTOLIC BLOOD PRESSURE: 70 MMHG | BODY MASS INDEX: 36.88 KG/M2 | SYSTOLIC BLOOD PRESSURE: 124 MMHG

## 2024-03-08 DIAGNOSIS — I10 ESSENTIAL HYPERTENSION: Primary | ICD-10-CM

## 2024-03-08 DIAGNOSIS — I10 PRIMARY HYPERTENSION: ICD-10-CM

## 2024-03-08 DIAGNOSIS — I48.19 PERSISTENT ATRIAL FIBRILLATION (HCC): ICD-10-CM

## 2024-03-08 DIAGNOSIS — I48.3 TYPICAL ATRIAL FLUTTER (HCC): ICD-10-CM

## 2024-03-08 DIAGNOSIS — I77.810 ASCENDING AORTA DILATION (HCC): ICD-10-CM

## 2024-03-08 PROCEDURE — 99214 OFFICE O/P EST MOD 30 MIN: CPT | Performed by: NURSE PRACTITIONER

## 2024-03-08 PROCEDURE — 93000 ELECTROCARDIOGRAM COMPLETE: CPT | Performed by: NURSE PRACTITIONER

## 2024-03-08 PROCEDURE — 1123F ACP DISCUSS/DSCN MKR DOCD: CPT | Performed by: NURSE PRACTITIONER

## 2024-03-08 PROCEDURE — 3074F SYST BP LT 130 MM HG: CPT | Performed by: NURSE PRACTITIONER

## 2024-03-08 PROCEDURE — 3078F DIAST BP <80 MM HG: CPT | Performed by: NURSE PRACTITIONER

## 2024-03-08 NOTE — PROGRESS NOTES
[facility]   Cardiology Office Visit      Date: 3/8/2024  CC:    Chief Complaint   Patient presents with    Follow-up     ablation follow up         HPI:   I had the privilege of visiting Misty Womack in the office.   Misty Womack is a 68 y.o. female  Presents today for follow up :for known history of TIA (2006), pituitary brain tumors with adrenal insufficiency, chiari malformation, thyroid disease and atrial fibrillation. She was first noted to have atrial fibrillation during her cataract surgery in 2017. Seen on EKG 4/26/2017. EKG 5/8/2017 showed NSR. She underwent atrial fibrillation ablation 7/18/22. She was seen in the office and noted to have recurrence of atrial fibrillation. She underwent successful cardioversion on 8/30/22.      She did not tolerated flecainide at all post ablation. After DCCV, ranexa was started 500 mg bid.     11/16/22 Patient contacted the office complaining of HR being 145 bpm. She spontaneously converted to sinus rhythm the following day.      12/15/2022 Patient contacted the office stating she is in atrial fibrillation with rate of 141 bpm. Recommended taking Ranexa 500 mg BID.      01/09/23 Had complaints of atrial fibrillation. HOMA Saleem advised patient to increase Ranexa to 100 mg BID with possible cardioversion if she remained in sinus rhythm. 01/2023 Informed the office that she remained in atrial fibrillation with rapid pounding. Planned for Tikosyn admission.      01/31/23 Admitted to hospital for dofetilide loading. Noted that there is corn oil in dofetilide and was decided not to start given significant allergy. She was started on Multaq.      03/01/23 Underwent left total knee replacement robotic assisted.      S/p Successful Pulmonary vein re-isolations using wide area circumferential radiofrequency ablation with confirmation of exit and entrance block. Successful CTI line with block 07/26/23     Seen in clinic for 1 month follow up after ablation

## 2024-03-18 PROBLEM — I48.19 PERSISTENT ATRIAL FIBRILLATION (HCC): Status: ACTIVE | Noted: 2023-01-31

## 2024-03-18 PROBLEM — I48.3 TYPICAL ATRIAL FLUTTER (HCC): Status: ACTIVE | Noted: 2024-02-07

## 2024-03-18 ASSESSMENT — ENCOUNTER SYMPTOMS
CHEST TIGHTNESS: 0
APNEA: 0
SHORTNESS OF BREATH: 0
WHEEZING: 0
COUGH: 0

## 2024-03-26 RX ORDER — ALBUTEROL SULFATE 90 UG/1
2 AEROSOL, METERED RESPIRATORY (INHALATION) EVERY 6 HOURS PRN
Qty: 8.5 G | Refills: 0 | Status: SHIPPED | OUTPATIENT
Start: 2024-03-26

## 2024-04-22 RX ORDER — ALBUTEROL SULFATE 90 UG/1
2 AEROSOL, METERED RESPIRATORY (INHALATION) EVERY 6 HOURS PRN
Qty: 8.5 G | Refills: 0 | Status: SHIPPED | OUTPATIENT
Start: 2024-04-22

## 2024-05-14 RX ORDER — ALBUTEROL SULFATE 90 UG/1
2 AEROSOL, METERED RESPIRATORY (INHALATION) EVERY 6 HOURS PRN
Qty: 8.5 G | Refills: 0 | Status: SHIPPED | OUTPATIENT
Start: 2024-05-14

## 2024-06-08 DIAGNOSIS — J45.30 MILD PERSISTENT ALLERGIC ASTHMA WITHOUT COMPLICATION: ICD-10-CM

## 2024-06-10 RX ORDER — FLUTICASONE PROPIONATE AND SALMETEROL 100; 50 UG/1; UG/1
POWDER RESPIRATORY (INHALATION)
Qty: 60 EACH | Refills: 3 | Status: SHIPPED | OUTPATIENT
Start: 2024-06-10

## 2024-06-10 RX ORDER — ALBUTEROL SULFATE 90 UG/1
2 AEROSOL, METERED RESPIRATORY (INHALATION) EVERY 6 HOURS PRN
Qty: 8.5 G | Refills: 2 | Status: SHIPPED | OUTPATIENT
Start: 2024-06-10

## 2024-06-24 ENCOUNTER — COMMUNITY OUTREACH (OUTPATIENT)
Dept: FAMILY MEDICINE CLINIC | Age: 69
End: 2024-06-24

## 2024-06-24 NOTE — PROGRESS NOTES
Patient's HM shows they are overdue for Mammogram.   [employer] and  files searched  without success.

## 2024-07-05 ENCOUNTER — HOSPITAL ENCOUNTER (OUTPATIENT)
Dept: CT IMAGING | Age: 69
Discharge: HOME | End: 2024-07-05
Attending: INTERNAL MEDICINE
Payer: MEDICARE

## 2024-07-05 DIAGNOSIS — I77.810 ASCENDING AORTA DILATION (HCC): ICD-10-CM

## 2024-07-05 DIAGNOSIS — Q23.1 BICUSPID AORTIC VALVE: ICD-10-CM

## 2024-07-05 LAB
PERFORMED ON: ABNORMAL
POC CREATININE: 1.1 MG/DL (ref 0.6–1.2)
POC SAMPLE TYPE: ABNORMAL

## 2024-07-05 PROCEDURE — 71260 CT THORAX DX C+: CPT

## 2024-07-05 PROCEDURE — 6360000004 HC RX CONTRAST MEDICATION: Performed by: INTERNAL MEDICINE

## 2024-07-05 PROCEDURE — 82565 ASSAY OF CREATININE: CPT

## 2024-07-05 RX ADMIN — IOPAMIDOL 75 ML: 755 INJECTION, SOLUTION INTRAVENOUS at 10:00

## 2024-08-21 RX ORDER — ALBUTEROL SULFATE 90 UG/1
2 AEROSOL, METERED RESPIRATORY (INHALATION) EVERY 6 HOURS PRN
Qty: 8.5 G | Refills: 2 | Status: SHIPPED | OUTPATIENT
Start: 2024-08-21

## 2024-09-20 DIAGNOSIS — I48.0 PAROXYSMAL ATRIAL FIBRILLATION (HCC): ICD-10-CM

## 2024-09-30 ENCOUNTER — TELEPHONE (OUTPATIENT)
Dept: FAMILY MEDICINE CLINIC | Age: 69
End: 2024-09-30

## 2024-09-30 DIAGNOSIS — R06.02 SHORTNESS OF BREATH: Primary | ICD-10-CM

## 2024-09-30 RX ORDER — METHYLPREDNISOLONE 4 MG
TABLET, DOSE PACK ORAL
Qty: 1 KIT | Refills: 0 | Status: SHIPPED | OUTPATIENT
Start: 2024-09-30 | End: 2024-10-06

## 2024-09-30 NOTE — TELEPHONE ENCOUNTER
Patient was calling to see if we can call her in some prednisone   She is getting over RSV and dealing with her asthma. She said she is using her inhaler way to much.    [facility]   Phone Number

## 2024-09-30 NOTE — TELEPHONE ENCOUNTER
Rx sent for Medrol Dosepak.  Take with food to prevent GI upset.  Would need an appointment if not feeling any better.

## 2024-10-07 DIAGNOSIS — J45.30 MILD PERSISTENT ALLERGIC ASTHMA WITHOUT COMPLICATION: ICD-10-CM

## 2024-10-07 RX ORDER — FLUTICASONE PROPIONATE AND SALMETEROL 100; 50 UG/1; UG/1
POWDER RESPIRATORY (INHALATION)
Qty: 60 EACH | Refills: 3 | Status: SHIPPED | OUTPATIENT
Start: 2024-10-07

## 2024-11-04 RX ORDER — ALBUTEROL SULFATE 90 UG/1
2 INHALANT RESPIRATORY (INHALATION) EVERY 6 HOURS PRN
Qty: 8.5 G | Refills: 2 | Status: SHIPPED | OUTPATIENT
Start: 2024-11-04

## 2024-11-07 SDOH — ECONOMIC STABILITY: FOOD INSECURITY: WITHIN THE PAST 12 MONTHS, YOU WORRIED THAT YOUR FOOD WOULD RUN OUT BEFORE YOU GOT MONEY TO BUY MORE.: NEVER TRUE

## 2024-11-07 SDOH — ECONOMIC STABILITY: FOOD INSECURITY: WITHIN THE PAST 12 MONTHS, THE FOOD YOU BOUGHT JUST DIDN'T LAST AND YOU DIDN'T HAVE MONEY TO GET MORE.: NEVER TRUE

## 2024-11-07 SDOH — ECONOMIC STABILITY: INCOME INSECURITY: HOW HARD IS IT FOR YOU TO PAY FOR THE VERY BASICS LIKE FOOD, HOUSING, MEDICAL CARE, AND HEATING?: NOT HARD AT ALL

## 2024-11-08 ENCOUNTER — OFFICE VISIT (OUTPATIENT)
Dept: FAMILY MEDICINE CLINIC | Age: 69
End: 2024-11-08

## 2024-11-08 VITALS
SYSTOLIC BLOOD PRESSURE: 124 MMHG | HEART RATE: 61 BPM | HEIGHT: 67 IN | OXYGEN SATURATION: 98 % | WEIGHT: 235 LBS | DIASTOLIC BLOOD PRESSURE: 80 MMHG | BODY MASS INDEX: 36.88 KG/M2

## 2024-11-08 DIAGNOSIS — J45.30 MILD PERSISTENT ALLERGIC ASTHMA WITHOUT COMPLICATION: Primary | ICD-10-CM

## 2024-11-08 DIAGNOSIS — R63.5 WEIGHT GAIN: ICD-10-CM

## 2024-11-08 RX ORDER — MOMETASONE FUROATE 200 UG/1
1 AEROSOL RESPIRATORY (INHALATION) 2 TIMES DAILY
Qty: 13 G | Refills: 2 | Status: SHIPPED | OUTPATIENT
Start: 2024-11-08

## 2024-11-08 SDOH — ECONOMIC STABILITY: INCOME INSECURITY: IN THE LAST 12 MONTHS, WAS THERE A TIME WHEN YOU WERE NOT ABLE TO PAY THE MORTGAGE OR RENT ON TIME?: NO

## 2024-11-08 NOTE — PROGRESS NOTES
leg swelling.   Neurological:  Negative for dizziness, weakness, light-headedness, numbness and headaches.   Psychiatric/Behavioral:  Negative for decreased concentration, dysphoric mood, self-injury, sleep disturbance and suicidal ideas. The patient is not nervous/anxious and is not hyperactive.        Physical Exam  Constitutional:       General: She is not in acute distress.     Appearance: Normal appearance. She is obese.   Cardiovascular:      Rate and Rhythm: Normal rate and regular rhythm.      Pulses: Normal pulses.      Heart sounds: Normal heart sounds. No murmur heard.     No gallop.   Pulmonary:      Effort: Pulmonary effort is normal.      Breath sounds: Normal breath sounds. No wheezing.   Neurological:      Mental Status: She is alert and oriented to person, place, and time.   Psychiatric:         Mood and Affect: Mood normal.         Behavior: Behavior normal.         Thought Content: Thought content normal.         Judgment: Judgment normal.               This dictation was generated by voice recognition computer software.  Although all attempts are made to edit the dictation for accuracy, there may be errors in the transcription that are not intended.    An electronic signature was used to authenticate this note.    --JERMAINE Gates - CNP

## 2024-11-20 ENCOUNTER — TELEPHONE (OUTPATIENT)
Dept: FAMILY MEDICINE CLINIC | Age: 69
End: 2024-11-20

## 2024-12-30 SDOH — HEALTH STABILITY: PHYSICAL HEALTH: ON AVERAGE, HOW MANY DAYS PER WEEK DO YOU ENGAGE IN MODERATE TO STRENUOUS EXERCISE (LIKE A BRISK WALK)?: 1 DAY

## 2024-12-30 SDOH — HEALTH STABILITY: PHYSICAL HEALTH: ON AVERAGE, HOW MANY MINUTES DO YOU ENGAGE IN EXERCISE AT THIS LEVEL?: 20 MIN

## 2025-01-02 ENCOUNTER — OFFICE VISIT (OUTPATIENT)
Dept: ORTHOPEDIC SURGERY | Age: 70
End: 2025-01-02

## 2025-01-02 VITALS — HEIGHT: 67 IN | WEIGHT: 240 LBS | BODY MASS INDEX: 37.67 KG/M2

## 2025-01-02 DIAGNOSIS — S46.011S ROTATOR CUFF STRAIN, RIGHT, SEQUELA: ICD-10-CM

## 2025-01-02 DIAGNOSIS — M25.511 ACUTE PAIN OF RIGHT SHOULDER: Primary | ICD-10-CM

## 2025-01-02 RX ORDER — METHYLPREDNISOLONE ACETATE 40 MG/ML
80 INJECTION, SUSPENSION INTRA-ARTICULAR; INTRALESIONAL; INTRAMUSCULAR; SOFT TISSUE ONCE
Status: COMPLETED | OUTPATIENT
Start: 2025-01-02 | End: 2025-01-02

## 2025-01-02 RX ORDER — HYDROCORTISONE 10 MG/1
10 TABLET ORAL
COMMUNITY
Start: 2008-03-28

## 2025-01-02 RX ADMIN — Medication 4 ML: at 13:27

## 2025-01-02 RX ADMIN — METHYLPREDNISOLONE ACETATE 80 MG: 40 INJECTION, SUSPENSION INTRA-ARTICULAR; INTRALESIONAL; INTRAMUSCULAR; SOFT TISSUE at 13:28

## 2025-01-02 NOTE — PROGRESS NOTES
There are no reproduction of symptoms into either arm with flexion, extension, rotation or palpation.  The patient has a negative Spurling sign, and no tenderness.    Examination of the left shoulder reveals normal scapular control and no prominence.  There is no pain over the acromioclavicular or sternoclavicular joints.  The patient has no biceps pain.  There is full range of motion.There is no pain with impingement testing.  There is no pain with Canada maneuver.  Mooreland's maneuver is normal.  There is no pain or apprehension in the abducted externally rotated position.  There is no sulcus sign.  There is no instability with anterior or posterior stress applied.  The patient demonstrates full strength in the supraspinatus, infraspinatus, and subscapularis.  Neurologic and vascular examination of the upper extremity  is normal.    Right shoulder has some mild tenderness laterally.  She has pain with Canada and impingement maneuvers.  She has pain reaching behind the back.  Strength is intact throughout the cuff.  She has no instability.  Neurologic and vascular exams are normal.  She is very stiff in internal rotation.    Xrays of the right shoulder were obtained today in the office and interpreted by me : AP in the scapular plane, axillary lateral, and scapular Y.   These demonstrate: Mild degenerative change.  No acute bony abnormalities are noted.      Hemoglobin A1c 12/19/2024 is 5.9  Estimated GFR is 43.      Assessment: Right shoulder rotator cuff strain versus small tear.    Plan: Cortisone injection and therapy.    Procedure: After obtaining verbal consent under sterile technique right shoulder was injected posteriorly in the subacromial space with 80 mg of Depo-Medrol and 40 mg of lidocaine.    Show he will attend therapy.  Follow-up with me in a month.  If she has ongoing pain then we will get an MRI.  She agrees

## 2025-01-07 ENCOUNTER — PATIENT MESSAGE (OUTPATIENT)
Dept: FAMILY MEDICINE CLINIC | Age: 70
End: 2025-01-07

## 2025-01-07 DIAGNOSIS — J45.30 MILD PERSISTENT ALLERGIC ASTHMA WITHOUT COMPLICATION: Primary | ICD-10-CM

## 2025-01-08 RX ORDER — FLUTICASONE PROPIONATE AND SALMETEROL 250; 50 UG/1; UG/1
1 POWDER RESPIRATORY (INHALATION) 2 TIMES DAILY
Qty: 60 EACH | Refills: 3 | Status: SHIPPED | OUTPATIENT
Start: 2025-01-08

## 2025-01-14 DIAGNOSIS — J45.30 MILD PERSISTENT ALLERGIC ASTHMA WITHOUT COMPLICATION: ICD-10-CM

## 2025-01-14 DIAGNOSIS — I48.0 PAROXYSMAL ATRIAL FIBRILLATION (HCC): ICD-10-CM

## 2025-01-15 RX ORDER — FLUTICASONE PROPIONATE AND SALMETEROL 250; 50 UG/1; UG/1
POWDER RESPIRATORY (INHALATION)
Qty: 60 EACH | Refills: 2 | Status: SHIPPED | OUTPATIENT
Start: 2025-01-15

## 2025-01-15 NOTE — TELEPHONE ENCOUNTER
Last OV: 3/8/24  Next OV: 3/11/25  Last refill: 9/20/24 #180 3 R/F  Most recent Labs: 2/8/24  Last EKG (if needed): 3/8/24

## 2025-01-17 ENCOUNTER — HOSPITAL ENCOUNTER (OUTPATIENT)
Dept: PHYSICAL THERAPY | Age: 70
Setting detail: THERAPIES SERIES
Discharge: HOME | End: 2025-01-17
Attending: ORTHOPAEDIC SURGERY
Payer: MEDICARE

## 2025-01-17 DIAGNOSIS — M25.511 ACUTE PAIN OF RIGHT SHOULDER: Primary | ICD-10-CM

## 2025-01-17 DIAGNOSIS — R53.1 WEAKNESS: ICD-10-CM

## 2025-01-17 PROCEDURE — 97161 PT EVAL LOW COMPLEX 20 MIN: CPT

## 2025-01-17 PROCEDURE — 97110 THERAPEUTIC EXERCISES: CPT

## 2025-01-17 PROCEDURE — 97112 NEUROMUSCULAR REEDUCATION: CPT

## 2025-01-17 NOTE — PLAN OF CARE
drainage, manual traction) for the purpose of modulating pain, promoting relaxation,  increasing ROM, reducing/eliminating soft tissue swelling/inflammation/restriction, improving soft tissue extensibility and allowing for proper ROM for normal function with self care, mobility, lifting and ambulation      GOALS     GOALS:  Patient stated goal: Reduce pain in shoulder  [] Progressing: [] Met: [] Not Met: [] Adjusted    Therapist goals for Patient:   Short Term Goals: To be achieved in: 2 weeks  1. Independent in HEP and progression per patient tolerance, in order to prevent re-injury.   [] Progressing: [] Met: [] Not Met: [] Adjusted  2. Patient will have a decrease in pain to <2/10 to facilitate improvement in movement, function, and ADLs as indicated by Functional Deficits.  [] Progressing: [] Met: [] Not Met: [] Adjusted      Long Term Goals: To be achieved in: 4 weeks  1. Disability index score of 12% or less for the Upper Extremity functional Scale to assist with reaching prior level of function with activities such as lifting bags of groceries.  [] Progressing: [] Met: [] Not Met: [] Adjusted  2. Patient will demonstrate increased AROM of right shoulder to WNL without pain to allow for proper joint functioning to enable patient to reach into OH cabinet in kitchen without pain.   [] Progressing: [] Met: [] Not Met: [] Adjusted  3. Patient will demonstrate increased Strength of right shoulder supraspinatus and shoulder ER to at least 5/5 throughout without pain to allow for proper functional mobility to enable patient to return to lifting  > 20 pounds without shoulder pain.   [] Progressing: [] Met: [] Not Met: [] Adjusted  4. Patient will return to sleeping on right shoulder without increased symptoms or restriction.   [] Progressing: [] Met: [] Not Met: [] Adjusted  5. Patient will be able to reach behind back without pain in shoulder. (patient specific functional goal)    [] Progressing: [] Met: [] Not Met:

## 2025-01-21 RX ORDER — ALBUTEROL SULFATE 90 UG/1
2 INHALANT RESPIRATORY (INHALATION) EVERY 6 HOURS PRN
Qty: 8.5 G | Refills: 2 | Status: SHIPPED | OUTPATIENT
Start: 2025-01-21

## 2025-01-23 ENCOUNTER — APPOINTMENT (OUTPATIENT)
Dept: PHYSICAL THERAPY | Age: 70
End: 2025-01-23
Attending: ORTHOPAEDIC SURGERY
Payer: MEDICARE

## 2025-01-31 ENCOUNTER — APPOINTMENT (OUTPATIENT)
Dept: PHYSICAL THERAPY | Age: 70
End: 2025-01-31
Attending: ORTHOPAEDIC SURGERY
Payer: MEDICARE

## 2025-02-04 ENCOUNTER — OFFICE VISIT (OUTPATIENT)
Dept: ORTHOPEDIC SURGERY | Age: 70
End: 2025-02-04
Payer: MEDICARE

## 2025-02-04 VITALS — BODY MASS INDEX: 37.67 KG/M2 | HEIGHT: 67 IN | WEIGHT: 240 LBS

## 2025-02-04 DIAGNOSIS — S46.011S ROTATOR CUFF STRAIN, RIGHT, SEQUELA: Primary | ICD-10-CM

## 2025-02-04 DIAGNOSIS — M25.511 ACUTE PAIN OF RIGHT SHOULDER: ICD-10-CM

## 2025-02-04 PROCEDURE — 1125F AMNT PAIN NOTED PAIN PRSNT: CPT | Performed by: ORTHOPAEDIC SURGERY

## 2025-02-04 PROCEDURE — 1160F RVW MEDS BY RX/DR IN RCRD: CPT | Performed by: ORTHOPAEDIC SURGERY

## 2025-02-04 PROCEDURE — 1159F MED LIST DOCD IN RCRD: CPT | Performed by: ORTHOPAEDIC SURGERY

## 2025-02-04 PROCEDURE — 1123F ACP DISCUSS/DSCN MKR DOCD: CPT | Performed by: ORTHOPAEDIC SURGERY

## 2025-02-04 PROCEDURE — 99212 OFFICE O/P EST SF 10 MIN: CPT | Performed by: ORTHOPAEDIC SURGERY

## 2025-02-04 NOTE — PROGRESS NOTES
Misty Womack returns today for 1 month follow-up of her right shoulder.    We did a cortisone injection 1 month ago.  She has tended to therapy.  Today she reports that the cortisone injection was tremendously helpful for a couple of weeks but when she went to lift something she had recurrent discomfort.  It no longer hurts at night.  She has a constant ache.  She says she would consider surgery because she cannot even do simple activities such as holding microphone when she is singing.    General Exam:    Vitals: Height 1.702 m (5' 7.01\"), weight 108.9 kg (240 lb), not currently breastfeeding.  Constitutional: Patient is adequately groomed with no evidence of malnutrition  Mental Status: The patient is oriented to time, place and person.  The patient's mood and affect are appropriate.    Right shoulder has diffuse tenderness.  She has complaints of pain with elevation and rotation and 4/5 strength in the supraspinatus.    Assessment: Refractory right shoulder pain despite injection therapy.    Plan: MRI scan right shoulder.    Follow-up with me after the scan

## 2025-02-07 ENCOUNTER — APPOINTMENT (OUTPATIENT)
Dept: PHYSICAL THERAPY | Age: 70
End: 2025-02-07
Attending: ORTHOPAEDIC SURGERY
Payer: MEDICARE

## 2025-02-13 ENCOUNTER — PREP FOR PROCEDURE (OUTPATIENT)
Dept: ORTHOPEDIC SURGERY | Age: 70
End: 2025-02-13

## 2025-02-13 ENCOUNTER — OFFICE VISIT (OUTPATIENT)
Dept: ORTHOPEDIC SURGERY | Age: 70
End: 2025-02-13

## 2025-02-13 VITALS — HEIGHT: 67 IN | BODY MASS INDEX: 37.67 KG/M2 | WEIGHT: 240 LBS

## 2025-02-13 DIAGNOSIS — S46.011A TRAUMATIC ROTATOR CUFF TEAR, RIGHT, INITIAL ENCOUNTER: ICD-10-CM

## 2025-02-13 DIAGNOSIS — S46.011A TRAUMATIC COMPLETE TEAR OF RIGHT ROTATOR CUFF, INITIAL ENCOUNTER: Primary | ICD-10-CM

## 2025-02-13 DIAGNOSIS — M75.21 BICEPS TENDINITIS ON RIGHT: ICD-10-CM

## 2025-02-13 NOTE — PROGRESS NOTES
benefits, and alternatives to surgery.  The alternatives include conservative management including medications, injections, and physical therapy as well as observation.  Risks of surgery include but are not limited to persistent pain, instability, and reinjury.  Risks also include risk of infection which could result in the need for further surgery and long-term use of antibiotics.  Risks also include deep venous thromboses and pulmonary emboli.  Risks also include problems with anesthesia including but not limited to cardiovascular compromise , stroke,  and death.  The patient understands that the goal of surgery is to improve pain and function but that can never be guaranteed.    We discussed things at length.  She understands that full recovery is on the order of 6 months and that she will be in a sling for 6 weeks.  She will need preoperative medical clearance.    She will be fit for her sling today.  I will plan to see her in the operating room next month.          Procedures    DJO ultrasling Shoulder Sling     Patient was prescribed a DJO Ultrasling IV Shoulder Brace.   The right shoulder will require stabilization / immobilization from this orthosis.  The orthosis will assist in protecting the affected area, provide functional support and facilitate healing.  The device was ordered and fit on 2/13/2025.    The patient was educated and fit by a healthcare professional with expert knowledge and specialization in brace application while under the direct supervision of the treating physician.  Verbal and written instructions for the use of and application of this item were provided.   They were instructed to contact the office immediately should the brace result in increased pain, decreased sensation, increased swelling or worsening of the condition.

## 2025-03-07 ENCOUNTER — OFFICE VISIT (OUTPATIENT)
Dept: FAMILY MEDICINE CLINIC | Age: 70
End: 2025-03-07

## 2025-03-07 VITALS
DIASTOLIC BLOOD PRESSURE: 80 MMHG | WEIGHT: 241 LBS | HEART RATE: 78 BPM | BODY MASS INDEX: 37.83 KG/M2 | HEIGHT: 67 IN | TEMPERATURE: 97.8 F | RESPIRATION RATE: 18 BRPM | OXYGEN SATURATION: 97 % | SYSTOLIC BLOOD PRESSURE: 120 MMHG

## 2025-03-07 DIAGNOSIS — S46.011S TRAUMATIC COMPLETE TEAR OF RIGHT ROTATOR CUFF, SEQUELA: Primary | ICD-10-CM

## 2025-03-07 DIAGNOSIS — Z01.818 PREOP EXAMINATION: ICD-10-CM

## 2025-03-07 SDOH — ECONOMIC STABILITY: FOOD INSECURITY: WITHIN THE PAST 12 MONTHS, THE FOOD YOU BOUGHT JUST DIDN'T LAST AND YOU DIDN'T HAVE MONEY TO GET MORE.: NEVER TRUE

## 2025-03-07 SDOH — ECONOMIC STABILITY: FOOD INSECURITY: WITHIN THE PAST 12 MONTHS, YOU WORRIED THAT YOUR FOOD WOULD RUN OUT BEFORE YOU GOT MONEY TO BUY MORE.: NEVER TRUE

## 2025-03-07 ASSESSMENT — PATIENT HEALTH QUESTIONNAIRE - PHQ9
4. FEELING TIRED OR HAVING LITTLE ENERGY: NOT AT ALL
3. TROUBLE FALLING OR STAYING ASLEEP: NOT AT ALL
SUM OF ALL RESPONSES TO PHQ QUESTIONS 1-9: 0
10. IF YOU CHECKED OFF ANY PROBLEMS, HOW DIFFICULT HAVE THESE PROBLEMS MADE IT FOR YOU TO DO YOUR WORK, TAKE CARE OF THINGS AT HOME, OR GET ALONG WITH OTHER PEOPLE: NOT DIFFICULT AT ALL
5. POOR APPETITE OR OVEREATING: NOT AT ALL
9. THOUGHTS THAT YOU WOULD BE BETTER OFF DEAD, OR OF HURTING YOURSELF: NOT AT ALL
SUM OF ALL RESPONSES TO PHQ QUESTIONS 1-9: 0
8. MOVING OR SPEAKING SO SLOWLY THAT OTHER PEOPLE COULD HAVE NOTICED. OR THE OPPOSITE, BEING SO FIGETY OR RESTLESS THAT YOU HAVE BEEN MOVING AROUND A LOT MORE THAN USUAL: NOT AT ALL
2. FEELING DOWN, DEPRESSED OR HOPELESS: NOT AT ALL
SUM OF ALL RESPONSES TO PHQ QUESTIONS 1-9: 0
SUM OF ALL RESPONSES TO PHQ QUESTIONS 1-9: 0
1. LITTLE INTEREST OR PLEASURE IN DOING THINGS: NOT AT ALL
7. TROUBLE CONCENTRATING ON THINGS, SUCH AS READING THE NEWSPAPER OR WATCHING TELEVISION: NOT AT ALL
6. FEELING BAD ABOUT YOURSELF - OR THAT YOU ARE A FAILURE OR HAVE LET YOURSELF OR YOUR FAMILY DOWN: NOT AT ALL

## 2025-03-07 NOTE — PROGRESS NOTES
Preoperative Consultation      Misty Womack  YOB: 1955    Date of Service:  3/7/2025    Vitals:    03/07/25 1342   BP: 120/80   Site: Right Upper Arm   Position: Sitting   Cuff Size: Large Adult   Pulse: 78   Resp: 18   Temp: 97.8 °F (36.6 °C)   TempSrc: Oral   SpO2: 97%   Weight: 109.3 kg (241 lb)   Height: 1.702 m (5' 7\")      Wt Readings from Last 2 Encounters:   03/07/25 109.3 kg (241 lb)   02/13/25 108.9 kg (240 lb)     BP Readings from Last 3 Encounters:   03/07/25 120/80   11/08/24 124/80   03/08/24 124/70        Chief Complaint   Patient presents with    Pre-op Exam     Pre op shoulder surgery- March 19th. Dr. Orlando at [facility] is repairing the   torn rotator cuff.        Allergies   Allergen Reactions    Flecainide Shortness Of Breath and Swelling    Corn Oil      \"Feel horrible after ingesting anything containing corn\"  Ankle swelling    Corn-Containing Products      \"Feel horrible after ingesting anything containing corn\"    Ankle swelling    Levofloxacin      Out of touch w/ world    Levothyroxine Itching     Has corn in it and I am allergic to corn.    Lyrica [Pregabalin] Swelling    Soybean-Containing Drug Products Swelling     swelling    Tramadol Swelling     \"Feel horrible after ingesting anything containing corn\" Ankle swelling    Wheat Swelling    Ambien [Zolpidem] Anxiety and Other (See Comments)     Made me shaky and more awake.    Xanthan Gum Itching     Outpatient Medications Marked as Taking for the 3/7/25 encounter (Office Visit) with John Adair APRN - CNP   Medication Sig Dispense Refill    albuterol sulfate HFA (PROVENTIL;VENTOLIN;PROAIR) 108 (90 Base) MCG/ACT inhaler INHALE 2 PUFFS INTO THE LUNGS EVERY 6 HOURS AS NEEDED FOR WHEEZING 8.5 g 2    apixaban (ELIQUIS) 5 MG TABS tablet Take 1 tablet by mouth twice daily 180 tablet 1    fluticasone-salmeterol (ADVAIR) 250-50 MCG/ACT AEPB diskus inhaler INHALE 1 PUFF INTO THE LUNGS 2 TIMES A DAY (90 DAY SUPPLY) 60 each

## 2025-03-10 NOTE — PROGRESS NOTES
98%   BMI 37.75 kg/m²   No intake or output data in the 24 hours ending 03/11/25 1443  Wt Readings from Last 2 Encounters:   03/11/25 109.3 kg (241 lb)   03/07/25 109.3 kg (241 lb)     Constitutional: She is oriented to person, place, and time. She appears well-developed and well-nourished. In no acute distress.   Head: Normocephalic and atraumatic.     Neck: Neck supple. No JVD present. Carotid bruit is not present. No mass and no thyromegaly present. No lymphadenopathy present.  Cardiovascular: Normal rate, regular rhythm, normal heart sounds and intact distal pulses.  Exam reveals no gallop and no friction rub.  No murmur heard.  Pulmonary/Chest: Effort normal and breath sounds normal. No respiratory distress. She has no wheezes, rhonchi or rales.   Abdominal: Soft, non-tender. Bowel sounds and aorta are normal. She exhibits no organomegaly, mass or bruit.   Extremities: No edema, cyanosis, or clubbing. Pulses are 2+ radial/carotid/dorsalis pedis and posterior tibial bilaterally.  Neurological: She is alert and oriented to person, place, and time. She has normal reflexes. No cranial nerve deficit. Coordination normal.   Skin: Skin is warm and dry. There is no rash or diaphoresis.   Psychiatric: She has a normal mood and affect. Her speech is normal and behavior is normal.     Personally reviewed and interpreted   EKG Interpretation: Sinus rhythm QTC interval 479ms  EKG Interpretation 3/11/225: Sinus rhythm with normal intervals    Image Review:    Echo THANIA 7/27/23  The left ventricular ejection fraction is 50-55%.  The mitral valve is normal in structure and function.  The left atrium was not well visualized.  Bicuspid aortic valve. The raphe location is between the Left and right  coronary cusp.  Mild to moderate regurgitation.  Dilated ascending aorta at 4.0 cm.  Normal right ventricular size and function.  Mild tricuspid regurgitation.  The right atrium is normal in size.  Trivial pericardial

## 2025-03-11 ENCOUNTER — OFFICE VISIT (OUTPATIENT)
Dept: CARDIOLOGY CLINIC | Age: 70
End: 2025-03-11
Payer: MEDICARE

## 2025-03-11 VITALS
BODY MASS INDEX: 37.83 KG/M2 | SYSTOLIC BLOOD PRESSURE: 114 MMHG | OXYGEN SATURATION: 98 % | DIASTOLIC BLOOD PRESSURE: 76 MMHG | WEIGHT: 241 LBS | HEIGHT: 67 IN | RESPIRATION RATE: 15 BRPM | HEART RATE: 67 BPM

## 2025-03-11 DIAGNOSIS — I10 ESSENTIAL HYPERTENSION: ICD-10-CM

## 2025-03-11 DIAGNOSIS — I77.810 ASCENDING AORTA DILATION: Primary | ICD-10-CM

## 2025-03-11 DIAGNOSIS — I48.0 PAROXYSMAL ATRIAL FIBRILLATION (HCC): ICD-10-CM

## 2025-03-11 DIAGNOSIS — I35.0 NONRHEUMATIC AORTIC VALVE STENOSIS: ICD-10-CM

## 2025-03-11 DIAGNOSIS — I34.0 MITRAL VALVE INSUFFICIENCY, UNSPECIFIED ETIOLOGY: ICD-10-CM

## 2025-03-11 PROCEDURE — 1123F ACP DISCUSS/DSCN MKR DOCD: CPT | Performed by: INTERNAL MEDICINE

## 2025-03-11 PROCEDURE — 99214 OFFICE O/P EST MOD 30 MIN: CPT | Performed by: INTERNAL MEDICINE

## 2025-03-11 PROCEDURE — 3074F SYST BP LT 130 MM HG: CPT | Performed by: INTERNAL MEDICINE

## 2025-03-11 PROCEDURE — G2211 COMPLEX E/M VISIT ADD ON: HCPCS | Performed by: INTERNAL MEDICINE

## 2025-03-11 PROCEDURE — 3078F DIAST BP <80 MM HG: CPT | Performed by: INTERNAL MEDICINE

## 2025-03-11 PROCEDURE — 93000 ELECTROCARDIOGRAM COMPLETE: CPT | Performed by: INTERNAL MEDICINE

## 2025-03-11 RX ORDER — UBIDECARENONE 75 MG
50 CAPSULE ORAL DAILY
COMMUNITY

## 2025-03-13 ENCOUNTER — TELEPHONE (OUTPATIENT)
Dept: CARDIOLOGY CLINIC | Age: 70
End: 2025-03-13

## 2025-03-13 NOTE — TELEPHONE ENCOUNTER
What type of procedure are you having?  RIGHT SHOULDER ARTHROSCOPY ROTATOR CUFF REPAIR BICEPS TENOTOMY      Which physician is performing your procedure?  Carl Orlando MD      When is your procedure scheduled?  3/19     Where are you having this procedure?  [facility]      Are you taking Blood Thinners?              If so what? (Name/dose/frequesncy)  apixaban (ELIQUIS)/ 5 MG TABS tablet /Take 1 tablet by mouth twice daily                  Does the surgeon want you to stop your blood thinner?  If so for how long?  5 days prior          Phone Number and Contact Name for Physicians office:  [phone]     Fax number to send information:  [phone]     Cardiac History:  Last office visit with Cardiologist:  3/11/25     Cardiac Procedures:   3) Paroxysmal atrial fibrillation/flutter  -[id number] Score 3 ( HTN, Age, Gender)   -Flutter ablation 2/8/24.   -Continue Eliquis 5 mg BID for stroke risk reduction  -EKG today Sinus Rhythm   -Asymptomatic on today's exam  -Unable to tolerate flecainide or Ranexa.   -History of TIA  -Management per Dr. Atkins.

## 2025-03-13 NOTE — TELEPHONE ENCOUNTER
CARDIAC CLEARANCE     What type of procedure are you having?  RIGHT SHOULDER ARTHROSCOPY ROTATOR CUFF REPAIR BICEPS TENOTOMY     Which physician is performing your procedure?  Carl Orlando MD     When is your procedure scheduled?  3/19    Where are you having this procedure?  [facility]     Are you taking Blood Thinners?   If so what? (Name/dose/frequesncy)  apixaban (ELIQUIS)/ 5 MG TABS tablet /Take 1 tablet by mouth twice daily      Does the surgeon want you to stop your blood thinner?  If so for how long?  5 days prior     Are you having any new or worsening cardiac symptoms?     Phone Number and Contact Name for Physicians office:  [phone]    Fax number to send information:  [phone]    Cardiac History:  Last office visit with Cardiologist:  3/11/25    Cardiac Procedures:    Cardiac Testing:

## 2025-03-13 NOTE — TELEPHONE ENCOUNTER
LOV: 03/08/2024 with Nat NP     ECG 03/11/2025 noted sinus rhythm.     Patient has a PMHx of atrial flutter, atrial fibrillation, ascending aorta dilatation, bicuspid aortic valve, TIA, and hypertension.     Patient has a [id number] Score of 6   (Age,gender,htn,dm,tia2). On Eliquis 5 mg BID.     EF 50-55% with mild to moderate regurgitation per echo 07/27/2023. Stress Test completed 05/15/2017 was normal     Underwent EPS with RFA of CTI dependent flutter 02/08/2024 with Dr. Atkins .

## 2025-03-17 ENCOUNTER — PATIENT MESSAGE (OUTPATIENT)
Dept: ORTHOPEDIC SURGERY | Age: 70
End: 2025-03-17

## 2025-03-17 NOTE — TELEPHONE ENCOUNTER
Called and spoke with patient. She cannot take medications made with corn products (corn starch) or Xanthan gum. She was able to take one fourth of a Dilaudid post op in 2023. She does have Tylenol at home that she can take. She will arrive at [facility] at 0600 for surgery at 0730 with sling. NPO at midnight.

## 2025-03-18 ENCOUNTER — ANESTHESIA EVENT (OUTPATIENT)
Dept: OPERATING ROOM | Age: 70
End: 2025-03-18
Payer: MEDICARE

## 2025-03-18 DIAGNOSIS — M75.21 BICEPS TENDINITIS ON RIGHT: ICD-10-CM

## 2025-03-18 DIAGNOSIS — S46.011A TRAUMATIC ROTATOR CUFF TEAR, RIGHT, INITIAL ENCOUNTER: Primary | ICD-10-CM

## 2025-03-18 DIAGNOSIS — M25.511 ACUTE PAIN OF RIGHT SHOULDER: ICD-10-CM

## 2025-03-18 RX ORDER — PROMETHAZINE HYDROCHLORIDE 25 MG/1
25 TABLET ORAL EVERY 6 HOURS PRN
Qty: 30 TABLET | Refills: 0 | Status: SHIPPED | OUTPATIENT
Start: 2025-03-19 | End: 2025-03-25

## 2025-03-18 RX ORDER — HYDROMORPHONE HYDROCHLORIDE 2 MG/1
1 TABLET ORAL EVERY 4 HOURS PRN
Qty: 20 TABLET | Refills: 0 | Status: SHIPPED | OUTPATIENT
Start: 2025-03-19 | End: 2025-03-26

## 2025-03-18 RX ORDER — DOCUSATE SODIUM 100 MG/1
100 CAPSULE, LIQUID FILLED ORAL 2 TIMES DAILY
Qty: 60 CAPSULE | Refills: 0 | Status: SHIPPED | OUTPATIENT
Start: 2025-03-19

## 2025-03-19 ENCOUNTER — ANESTHESIA (OUTPATIENT)
Dept: OPERATING ROOM | Age: 70
End: 2025-03-19
Payer: MEDICARE

## 2025-03-19 ENCOUNTER — HOSPITAL ENCOUNTER (OUTPATIENT)
Age: 70
Setting detail: OUTPATIENT SURGERY
Discharge: HOME | End: 2025-03-19
Attending: ORTHOPAEDIC SURGERY | Admitting: ORTHOPAEDIC SURGERY
Payer: MEDICARE

## 2025-03-19 VITALS
DIASTOLIC BLOOD PRESSURE: 72 MMHG | RESPIRATION RATE: 16 BRPM | HEIGHT: 67 IN | SYSTOLIC BLOOD PRESSURE: 135 MMHG | HEART RATE: 55 BPM | BODY MASS INDEX: 38.08 KG/M2 | OXYGEN SATURATION: 100 % | TEMPERATURE: 96.8 F | WEIGHT: 242.6 LBS

## 2025-03-19 PROCEDURE — 2580000003 HC RX 258: Performed by: ANESTHESIOLOGY

## 2025-03-19 PROCEDURE — 7100000010 HC PHASE II RECOVERY - FIRST 15 MIN: Performed by: ORTHOPAEDIC SURGERY

## 2025-03-19 PROCEDURE — 2500000003 HC RX 250 WO HCPCS: Performed by: ANESTHESIOLOGY

## 2025-03-19 PROCEDURE — C1713 ANCHOR/SCREW BN/BN,TIS/BN: HCPCS | Performed by: ORTHOPAEDIC SURGERY

## 2025-03-19 PROCEDURE — 7100000001 HC PACU RECOVERY - ADDTL 15 MIN: Performed by: ORTHOPAEDIC SURGERY

## 2025-03-19 PROCEDURE — 6360000002 HC RX W HCPCS: Performed by: ANESTHESIOLOGY

## 2025-03-19 PROCEDURE — 3700000000 HC ANESTHESIA ATTENDED CARE: Performed by: ORTHOPAEDIC SURGERY

## 2025-03-19 PROCEDURE — 2709999900 HC NON-CHARGEABLE SUPPLY: Performed by: ORTHOPAEDIC SURGERY

## 2025-03-19 PROCEDURE — 64415 NJX AA&/STRD BRCH PLXS IMG: CPT | Performed by: ANESTHESIOLOGY

## 2025-03-19 PROCEDURE — 7100000011 HC PHASE II RECOVERY - ADDTL 15 MIN: Performed by: ORTHOPAEDIC SURGERY

## 2025-03-19 PROCEDURE — 3600000014 HC SURGERY LEVEL 4 ADDTL 15MIN: Performed by: ORTHOPAEDIC SURGERY

## 2025-03-19 PROCEDURE — 3700000001 HC ADD 15 MINUTES (ANESTHESIA): Performed by: ORTHOPAEDIC SURGERY

## 2025-03-19 PROCEDURE — 7100000000 HC PACU RECOVERY - FIRST 15 MIN: Performed by: ORTHOPAEDIC SURGERY

## 2025-03-19 PROCEDURE — 2580000003 HC RX 258: Performed by: ORTHOPAEDIC SURGERY

## 2025-03-19 PROCEDURE — 6360000002 HC RX W HCPCS: Performed by: ORTHOPAEDIC SURGERY

## 2025-03-19 PROCEDURE — 3600000004 HC SURGERY LEVEL 4 BASE: Performed by: ORTHOPAEDIC SURGERY

## 2025-03-19 PROCEDURE — 2500000003 HC RX 250 WO HCPCS: Performed by: ORTHOPAEDIC SURGERY

## 2025-03-19 PROCEDURE — 2720000010 HC SURG SUPPLY STERILE: Performed by: ORTHOPAEDIC SURGERY

## 2025-03-19 DEVICE — BIO-COMPOSITE CRKSCRW 5.5X14.9MM
Type: IMPLANTABLE DEVICE | Site: SHOULDER | Status: FUNCTIONAL
Brand: ARTHREX®

## 2025-03-19 RX ORDER — TRANEXAMIC ACID 10 MG/ML
INJECTION, SOLUTION INTRAVENOUS
Status: DISCONTINUED | OUTPATIENT
Start: 2025-03-19 | End: 2025-03-19 | Stop reason: SDUPTHER

## 2025-03-19 RX ORDER — HYDROCORTISONE SODIUM SUCCINATE 100 MG/2ML
INJECTION INTRAMUSCULAR; INTRAVENOUS
Status: DISCONTINUED | OUTPATIENT
Start: 2025-03-19 | End: 2025-03-19 | Stop reason: SDUPTHER

## 2025-03-19 RX ORDER — BUPIVACAINE HYDROCHLORIDE AND EPINEPHRINE 5; 5 MG/ML; UG/ML
INJECTION, SOLUTION EPIDURAL; INTRACAUDAL; PERINEURAL
Status: DISCONTINUED | OUTPATIENT
Start: 2025-03-19 | End: 2025-03-19 | Stop reason: ALTCHOICE

## 2025-03-19 RX ORDER — PHENYLEPHRINE HCL IN 0.9% NACL 1 MG/10 ML
SYRINGE (ML) INTRAVENOUS
Status: DISCONTINUED | OUTPATIENT
Start: 2025-03-19 | End: 2025-03-19 | Stop reason: SDUPTHER

## 2025-03-19 RX ORDER — LIDOCAINE HYDROCHLORIDE 20 MG/ML
INJECTION, SOLUTION EPIDURAL; INFILTRATION; INTRACAUDAL; PERINEURAL
Status: DISCONTINUED | OUTPATIENT
Start: 2025-03-19 | End: 2025-03-19 | Stop reason: SDUPTHER

## 2025-03-19 RX ORDER — SODIUM CHLORIDE 9 MG/ML
INJECTION, SOLUTION INTRAVENOUS PRN
Status: DISCONTINUED | OUTPATIENT
Start: 2025-03-19 | End: 2025-03-19 | Stop reason: HOSPADM

## 2025-03-19 RX ORDER — PROPOFOL 10 MG/ML
INJECTION, EMULSION INTRAVENOUS
Status: DISCONTINUED | OUTPATIENT
Start: 2025-03-19 | End: 2025-03-19 | Stop reason: SDUPTHER

## 2025-03-19 RX ORDER — ROCURONIUM BROMIDE 10 MG/ML
INJECTION, SOLUTION INTRAVENOUS
Status: DISCONTINUED | OUTPATIENT
Start: 2025-03-19 | End: 2025-03-19 | Stop reason: SDUPTHER

## 2025-03-19 RX ORDER — MIDAZOLAM HYDROCHLORIDE 1 MG/ML
2 INJECTION, SOLUTION INTRAMUSCULAR; INTRAVENOUS ONCE
Status: COMPLETED | OUTPATIENT
Start: 2025-03-19 | End: 2025-03-19

## 2025-03-19 RX ORDER — SODIUM CHLORIDE 0.9 % (FLUSH) 0.9 %
5-40 SYRINGE (ML) INJECTION PRN
Status: DISCONTINUED | OUTPATIENT
Start: 2025-03-19 | End: 2025-03-19 | Stop reason: HOSPADM

## 2025-03-19 RX ORDER — LIDOCAINE HYDROCHLORIDE 10 MG/ML
5 INJECTION, SOLUTION EPIDURAL; INFILTRATION; INTRACAUDAL; PERINEURAL ONCE
Status: DISCONTINUED | OUTPATIENT
Start: 2025-03-19 | End: 2025-03-19 | Stop reason: HOSPADM

## 2025-03-19 RX ORDER — BUPIVACAINE HYDROCHLORIDE 5 MG/ML
30 INJECTION, SOLUTION EPIDURAL; INTRACAUDAL; PERINEURAL ONCE
Status: DISCONTINUED | OUTPATIENT
Start: 2025-03-19 | End: 2025-03-19 | Stop reason: HOSPADM

## 2025-03-19 RX ORDER — BUPIVACAINE HYDROCHLORIDE 5 MG/ML
INJECTION, SOLUTION EPIDURAL; INTRACAUDAL; PERINEURAL
Status: COMPLETED | OUTPATIENT
Start: 2025-03-19 | End: 2025-03-19

## 2025-03-19 RX ORDER — SODIUM CHLORIDE 0.9 % (FLUSH) 0.9 %
5-40 SYRINGE (ML) INJECTION EVERY 12 HOURS SCHEDULED
Status: DISCONTINUED | OUTPATIENT
Start: 2025-03-19 | End: 2025-03-19 | Stop reason: HOSPADM

## 2025-03-19 RX ORDER — OXYCODONE HYDROCHLORIDE 5 MG/1
5 TABLET ORAL PRN
Status: DISCONTINUED | OUTPATIENT
Start: 2025-03-19 | End: 2025-03-19 | Stop reason: HOSPADM

## 2025-03-19 RX ORDER — SUCCINYLCHOLINE/SOD CL,ISO/PF 200MG/10ML
SYRINGE (ML) INTRAVENOUS
Status: DISCONTINUED | OUTPATIENT
Start: 2025-03-19 | End: 2025-03-19 | Stop reason: SDUPTHER

## 2025-03-19 RX ORDER — FENTANYL CITRATE 50 UG/ML
INJECTION, SOLUTION INTRAMUSCULAR; INTRAVENOUS
Status: DISCONTINUED | OUTPATIENT
Start: 2025-03-19 | End: 2025-03-19 | Stop reason: SDUPTHER

## 2025-03-19 RX ORDER — ONDANSETRON 2 MG/ML
INJECTION INTRAMUSCULAR; INTRAVENOUS
Status: DISCONTINUED | OUTPATIENT
Start: 2025-03-19 | End: 2025-03-19 | Stop reason: SDUPTHER

## 2025-03-19 RX ORDER — BUPIVACAINE HYDROCHLORIDE AND EPINEPHRINE 2.5; 5 MG/ML; UG/ML
INJECTION, SOLUTION EPIDURAL; INFILTRATION; INTRACAUDAL; PERINEURAL
Status: DISCONTINUED | OUTPATIENT
Start: 2025-03-19 | End: 2025-03-19 | Stop reason: ALTCHOICE

## 2025-03-19 RX ORDER — SODIUM CHLORIDE, SODIUM LACTATE, POTASSIUM CHLORIDE, AND CALCIUM CHLORIDE .6; .31; .03; .02 G/100ML; G/100ML; G/100ML; G/100ML
IRRIGANT IRRIGATION
Status: DISCONTINUED | OUTPATIENT
Start: 2025-03-19 | End: 2025-03-19 | Stop reason: ALTCHOICE

## 2025-03-19 RX ORDER — NALOXONE HYDROCHLORIDE 0.4 MG/ML
INJECTION, SOLUTION INTRAMUSCULAR; INTRAVENOUS; SUBCUTANEOUS PRN
Status: DISCONTINUED | OUTPATIENT
Start: 2025-03-19 | End: 2025-03-19 | Stop reason: HOSPADM

## 2025-03-19 RX ORDER — ONDANSETRON 2 MG/ML
4 INJECTION INTRAMUSCULAR; INTRAVENOUS
Status: DISCONTINUED | OUTPATIENT
Start: 2025-03-19 | End: 2025-03-19 | Stop reason: HOSPADM

## 2025-03-19 RX ORDER — OXYCODONE HYDROCHLORIDE 10 MG/1
10 TABLET ORAL PRN
Status: DISCONTINUED | OUTPATIENT
Start: 2025-03-19 | End: 2025-03-19 | Stop reason: HOSPADM

## 2025-03-19 RX ADMIN — SUGAMMADEX 200 MG: 100 INJECTION, SOLUTION INTRAVENOUS at 08:22

## 2025-03-19 RX ADMIN — SODIUM CHLORIDE 2000 MG: 9 INJECTION, SOLUTION INTRAVENOUS at 07:26

## 2025-03-19 RX ADMIN — SODIUM CHLORIDE: 0.9 INJECTION, SOLUTION INTRAVENOUS at 06:41

## 2025-03-19 RX ADMIN — ROCURONIUM BROMIDE 5 MG: 10 SOLUTION INTRAVENOUS at 07:23

## 2025-03-19 RX ADMIN — BUPIVACAINE HYDROCHLORIDE 30 ML: 5 INJECTION, SOLUTION EPIDURAL; INTRACAUDAL; PERINEURAL at 06:55

## 2025-03-19 RX ADMIN — Medication 100 MCG: at 07:34

## 2025-03-19 RX ADMIN — ONDANSETRON 4 MG: 2 INJECTION INTRAMUSCULAR; INTRAVENOUS at 08:15

## 2025-03-19 RX ADMIN — MIDAZOLAM HYDROCHLORIDE 2 MG: 1 INJECTION, SOLUTION INTRAMUSCULAR; INTRAVENOUS at 07:05

## 2025-03-19 RX ADMIN — LIDOCAINE HYDROCHLORIDE 80 MG: 20 INJECTION, SOLUTION EPIDURAL; INFILTRATION; INTRACAUDAL; PERINEURAL at 07:22

## 2025-03-19 RX ADMIN — Medication 120 MG: at 07:23

## 2025-03-19 RX ADMIN — ROCURONIUM BROMIDE 35 MG: 10 SOLUTION INTRAVENOUS at 07:35

## 2025-03-19 RX ADMIN — Medication 50 MCG: at 07:31

## 2025-03-19 RX ADMIN — PROPOFOL 120 MG: 10 INJECTION, EMULSION INTRAVENOUS at 07:22

## 2025-03-19 RX ADMIN — ROCURONIUM BROMIDE 10 MG: 10 SOLUTION INTRAVENOUS at 08:01

## 2025-03-19 RX ADMIN — FENTANYL CITRATE 25 MCG: 50 INJECTION INTRAMUSCULAR; INTRAVENOUS at 07:39

## 2025-03-19 RX ADMIN — TRANEXAMIC ACID 1 G: 10 INJECTION, SOLUTION INTRAVENOUS at 07:48

## 2025-03-19 RX ADMIN — FENTANYL CITRATE 50 MCG: 50 INJECTION INTRAMUSCULAR; INTRAVENOUS at 07:22

## 2025-03-19 RX ADMIN — HYDROCORTISONE SODIUM SUCCINATE 100 MG: 100 INJECTION, POWDER, FOR SOLUTION INTRAMUSCULAR; INTRAVENOUS at 07:26

## 2025-03-19 ASSESSMENT — ENCOUNTER SYMPTOMS: SHORTNESS OF BREATH: 0

## 2025-03-19 ASSESSMENT — PAIN - FUNCTIONAL ASSESSMENT
PAIN_FUNCTIONAL_ASSESSMENT: ACTIVITIES ARE NOT PREVENTED
PAIN_FUNCTIONAL_ASSESSMENT: 0-10
PAIN_FUNCTIONAL_ASSESSMENT: NONE - DENIES PAIN

## 2025-03-19 ASSESSMENT — PAIN DESCRIPTION - DESCRIPTORS: DESCRIPTORS: ACHING

## 2025-03-19 NOTE — H&P
Misty Womack was seen and examined.  No interval changes.  Right shoulder marked.  Risks, benefits, alternatives to surgery have been discussed at length and patient and family ready proceed with right shoulder arthroscopy with rotator cuff repair and bicep tenotomy.

## 2025-03-19 NOTE — ANESTHESIA PRE PROCEDURE
Department of Anesthesiology  Preprocedure Note       Name:  Misty Womack   Age:  69 y.o.  :  1955                                          MRN:  [id number]         Date:  3/19/2025      Surgeon: Surgeon(s):  Carl Orlando MD    Procedure:     Medications prior to admission:   Prior to Admission medications    Medication Sig Start Date End Date Taking? Authorizing Provider   vitamin B-12 (CYANOCOBALAMIN) 100 MCG tablet Take 0.5 tablets by mouth daily   Yes Deborah Saini MD   albuterol sulfate HFA (PROVENTIL;VENTOLIN;PROAIR) 108 (90 Base) MCG/ACT inhaler INHALE 2 PUFFS INTO THE LUNGS EVERY 6 HOURS AS NEEDED FOR WHEEZING 25  Yes Kimberli Becker APRN - CNP   fluticasone-salmeterol (ADVAIR) 250-50 MCG/ACT AEPB diskus inhaler INHALE 1 PUFF INTO THE LUNGS 2 TIMES A DAY (90 DAY SUPPLY) 1/15/25  Yes John Adair APRN - CNP   hydrocortisone (CORTEF) 10 MG tablet 1 tablet 2 times daily 3/28/08  Yes ProviderDeborah MD   TIROSINT 50 MCG CAPS  24  Yes ProviderDeborah MD   magnesium 30 MG tablet Take 1 tablet by mouth 2 times daily   Yes Deborah Saini MD   docusate sodium (COLACE) 100 MG capsule Take 1 capsule by mouth 2 times daily Post-op  Patient not taking: Reported on 3/19/2025 3/19/25   Carl Orlando MD   HYDROmorphone (DILAUDID) 2 MG tablet Take 0.5 tablets by mouth every 4 hours as needed for Pain for up to 7 days. Max Daily Amount: 6 mg 3/19/25 3/26/25  Carl Orlando MD   promethazine (PHENERGAN) 25 MG tablet Take 1 tablet by mouth every 6 hours as needed for Nausea Post-op  Patient not taking: Reported on 3/19/2025 3/19/25 3/25/25  Carl Orlando MD   Vitamin D-Vitamin K (VITAMIN K2-VITAMIN D3 PO) Take by mouth    Deborah Saini MD   apixaban (ELIQUIS) 5 MG TABS tablet Take 1 tablet by mouth twice daily 1/15/25   Giancarlo Atkins MD       Current medications:    Current Facility-Administered Medications   Medication Dose Route Frequency

## 2025-03-19 NOTE — OP NOTE
[facility]          [address] [place] [address]                            OPERATIVE REPORT      PATIENT NAME: RANJITH SHAH             : 1955  MED REC NO: [id number]                      ROOM: OR  ACCOUNT NO: [id number]                       ADMIT DATE: 2025  PROVIDER: Carl rOlando MD      DATE OF PROCEDURE:  2025    SURGEON:  Carl Orlando MD    PREOPERATIVE DIAGNOSES:  Right shoulder rotator cuff tear and biceps tendon tear.    POSTOPERATIVE DIAGNOSES:  Right shoulder rotator cuff tear, labrum tear, arthritis  and biceps tendon tear.    PROCEDURE:    1. Arthroscopy of the right shoulder extensive debridement with biceps tenotomy.  2. Arthroscopy of right shoulder with rotator cuff repair.    FIRST ASSISTANT:  Dr. Jimmy Cerna.    SECOND ASSISTANT:  Joaine Pineda.    ANESTHESIA:  Interscalene nerve block plus general.    ANTIBIOTICS:  Ancef.    ESTIMATED BLOOD LOSS:  Minimal.    COMPLICATIONS:  None apparent.    INDICATIONS FOR PROCEDURE:  The patient suffered full-thickness rotator cuff tear of the right shoulder.  She had persistent pain despite extensive nonoperative management.  She was therefore indicated for surgery.  Understanding risks, benefits, and alternatives of the operation, she was eager to proceed.    DESCRIPTION OF SURGERY:  The patient was identified in the preop holding area.  Informed consent was obtained.  The operative site of the right shoulder was marked by me with my initials.  She was given preop block, brought to the operating room, placed under general anesthesia and placed in a beach chair position.  Examination of the right shoulder under anesthesia revealed stable complete motion.  After thorough prep and drape with alcohol and ChloraPrep and time-out, arthroscopy was commenced with the scope posteriorly and a working portal was created in the rotator interval from outside under needle guidance.

## 2025-03-19 NOTE — DISCHARGE INSTR - DIET
Good nutrition is important when healing from an illness, injury, or surgery.  Follow any nutrition recommendations given to you during your hospital stay.   If you were given an oral nutrition supplement while in the hospital, continue to take this supplement at home.  You can take it with meals, in-between meals, and/or before bedtime. These supplements can be purchased at most local grocery stores, pharmacies, and chain [employer]-stores.   If you have any questions about your diet or nutrition, call the hospital and ask for the dietitian.  Advance to regular diet as tolerated

## 2025-03-19 NOTE — PROGRESS NOTES
Follow Up Prior to Surgery    DOS: 3/19/2025  :1955    Naa Hawkins:     pcp has anesthesia recommendations in note from 3/3/25 in media(pat printed note to give to anesthesia)                                 Surgeon's Name: Darion  
CLINICAL PHARMACY NOTE: MEDS TO BEDS    Total # of Prescriptions Filled: 2   The following medications were delivered to the patient:  Current Discharge Medication List      Promethazine   Docusate        Additional Documentation:   Gave daughter  hard copy script of   pain medication ,    
Dr Rodriguez in to see pt.   
Pt arrived to PACU from OR. Pt asleep on room air, awakens easily. Right shoulder dressings x 3 C/D/I. Ice pack applied. +pulses noted. Pt denies c/o pain.   
Pt assisted up to chair. Ambulates well.   
Pt dressed. Tolerated water. Denies pain. Discharged home via WC.   
Received pt in [facility]. Pt A&O. Denies pain.   
or nail polish on your fingers or toes.      For your safety, please do not wear any jewelry or body piercing's on the day of surgery.   All jewelry must be removed.      If you have dentures, they will be removed before going to operating room.    For your convenience, we will provide you with a container.    If you wear contact lenses or glasses, they will be removed, please bring a case for them.     If you have a living will and a durable power of  for healthcare, please bring in a copy.     As part of our patient safety program to minimize surgical site infections, we ask you to do the following:    Please notify your surgeon if you develop any illness between         now and the  day of your surgery.    This includes a cough, cold, fever, sore throat, nausea,         or vomiting, and diarrhea, etc.   Please notify your surgeon if you experience dizziness, shortness         of breath or blurred vision between now and the time of your surgery.      Do not shave your operative site 96 hours prior to surgery.   For face and neck surgery, men may use an electric razor 48 hours   prior to surgery.    You must shower the night before surgery and the morning of   your surgery with an antibacterial soap.    You will need to bring a photo ID and insurance card.    [facility] has an onsite pharmacy, would you like to utilize our pharmacy.     If you will be staying overnight and use a C-pap machine, please bring   your C-pap to hospital.     Our goal is to provide you with excellent care, therefore, visitors will be limited to two in the room at a time so that we may focus on providing this care for you.          Please contact pre-admission testing if you have any further questions.                 Whit Epps phone number:  [phone]     [facility] fax number:  [phone]  Please note these are generalized instructions for all surgical cases, you may be provided with more specific instructions according to your

## 2025-03-19 NOTE — BRIEF OP NOTE
Brief Postoperative Note      Patient: Misty Womack  YOB: 1955  MRN: [id number]    Date of Procedure: 3/19/2025    Pre-Op Diagnosis Codes:      * Traumatic rotator cuff tear, right, initial encounter [S46.011A]    Post-Op Diagnosis: Same       Procedure(s):  RIGHT SHOULDER ARTHROSCOPY ROTATOR CUFF REPAIR BICEPS TENOTOMY    Surgeon(s):  Carl Orlando MD    Assistant:  Surgical Assistant: Joanie Pineda    Anesthesia: General    Estimated Blood Loss (mL): Minimal    Complications: None    Specimens:   * No specimens in log *    Implants:  Implant Name Type Inv. Item Serial No.  Lot No. LRB No. Used Action   ANCHOR SUT L14.7MM DIA5.5MM BIOCOMPOSITE W/ 3 SZ 2 - [id number]  ANCHOR SUT L14.7MM DIA5.5MM BIOCOMPOSITE W/ 3 SZ 2  [employer] [id number] Right 1 Implanted         Drains: * No LDAs found *    Findings:  Infection Present At Time Of Surgery (PATOS) (choose all levels that have infection present):  No infection present  Other Findings: RCT, MILD OA    Electronically signed by Darion ISSA MD on 3/19/2025 at 8:12 AM

## 2025-03-19 NOTE — ANESTHESIA POSTPROCEDURE EVALUATION
department on discharge from perioperative area   Pain management: satisfactory to patient    No notable events documented.

## 2025-03-19 NOTE — ANESTHESIA PROCEDURE NOTES
Peripheral Block    Patient location during procedure: pre-op  Reason for block: post-op pain management and at surgeon's request  Start time: 3/19/2025 6:55 AM  End time: 3/19/2025 6:55 AM  Staffing  Performed: anesthesiologist   Anesthesiologist: Eric Rodriguez MD  Performed by: Eric Rodriguez MD  Authorized by: Eric Rodriguez MD    Preanesthetic Checklist  Completed: patient identified, IV checked, site marked, risks and benefits discussed, surgical/procedural consents, equipment checked, pre-op evaluation, timeout performed, anesthesia consent given, oxygen available and monitors applied/VS acknowledged  Peripheral Block   Patient position: sitting  Prep: ChloraPrep  Provider prep: mask and sterile gloves  Patient monitoring: cardiac monitor, continuous pulse ox, frequent blood pressure checks and IV access  Block type: Brachial plexus  Interscalene  Laterality: right  Injection technique: single-shot  Guidance: ultrasound guided  Local infiltration: lidocaine  Infiltration strength: 1 %  Local infiltration: lidocaine  Dose: 3 mL    Needle   Needle gauge: 21 G  Needle localization: ultrasound guidance  Needle length: 10 cm  Assessment   Injection assessment: negative aspiration for heme, no paresthesia on injection and local visualized surrounding nerve on ultrasound  Paresthesia pain: none  Slow fractionated injection: yes  Hemodynamics: stable  Outcomes: uncomplicated and patient tolerated procedure well    Additional Notes  Immediately prior to procedure a \"time out\" was called to verify the correct patient, allergies, laterality, procedure and equipment. Time out performed with GEO RN    Local Anesthetic: 0.5 %  Bupivacaine   Amount: 30 ml  in 5 ml increments after negative aspiration each time.    Anterior scalene and middle scalene muscles, upper, middle and lower trunks of the brachial plexus are identified, the tip of the needle and the spread of the local anesthetic around

## 2025-03-20 ENCOUNTER — HOSPITAL ENCOUNTER (OUTPATIENT)
Dept: PHYSICAL THERAPY | Age: 70
Setting detail: THERAPIES SERIES
Discharge: HOME | End: 2025-03-20
Attending: ORTHOPAEDIC SURGERY
Payer: MEDICARE

## 2025-03-20 ENCOUNTER — OFFICE VISIT (OUTPATIENT)
Dept: ORTHOPEDIC SURGERY | Age: 70
End: 2025-03-20

## 2025-03-20 VITALS — BODY MASS INDEX: 37.67 KG/M2 | WEIGHT: 240 LBS | RESPIRATION RATE: 12 BRPM | HEIGHT: 67 IN

## 2025-03-20 DIAGNOSIS — M25.511 ACUTE PAIN OF RIGHT SHOULDER: ICD-10-CM

## 2025-03-20 DIAGNOSIS — M75.21 BICEPS TENDINITIS ON RIGHT: ICD-10-CM

## 2025-03-20 DIAGNOSIS — S46.011D TRAUMATIC ROTATOR CUFF TEAR, RIGHT, SUBSEQUENT ENCOUNTER: Primary | ICD-10-CM

## 2025-03-20 PROCEDURE — 97164 PT RE-EVAL EST PLAN CARE: CPT

## 2025-03-20 PROCEDURE — 99024 POSTOP FOLLOW-UP VISIT: CPT | Performed by: ORTHOPAEDIC SURGERY

## 2025-03-20 PROCEDURE — 97530 THERAPEUTIC ACTIVITIES: CPT

## 2025-03-20 PROCEDURE — 97110 THERAPEUTIC EXERCISES: CPT

## 2025-03-20 NOTE — PLAN OF CARE
Ava Otto    Painful Arc    Drop Arm    ER Lag Sign    Empty Can / Cheli's    Champagne [place]    Speeds    O’Briens    Cross body adduction    Apprehension/Relocation    Hornblower's    Bicep Load II          Joint mobility:  3/20/25 deferred              []Normal               []Hypo              []Hyper     Palpation: deferred     Bandages/Dressings/Incisions: changed by MD today in clinic.     Falls Risk Assessment (30 days):   Falls Risk assessed and no intervention required.  Time Up and Go (TUG):   Not Assessed       Exercises/Interventions     Restrictions/Precautions: S/P Right Shoulder RCR and biceps tenotomy on 3/19/25:  / ER 60 x 6 weeks  Exercises/Interventions:   Exercise/Equipment Resistance/Repetitions Other comments   Stretching/PROM     Cane ER     Table Slides     Wall Slides     OH Cane Flexion     Pulleys     Forward Hangs 10x10\"    Cross Body Stretch     Sleeper Stretch     BBIR          Upper Trap Stretch 3x30\"    Levator Scap Stretch 3x30\"    Elbow PROM 2x10              Isometrics     Retraction      2x10    Flexion     Abduction     External Rotation     Internal Rotation          PRE's     UK Flexion     Flexion     Abduction     Scaption/Full Can     External Rotation     Internal Rotation     Shrugs 2x10    Serratus Punch     Wall Push Up +     Prone I     Prone T     Prone Y     Prone Row + ER     Prow Row + ER + [place] Press     Biceps     Triceps     Bent Over Row     Wrist Flexion     Wrist Extension     Scapular Retraction 2x10         Cable Column/Theraband     External Rotation     Internal Rotation     Lats     Ext     Scapular Retraction     BIC     TRIC     PNF          Dynamic Stability     BoW     Body Blade          Manual interventions                     Modalities:    No modalities applied this session    Education/Home Exercise Program:   Patient instructed on HEP on this date with handout provided and all questions answered. Discussions about how to

## 2025-03-20 NOTE — PROGRESS NOTES
Misty Womack returns today 1 day status post right shoulder arthroscopy with rotator cuff repair and bicep tenotomy.    She is doing well.  Pain is well-managed.    I reviewed her arthroscopic photos with her.    She has some ecchymosis around her incisions but no active drainage.  I changed her bandages and placed new Steri-Strips and Tegaderm.  She will start therapy.  Follow-up with me in a week.

## 2025-03-27 ENCOUNTER — HOSPITAL ENCOUNTER (OUTPATIENT)
Dept: PHYSICAL THERAPY | Age: 70
Setting detail: THERAPIES SERIES
Discharge: HOME | End: 2025-03-27
Attending: ORTHOPAEDIC SURGERY
Payer: MEDICARE

## 2025-03-27 ENCOUNTER — OFFICE VISIT (OUTPATIENT)
Dept: ORTHOPEDIC SURGERY | Age: 70
End: 2025-03-27

## 2025-03-27 VITALS — HEIGHT: 67 IN | BODY MASS INDEX: 37.67 KG/M2 | RESPIRATION RATE: 12 BRPM | WEIGHT: 240 LBS

## 2025-03-27 DIAGNOSIS — S46.011D TRAUMATIC ROTATOR CUFF TEAR, RIGHT, SUBSEQUENT ENCOUNTER: Primary | ICD-10-CM

## 2025-03-27 PROCEDURE — 97110 THERAPEUTIC EXERCISES: CPT | Performed by: PHYSICAL THERAPIST

## 2025-03-27 PROCEDURE — 99024 POSTOP FOLLOW-UP VISIT: CPT | Performed by: ORTHOPAEDIC SURGERY

## 2025-03-27 NOTE — PROGRESS NOTES
Misty Womack returns today 1 week status post right shoulder arthroscopy with rotator cuff repair and bicep tenotomy performed 3/19/2025.    She is doing well.  Her pain is well-managed.    Today, incisions look great.  She has some ecchymosis.  I removed her sutures and placed new Steri-Strips.  She will continue with rehab and follow-up with me in 5 weeks

## 2025-03-27 NOTE — FLOWSHEET NOTE
[facility]- Outpatient Rehabilitation and Therapy [address], [place] [address] office: [phone] fax: [phone]       Physical Therapy: TREATMENT/PROGRESS NOTE   Patient: Misty Womack (69 y.o. female)   Examination Date: 2025   :  1955 MRN: [id number]   Visit #: 3   Insurance Allowable Auth Needed   30 VPCY []Yes    [x]No    Insurance: Payor: [employer] MEDICARE ADVANTAGE / Plan: [employer] ADVANTAGE CHOICE HMO / Product Type: *No Product type* /   Insurance ID: [id number] - (Medicare Managed)  Secondary Insurance (if applicable):    Treatment Diagnosis:     ICD-10-CM    1. Acute pain of right shoulder  M25.511       2. Weakness  R53.1          Medical Diagnosis:  Acute pain of right shoulder [M25.511]  S/P Right Shoulder RCR with biceps tenotomy  DOS: 3/19/25   Referring Physician: Carl Orlando MD  PCP: John Adair APRN - CNP     Plan of care signed (Y/N): Yes    Date of Patient follow up with Physician:Today, 1 week PO     Progress Report/POC: Yes  POC update due: (10 visits /OR AUTH LIMITS, whichever is less)  25                                             Precautions/ Contra-indications:           Latex allergy:  NO  Pacemaker:    NO  Contraindications for Manipulation: long history of steroid use  Date of Surgery: 3/19/25  Other:    Red Flags:  None    Medical History:  Comorbidities:  Cancer/Tumor  Stroke/TIA  Other: AFib  - ON Elliquis   Relevant Medical History: Asthma; 2 knee replacements ; Adrenal insufficiency (on chronic steroids)    Suicide Screening:   The patient did not verbalize a primary behavioral concern, suicidal ideation, suicidal intent, or demonstrate suicidal behaviors.    Preferred Language for Healthcare:   [x] English       [] other:    SUBJECTIVE EXAMINATION     OUTCOME MEASURE DATE % Deficit   UEFI 25 25%   UEFI 3/20/25 97%       Pain: 0 /10    Patient stated complaint: Pt states she is having an easier

## 2025-04-03 ENCOUNTER — HOSPITAL ENCOUNTER (OUTPATIENT)
Dept: PHYSICAL THERAPY | Age: 70
Setting detail: THERAPIES SERIES
Discharge: HOME | End: 2025-04-03
Attending: ORTHOPAEDIC SURGERY
Payer: MEDICARE

## 2025-04-03 PROCEDURE — 97110 THERAPEUTIC EXERCISES: CPT | Performed by: PHYSICAL THERAPIST

## 2025-04-03 NOTE — FLOWSHEET NOTE
Therapy (90254)     Estim Attended (09409)    Canalith Repositioning (14032)     Physical Performance Test (56557)    Custom orthotic ()     Other:    Other: CP 15' x   Total Timed Code Tx Minutes    25'     Total Treatment Minutes 35'          Charge Justification:  (55732) THERAPEUTIC EXERCISE - Provided verbal/tactile cueing for HEP and/or activities related to strengthening, flexibility, endurance, ROM performed to prevent loss of range of motion, maintain or improve muscular strength or increase flexibility, following either an injury or surgery.       GOALS     GOALS:  Patient stated goal: Reduce pain in shoulder  [] Progressing: [] Met: [] Not Met: [] Adjusted    Therapist goals for Patient:   Short Term Goals: To be achieved in: 2 weeks  1. Independent in HEP and progression per patient tolerance, in order to prevent re-injury.   [] Progressing: [] Met: [] Not Met: [] Adjusted  2. Patient will have a decrease in pain to <2/10 to facilitate improvement in movement, function, and ADLs as indicated by Functional Deficits.  [] Progressing: [] Met: [] Not Met: [] Adjusted      Long Term Goals: To be achieved in: 16 weeks  1. Disability index score of 12% or less for the Upper Extremity functional Scale to assist with reaching prior level of function with activities such as lifting bags of groceries.  [] Progressing: [] Met: [] Not Met: [] Adjusted  2. Patient will demonstrate increased AROM of right shoulder to WNL without pain to allow for proper joint functioning to enable patient to reach into [place] cabinet in kitchen without pain.   [] Progressing: [] Met: [] Not Met: [] Adjusted  3. Patient will demonstrate increased Strength of right shoulder supraspinatus and shoulder ER to at least 5/5 throughout without pain to allow for proper functional mobility to enable patient to return to lifting  > 20 pounds without shoulder pain.   [] Progressing: [] Met: [] Not Met: [] Adjusted  4. Patient will return to

## 2025-04-07 RX ORDER — ALBUTEROL SULFATE 90 UG/1
2 INHALANT RESPIRATORY (INHALATION) EVERY 6 HOURS PRN
Qty: 8.5 G | Refills: 2 | Status: SHIPPED | OUTPATIENT
Start: 2025-04-07

## 2025-04-09 NOTE — PROGRESS NOTES
month follow up after ablation 08/30/23 . Admits to feeling better with occasional fluttering that is not bothersome. Stopped Ranexa on her own. Discussed to continue Dronaderone for 2 more months with plan to discontinue and wear cardiac monitor.     She called into the office on 02/05/2024 reporting elevated heart rate for the past 18 hours. She came into the office on 02/06/2024 for EKG which showed atrial flutter with RVR, v-rate 135    Seen in clinic 02/07/2024 and ECG noted atrial flutter with rate of 136 bpm. Had complaints of severe fatigue. Suspected to have veins reconnected. Sent to the ED for rhythm management. Underwent EPS with RFA of CTI dependent flutter 02/08/2024    Interval History:   Today, she presents to office for follow up. ECG today shows atrial fibrillation with RVR. Reports she fell against a wall July of last year and finally went to be seen in December where she received cortisone injection. Following injection, she was noted to have recurrent pain and was found to have completely torn rotator cuff. 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     Past Medical History:   Diagnosis Date    Adrenal insufficiency     Arthritis     Asthma     Atrial fibrillation (HCC)     Bicuspid aortic valve     instead of 3 valves has 2    Brain tumor (HCC)     chiari - malformation    Chiari malformation 03/2011    Chronic anticoagulation     CKD (chronic kidney disease) stage 3, GFR 30-59 ml/min (HCC)     Fibromyalgia 02/21/2020    History of TIA (transient ischemic attack) 2006    right eye does not close completely    Hypercholesterolemia     denies    Hypertension     denies    Mood disorder     Obesity (BMI 35.0-39.9 without comorbidity)     Panhypopituitarism 08/26/2014    Paroxysmal atrial fibrillation (HCC)     PONV (postoperative nausea and vomiting)     Restless leg     if gets dehydrated

## 2025-04-10 ENCOUNTER — HOSPITAL ENCOUNTER (OUTPATIENT)
Dept: PHYSICAL THERAPY | Age: 70
Setting detail: THERAPIES SERIES
Discharge: HOME | End: 2025-04-10
Attending: ORTHOPAEDIC SURGERY
Payer: MEDICARE

## 2025-04-10 PROCEDURE — 97110 THERAPEUTIC EXERCISES: CPT | Performed by: PHYSICAL THERAPIST

## 2025-04-10 NOTE — FLOWSHEET NOTE
Note: Portions of this note have been templated and/or copied from initial evaluation, reassessments and prior notes for documentation efficiency.  Note: If patient does not return for scheduled/recommended follow up visits, this note will serve as a discharge from care along with the most recent update on progress.

## 2025-04-16 ENCOUNTER — TELEPHONE (OUTPATIENT)
Dept: CARDIOLOGY CLINIC | Age: 70
End: 2025-04-16

## 2025-04-16 ENCOUNTER — OFFICE VISIT (OUTPATIENT)
Dept: CARDIOLOGY CLINIC | Age: 70
End: 2025-04-16
Payer: MEDICARE

## 2025-04-16 VITALS
WEIGHT: 240 LBS | OXYGEN SATURATION: 99 % | SYSTOLIC BLOOD PRESSURE: 116 MMHG | HEART RATE: 148 BPM | BODY MASS INDEX: 37.67 KG/M2 | DIASTOLIC BLOOD PRESSURE: 64 MMHG | HEIGHT: 67 IN

## 2025-04-16 DIAGNOSIS — I48.0 PAROXYSMAL ATRIAL FIBRILLATION (HCC): Primary | ICD-10-CM

## 2025-04-16 PROCEDURE — 3074F SYST BP LT 130 MM HG: CPT | Performed by: INTERNAL MEDICINE

## 2025-04-16 PROCEDURE — 1123F ACP DISCUSS/DSCN MKR DOCD: CPT | Performed by: INTERNAL MEDICINE

## 2025-04-16 PROCEDURE — 1159F MED LIST DOCD IN RCRD: CPT | Performed by: INTERNAL MEDICINE

## 2025-04-16 PROCEDURE — 93000 ELECTROCARDIOGRAM COMPLETE: CPT | Performed by: INTERNAL MEDICINE

## 2025-04-16 PROCEDURE — 3078F DIAST BP <80 MM HG: CPT | Performed by: INTERNAL MEDICINE

## 2025-04-16 PROCEDURE — 99215 OFFICE O/P EST HI 40 MIN: CPT | Performed by: INTERNAL MEDICINE

## 2025-04-16 NOTE — TELEPHONE ENCOUNTER
Jennyfer's states Milly started her on Multaq - 400 MG - Take 400 mg twice daily for 10 days then 400 mg daily thereafter. She states she is back in sinus rhythm and wants to know if she should continue taking this medication.     Please advise    Jennyfer's callback: [phone]

## 2025-04-17 ENCOUNTER — HOSPITAL ENCOUNTER (OUTPATIENT)
Dept: PHYSICAL THERAPY | Age: 70
Setting detail: THERAPIES SERIES
Discharge: HOME | End: 2025-04-17
Attending: ORTHOPAEDIC SURGERY
Payer: MEDICARE

## 2025-04-17 PROCEDURE — 97110 THERAPEUTIC EXERCISES: CPT | Performed by: PHYSICAL THERAPIST

## 2025-04-17 NOTE — TELEPHONE ENCOUNTER
Patient reports insurance denied the Multaq. She reports she Is back in sinus rhythm and would like to know plan

## 2025-04-17 NOTE — FLOWSHEET NOTE
[facility]- Outpatient Rehabilitation and Therapy [address], [place] [address] office: [phone] fax: [phone]       Physical Therapy: TREATMENT/PROGRESS NOTE   Patient: Misty Womack (69 y.o. female)   Examination Date: 2025   :  1955 MRN: [id number]   Visit #: 6   Insurance Allowable Auth Needed   30 VPCY []Yes    [x]No    Insurance: Payor: MEDICAL [employer] MEDICARE ADVANTAGE / Plan: MEDICAL [employer] ADVANTAGE CHOICE HMO / Product Type: *No Product type* /   Insurance ID: [id number] - (Medicare Managed)  Secondary Insurance (if applicable):    Treatment Diagnosis:     ICD-10-CM    1. Acute pain of right shoulder  M25.511       2. Weakness  R53.1          Medical Diagnosis:  Acute pain of right shoulder [M25.511]  S/P Right Shoulder RCR with biceps tenotomy  DOS: 3/19/25   Referring Physician: Carl Orlando MD  PCP: John Adair APRN - CNP     Plan of care signed (Y/N): Yes    Date of Patient follow up with Physician:Today, 1 week PO     Progress Report/POC: Yes  POC update due: (10 visits /OR AUTH LIMITS, whichever is less)  25                                             Precautions/ Contra-indications:           Latex allergy:  NO  Pacemaker:    NO  Contraindications for Manipulation: long history of steroid use  Date of Surgery: S/P Right Shoulder RCR with biceps tenotomy - 3/19  Other:    Red Flags:  None    Medical History:  Comorbidities:  Cancer/Tumor  Stroke/TIA  Other: AFib  - ON Elliquis   Relevant Medical History: Asthma; 2 knee replacements ; Adrenal insufficiency (on chronic steroids)    Suicide Screening:   The patient did not verbalize a primary behavioral concern, suicidal ideation, suicidal intent, or demonstrate suicidal behaviors.    Preferred Language for Healthcare:   [x] English       [] other:    SUBJECTIVE EXAMINATION     OUTCOME MEASURE DATE % Deficit   UEFI 25 25%   UEFI 3/20/25 97%       Pain: 2/10     Patient stated

## 2025-04-21 NOTE — TELEPHONE ENCOUNTER
PA completed in CoverMyMeds. This has been approved. Please let the patient know . Should  rx and begin taking as previously discussed

## 2025-04-24 ENCOUNTER — HOSPITAL ENCOUNTER (OUTPATIENT)
Dept: PHYSICAL THERAPY | Age: 70
Setting detail: THERAPIES SERIES
Discharge: HOME | End: 2025-04-24
Attending: ORTHOPAEDIC SURGERY
Payer: MEDICARE

## 2025-04-24 PROCEDURE — 97140 MANUAL THERAPY 1/> REGIONS: CPT | Performed by: PHYSICAL THERAPIST

## 2025-04-24 PROCEDURE — 97110 THERAPEUTIC EXERCISES: CPT | Performed by: PHYSICAL THERAPIST

## 2025-04-24 NOTE — FLOWSHEET NOTE
Prone T     Prone Y     Prone Row + ER     Prow Row + ER + [place] Press     Biceps     Triceps     Bent Over Row     Wrist Flexion     Wrist Extension     Scapular Retraction 10 sec x 10          Cable Column/Theraband     External Rotation     Internal Rotation     Lats     Ext     Scapular Retraction     BIC     TRIC     PNF          Dynamic Stability     BoW     Body Blade          Manual interventions     Shoulder stretching - flex, scap, and ER 8'  Added 4/24              Modalities:    No modalities applied this session    Education/Home Exercise Program:   Patient instructed on HEP on this date with handout provided and all questions answered. Discussions about how to progress sets/reps/resistance as necessary for fatigue and challenge. Patient was instructed to contact PT with any questions or concerns about HEP moving forward. Patient verbally stated she/he understood.   Access Code: [email or web address]            ASSESSMENT     Today's Assessment: Pt is 5 weeks post-op. She has no pain. She jefferson the addition of manual stretching, well, requiring min VCs to decrease muscle guarding.  4/24    Medical Necessity Documentation:  I certify that this patient meets the below criteria necessary for medical necessity for care and/or justification of therapy services:  The patient has functional impairments and/or activity limitations and would benefit from continued outpatient therapy services to address the deficits outlined in the patients goals    Return to Play: NA    Prognosis for POC: [x] Good [] Fair  [] Poor    Patient requires continued skilled intervention: [x] Yes  [] No      CHARGE CAPTURE     PT CHARGE GRID   CPT Code (TIMED)  # CPT Code (UNTIMED) #     Therex (11983)   2  EVAL:LOW (68845 - Typically 20 minutes face-to-face)     Neuromusc. Re-ed (07261)    Re-Eval (85743)     Manual (95838)  1  Estim Unattended (10702)     Ther. Act (83285)    [facility] Traction (43284)     Gait (76553)    Dry Needle 1-2 muscle (22001)

## 2025-05-01 ENCOUNTER — HOSPITAL ENCOUNTER (OUTPATIENT)
Dept: PHYSICAL THERAPY | Age: 70
Setting detail: THERAPIES SERIES
Discharge: HOME | End: 2025-05-01
Attending: ORTHOPAEDIC SURGERY
Payer: MEDICARE

## 2025-05-01 ENCOUNTER — OFFICE VISIT (OUTPATIENT)
Dept: ORTHOPEDIC SURGERY | Age: 70
End: 2025-05-01

## 2025-05-01 VITALS — RESPIRATION RATE: 12 BRPM | WEIGHT: 240 LBS | HEIGHT: 67 IN | BODY MASS INDEX: 37.67 KG/M2

## 2025-05-01 DIAGNOSIS — M75.21 BICEPS TENDINITIS ON RIGHT: ICD-10-CM

## 2025-05-01 DIAGNOSIS — S46.011D TRAUMATIC ROTATOR CUFF TEAR, RIGHT, SUBSEQUENT ENCOUNTER: Primary | ICD-10-CM

## 2025-05-01 PROCEDURE — 97140 MANUAL THERAPY 1/> REGIONS: CPT | Performed by: PHYSICAL THERAPIST

## 2025-05-01 PROCEDURE — 99024 POSTOP FOLLOW-UP VISIT: CPT | Performed by: ORTHOPAEDIC SURGERY

## 2025-05-01 PROCEDURE — 97110 THERAPEUTIC EXERCISES: CPT | Performed by: PHYSICAL THERAPIST

## 2025-05-01 PROCEDURE — 97530 THERAPEUTIC ACTIVITIES: CPT | Performed by: PHYSICAL THERAPIST

## 2025-05-01 NOTE — PLAN OF CARE
[facility]- Outpatient Rehabilitation and Therapy [address], [place] [address] office: [phone] fax: [phone]     Physical Therapy Re-Certification Plan of Care    Dear Carl Orlando MD,    We had the pleasure of treating the following patient for physical therapy services at [facility].  A summary of our findings can be found in the updated assessment below.  This includes our plan of care.  If you have any questions or concerns regarding these findings, please do not hesitate to contact me at the office phone number checked above.  Thank you for the referral.     Physician Signature:________________________________Date:__________________  By signing above (or electronic signature), therapist’s plan is approved by physician      Overall Response to Treatment:   [x]Patient is responding well to treatment and improvement is noted with regards  to goals   []Patient should continue to improve in reasonable time if they continue HEP   []Patient has plateaued and is no longer responding to skilled PT intervention    []Patient is getting worse and would benefit from return to referring MD   []Patient unable to adhere to initial POC   [x]Other: Pt is 6 weeks post-op. She demonstrates appropriate R shoulder ROM for 6 weeks post-op. She jefferson the addition of shoulder pulleys. She jefferson manual stretching well today with minimal muscle guarding. Recommend pt increase frequency to 2x/ week to regain full shoulder ROM per protocol to return to PLOF.    Date range of previous POC Visits: 3/20/25 - 25    Total Visits: 8           Physical Therapy: TREATMENT/PROGRESS NOTE   Patient: Misty Womack (69 y.o. female)   Examination Date: 2025   :  1955 MRN: [id number]   Visit #: 8   Insurance Allowable Auth Needed   30 VPCY []Yes    [x]No    Insurance: Payor: MEDICAL [employer] MEDICARE ADVANTAGE / Plan: MEDICAL [employer] ADVANTAGE CHOICE HMO / Product

## 2025-05-01 NOTE — PROGRESS NOTES
Misty Womack returns today 6 weeks status post right shoulder arthroscopy with rotator cuff repair and bicep tenotomy.    Overall she is doing really well.  Her therapist think she is ready to come out of her sling.    Right shoulder today is nontender.  She has better than antigravity strength in all planes.    At this point she will begin to wean from her sling.  She will progress toward full motion and therapy.  Follow-up with me in 6 more weeks

## 2025-05-06 ENCOUNTER — RESULTS FOLLOW-UP (OUTPATIENT)
Dept: CARDIOLOGY CLINIC | Age: 70
End: 2025-05-06

## 2025-05-06 ENCOUNTER — HOSPITAL ENCOUNTER (OUTPATIENT)
Age: 70
Discharge: HOME | End: 2025-05-08
Attending: INTERNAL MEDICINE
Payer: MEDICARE

## 2025-05-06 ENCOUNTER — TELEPHONE (OUTPATIENT)
Dept: ORTHOPEDIC SURGERY | Age: 70
End: 2025-05-06

## 2025-05-06 VITALS
SYSTOLIC BLOOD PRESSURE: 137 MMHG | DIASTOLIC BLOOD PRESSURE: 73 MMHG | HEIGHT: 67 IN | BODY MASS INDEX: 37.67 KG/M2 | WEIGHT: 240 LBS

## 2025-05-06 DIAGNOSIS — I34.0 MITRAL VALVE INSUFFICIENCY, UNSPECIFIED ETIOLOGY: ICD-10-CM

## 2025-05-06 DIAGNOSIS — I35.0 SEVERE AORTIC STENOSIS: ICD-10-CM

## 2025-05-06 DIAGNOSIS — I35.0 NONRHEUMATIC AORTIC VALVE STENOSIS: Primary | ICD-10-CM

## 2025-05-06 DIAGNOSIS — I35.0 NONRHEUMATIC AORTIC VALVE STENOSIS: ICD-10-CM

## 2025-05-06 LAB
ECHO AO ASC DIAM: 3.9 CM
ECHO AO ASCENDING AORTA INDEX: 1.79 CM/M2
ECHO AO ROOT DIAM: 3.4 CM
ECHO AO ROOT INDEX: 1.56 CM/M2
ECHO AV AREA PEAK VELOCITY: 0.6 CM2
ECHO AV AREA VTI: 0.6 CM2
ECHO AV AREA/BSA PEAK VELOCITY: 0.3 CM2/M2
ECHO AV AREA/BSA VTI: 0.3 CM2/M2
ECHO AV MEAN GRADIENT: 55 MMHG
ECHO AV MEAN VELOCITY: 3.5 M/S
ECHO AV PEAK GRADIENT: 93 MMHG
ECHO AV PEAK VELOCITY: 4.8 M/S
ECHO AV VELOCITY RATIO: 0.21
ECHO AV VTI: 111 CM
ECHO BSA: 2.27 M2
ECHO EST RA PRESSURE: 3 MMHG
ECHO IVC PROX: 2.3 CM
ECHO LA AREA 2C: 25.2 CM2
ECHO LA AREA 4C: 28.1 CM2
ECHO LA MAJOR AXIS: 7.2 CM
ECHO LA MINOR AXIS: 5.7 CM
ECHO LA VOL MOD A2C: 90 ML (ref 22–52)
ECHO LA VOL MOD A4C: 83 ML (ref 22–52)
ECHO LA VOLUME INDEX MOD A2C: 41 ML/M2 (ref 16–34)
ECHO LA VOLUME INDEX MOD A4C: 38 ML/M2 (ref 16–34)
ECHO LV E' LATERAL VELOCITY: 9.79 CM/S
ECHO LV E' SEPTAL VELOCITY: 8.05 CM/S
ECHO LV EDV A2C: 89 ML
ECHO LV EDV A4C: 96 ML
ECHO LV EDV INDEX A4C: 44 ML/M2
ECHO LV EDV NDEX A2C: 41 ML/M2
ECHO LV EF PHYSICIAN: 63 %
ECHO LV EJECTION FRACTION A2C: 63 %
ECHO LV EJECTION FRACTION A4C: 70 %
ECHO LV EJECTION FRACTION BIPLANE: 68 % (ref 55–100)
ECHO LV ESV A2C: 33 ML
ECHO LV ESV A4C: 29 ML
ECHO LV ESV INDEX A2C: 15 ML/M2
ECHO LV ESV INDEX A4C: 13 ML/M2
ECHO LV FRACTIONAL SHORTENING: 40 % (ref 28–44)
ECHO LV INTERNAL DIMENSION DIASTOLE INDEX: 2.39 CM/M2
ECHO LV INTERNAL DIMENSION DIASTOLIC: 5.2 CM (ref 3.9–5.3)
ECHO LV INTERNAL DIMENSION SYSTOLIC INDEX: 1.42 CM/M2
ECHO LV INTERNAL DIMENSION SYSTOLIC: 3.1 CM
ECHO LV IVSD: 0.8 CM (ref 0.6–0.9)
ECHO LV MASS 2D: 156.9 G (ref 67–162)
ECHO LV MASS INDEX 2D: 72 G/M2 (ref 43–95)
ECHO LV POSTERIOR WALL DIASTOLIC: 0.9 CM (ref 0.6–0.9)
ECHO LV RELATIVE WALL THICKNESS RATIO: 0.35
ECHO LVOT AREA: 3.1 CM2
ECHO LVOT AV VTI INDEX: 0.21
ECHO LVOT DIAM: 2 CM
ECHO LVOT MEAN GRADIENT: 2 MMHG
ECHO LVOT PEAK GRADIENT: 4 MMHG
ECHO LVOT PEAK VELOCITY: 1 M/S
ECHO LVOT STROKE VOLUME INDEX: 33.3 ML/M2
ECHO LVOT SV: 72.5 ML
ECHO LVOT VTI: 23.1 CM
ECHO PV MAX VELOCITY: 1.1 M/S
ECHO PV PEAK GRADIENT: 5 MMHG
ECHO RA AREA 4C: 16.1 CM2
ECHO RA END SYSTOLIC VOLUME APICAL 4 CHAMBER INDEX BSA: 22 ML/M2
ECHO RA VOLUME: 47 ML
ECHO RIGHT VENTRICULAR SYSTOLIC PRESSURE (RVSP): 12 MMHG
ECHO RV BASAL DIMENSION: 4 CM
ECHO RV FREE WALL PEAK S': 13.2 CM/S
ECHO RV TAPSE: 2 CM (ref 1.7–?)
ECHO TV REGURGITANT MAX VELOCITY: 1.51 M/S
ECHO TV REGURGITANT PEAK GRADIENT: 9 MMHG

## 2025-05-06 PROCEDURE — 93306 TTE W/DOPPLER COMPLETE: CPT

## 2025-05-06 PROCEDURE — 93306 TTE W/DOPPLER COMPLETE: CPT | Performed by: STUDENT IN AN ORGANIZED HEALTH CARE EDUCATION/TRAINING PROGRAM

## 2025-05-06 NOTE — TELEPHONE ENCOUNTER
Other PATIENT CALLING STATES SHE HAS A DENTAL PROCEDURE SCHEDULED FOR 05/12/2025 AND WANTS TO KNOW IF SHE STILL NEEDS TO USE ANTIBIOTICS AFTER HAVING TKR ON 03/01/2023     PLEASE CALL PATIENT AT  [phone] TO ADVISE     IF SHE DOES NEED THE ANTIBIOTICS PLEASE SEND THEM TO THE PHARMACY BELOW     [id number] - [place], [place] - [address] [phone] - F [phone]

## 2025-05-06 NOTE — TELEPHONE ENCOUNTER
Called and spoke with patient, advised that it is not mandatory to take antibiotics before dental now, she understood.

## 2025-05-07 NOTE — RESULT ENCOUNTER NOTE
Per [facility]'s insurance authorization requires 14 business days.     Date of [facility]     Time of arrival: 6:30 am     Procedure time: 8:00 am     Called and spoke to [facility] and she is agreeable to date and time. Reviewed and emailed [facility] her procedure date, time and instructions and she verbalized understanding. Encouraged to call with any questions or concerns.     Published on [employer] and e-mail to Neida.

## 2025-05-07 NOTE — TELEPHONE ENCOUNTER
Jonelle please schedule pt for LHC with Dr. Fontana at [place].  Orders placed thank you.     Nora, RN I placed referral for TAVR with Dr. Castano as well.      Left heart Catheterization Pre procedure Instructions    Date:______________________________    Arrive at:__________________________    Procedure time:____________________    The morning of your procedure you will park in the hospital parking lot and report directly to the cath lab to check in.   At the information desk stay right and go all the way to the end of the fraser, this will take you directly to your check in desk for the cath lab.    Pre-Procedure Instructions   You will need to fast (nothing to eat or drink) after midnight the day of your procedure. No caffeine the morning of.   Do NOT chew gum or eat mints the day of your procedure.  You will need to hold your anticoagulation, Eliquis for two (2) days prior.   All other medications can be taken in the morning with sips of water.   Do not use any lotions, creams or perfume the morning of procedure.   Pre-procedure lab work will need to be completed 5-7 days prior to procedure.   Please have a responsible adult to drive you home after procedure. We advise you have someone to stay with you for 24 hours following procedure for precautionary measures. Depending on procedure you may require an overnight stay.   Cath lab will provide you with all post procedure instructions.     If you have any questions regarding the procedure itself or medications, please call [phone] and ask to speak with a nurse.                 [facility]     [facility]  [address]  [address]   [place] [address]  Phone: [phone]  The hours are Mon -Fri.  6:30 am - 4:00 pm   Saturday 8:00 am - noon  No appointment necessary

## 2025-05-07 NOTE — TELEPHONE ENCOUNTER
----- Message from Dr. John Fontana MD sent at 5/6/2025  4:56 PM EDT -----  I called and spoke with Jennyfer about her echo results. Please set her up for a coronary angiogram. Referral to Dr. Castano for TAVR please.    Also, Aminah, she stopped her Multaq because she thought it was making her short of breath. It was probably more her severe AS causing that. I told her you would call her but she is off it right now.

## 2025-05-08 ENCOUNTER — HOSPITAL ENCOUNTER (OUTPATIENT)
Dept: PHYSICAL THERAPY | Age: 70
Setting detail: THERAPIES SERIES
Discharge: HOME | End: 2025-05-08
Attending: ORTHOPAEDIC SURGERY
Payer: MEDICARE

## 2025-05-08 PROBLEM — I35.0 SEVERE AORTIC STENOSIS: Status: ACTIVE | Noted: 2025-05-06

## 2025-05-08 PROCEDURE — 97110 THERAPEUTIC EXERCISES: CPT | Performed by: PHYSICAL THERAPIST

## 2025-05-08 PROCEDURE — 97112 NEUROMUSCULAR REEDUCATION: CPT | Performed by: PHYSICAL THERAPIST

## 2025-05-08 PROCEDURE — 97140 MANUAL THERAPY 1/> REGIONS: CPT | Performed by: PHYSICAL THERAPIST

## 2025-05-08 NOTE — FLOWSHEET NOTE
[facility]- Outpatient Rehabilitation and Therapy [address], [place] [address] office: [phone] fax: [phone]      Physical Therapy: TREATMENT/PROGRESS NOTE   Patient: Misty Womack (69 y.o. female)   Examination Date: 2025   :  1955 MRN: [id number]   Visit #: 9   Insurance Allowable Auth Needed   30 VPCY []Yes    [x]No    Insurance: Payor: MEDICAL [email or web address] MEDICARE ADVANTAGE / Plan: MEDICAL [email or web address] ADVANTAGE CHOICE HMO / Product Type: *No Product type* /   Insurance ID: [id number] - (Medicare Managed)  Secondary Insurance (if applicable):    Treatment Diagnosis:     ICD-10-CM    1. Acute pain of right shoulder  M25.511       2. Weakness  R53.1          Medical Diagnosis:  Acute pain of right shoulder [M25.511]  S/P Right Shoulder RCR with biceps tenotomy  DOS: 3/19/25   Referring Physician: Carl Orlando MD  PCP: John Adair APRN - CNP     Plan of care signed (Y/N): Yes    Date of Patient follow up with Physician:Today, 1 week PO     Progress Report/POC: Yes  POC update due: (10 visits /OR AUTH LIMITS, whichever is less)  29 May 2025                                            Precautions/ Contra-indications:           Latex allergy:  NO  Pacemaker:    NO  Contraindications for Manipulation: long history of steroid use  Date of Surgery: S/P Right Shoulder RCR with biceps tenotomy - 3/19  Other:    Red Flags:  None    Medical History:  Comorbidities:  Cancer/Tumor  Stroke/TIA  Other: AFib  - ON Elliquis   Relevant Medical History: Asthma; 2 knee replacements ; Adrenal insufficiency (on chronic steroids)    Suicide Screening:   The patient did not verbalize a primary behavioral concern, suicidal ideation, suicidal intent, or demonstrate suicidal behaviors.    Preferred Language for Healthcare:   [x] English       [] other:    SUBJECTIVE EXAMINATION     OUTCOME MEASURE DATE % Deficit   UEFI 25 25%   UEFI 3/20/25 97%    25 91.25% limitation

## 2025-05-12 ENCOUNTER — HOSPITAL ENCOUNTER (OUTPATIENT)
Dept: PHYSICAL THERAPY | Age: 70
Setting detail: THERAPIES SERIES
Discharge: HOME | End: 2025-05-12
Attending: ORTHOPAEDIC SURGERY
Payer: MEDICARE

## 2025-05-12 PROCEDURE — 97140 MANUAL THERAPY 1/> REGIONS: CPT | Performed by: PHYSICAL THERAPIST

## 2025-05-12 PROCEDURE — 97110 THERAPEUTIC EXERCISES: CPT | Performed by: PHYSICAL THERAPIST

## 2025-05-12 NOTE — FLOWSHEET NOTE
[facility]- Outpatient Rehabilitation and Therapy [address],  [address] office: [phone] fax: [phone]      Physical Therapy: TREATMENT/PROGRESS NOTE   Patient: Misty Womack (69 y.o. female)   Examination Date: 2025   :  1955 MRN: [id number]   Visit #: 10   Insurance Allowable Auth Needed   30 VPCY []Yes    [x]No    Insurance: Payor: MEDICAL [employer] MEDICARE ADVANTAGE / Plan: MEDICAL [employer] ADVANTAGE CHOICE HMO / Product Type: *No Product type* /   Insurance ID: [id number] - (Medicare Managed)  Secondary Insurance (if applicable):    Treatment Diagnosis:     ICD-10-CM    1. Acute pain of right shoulder  M25.511       2. Weakness  R53.1          Medical Diagnosis:  Acute pain of right shoulder [M25.511]  S/P Right Shoulder RCR with biceps tenotomy  DOS: 3/19/25   Referring Physician: Carl Orlando MD  PCP: John Adair APRN - CNP     Plan of care signed (Y/N): Yes    Date of Patient follow up with Physician:Today, 1 week PO     Progress Report/POC: Yes  POC update due: (10 visits /OR AUTH LIMITS, whichever is less)  29 May 2025                                            Precautions/ Contra-indications:           Latex allergy:  NO  Pacemaker:    NO  Contraindications for Manipulation: long history of steroid use  Date of Surgery: S/P Right Shoulder RCR with biceps tenotomy - 3/19  Other:    Red Flags:  None    Medical History:  Comorbidities:  Cancer/Tumor  Stroke/TIA  Other: AFib  - ON Elliquis   Relevant Medical History: Asthma; 2 knee replacements ; Adrenal insufficiency (on chronic steroids)    Suicide Screening:   The patient did not verbalize a primary behavioral concern, suicidal ideation, suicidal intent, or demonstrate suicidal behaviors.    Preferred Language for Healthcare:   [x] English       [] other:    SUBJECTIVE EXAMINATION     OUTCOME MEASURE DATE % Deficit   UEFI 25 25%   UEFI 3/20/25 97%    25 91.25% limitation

## 2025-05-15 ENCOUNTER — HOSPITAL ENCOUNTER (OUTPATIENT)
Dept: PHYSICAL THERAPY | Age: 70
Setting detail: THERAPIES SERIES
Discharge: HOME | End: 2025-05-15
Attending: ORTHOPAEDIC SURGERY
Payer: MEDICARE

## 2025-05-15 PROCEDURE — 97140 MANUAL THERAPY 1/> REGIONS: CPT

## 2025-05-15 PROCEDURE — 97110 THERAPEUTIC EXERCISES: CPT

## 2025-05-15 NOTE — FLOWSHEET NOTE
[facility]- Outpatient Rehabilitation and Therapy [address], [place] [address] office: [phone] fax: [phone]      Physical Therapy: TREATMENT/PROGRESS NOTE   Patient: Misty Womack (69 y.o. female)   Examination Date: 05/15/2025   :  1955 MRN: [id number]   Visit #: 11   Insurance Allowable Auth Needed   30 VPCY []Yes    [x]No    Insurance: Payor: MEDICAL [employer] MEDICARE ADVANTAGE / Plan: MEDICAL [employer] ADVANTAGE CHOICE HMO / Product Type: *No Product type* /   Insurance ID: [id number] - (Medicare Managed)  Secondary Insurance (if applicable):    Treatment Diagnosis:     ICD-10-CM    1. Acute pain of right shoulder  M25.511       2. Weakness  R53.1          Medical Diagnosis:  Acute pain of right shoulder [M25.511]  S/P Right Shoulder RCR with biceps tenotomy  DOS: 3/19/25   Referring Physician: Carl Orlando MD  PCP: John Adair APRN - CNP     Plan of care signed (Y/N): Yes    Date of Patient follow up with Physician:Today, 1 week PO     Progress Report/POC: NO   POC update due: (10 visits /OR AUTH LIMITS, whichever is less)  29 May 2025                                            Precautions/ Contra-indications:           Latex allergy:  NO  Pacemaker:    NO  Contraindications for Manipulation: long history of steroid use  Date of Surgery: S/P Right Shoulder RCR with biceps tenotomy - 3/19  Other:    Red Flags:  None    Medical History:  Comorbidities:  Cancer/Tumor  Stroke/TIA  Other: AFib  - ON Elliquis   Relevant Medical History: Asthma; 2 knee replacements ; Adrenal insufficiency (on chronic steroids)    Suicide Screening:   The patient did not verbalize a primary behavioral concern, suicidal ideation, suicidal intent, or demonstrate suicidal behaviors.    Preferred Language for Healthcare:   [x] English       [] other:    SUBJECTIVE EXAMINATION     OUTCOME MEASURE DATE % Deficit   UEFI 25 25%   UEFI 3/20/25 97%    25 91.25% limitation

## 2025-05-19 ENCOUNTER — HOSPITAL ENCOUNTER (OUTPATIENT)
Dept: PHYSICAL THERAPY | Age: 70
Setting detail: THERAPIES SERIES
Discharge: HOME | End: 2025-05-19
Attending: ORTHOPAEDIC SURGERY
Payer: MEDICARE

## 2025-05-19 DIAGNOSIS — J45.30 MILD PERSISTENT ALLERGIC ASTHMA WITHOUT COMPLICATION: ICD-10-CM

## 2025-05-19 PROCEDURE — 97110 THERAPEUTIC EXERCISES: CPT | Performed by: PHYSICAL THERAPIST

## 2025-05-19 PROCEDURE — 97140 MANUAL THERAPY 1/> REGIONS: CPT | Performed by: PHYSICAL THERAPIST

## 2025-05-19 RX ORDER — FLUTICASONE PROPIONATE AND SALMETEROL 250; 50 UG/1; UG/1
POWDER RESPIRATORY (INHALATION)
Qty: 60 EACH | Refills: 2 | Status: SHIPPED | OUTPATIENT
Start: 2025-05-19

## 2025-05-19 NOTE — FLOWSHEET NOTE
[facility]- Outpatient Rehabilitation and Therapy [address], [place] [address] office: [phone] fax: [phone]      Physical Therapy: TREATMENT/PROGRESS NOTE   Patient: Misty Womack (69 y.o. female)   Examination Date: 2025   :  1955 MRN: [id number]   Visit #: 12   Insurance Allowable Auth Needed   30 VPCY []Yes    [x]No    Insurance: Payor: MEDICAL [employer] MEDICARE ADVANTAGE / Plan: MEDICAL [employer] ADVANTAGE CHOICE HMO / Product Type: *No Product type* /   Insurance ID: [id number] - (Medicare Managed)  Secondary Insurance (if applicable):    Treatment Diagnosis:     ICD-10-CM    1. Acute pain of right shoulder  M25.511       2. Weakness  R53.1          Medical Diagnosis:  Acute pain of right shoulder [M25.511]  S/P Right Shoulder RCR with biceps tenotomy  DOS: 3/19/25   Referring Physician: Carl Orlando MD  PCP: John Adair APRN - CNP     Plan of care signed (Y/N): Yes    Date of Patient follow up with Physician:Today, 1 week PO     Progress Report/POC: NO   POC update due: (10 visits /OR AUTH LIMITS, whichever is less)  29 May 2025                                            Precautions/ Contra-indications:           Latex allergy:  NO  Pacemaker:    NO  Contraindications for Manipulation: long history of steroid use  Date of Surgery: S/P Right Shoulder RCR with biceps tenotomy - 3/19  Other:    Red Flags:  None    Medical History:  Comorbidities:  Cancer/Tumor  Stroke/TIA  Other: AFib  - ON Elliquis   Relevant Medical History: Asthma; 2 knee replacements ; Adrenal insufficiency (on chronic steroids)    Suicide Screening:   The patient did not verbalize a primary behavioral concern, suicidal ideation, suicidal intent, or demonstrate suicidal behaviors.    Preferred Language for Healthcare:   [x] English       [] other:    SUBJECTIVE EXAMINATION     OUTCOME MEASURE DATE % Deficit   UEFI 25 25%   UEFI 3/20/25 97%    25 91.25% limitation

## 2025-05-22 ENCOUNTER — HOSPITAL ENCOUNTER (OUTPATIENT)
Dept: PHYSICAL THERAPY | Age: 70
Setting detail: THERAPIES SERIES
Discharge: HOME | End: 2025-05-22
Attending: ORTHOPAEDIC SURGERY
Payer: MEDICARE

## 2025-05-22 DIAGNOSIS — I35.0 NONRHEUMATIC AORTIC VALVE STENOSIS: ICD-10-CM

## 2025-05-22 LAB
ANION GAP SERPL CALCULATED.3IONS-SCNC: 12 MMOL/L (ref 3–16)
BUN SERPL-MCNC: 25 MG/DL (ref 7–20)
CALCIUM SERPL-MCNC: 9.3 MG/DL (ref 8.3–10.6)
CHLORIDE SERPL-SCNC: 102 MMOL/L (ref 99–110)
CO2 SERPL-SCNC: 24 MMOL/L (ref 21–32)
CREAT SERPL-MCNC: 1.3 MG/DL (ref 0.6–1.2)
DEPRECATED RDW RBC AUTO: 15 % (ref 12.4–15.4)
GFR SERPLBLD CREATININE-BSD FMLA CKD-EPI: 45 ML/MIN/{1.73_M2}
GLUCOSE SERPL-MCNC: 86 MG/DL (ref 70–99)
HCT VFR BLD AUTO: 35.4 % (ref 36–48)
HGB BLD-MCNC: 12.1 G/DL (ref 12–16)
MCH RBC QN AUTO: 30 PG (ref 26–34)
MCHC RBC AUTO-ENTMCNC: 34.1 G/DL (ref 31–36)
MCV RBC AUTO: 88 FL (ref 80–100)
PLATELET # BLD AUTO: 196 K/UL (ref 135–450)
PMV BLD AUTO: 10.6 FL (ref 5–10.5)
POTASSIUM SERPL-SCNC: 4.2 MMOL/L (ref 3.5–5.1)
RBC # BLD AUTO: 4.02 M/UL (ref 4–5.2)
SODIUM SERPL-SCNC: 138 MMOL/L (ref 136–145)
WBC # BLD AUTO: 8.5 K/UL (ref 4–11)

## 2025-05-22 PROCEDURE — 97110 THERAPEUTIC EXERCISES: CPT | Performed by: PHYSICAL THERAPIST

## 2025-05-22 PROCEDURE — 97140 MANUAL THERAPY 1/> REGIONS: CPT | Performed by: PHYSICAL THERAPIST

## 2025-05-22 NOTE — FLOWSHEET NOTE
[facility]- Outpatient Rehabilitation and Therapy [address], [place] [address] office: [phone] fax: [phone]      Physical Therapy: TREATMENT/PROGRESS NOTE   Patient: Misty Womack (69 y.o. female)   Examination Date: 2025   :  1955 MRN: [id number]   Visit #: 13   Insurance Allowable Auth Needed   30 VPCY []Yes    [x]No    Insurance: Payor: MEDICAL [employer] MEDICARE ADVANTAGE / Plan: MEDICAL [employer] ADVANTAGE CHOICE HMO / Product Type: *No Product type* /   Insurance ID: [id number] - (Medicare Managed)  Secondary Insurance (if applicable):    Treatment Diagnosis:     ICD-10-CM    1. Acute pain of right shoulder  M25.511       2. Weakness  R53.1          Medical Diagnosis:  Acute pain of right shoulder [M25.511]  S/P Right Shoulder RCR with biceps tenotomy  DOS: 3/19/25   Referring Physician: Carl Orlando MD  PCP: John Adair APRN - CNP     Plan of care signed (Y/N): Yes    Date of Patient follow up with Physician:Today, 1 week PO     Progress Report/POC: NO   POC update due: (10 visits /OR AUTH LIMITS, whichever is less)  29 May 2025                                            Precautions/ Contra-indications:           Latex allergy:  NO  Pacemaker:    NO  Contraindications for Manipulation: long history of steroid use  Date of Surgery: S/P Right Shoulder RCR with biceps tenotomy - 3/19  Other:    Red Flags:  None    Medical History:  Comorbidities:  Cancer/Tumor  Stroke/TIA  Other: AFib  - ON Elliquis   Relevant Medical History: Asthma; 2 knee replacements ; Adrenal insufficiency (on chronic steroids)    Suicide Screening:   The patient did not verbalize a primary behavioral concern, suicidal ideation, suicidal intent, or demonstrate suicidal behaviors.    Preferred Language for Healthcare:   [x] English       [] other:    SUBJECTIVE EXAMINATION     OUTCOME MEASURE DATE % Deficit   UEFI 25 25%   UEFI 3/20/25 97%    25 91.25% limitation

## 2025-05-27 ENCOUNTER — HOSPITAL ENCOUNTER (OUTPATIENT)
Dept: PHYSICAL THERAPY | Age: 70
Setting detail: THERAPIES SERIES
Discharge: HOME | End: 2025-05-27
Attending: ORTHOPAEDIC SURGERY
Payer: MEDICARE

## 2025-05-27 PROCEDURE — 97140 MANUAL THERAPY 1/> REGIONS: CPT | Performed by: PHYSICAL THERAPIST

## 2025-05-27 PROCEDURE — 97110 THERAPEUTIC EXERCISES: CPT | Performed by: PHYSICAL THERAPIST

## 2025-05-27 NOTE — FLOWSHEET NOTE
[facility]- Outpatient Rehabilitation and Therapy [address], [place] [address] office: [phone] fax: [phone]      Physical Therapy: TREATMENT/PROGRESS NOTE   Patient: Misty Womack (69 y.o. female)   Examination Date: 2025   :  1955 MRN: [id number]   Visit #: 14   Insurance Allowable Auth Needed   30 VPCY []Yes    [x]No    Insurance: Payor: MEDICAL [employer] MEDICARE ADVANTAGE / Plan: MEDICAL [employer] ADVANTAGE CHOICE HMO / Product Type: *No Product type* /   Insurance ID: [id number] - (Medicare Managed)  Secondary Insurance (if applicable):    Treatment Diagnosis:     ICD-10-CM    1. Acute pain of right shoulder  M25.511       2. Weakness  R53.1          Medical Diagnosis:  Acute pain of right shoulder [M25.511]  S/P Right Shoulder RCR with biceps tenotomy  DOS: 3/19/25   Referring Physician: Carl Orlando MD  PCP: John Adair APRN - CNP     Plan of care signed (Y/N): Yes    Date of Patient follow up with Physician:Today, 1 week PO     Progress Report/POC: NO   POC update due: (10 visits /OR AUTH LIMITS, whichever is less)  29 May 2025                                            Precautions/ Contra-indications:           Latex allergy:  NO  Pacemaker:    NO  Contraindications for Manipulation: long history of steroid use  Date of Surgery: S/P Right Shoulder RCR with biceps tenotomy - 3/19  Other:    Red Flags:  None    Medical History:  Comorbidities:  Cancer/Tumor  Stroke/TIA  Other: AFib  - ON Elliquis   Relevant Medical History: Asthma; 2 knee replacements ; Adrenal insufficiency (on chronic steroids)    Suicide Screening:   The patient did not verbalize a primary behavioral concern, suicidal ideation, suicidal intent, or demonstrate suicidal behaviors.    Preferred Language for Healthcare:   [x] English       [] other:    SUBJECTIVE EXAMINATION     OUTCOME MEASURE DATE % Deficit   UEFI 25 25%   UEFI 3/20/25 97%    25 91.25% limitation

## 2025-05-28 ENCOUNTER — HOSPITAL ENCOUNTER (OUTPATIENT)
Age: 70
Setting detail: OUTPATIENT SURGERY
Discharge: HOME | End: 2025-05-28
Attending: INTERNAL MEDICINE | Admitting: INTERNAL MEDICINE
Payer: MEDICARE

## 2025-05-28 VITALS
HEART RATE: 58 BPM | OXYGEN SATURATION: 99 % | RESPIRATION RATE: 12 BRPM | SYSTOLIC BLOOD PRESSURE: 128 MMHG | DIASTOLIC BLOOD PRESSURE: 74 MMHG | HEIGHT: 67 IN | BODY MASS INDEX: 38.92 KG/M2 | TEMPERATURE: 98.8 F | WEIGHT: 248 LBS

## 2025-05-28 DIAGNOSIS — I35.0 SEVERE AORTIC STENOSIS: ICD-10-CM

## 2025-05-28 LAB
ECHO BSA: 2.31 M2
EKG ATRIAL RATE: 60 BPM
EKG DIAGNOSIS: NORMAL
EKG P AXIS: 46 DEGREES
EKG P-R INTERVAL: 182 MS
EKG Q-T INTERVAL: 454 MS
EKG QRS DURATION: 72 MS
EKG QTC CALCULATION (BAZETT): 454 MS
EKG R AXIS: 21 DEGREES
EKG T AXIS: 48 DEGREES
EKG VENTRICULAR RATE: 60 BPM

## 2025-05-28 PROCEDURE — 6360000004 HC RX CONTRAST MEDICATION: Performed by: INTERNAL MEDICINE

## 2025-05-28 PROCEDURE — 7100000010 HC PHASE II RECOVERY - FIRST 15 MIN: Performed by: INTERNAL MEDICINE

## 2025-05-28 PROCEDURE — 6370000000 HC RX 637 (ALT 250 FOR IP): Performed by: INTERNAL MEDICINE

## 2025-05-28 PROCEDURE — 6360000002 HC RX W HCPCS: Performed by: INTERNAL MEDICINE

## 2025-05-28 PROCEDURE — 2580000003 HC RX 258: Performed by: INTERNAL MEDICINE

## 2025-05-28 PROCEDURE — C1894 INTRO/SHEATH, NON-LASER: HCPCS | Performed by: INTERNAL MEDICINE

## 2025-05-28 PROCEDURE — 2709999900 HC NON-CHARGEABLE SUPPLY: Performed by: INTERNAL MEDICINE

## 2025-05-28 PROCEDURE — 93454 CORONARY ARTERY ANGIO S&I: CPT | Performed by: INTERNAL MEDICINE

## 2025-05-28 PROCEDURE — 2500000003 HC RX 250 WO HCPCS: Performed by: INTERNAL MEDICINE

## 2025-05-28 PROCEDURE — 93005 ELECTROCARDIOGRAM TRACING: CPT

## 2025-05-28 PROCEDURE — C1769 GUIDE WIRE: HCPCS | Performed by: INTERNAL MEDICINE

## 2025-05-28 PROCEDURE — 7100000011 HC PHASE II RECOVERY - ADDTL 15 MIN: Performed by: INTERNAL MEDICINE

## 2025-05-28 PROCEDURE — 99152 MOD SED SAME PHYS/QHP 5/>YRS: CPT | Performed by: INTERNAL MEDICINE

## 2025-05-28 PROCEDURE — 99153 MOD SED SAME PHYS/QHP EA: CPT | Performed by: INTERNAL MEDICINE

## 2025-05-28 RX ORDER — SODIUM CHLORIDE 0.9 % (FLUSH) 0.9 %
5-40 SYRINGE (ML) INJECTION PRN
Status: DISCONTINUED | OUTPATIENT
Start: 2025-05-28 | End: 2025-05-28 | Stop reason: HOSPADM

## 2025-05-28 RX ORDER — MIDAZOLAM HYDROCHLORIDE 1 MG/ML
INJECTION, SOLUTION INTRAMUSCULAR; INTRAVENOUS PRN
Status: DISCONTINUED | OUTPATIENT
Start: 2025-05-28 | End: 2025-05-28 | Stop reason: HOSPADM

## 2025-05-28 RX ORDER — SODIUM CHLORIDE 0.9 % (FLUSH) 0.9 %
5-40 SYRINGE (ML) INJECTION EVERY 12 HOURS SCHEDULED
Status: DISCONTINUED | OUTPATIENT
Start: 2025-05-28 | End: 2025-05-28 | Stop reason: HOSPADM

## 2025-05-28 RX ORDER — LIDOCAINE HYDROCHLORIDE 10 MG/ML
INJECTION, SOLUTION INFILTRATION; PERINEURAL PRN
Status: DISCONTINUED | OUTPATIENT
Start: 2025-05-28 | End: 2025-05-28 | Stop reason: HOSPADM

## 2025-05-28 RX ORDER — ASPIRIN 325 MG
325 TABLET ORAL ONCE
Status: COMPLETED | OUTPATIENT
Start: 2025-05-28 | End: 2025-05-28

## 2025-05-28 RX ORDER — SODIUM CHLORIDE 9 MG/ML
INJECTION, SOLUTION INTRAVENOUS PRN
Status: DISCONTINUED | OUTPATIENT
Start: 2025-05-28 | End: 2025-05-28 | Stop reason: HOSPADM

## 2025-05-28 RX ORDER — ACETAMINOPHEN 325 MG/1
650 TABLET ORAL EVERY 4 HOURS PRN
Status: DISCONTINUED | OUTPATIENT
Start: 2025-05-28 | End: 2025-05-28 | Stop reason: HOSPADM

## 2025-05-28 RX ORDER — IOPAMIDOL 755 MG/ML
INJECTION, SOLUTION INTRAVASCULAR PRN
Status: DISCONTINUED | OUTPATIENT
Start: 2025-05-28 | End: 2025-05-28 | Stop reason: HOSPADM

## 2025-05-28 RX ADMIN — ASPIRIN 325 MG: 325 TABLET ORAL at 07:31

## 2025-05-28 RX ADMIN — Medication 10 ML: at 07:31

## 2025-05-28 RX ADMIN — SODIUM CHLORIDE 75 ML/HR: 0.9 INJECTION, SOLUTION INTRAVENOUS at 07:31

## 2025-05-28 NOTE — PROGRESS NOTES
0730  Patient with large hematoma right hip from fall.  Has multiple bruises on body   0855  Returned from procedure, awake and alert.  TR band in place to right wrist.  0905  Dr Fontana at bedside discussing results of procedure with patient, sister and friend.  0930  Resting on stretcher without complaints.  Sister at bedside, call light in reach.  1025  Ambulated to BR without difficulty. TR band removed.  DSD, tegederm and armboard applied to right wrist  1035  Transferred to discharge bridge via wheelchair.

## 2025-05-28 NOTE — ANESTHESIA PRE-OP
Brief Pre-Op Note/Sedation Assessment      Misty Womack  1955  [id number]  8:11 AM    Planned Procedure: Cardiac Catheterization Procedure  Post Procedure Plan: Return to same level of care  Consent: I have discussed with the patient and/or the patient representative the indication, alternatives, and the possible risks and/or complications of the planned procedure and the anesthesia methods. The patient and/or patient representative appear to understand and agree to proceed.        Chief Complaint:   Dyspnea on Exertion      Indications for Cath Procedure:  Presentation:  Valvular Disease - Aortic Stenosis; Stenosis Severity: Severe  2.  Anginal Classification within 2 weeks:  CCS III - Symptoms with everyday living activities, i.e., moderate limitation  3.  Angina Symptoms Assessment:  Typical Chest Pain  4.  Heart Failure Class within last 2 weeks:  No symptoms  5.  Cardiovascular Instability:  No    Prior Ischemic Workup/Eval:  Pre-Procedural Medications: Yes: Aspirin and STATIN  2.   Stress Test Completed?  No    Does Patient need surgery?  Cath Valve Surgery:  Yes:  Aortic Stenosis; Stenosis Severity: Severe Intermediate >= 4 METS with symptoms    Pre-Procedure Medical History:  Vital Signs:  /72   Pulse 69   Temp 98.8 °F (37.1 °C) (Infrared)   Resp 12   Ht 1.702 m (5' 7\")   Wt 112.5 kg (248 lb)   SpO2 97%   BMI 38.84 kg/m²     Allergies:    Allergies   Allergen Reactions    Flecainide Shortness Of Breath and Swelling    Corn Oil      \"Feel horrible after ingesting anything containing corn\"  Ankle swelling    Corn-Containing Products      \"Feel horrible after ingesting anything containing corn\"    Ankle swelling    Levofloxacin      Out of touch w/ world    Levothyroxine Itching     Has corn in it and I am allergic to corn.    Lyrica [Pregabalin] Swelling    Soybean-Containing Drug Products Swelling     swelling    Tramadol Swelling     \"Feel horrible after ingesting anything containing

## 2025-05-28 NOTE — CATH APPROPRIATE USE CRITERIA
ACC-NCDR CathPCI V5 Collection Form    Pre-Procedure Information  - Electrocardiac assessment method: ECG     - The assessment result was normal.     Indications and Presentation  - Indication(s) for cath lab visit: valvular disease    - Angina type: asymptomatic.  - AV demonstrates severe stenosis.

## 2025-05-28 NOTE — CATH APPROPRIATE USE CRITERIA
ACC-NCDR CathPCI V5 Collection Form    Pre-Procedure Information  - Electrocardiac assessment method: ECG     - The assessment result was normal.     Indications and Presentation  - Indication(s) for cath lab visit: valvular disease and pre-operative evaluation    - Angina type: asymptomatic.  - AV demonstrates severe stenosis.    Pre-Operative Evaluation  - Type of surgery: cardiac surgery  - TAVR

## 2025-05-29 ENCOUNTER — APPOINTMENT (OUTPATIENT)
Dept: PHYSICAL THERAPY | Age: 70
End: 2025-05-29
Attending: ORTHOPAEDIC SURGERY
Payer: MEDICARE

## 2025-06-02 ENCOUNTER — HOSPITAL ENCOUNTER (OUTPATIENT)
Dept: PHYSICAL THERAPY | Age: 70
Setting detail: THERAPIES SERIES
Discharge: HOME | End: 2025-06-02
Attending: ORTHOPAEDIC SURGERY
Payer: MEDICARE

## 2025-06-02 PROCEDURE — 97140 MANUAL THERAPY 1/> REGIONS: CPT | Performed by: PHYSICAL THERAPIST

## 2025-06-02 PROCEDURE — 97110 THERAPEUTIC EXERCISES: CPT | Performed by: PHYSICAL THERAPIST

## 2025-06-02 NOTE — FLOWSHEET NOTE
[facility] [address], [place] [address] office: [phone] fax: [phone]      Physical Therapy: TREATMENT/PROGRESS NOTE   Patient: Misty Womack (69 y.o. female)   Examination Date: 2025   :  1955 MRN: [id number]   Visit #: 15   Insurance Allowable Auth Needed   30 VPCY []Yes    [x]No    Insurance: Payor: MEDICAL [employer] MEDICARE ADVANTAGE / Plan: MEDICAL [employer] ADVANTAGE CHOICE HMO / Product Type: *No Product type* /   Insurance ID: [id number] - (Medicare Managed)  Secondary Insurance (if applicable):    Treatment Diagnosis:     ICD-10-CM    1. Acute pain of right shoulder  M25.511       2. Weakness  R53.1          Medical Diagnosis:  Acute pain of right shoulder [M25.511]  S/P Right Shoulder RCR with biceps tenotomy  DOS: 3/19/25   Referring Physician: Carl Orlando MD  PCP: John Adair APRN - CNP     Plan of care signed (Y/N): Yes    Date of Patient follow up with Physician:Today, 1 week PO     Progress Report/POC: NO   POC update due: (10 visits /OR AUTH LIMITS, whichever is less)  29 May 2025                                            Precautions/ Contra-indications:           Latex allergy:  NO  Pacemaker:    NO  Contraindications for Manipulation: long history of steroid use  Date of Surgery: S/P Right Shoulder RCR with biceps tenotomy - 3/19  Other:    Red Flags:  None    Medical History:  Comorbidities:  Cancer/Tumor  Stroke/TIA  Other: AFib  - ON Elliquis   Relevant Medical History: Asthma; 2 knee replacements ; Adrenal insufficiency (on chronic steroids)    Suicide Screening:   The patient did not verbalize a primary behavioral concern, suicidal ideation, suicidal intent, or demonstrate suicidal behaviors.    Preferred Language for Healthcare:   [x] English       [] other:    SUBJECTIVE EXAMINATION     OUTCOME MEASURE DATE % Deficit   UEFI 25 25%   UEFI 3/20/25 97%    25 91.25% limitation

## 2025-06-05 ENCOUNTER — PATIENT MESSAGE (OUTPATIENT)
Dept: ORTHOPEDIC SURGERY | Age: 70
End: 2025-06-05

## 2025-06-05 ENCOUNTER — OFFICE VISIT (OUTPATIENT)
Dept: CARDIOLOGY CLINIC | Age: 70
End: 2025-06-05
Payer: MEDICARE

## 2025-06-05 ENCOUNTER — RESULTS FOLLOW-UP (OUTPATIENT)
Dept: CARDIOLOGY | Age: 70
End: 2025-06-05

## 2025-06-05 VITALS
WEIGHT: 246.8 LBS | DIASTOLIC BLOOD PRESSURE: 72 MMHG | HEART RATE: 73 BPM | BODY MASS INDEX: 38.74 KG/M2 | SYSTOLIC BLOOD PRESSURE: 128 MMHG | HEIGHT: 67 IN | RESPIRATION RATE: 16 BRPM | OXYGEN SATURATION: 95 %

## 2025-06-05 DIAGNOSIS — I10 ESSENTIAL HYPERTENSION: ICD-10-CM

## 2025-06-05 DIAGNOSIS — I35.0 SEVERE AORTIC STENOSIS: Primary | ICD-10-CM

## 2025-06-05 DIAGNOSIS — I48.0 PAF (PAROXYSMAL ATRIAL FIBRILLATION) (HCC): ICD-10-CM

## 2025-06-05 PROCEDURE — 3074F SYST BP LT 130 MM HG: CPT | Performed by: INTERNAL MEDICINE

## 2025-06-05 PROCEDURE — 1123F ACP DISCUSS/DSCN MKR DOCD: CPT | Performed by: INTERNAL MEDICINE

## 2025-06-05 PROCEDURE — 1159F MED LIST DOCD IN RCRD: CPT | Performed by: INTERNAL MEDICINE

## 2025-06-05 PROCEDURE — 3078F DIAST BP <80 MM HG: CPT | Performed by: INTERNAL MEDICINE

## 2025-06-05 PROCEDURE — 99215 OFFICE O/P EST HI 40 MIN: CPT | Performed by: INTERNAL MEDICINE

## 2025-06-05 NOTE — TELEPHONE ENCOUNTER
Called and spoke with patient. I explained that Dr. Orlando will not refill her pain medicine. Patient needs to see him in the office. Patient scheduled for 6/9/25.

## 2025-06-05 NOTE — PROGRESS NOTES
[facility]  Cardiology Consult    Misty Womack  1955 June 5, 2025      Reason for Referral: Aortic stenosis    Referring physician: Dr Fontana    CC: \"I am here to discuss my valve.\"      Subjective:     History of Present Illness:    Misty Womack is a 69 y.o. patient with a PMH significant for paroxsymal atrial fibrillation s/p ablation 7/26/2023, atrial flutter s/p ablation 2/8/2024, aortic stenosis, TIA (2006), hypertension and hyperlipidemia.      Today, she is here to discuss her aortic valve. She has known that she was born with a bicuspid aortic valve. She does have increased fatigue. Patient denies exertional chest pain/pressure, dyspnea at rest, PND, orthopnea, palpitations, lightheadedness, weight changes, changes in LE edema, and syncope. Patient reports compliance to her medications.     Past Medical History:   has a past medical history of Adrenal insufficiency, Arthritis, Asthma, Atrial fibrillation (HCC), Bicuspid aortic valve, Brain tumor (HCC), Chiari malformation, Chronic anticoagulation, CKD (chronic kidney disease) stage 3, GFR 30-59 ml/min (HCC), Fibromyalgia, History of TIA (transient ischemic attack), Hypercholesterolemia, Hypertension, Mood disorder, Obesity (BMI 35.0-39.9 without comorbidity), Panhypopituitarism, Paroxysmal atrial fibrillation (HCC), PONV (postoperative nausea and vomiting), Restless leg, S/P selective transsphenoidal pituitary adenomectomy, Thyroid disease, Unspecified cerebral artery occlusion with cerebral infarction, and Vitreous degeneration.    Surgical History:   has a past surgical history that includes brain surgery (03/24/2011); brain surgery (11/2007); laminectomy (C1); Knee Arthroplasty (Right, 08/25/2014); pituitary surgery (02/2015); Hysterectomy; Biceps Tenodesis (Left, 11/10/2016); Cataract removal (Bilateral, 04/2017); Total knee arthroplasty (Left, 03/01/2023); Tonsillectomy; ablation of dysrhythmic focus; Shoulder arthroscopy

## 2025-06-09 ENCOUNTER — HOSPITAL ENCOUNTER (OUTPATIENT)
Dept: PHYSICAL THERAPY | Age: 70
Setting detail: THERAPIES SERIES
Discharge: HOME | End: 2025-06-09
Attending: ORTHOPAEDIC SURGERY
Payer: MEDICARE

## 2025-06-09 ENCOUNTER — TELEPHONE (OUTPATIENT)
Dept: CARDIOLOGY CLINIC | Age: 70
End: 2025-06-09

## 2025-06-09 ENCOUNTER — OFFICE VISIT (OUTPATIENT)
Dept: ORTHOPEDIC SURGERY | Age: 70
End: 2025-06-09

## 2025-06-09 VITALS — HEIGHT: 67 IN | WEIGHT: 246 LBS | BODY MASS INDEX: 38.61 KG/M2

## 2025-06-09 DIAGNOSIS — I35.0 SEVERE AORTIC STENOSIS: ICD-10-CM

## 2025-06-09 DIAGNOSIS — M75.21 BICEPS TENDINITIS ON RIGHT: ICD-10-CM

## 2025-06-09 DIAGNOSIS — S46.011D TRAUMATIC ROTATOR CUFF TEAR, RIGHT, SUBSEQUENT ENCOUNTER: Primary | ICD-10-CM

## 2025-06-09 DIAGNOSIS — Q23.81 BICUSPID AORTIC VALVE: Primary | ICD-10-CM

## 2025-06-09 DIAGNOSIS — R42 DIZZINESS: ICD-10-CM

## 2025-06-09 DIAGNOSIS — M25.511 ACUTE PAIN OF RIGHT SHOULDER: ICD-10-CM

## 2025-06-09 PROCEDURE — 99024 POSTOP FOLLOW-UP VISIT: CPT | Performed by: ORTHOPAEDIC SURGERY

## 2025-06-09 PROCEDURE — 97110 THERAPEUTIC EXERCISES: CPT | Performed by: PHYSICAL THERAPIST

## 2025-06-09 PROCEDURE — 97140 MANUAL THERAPY 1/> REGIONS: CPT | Performed by: PHYSICAL THERAPIST

## 2025-06-09 NOTE — TELEPHONE ENCOUNTER
Olivia, can you schedule pt for a TAVR CTA and carotid US at [facility]? Pt has no allergy to dye, lives at home and has no port. Labs are ordered. Thanks, Kal JARRETT

## 2025-06-09 NOTE — FLOWSHEET NOTE
[facility]- Outpatient Rehabilitation and Therapy [address], [place] [address] office: [phone] fax: [phone]      Physical Therapy: TREATMENT/PROGRESS NOTE   Patient: Misty Womack (69 y.o. female)   Examination Date: 2025   :  1955 MRN: [id number]   Visit #: 16   Insurance Allowable Auth Needed   30 VPCY []Yes    [x]No    Insurance: Payor: MEDICAL [employer] MEDICARE ADVANTAGE / Plan: MEDICAL [employer] ADVANTAGE CHOICE HMO / Product Type: *No Product type* /   Insurance ID: [id number] - (Medicare Managed)  Secondary Insurance (if applicable):    Treatment Diagnosis:     ICD-10-CM    1. Acute pain of right shoulder  M25.511       2. Weakness  R53.1          Medical Diagnosis:  Acute pain of right shoulder [M25.511]  S/P Right Shoulder RCR with biceps tenotomy  DOS: 3/19/25   Referring Physician: Carl Orlando MD  PCP: John Adair APRN - CNP     Plan of care signed (Y/N): Yes    Date of Patient follow up with Physician:Today, 1 week PO     Progress Report/POC: NO   POC update due: (10 visits /OR AUTH LIMITS, whichever is less)  29 May 2025                                            Precautions/ Contra-indications:           Latex allergy:  NO  Pacemaker:    NO  Contraindications for Manipulation: long history of steroid use  Date of Surgery: S/P Right Shoulder RCR with biceps tenotomy - 3/19  Other:    Red Flags:  None    Medical History:  Comorbidities:  Cancer/Tumor  Stroke/TIA  Other: AFib  - ON Elliquis   Relevant Medical History: Asthma; 2 knee replacements ; Adrenal insufficiency (on chronic steroids)    Suicide Screening:   The patient did not verbalize a primary behavioral concern, suicidal ideation, suicidal intent, or demonstrate suicidal behaviors.    Preferred Language for Healthcare:   [x] English       [] other:    SUBJECTIVE EXAMINATION     OUTCOME MEASURE DATE % Deficit   UEFI 25 25%   UEFI 3/20/25 97%    25 91.25% limitation

## 2025-06-09 NOTE — PROGRESS NOTES
Misty Womack returns today status post right shoulder arthroscopy with rotator cuff repair performed about 3 months ago.    She called last week indicating she was having a difficult time sleeping.  She typically falls asleep fine but wakes up in the middle of the night with pain that last about 10 minutes that can be as bad as 8 out of 10.    Her issue with medicine is that she is allergic to anything that has cornstarch in it and it is difficult to find medication that she can tolerate.  Because of her other history she cannot take ibuprofen.  There are some Tylenol brand she can take.  She has been using her postsurgical Dilaudid.      As I examined her today she has no tenderness.  She has mild discomfort in the upper trap.  She has good strength in abduction and external rotation and mild stiffness and elevation.    She has no warmth erythema and incisions are all well-healed.    At this point she seems to be progressing appropriately.  I recommend that she try some topical anti-inflammatory or analgesic creams at nighttime in addition to the Tylenol she can take.  She understands we will not be refilling her Dilaudid.  Follow-up with me in 3 months

## 2025-06-11 NOTE — TELEPHONE ENCOUNTER
TAVR testing is scheduled per below:    6-20-25 - [facility]  9:30 am - CAROTID STUDY  * Please wear easy to remove clothing  * Please take all medication, unless otherwise instructed  * You will be here for approximately 1 hour    11:30 am - CTA CHEST ABD PELVIC W/CONTRAST  PREPS:  * NPO 4 hours prior to procedure - can take medications with small sips of water  * No necklaces, body piercing, no pants with zippers, belt or suspenders  * Bring a complete list of medications or the test cannot be completed.     Labs to be drawn prior to 6-20-25

## 2025-06-12 ENCOUNTER — HOSPITAL ENCOUNTER (OUTPATIENT)
Dept: PHYSICAL THERAPY | Age: 70
Setting detail: THERAPIES SERIES
Discharge: HOME | End: 2025-06-12
Attending: ORTHOPAEDIC SURGERY
Payer: MEDICARE

## 2025-06-12 PROCEDURE — 97110 THERAPEUTIC EXERCISES: CPT | Performed by: PHYSICAL THERAPIST

## 2025-06-12 PROCEDURE — 97112 NEUROMUSCULAR REEDUCATION: CPT | Performed by: PHYSICAL THERAPIST

## 2025-06-12 PROCEDURE — 97140 MANUAL THERAPY 1/> REGIONS: CPT | Performed by: PHYSICAL THERAPIST

## 2025-06-12 NOTE — FLOWSHEET NOTE
[facility]- Outpatient Rehabilitation and Therapy [address], [place] [address] office: [phone] fax: [phone]      Physical Therapy: TREATMENT/PROGRESS NOTE   Patient: Misty Womack (69 y.o. female)   Examination Date: 2025   :  1955 MRN: [id number]   Visit #: 17   Insurance Allowable Auth Needed   30 VPCY []Yes    [x]No    Insurance: Payor: MEDICAL [employer] MEDICARE ADVANTAGE / Plan: MEDICAL [employer] ADVANTAGE CHOICE HMO / Product Type: *No Product type* /   Insurance ID: [id number] - (Medicare Managed)  Secondary Insurance (if applicable):    Treatment Diagnosis:     ICD-10-CM    1. Acute pain of right shoulder  M25.511       2. Weakness  R53.1          Medical Diagnosis:  Acute pain of right shoulder [M25.511]  S/P Right Shoulder RCR with biceps tenotomy  DOS: 3/19/25   Referring Physician: Carl Orlando MD  PCP: John Adair APRN - CNP     Plan of care signed (Y/N): Yes    Date of Patient follow up with Physician:Today, 1 week PO     Progress Report/POC: NO   POC update due: (10 visits /OR AUTH LIMITS, whichever is less)  29 May 2025                                            Precautions/ Contra-indications:           Latex allergy:  NO  Pacemaker:    NO  Contraindications for Manipulation: long history of steroid use  Date of Surgery: S/P Right Shoulder RCR with biceps tenotomy - 3/19  Other:    Red Flags:  None    Medical History:  Comorbidities:  Cancer/Tumor  Stroke/TIA  Other: AFib  - ON Elliquis   Relevant Medical History: Asthma; 2 knee replacements ; Adrenal insufficiency (on chronic steroids)    Suicide Screening:   The patient did not verbalize a primary behavioral concern, suicidal ideation, suicidal intent, or demonstrate suicidal behaviors.    Preferred Language for Healthcare:   [x] English       [] other:    SUBJECTIVE EXAMINATION     OUTCOME MEASURE DATE % Deficit   UEFI 25 25%   UEFI 3/20/25 97%    25 91.25% limitation

## 2025-06-16 ENCOUNTER — HOSPITAL ENCOUNTER (OUTPATIENT)
Dept: PHYSICAL THERAPY | Age: 70
Setting detail: THERAPIES SERIES
Discharge: HOME | End: 2025-06-16
Attending: ORTHOPAEDIC SURGERY
Payer: MEDICARE

## 2025-06-16 ENCOUNTER — RESULTS FOLLOW-UP (OUTPATIENT)
Dept: CARDIOLOGY CLINIC | Age: 70
End: 2025-06-16

## 2025-06-16 DIAGNOSIS — Q23.81 BICUSPID AORTIC VALVE: ICD-10-CM

## 2025-06-16 DIAGNOSIS — I35.0 SEVERE AORTIC STENOSIS: ICD-10-CM

## 2025-06-16 DIAGNOSIS — R42 DIZZINESS: ICD-10-CM

## 2025-06-16 LAB
BUN SERPL-MCNC: 18 MG/DL (ref 7–20)
CREAT SERPL-MCNC: 1.2 MG/DL (ref 0.6–1.2)
GFR SERPLBLD CREATININE-BSD FMLA CKD-EPI: 49 ML/MIN/{1.73_M2}

## 2025-06-16 PROCEDURE — 97140 MANUAL THERAPY 1/> REGIONS: CPT | Performed by: PHYSICAL THERAPIST

## 2025-06-16 PROCEDURE — 97112 NEUROMUSCULAR REEDUCATION: CPT | Performed by: PHYSICAL THERAPIST

## 2025-06-16 PROCEDURE — 97110 THERAPEUTIC EXERCISES: CPT | Performed by: PHYSICAL THERAPIST

## 2025-06-16 NOTE — FLOWSHEET NOTE
[facility]- Outpatient Rehabilitation and Therapy [address], [place] [address] office: [phone] fax: [phone]      Physical Therapy: TREATMENT/PROGRESS NOTE   Patient: Misty Womack (69 y.o. female)   Examination Date: 2025   :  1955 MRN: [id number]   Visit #: 18   Insurance Allowable Auth Needed   30 VPCY []Yes    [x]No    Insurance: Payor: MEDICAL [employer] MEDICARE ADVANTAGE / Plan: MEDICAL [employer] ADVANTAGE CHOICE HMO / Product Type: *No Product type* /   Insurance ID: [id number] - (Medicare Managed)  Secondary Insurance (if applicable):    Treatment Diagnosis:     ICD-10-CM    1. Acute pain of right shoulder  M25.511       2. Weakness  R53.1          Medical Diagnosis:  Acute pain of right shoulder [M25.511]  S/P Right Shoulder RCR with biceps tenotomy  DOS: 3/19/25   Referring Physician: Carl Orlando MD  PCP: John Adair APRN - CNP     Plan of care signed (Y/N): Yes    Date of Patient follow up with Physician:Today, 1 week PO     Progress Report/POC: NO   POC update due: (10 visits /OR AUTH LIMITS, whichever is less)  29 May 2025                                            Precautions/ Contra-indications:           Latex allergy:  NO  Pacemaker:    NO  Contraindications for Manipulation: long history of steroid use  Date of Surgery: S/P Right Shoulder RCR with biceps tenotomy - 3/19  Other:    Red Flags:  None    Medical History:  Comorbidities:  Cancer/Tumor  Stroke/TIA  Other: AFib - ON Elliquis   Relevant Medical History: Asthma; 2 knee replacements ; Adrenal insufficiency (on chronic steroids)    Suicide Screening:   The patient did not verbalize a primary behavioral concern, suicidal ideation, suicidal intent, or demonstrate suicidal behaviors.    Preferred Language for Healthcare:   [x] English       [] other:    SUBJECTIVE EXAMINATION     OUTCOME MEASURE DATE % Deficit   UEFI 25 25%   UEFI 3/20/25 97%    25 91.25% limitation

## 2025-06-18 ENCOUNTER — HOSPITAL ENCOUNTER (OUTPATIENT)
Dept: PHYSICAL THERAPY | Age: 70
Setting detail: THERAPIES SERIES
Discharge: HOME | End: 2025-06-18
Attending: ORTHOPAEDIC SURGERY
Payer: MEDICARE

## 2025-06-18 PROCEDURE — 97140 MANUAL THERAPY 1/> REGIONS: CPT | Performed by: PHYSICAL THERAPIST

## 2025-06-18 PROCEDURE — 97110 THERAPEUTIC EXERCISES: CPT | Performed by: PHYSICAL THERAPIST

## 2025-06-18 PROCEDURE — 97112 NEUROMUSCULAR REEDUCATION: CPT | Performed by: PHYSICAL THERAPIST

## 2025-06-18 NOTE — FLOWSHEET NOTE
Scapular Retraction         Cable Column/Theraband     External Rotation     Internal Rotation     Lats     Ext 3 x 10 blue TB  ^6/16   Scapular Retraction 3 x 10 blue TB  ^6/16   BIC 3 x 10 4# ^6/16   TRIC     PNF          Dynamic Stability     UK deltoid program 5' pain free ROM Added 5/19   BoW     Body Blade          Manual interventions     Shoulder stretching - flex, scap, and ER 10'  6/18              Modalities:    Cold Pack - 15' 6/16  Education/Home Exercise Program:   Patient instructed on HEP on this date with handout provided and all questions answered. Discussions about how to progress sets/reps/resistance as necessary for fatigue and challenge. Patient was instructed to contact PT with any questions or concerns about HEP moving forward. Patient verbally stated she/he understood.   Access Code: [email or web address]            ASSESSMENT     Today's Assessment: Pt jefferson session well. She jefferson manual stretching well today with minimal pain. Pt educated to continue isometrics until next session after meeting with the heart doctor. Will re-assess next session.   6/18    Medical Necessity Documentation:  I certify that this patient meets the below criteria necessary for medical necessity for care and/or justification of therapy services:  The patient has functional impairments and/or activity limitations and would benefit from continued outpatient therapy services to address the deficits outlined in the patients goals    Return to Play: NA    Prognosis for POC: [x] Good [] Fair  [] Poor    Patient requires continued skilled intervention: [x] Yes  [] No      CHARGE CAPTURE     PT CHARGE GRID   CPT Code (TIMED)  # CPT Code (UNTIMED) #     Therex (64732)   1  EVAL:LOW (95717 - Typically 20 minutes face-to-face)     Neuromusc. Re-ed (48909)  1  Re-Eval (18532)     Manual (66004)  1  Estim Unattended (95317)     Ther. Act (09637)    [facility]. Traction (56070)     Gait (60381)    Dry Needle 1-2 muscle (10309)     Aquatic Therex

## 2025-06-20 ENCOUNTER — HOSPITAL ENCOUNTER (OUTPATIENT)
Dept: CT IMAGING | Age: 70
Discharge: HOME | End: 2025-06-20
Attending: INTERNAL MEDICINE
Payer: MEDICARE

## 2025-06-20 ENCOUNTER — HOSPITAL ENCOUNTER (OUTPATIENT)
Dept: VASCULAR LAB | Age: 70
Discharge: HOME | End: 2025-06-22
Attending: INTERNAL MEDICINE
Payer: MEDICARE

## 2025-06-20 DIAGNOSIS — I35.0 SEVERE AORTIC STENOSIS: ICD-10-CM

## 2025-06-20 DIAGNOSIS — R42 DIZZINESS: ICD-10-CM

## 2025-06-20 DIAGNOSIS — Q23.81 BICUSPID AORTIC VALVE: ICD-10-CM

## 2025-06-20 LAB
VAS LEFT ARM BP: 130 MMHG
VAS LEFT CCA DIST EDV: 30.3 CM/S
VAS LEFT CCA DIST PSV: 91 CM/S
VAS LEFT CCA MID EDV: 34.7 CM/S
VAS LEFT CCA MID PSV: 101 CM/S
VAS LEFT CCA PROX EDV: 20 CM/S
VAS LEFT CCA PROX PSV: 82.9 CM/S
VAS LEFT ECA EDV: 15.6 CM/S
VAS LEFT ECA PSV: 103 CM/S
VAS LEFT ICA DIST EDV: 40 CM/S
VAS LEFT ICA DIST PSV: 103 CM/S
VAS LEFT ICA MID EDV: 31 CM/S
VAS LEFT ICA MID PSV: 84.5 CM/S
VAS LEFT ICA PROX EDV: 18.2 CM/S
VAS LEFT ICA PROX PSV: 58 CM/S
VAS LEFT ICA/CCA PSV: 0.64
VAS LEFT SUBCLAVIAN PROX PSV: 101 CM/S
VAS LEFT VERTEBRAL EDV: 23.5 CM/S
VAS LEFT VERTEBRAL PSV: 58.8 CM/S
VAS RIGHT ARM BP: 134 MMHG
VAS RIGHT CCA DIST EDV: 19.3 CM/S
VAS RIGHT CCA DIST PSV: 66.5 CM/S
VAS RIGHT CCA MID EDV: 23 CM/S
VAS RIGHT CCA MID PSV: 93.3 CM/S
VAS RIGHT CCA PROX EDV: 15.4 CM/S
VAS RIGHT CCA PROX PSV: 60.4 CM/S
VAS RIGHT ECA EDV: 15.5 CM/S
VAS RIGHT ECA PSV: 67.1 CM/S
VAS RIGHT ICA DIST EDV: 28 CM/S
VAS RIGHT ICA DIST PSV: 77 CM/S
VAS RIGHT ICA MID EDV: 38.5 CM/S
VAS RIGHT ICA MID PSV: 83.2 CM/S
VAS RIGHT ICA PROX EDV: 17.4 CM/S
VAS RIGHT ICA PROX PSV: 58.4 CM/S
VAS RIGHT ICA/CCA PSV: 0.88
VAS RIGHT SUBCLAVIAN PROX PSV: 136 CM/S
VAS RIGHT VERTEBRAL EDV: 20.5 CM/S
VAS RIGHT VERTEBRAL PSV: 59 CM/S

## 2025-06-20 PROCEDURE — 6360000004 HC RX CONTRAST MEDICATION: Performed by: INTERNAL MEDICINE

## 2025-06-20 PROCEDURE — 93880 EXTRACRANIAL BILAT STUDY: CPT

## 2025-06-20 PROCEDURE — 74174 CTA ABD&PLVS W/CONTRAST: CPT

## 2025-06-20 RX ORDER — IOPAMIDOL 755 MG/ML
150 INJECTION, SOLUTION INTRAVASCULAR
Status: COMPLETED | OUTPATIENT
Start: 2025-06-20 | End: 2025-06-20

## 2025-06-20 RX ADMIN — IOPAMIDOL 150 ML: 755 INJECTION, SOLUTION INTRAVENOUS at 10:17

## 2025-06-23 ENCOUNTER — TELEPHONE (OUTPATIENT)
Dept: CARDIOTHORACIC SURGERY | Age: 70
End: 2025-06-23

## 2025-06-23 DIAGNOSIS — I35.9 AORTIC VALVE DISEASE: Primary | ICD-10-CM

## 2025-06-23 PROCEDURE — 93880 EXTRACRANIAL BILAT STUDY: CPT | Performed by: SURGERY

## 2025-06-23 NOTE — TELEPHONE ENCOUNTER
Spoke with patient regarding schedule of pulmonary function testing on 6/26/2025 at 10:30 am with arrival time of 10:00 am at [facility]. Melva given instructions for procedure.

## 2025-06-24 ENCOUNTER — OFFICE VISIT (OUTPATIENT)
Dept: ORTHOPEDIC SURGERY | Age: 70
End: 2025-06-24
Payer: MEDICARE

## 2025-06-24 VITALS — BODY MASS INDEX: 39.24 KG/M2 | HEIGHT: 67 IN | WEIGHT: 250 LBS

## 2025-06-24 DIAGNOSIS — M25.472 LEFT ANKLE SWELLING: ICD-10-CM

## 2025-06-24 DIAGNOSIS — M21.41 PES PLANUS OF BOTH FEET: ICD-10-CM

## 2025-06-24 DIAGNOSIS — M79.672 PAIN OF LEFT HEEL: Primary | ICD-10-CM

## 2025-06-24 DIAGNOSIS — M72.2 PLANTAR FASCIITIS OF LEFT FOOT: ICD-10-CM

## 2025-06-24 DIAGNOSIS — M76.62 ACHILLES TENDINITIS OF LEFT LOWER EXTREMITY: ICD-10-CM

## 2025-06-24 DIAGNOSIS — M21.42 PES PLANUS OF BOTH FEET: ICD-10-CM

## 2025-06-24 PROCEDURE — 1159F MED LIST DOCD IN RCRD: CPT | Performed by: FAMILY MEDICINE

## 2025-06-24 PROCEDURE — L3040 FT ARCH SUPRT PREMOLD LONGIT: HCPCS | Performed by: FAMILY MEDICINE

## 2025-06-24 PROCEDURE — 1123F ACP DISCUSS/DSCN MKR DOCD: CPT | Performed by: FAMILY MEDICINE

## 2025-06-24 PROCEDURE — 99203 OFFICE O/P NEW LOW 30 MIN: CPT | Performed by: FAMILY MEDICINE

## 2025-06-24 PROCEDURE — L4361 PNEUMA/VAC WALK BOOT PRE OTS: HCPCS | Performed by: FAMILY MEDICINE

## 2025-06-24 PROCEDURE — 1125F AMNT PAIN NOTED PAIN PRSNT: CPT | Performed by: FAMILY MEDICINE

## 2025-06-24 RX ORDER — METHYLPREDNISOLONE 4 MG/1
TABLET ORAL
Qty: 21 KIT | Refills: 0 | Status: SHIPPED | OUTPATIENT
Start: 2025-06-24

## 2025-06-25 NOTE — PROGRESS NOTES
[facility]   New Patient Clinic Note     6/27/2025 2:35 PM  Surgeon:  Dr. Rick Niño for :  Aortic valve stenosis    Chief complaint : Shortness of breath dyspnea on exertion  Subjective:  Ms. Womack is being seen at the request of Dr. Castano for surgical consideration of SAVR vs TAVR.  Patient is a 69-year-old female with a history of atrial fibrillation, which she reports she can tell when she is in atrial fibrillation, presently in sinus with known bicuspid aortic valve stenosis.  Patient denies chest pain, indicates some lightheadedness but denies syncope, past CHF.  Denies prior MI.  Denies prior stroke.  Patient has an ascending and root aortic aneurysm at 4 cm.    Review of Systems  Constitutional:  No night sweats, headaches, weight loss.  Eyes:  No glaucoma, cataracts.   ENMT:  No nosebleeds, deviated septum.   Cardiac:  +arrhythmias hx of atrial fibrillation.  Vascular:  No claudication, varicosities.  GI:  No PUD, heartburn.  :  No kidney stones, frequent UTIs  Musculoskeletal: + arthritis, gout.  Respiratory:  +SOB at times, emphysema, +asthma.  Integumentary:  No dermatitis, itching, rash.  Neurological:  No stroke, +TIAs 2006, seizures.  Psychiatric:  No depression, anxiety.  Endocrine: No diabetes, +thyroid issues taking tirosint  Hematologic:  No bleeding, +easy bruising with taking eliquis  Immunologic:  No known cancer, steroid therapies.      Past Medical History:        Diagnosis Date    Adrenal insufficiency     Arthritis     Asthma     Atrial fibrillation (HCC)     Bicuspid aortic valve     instead of 3 valves has 2    Brain tumor (HCC)     chiari - malformation    Chiari malformation 03/2011    Chronic anticoagulation     CKD (chronic kidney disease) stage 3, GFR 30-59 ml/min (HCC)     Fibromyalgia 02/21/2020    History of TIA (transient ischemic attack) 2006    right eye does not close completely    Hypercholesterolemia     denies    Hypertension

## 2025-06-26 ENCOUNTER — HOSPITAL ENCOUNTER (OUTPATIENT)
Dept: PULMONOLOGY | Age: 70
Discharge: HOME | End: 2025-06-26
Attending: THORACIC SURGERY (CARDIOTHORACIC VASCULAR SURGERY)
Payer: MEDICARE

## 2025-06-26 DIAGNOSIS — I35.9 AORTIC VALVE DISEASE: ICD-10-CM

## 2025-06-26 LAB
DLCO %PRED: 104 %
DLCO PRED: NORMAL
DLCO/VA %PRED: 94 %
DLCO/VA PRED: NORMAL
DLCO/VA: 3.89 ML/MIN/MMHG
DLCO: 20.78 ML/MIN/MMHG
EXPIRATORY TIME-POST: NORMAL
EXPIRATORY TIME: NORMAL
FEF 25-75 %CHNG: NORMAL
FEF 25-75 POST %PRED: NORMAL
FEF 25-75% %PRED-PRE: NORMAL
FEF 25-75% PRED: NORMAL
FEF 25-75-POST: NORMAL
FEF 25-75-PRE: NORMAL
FEV1 %PRED-POST: 80 %
FEV1 %PRED-PRE: 80 %
FEV1 PRED: NORMAL
FEV1-POST: NORMAL
FEV1-PRE: NORMAL
FEV1/FVC %PRED-POST: NORMAL
FEV1/FVC %PRED-PRE: NORMAL
FEV1/FVC PRED: NORMAL
FEV1/FVC-POST: 68 %
FEV1/FVC-PRE: 71 %
FVC %PRED-POST: 91 L
FVC %PRED-PRE: 87 %
FVC PRED: NORMAL
FVC-POST: 2.82 L
FVC-PRE: 2.71 L
GAW %PRED: NORMAL
GAW PRED: NORMAL
GAW: NORMAL
IC PRE %PRED: NORMAL
IC PRED: NORMAL
IC: NORMAL
MEP: NORMAL
MIP: NORMAL
MVV %PRED-PRE: NORMAL
MVV PRED: NORMAL
MVV-PRE: NORMAL
PEF %PRED-POST: NORMAL
PEF %PRED-PRE: NORMAL
PEF PRED: NORMAL
PEF%CHNG: NORMAL
PEF-POST: NORMAL
PEF-PRE: NORMAL
RAW %PRED: 80 %
RAW PRED: NORMAL
RAW: NORMAL
RV PRE %PRED: 74 %
RV PRED: NORMAL
RV: NORMAL
SVC %PRED: NORMAL
SVC PRED: NORMAL
SVC: NORMAL
TLC PRE %PRED: 89 %
TLC PRED: NORMAL
TLC: NORMAL
VA %PRED: 110 %
VA PRED: NORMAL
VA: 5.33 L
VTG %PRED: 124 %
VTG PRED: NORMAL
VTG: NORMAL

## 2025-06-26 PROCEDURE — 94729 DIFFUSING CAPACITY: CPT

## 2025-06-26 PROCEDURE — 94664 DEMO&/EVAL PT USE INHALER: CPT

## 2025-06-26 PROCEDURE — 94726 PLETHYSMOGRAPHY LUNG VOLUMES: CPT

## 2025-06-26 PROCEDURE — 6370000000 HC RX 637 (ALT 250 FOR IP): Performed by: THORACIC SURGERY (CARDIOTHORACIC VASCULAR SURGERY)

## 2025-06-26 PROCEDURE — 94640 AIRWAY INHALATION TREATMENT: CPT

## 2025-06-26 PROCEDURE — 94060 EVALUATION OF WHEEZING: CPT

## 2025-06-26 RX ORDER — ALBUTEROL SULFATE 90 UG/1
4 INHALANT RESPIRATORY (INHALATION) ONCE
Status: COMPLETED | OUTPATIENT
Start: 2025-06-26 | End: 2025-06-26

## 2025-06-26 RX ADMIN — ALBUTEROL SULFATE 4 PUFF: 90 AEROSOL, METERED RESPIRATORY (INHALATION) at 10:13

## 2025-06-26 ASSESSMENT — PULMONARY FUNCTION TESTS
FEV1/FVC_POST: 68
FVC_POST: 2.82
FEV1_PERCENT_PREDICTED_POST: 80
FEV1_PERCENT_PREDICTED_PRE: 80
FVC_PERCENT_PREDICTED_POST: 91
FVC_PERCENT_PREDICTED_PRE: 87
FVC_PRE: 2.71
FEV1/FVC_PRE: 71

## 2025-06-26 NOTE — PROGRESS NOTES
Review of Systems:  Constitutional:  No night sweats, headaches, weight loss.  Eyes:  No glaucoma, cataracts.   ENMT:  No nosebleeds, deviated septum.   Cardiac:  +arrhythmias hx of atrial fibrillation.  Vascular:  No claudication, varicosities.  GI:  No PUD, heartburn.  :  No kidney stones, frequent UTIs  Musculoskeletal: + arthritis, gout.  Respiratory:  +SOB at times, emphysema, +asthma.  Integumentary:  No dermatitis, itching, rash.  Neurological:  No stroke, +TIAs 2006, seizures.  Psychiatric:  No depression, anxiety.  Endocrine: No diabetes, +thyroid issues taking tirosint  Hematologic:  No bleeding, +easy bruising with taking eliquis  Immunologic:  No known cancer, steroid therapies.

## 2025-06-27 ENCOUNTER — OFFICE VISIT (OUTPATIENT)
Age: 70
End: 2025-06-27

## 2025-06-27 VITALS
HEIGHT: 67 IN | OXYGEN SATURATION: 94 % | SYSTOLIC BLOOD PRESSURE: 114 MMHG | TEMPERATURE: 96.4 F | BODY MASS INDEX: 39.05 KG/M2 | HEART RATE: 73 BPM | WEIGHT: 248.8 LBS | DIASTOLIC BLOOD PRESSURE: 70 MMHG

## 2025-06-27 DIAGNOSIS — Q23.81 BICUSPID AORTIC VALVE WITH ASCENDING AORTA 4.0 TO 4.5 CM IN DIAMETER: ICD-10-CM

## 2025-06-27 DIAGNOSIS — I35.0 SEVERE AORTIC VALVE STENOSIS: Primary | ICD-10-CM

## 2025-06-27 RX ORDER — ALBUTEROL SULFATE 90 UG/1
2 INHALANT RESPIRATORY (INHALATION) EVERY 6 HOURS PRN
Qty: 8.5 G | Refills: 2 | Status: SHIPPED | OUTPATIENT
Start: 2025-06-27

## 2025-06-28 DIAGNOSIS — J45.30 MILD PERSISTENT ALLERGIC ASTHMA WITHOUT COMPLICATION: ICD-10-CM

## 2025-06-28 NOTE — PROCEDURES
PROCEDURE NOTE  Date: 6/28/2025   Name: Misty Womack  YOB: 1955  Spirometry was acceptable and reproducible by ATS standards      Spirometry/Flow volume loop:  By Gold criteria FEV1/FVC is 68%.  Spirometry and flow volume loops consistent with mild airflow obstruction. FEV1 of 80%, and FVC 91%.  There was not a significant bronchodilator response.       Lung volumes:  Lung volumes within normal limits  Diffusing capacity:  Diffusion capacity within normal limits    Summary:  By Gold criteria meets Gold stage I COPD      FEV1 %Pred-Post   Date Value Ref Range Status   06/26/2025 80 % Final     FEV1/FVC-Post   Date Value Ref Range Status   06/26/2025 68 % Final     TLC Pre %Pred   Date Value Ref Range Status   06/26/2025 89 % Final     DLCO %Pred   Date Value Ref Range Status   06/26/2025 104 % Final     DLCO/VA %Pred   Date Value Ref Range Status   06/26/2025 94 % Final       PFT data will be scanned into the media tab under this encounter. Please see the scanned data for numerical values.     Ron Hunter MD  [facility] Pulmonary, Sleep and Critical Care Medicine

## 2025-06-30 ENCOUNTER — HOSPITAL ENCOUNTER (OUTPATIENT)
Dept: PHYSICAL THERAPY | Age: 70
Setting detail: THERAPIES SERIES
Discharge: HOME | End: 2025-06-30
Attending: ORTHOPAEDIC SURGERY
Payer: MEDICARE

## 2025-06-30 PROCEDURE — 97140 MANUAL THERAPY 1/> REGIONS: CPT | Performed by: PHYSICAL THERAPIST

## 2025-06-30 PROCEDURE — 97110 THERAPEUTIC EXERCISES: CPT | Performed by: PHYSICAL THERAPIST

## 2025-06-30 PROCEDURE — 97112 NEUROMUSCULAR REEDUCATION: CPT | Performed by: PHYSICAL THERAPIST

## 2025-06-30 RX ORDER — FLUTICASONE PROPIONATE AND SALMETEROL 250; 50 UG/1; UG/1
POWDER RESPIRATORY (INHALATION)
Qty: 60 EACH | Refills: 2 | Status: SHIPPED | OUTPATIENT
Start: 2025-06-30

## 2025-06-30 NOTE — PLAN OF CARE
[facility]- Outpatient Rehabilitation and Therapy [address], [place] [address] office: [phone] fax: [phone]     Physical Therapy Re-Certification Plan of Care    Dear Carl Orlando MD,    We had the pleasure of treating the following patient for physical therapy services at [facility].  A summary of our findings can be found in the updated assessment below.  This includes our plan of care.  If you have any questions or concerns regarding these findings, please do not hesitate to contact me at the office phone number checked above.  Thank you for the referral.     Physician Signature:________________________________Date:__________________  By signing above (or electronic signature), therapist’s plan is approved by physician      Overall Response to Treatment:   [x]Patient is responding well to treatment and improvement is noted with regards  to goals   []Patient should continue to improve in reasonable time if they continue HEP   []Patient has plateaued and is no longer responding to skilled PT intervention    []Patient is getting worse and would benefit from return to referring MD   []Patient unable to adhere to initial POC   [x]Other: Pt is 14 weeks post-op. She demonstrates increased active R shoulder ROM. She continues to have discomfort at end range, but tolerating manual stretching better. She has been tolerating isometrics well and will begin to progress rotator cuff strengthening. Pt is having a TAVR procedure, so may have to modify frequency once scheduled. Recommend pt continue with current POC to regain full shoulder ROM and progress UE strength training in order to return to PLOF.    Date range of previous POC Visits: 25 - 25    Total Visits: 20          Physical Therapy: TREATMENT/PROGRESS NOTE   Patient: Misty Womack (69 y.o. female)   Examination Date: 2025   :  1955 MRN: [id number]   Visit #: 20

## 2025-06-30 NOTE — TELEPHONE ENCOUNTER
LV 3/7/25 WITH TR FOR PRE OP NV NONE  Return for Medicare Annual Wellness Visit/Fasting Labs sometime before the end of the year.

## 2025-07-01 ENCOUNTER — TELEPHONE (OUTPATIENT)
Dept: CARDIOLOGY CLINIC | Age: 70
End: 2025-07-01

## 2025-07-01 NOTE — TELEPHONE ENCOUNTER
Pt aware of TAVR date 7/15/25. Aware of PAT OV w Dr. Castano on 7/10/25 at 2pm at [facility]. Kal JARRETT

## 2025-07-03 ENCOUNTER — HOSPITAL ENCOUNTER (OUTPATIENT)
Dept: PHYSICAL THERAPY | Age: 70
Setting detail: THERAPIES SERIES
Discharge: HOME | End: 2025-07-03
Attending: ORTHOPAEDIC SURGERY
Payer: MEDICARE

## 2025-07-03 PROCEDURE — 97112 NEUROMUSCULAR REEDUCATION: CPT | Performed by: PHYSICAL THERAPIST

## 2025-07-03 PROCEDURE — 97110 THERAPEUTIC EXERCISES: CPT | Performed by: PHYSICAL THERAPIST

## 2025-07-03 PROCEDURE — 97140 MANUAL THERAPY 1/> REGIONS: CPT | Performed by: PHYSICAL THERAPIST

## 2025-07-03 NOTE — PLAN OF CARE
[facility]- Outpatient Rehabilitation and Therapy [address], [place] [address] office: [phone] fax: [phone]    Physical Therapy: TREATMENT/PROGRESS NOTE   Patient: Misty Womack (69 y.o. female)   Examination Date: 2025   :  1955 MRN: [id number]   Visit #: 21   Insurance Allowable Auth Needed   30 VPCY []Yes    [x]No    Insurance: Payor: MEDICAL [employer]Plan MEDICARE ADVANTAGE / Plan: MEDICAL [employer] ADVANTAGE CHOICE HMO / Product Type: *No Product type* /   Insurance ID: [id number] - (Medicare Managed)  Secondary Insurance (if applicable):    Treatment Diagnosis:     ICD-10-CM    1. Acute pain of right shoulder  M25.511       2. Weakness  R53.1          Medical Diagnosis:  Acute pain of right shoulder [M25.511]  S/P Right Shoulder RCR with biceps tenotomy  DOS: 3/19/25   Referring Physician: Carl Orlando MD  PCP: John Adair APRN - CNP     Plan of care signed (Y/N): Yes    Date of Patient follow up with Physician:Today, 1 week PO     Progress Report/POC: NO   POC update due: (10 visits /OR AUTH LIMITS, whichever is less)  29 May 2025                                            Precautions/ Contra-indications:           Latex allergy:  NO  Pacemaker:    NO  Contraindications for Manipulation: long history of steroid use  Date of Surgery: S/P Right Shoulder RCR with biceps tenotomy - 3/19  Other:    Red Flags:  None    Medical History:  Comorbidities:  Cancer/Tumor  Stroke/TIA  Other: AFib - ON Elliquis   Relevant Medical History: Asthma; 2 knee replacements ; Adrenal insufficiency (on chronic steroids)    Suicide Screening:   The patient did not verbalize a primary behavioral concern, suicidal ideation, suicidal intent, or demonstrate suicidal behaviors.    Preferred Language for Healthcare:   [x] English       [] other:    SUBJECTIVE EXAMINATION     OUTCOME MEASURE DATE % Deficit   UEFI 25 25%   UEFI 3/20/25 97%     68.75%  limitation       Pain: 0

## 2025-07-07 ENCOUNTER — HOSPITAL ENCOUNTER (OUTPATIENT)
Dept: PHYSICAL THERAPY | Age: 70
Setting detail: THERAPIES SERIES
Discharge: HOME | End: 2025-07-07
Attending: ORTHOPAEDIC SURGERY
Payer: MEDICARE

## 2025-07-07 PROCEDURE — 97140 MANUAL THERAPY 1/> REGIONS: CPT | Performed by: PHYSICAL THERAPIST

## 2025-07-07 PROCEDURE — 97110 THERAPEUTIC EXERCISES: CPT | Performed by: PHYSICAL THERAPIST

## 2025-07-07 NOTE — PLAN OF CARE
[facility]- Outpatient Rehabilitation and Therapy [address], [place] [address] office: [phone] fax: [phone]    Physical Therapy: TREATMENT/PROGRESS NOTE   Patient: Misty Womack (69 y.o. female)   Examination Date: 2025   :  1955 MRN: [id number]   Visit #: 22   Insurance Allowable Auth Needed   30 VPCY []Yes    [x]No    Insurance: Payor: MEDICAL [employer] MEDICARE ADVANTAGE / Plan: MEDICAL [employer] ADVANTAGE CHOICE HMO / Product Type: *No Product type* /   Insurance ID: [id number] - (Medicare Managed)  Secondary Insurance (if applicable):    Treatment Diagnosis:     ICD-10-CM    1. Acute pain of right shoulder  M25.511       2. Weakness  R53.1          Medical Diagnosis:  Acute pain of right shoulder [M25.511]  S/P Right Shoulder RCR with biceps tenotomy  DOS: 3/19/25   Referring Physician: Carl Orlando MD  PCP: John Adair APRN - CNP     Plan of care signed (Y/N): Yes    Date of Patient follow up with Physician:Today, 1 week PO     Progress Report/POC: NO   POC update due: (10 visits /OR AUTH LIMITS, whichever is less)  29 May 2025                                            Precautions/ Contra-indications:           Latex allergy:  NO  Pacemaker:    NO  Contraindications for Manipulation: long history of steroid use  Date of Surgery: S/P Right Shoulder RCR with biceps tenotomy - 3/19  Other:    Red Flags:  None    Medical History:  Comorbidities:  Cancer/Tumor  Stroke/TIA  Other: AFib - ON Elliquis   Relevant Medical History: Asthma; 2 knee replacements ; Adrenal insufficiency (on chronic steroids)    Suicide Screening:   The patient did not verbalize a primary behavioral concern, suicidal ideation, suicidal intent, or demonstrate suicidal behaviors.    Preferred Language for Healthcare:   [x] English       [] other:    SUBJECTIVE EXAMINATION     OUTCOME MEASURE DATE % Deficit   UEFI 25 25%   UEFI 3/20/25 97%     68.75%  limitation       Pain: 0

## 2025-07-09 DIAGNOSIS — Z01.818 PRE-OP TESTING: Primary | ICD-10-CM

## 2025-07-09 DIAGNOSIS — R30.0 DYSURIA: ICD-10-CM

## 2025-07-09 DIAGNOSIS — I35.0 AORTIC STENOSIS, SEVERE: ICD-10-CM

## 2025-07-10 ENCOUNTER — OFFICE VISIT (OUTPATIENT)
Dept: ORTHOPEDIC SURGERY | Age: 70
End: 2025-07-10
Payer: MEDICARE

## 2025-07-10 ENCOUNTER — TELEPHONE (OUTPATIENT)
Dept: ORTHOPEDIC SURGERY | Age: 70
End: 2025-07-10

## 2025-07-10 ENCOUNTER — OFFICE VISIT (OUTPATIENT)
Dept: CARDIOLOGY CLINIC | Age: 70
End: 2025-07-10
Payer: MEDICARE

## 2025-07-10 ENCOUNTER — APPOINTMENT (OUTPATIENT)
Dept: PHYSICAL THERAPY | Age: 70
End: 2025-07-10
Attending: ORTHOPAEDIC SURGERY
Payer: MEDICARE

## 2025-07-10 VITALS
HEART RATE: 68 BPM | OXYGEN SATURATION: 96 % | WEIGHT: 243 LBS | SYSTOLIC BLOOD PRESSURE: 128 MMHG | BODY MASS INDEX: 38.14 KG/M2 | HEIGHT: 67 IN | DIASTOLIC BLOOD PRESSURE: 74 MMHG

## 2025-07-10 VITALS — WEIGHT: 248 LBS | HEIGHT: 67 IN | BODY MASS INDEX: 38.92 KG/M2

## 2025-07-10 DIAGNOSIS — R30.0 DYSURIA: ICD-10-CM

## 2025-07-10 DIAGNOSIS — I35.0 SEVERE AORTIC STENOSIS: Primary | ICD-10-CM

## 2025-07-10 DIAGNOSIS — M79.672 PAIN OF LEFT HEEL: Primary | ICD-10-CM

## 2025-07-10 DIAGNOSIS — Z01.818 PRE-OP TESTING: ICD-10-CM

## 2025-07-10 DIAGNOSIS — M76.62 ACHILLES TENDINITIS OF LEFT LOWER EXTREMITY: ICD-10-CM

## 2025-07-10 DIAGNOSIS — I35.0 AORTIC STENOSIS, SEVERE: ICD-10-CM

## 2025-07-10 DIAGNOSIS — M25.472 LEFT ANKLE SWELLING: ICD-10-CM

## 2025-07-10 DIAGNOSIS — I48.0 PAF (PAROXYSMAL ATRIAL FIBRILLATION) (HCC): ICD-10-CM

## 2025-07-10 DIAGNOSIS — M72.2 PLANTAR FASCIITIS OF LEFT FOOT: ICD-10-CM

## 2025-07-10 DIAGNOSIS — I48.0 PAROXYSMAL ATRIAL FIBRILLATION (HCC): ICD-10-CM

## 2025-07-10 DIAGNOSIS — I10 ESSENTIAL HYPERTENSION: ICD-10-CM

## 2025-07-10 LAB
ALBUMIN SERPL-MCNC: 3.9 G/DL (ref 3.4–5)
ALP SERPL-CCNC: 85 U/L (ref 40–129)
ALT SERPL-CCNC: 17 U/L (ref 10–40)
ANION GAP SERPL CALCULATED.3IONS-SCNC: 13 MMOL/L (ref 3–16)
AST SERPL-CCNC: 18 U/L (ref 15–37)
BACTERIA URNS QL MICRO: NORMAL /HPF
BASOPHILS # BLD: 0 K/UL (ref 0–0.2)
BASOPHILS NFR BLD: 0.3 %
BILIRUB DIRECT SERPL-MCNC: 0.2 MG/DL (ref 0–0.3)
BILIRUB INDIRECT SERPL-MCNC: 0.3 MG/DL (ref 0–1)
BILIRUB SERPL-MCNC: 0.5 MG/DL (ref 0–1)
BILIRUB UR QL STRIP.AUTO: NEGATIVE
BUN SERPL-MCNC: 26 MG/DL (ref 7–20)
CALCIUM SERPL-MCNC: 9.2 MG/DL (ref 8.3–10.6)
CHLORIDE SERPL-SCNC: 101 MMOL/L (ref 99–110)
CLARITY UR: CLEAR
CO2 SERPL-SCNC: 23 MMOL/L (ref 21–32)
COLOR UR: YELLOW
CREAT SERPL-MCNC: 1.4 MG/DL (ref 0.6–1.2)
DEPRECATED RDW RBC AUTO: 14.8 % (ref 12.4–15.4)
EOSINOPHIL # BLD: 0.1 K/UL (ref 0–0.6)
EOSINOPHIL NFR BLD: 0.7 %
EPI CELLS #/AREA URNS AUTO: 2 /HPF (ref 0–5)
GFR SERPLBLD CREATININE-BSD FMLA CKD-EPI: 41 ML/MIN/{1.73_M2}
GLUCOSE SERPL-MCNC: 198 MG/DL (ref 70–99)
GLUCOSE UR STRIP.AUTO-MCNC: NEGATIVE MG/DL
HCT VFR BLD AUTO: 38.9 % (ref 36–48)
HGB BLD-MCNC: 12.8 G/DL (ref 12–16)
HGB UR QL STRIP.AUTO: NEGATIVE
HYALINE CASTS #/AREA URNS AUTO: 0 /LPF (ref 0–8)
INR PPP: 1.3 (ref 0.86–1.14)
KETONES UR STRIP.AUTO-MCNC: NEGATIVE MG/DL
LEUKOCYTE ESTERASE UR QL STRIP.AUTO: ABNORMAL
LYMPHOCYTES # BLD: 0.7 K/UL (ref 1–5.1)
LYMPHOCYTES NFR BLD: 5.2 %
MCH RBC QN AUTO: 29.3 PG (ref 26–34)
MCHC RBC AUTO-ENTMCNC: 32.9 G/DL (ref 31–36)
MCV RBC AUTO: 89 FL (ref 80–100)
MONOCYTES # BLD: 0.4 K/UL (ref 0–1.3)
MONOCYTES NFR BLD: 2.8 %
NEUTROPHILS # BLD: 12.9 K/UL (ref 1.7–7.7)
NEUTROPHILS NFR BLD: 91 %
NITRITE UR QL STRIP.AUTO: NEGATIVE
PH UR STRIP.AUTO: 5.5 [PH] (ref 5–8)
PLATELET # BLD AUTO: 179 K/UL (ref 135–450)
PMV BLD AUTO: 10.4 FL (ref 5–10.5)
POTASSIUM SERPL-SCNC: 4.4 MMOL/L (ref 3.5–5.1)
PROT SERPL-MCNC: 6 G/DL (ref 6.4–8.2)
PROT UR STRIP.AUTO-MCNC: NEGATIVE MG/DL
PROTHROMBIN TIME: 16.4 SEC (ref 12.1–14.9)
RBC # BLD AUTO: 4.37 M/UL (ref 4–5.2)
RBC CLUMPS #/AREA URNS AUTO: 2 /HPF (ref 0–4)
SODIUM SERPL-SCNC: 137 MMOL/L (ref 136–145)
SP GR UR STRIP.AUTO: 1.02 (ref 1–1.03)
UA COMPLETE W REFLEX CULTURE PNL UR: ABNORMAL
UA DIPSTICK W REFLEX MICRO PNL UR: YES
URN SPEC COLLECT METH UR: ABNORMAL
UROBILINOGEN UR STRIP-ACNC: 0.2 E.U./DL
WBC # BLD AUTO: 14.2 K/UL (ref 4–11)
WBC #/AREA URNS AUTO: 5 /HPF (ref 0–5)

## 2025-07-10 PROCEDURE — 99214 OFFICE O/P EST MOD 30 MIN: CPT | Performed by: FAMILY MEDICINE

## 2025-07-10 PROCEDURE — 1159F MED LIST DOCD IN RCRD: CPT | Performed by: FAMILY MEDICINE

## 2025-07-10 PROCEDURE — 99215 OFFICE O/P EST HI 40 MIN: CPT | Performed by: INTERNAL MEDICINE

## 2025-07-10 PROCEDURE — 1123F ACP DISCUSS/DSCN MKR DOCD: CPT | Performed by: INTERNAL MEDICINE

## 2025-07-10 PROCEDURE — 3074F SYST BP LT 130 MM HG: CPT | Performed by: INTERNAL MEDICINE

## 2025-07-10 PROCEDURE — 1159F MED LIST DOCD IN RCRD: CPT | Performed by: INTERNAL MEDICINE

## 2025-07-10 PROCEDURE — 3078F DIAST BP <80 MM HG: CPT | Performed by: INTERNAL MEDICINE

## 2025-07-10 PROCEDURE — 1123F ACP DISCUSS/DSCN MKR DOCD: CPT | Performed by: FAMILY MEDICINE

## 2025-07-10 RX ORDER — APIXABAN 5 MG/1
5 TABLET, FILM COATED ORAL 2 TIMES DAILY
Qty: 180 TABLET | Refills: 3 | Status: SHIPPED | OUTPATIENT
Start: 2025-07-10

## 2025-07-10 NOTE — TELEPHONE ENCOUNTER
Last OV: 6/5/25  Next OV: 7/10/25  Last refill: 1/15/25 #180 1 R/F  Most recent Labs: 5/22/25  Last EKG (if needed):5/28/25

## 2025-07-10 NOTE — PROGRESS NOTES
able to walk approximately 25 steps without her boot before experiencing pain, which was less intense than before. Prior to the stretching episode, she estimated a 10% improvement in her condition.    She experiences pain while walking in the boot, rating it as a 5 on a scale of 1 to 10. The boot has been significantly helpful, but she cannot wear it indefinitely. Without the boot, such as when showering, she rates her pain as a 9. She has been avoiding putting weight on her foot. She has not been using Voltaren gel due to concerns about her heart condition. She has not been able to dedicate much time to her foot therapy. She is scheduled for a TAVR procedure on Tuesday and is unsure about the downtime required for therapy post-procedure. She is willing to continue using the boot for another 3 weeks if necessary. She does not have any issues with claustrophobia. She has had 2 brain tumors in the past. She has been using a steroid pack, which has reduced the swelling and improved her pain.      Medical History  Patient's medications, allergies, past medical, surgical, social and family histories were reviewed and updated as appropriate.    Review of Systems  Pertinent items are noted in HPI  Review of systems reviewed from Patient History Form dated on 6/20/2025 and available in the patient's chart under the Media tab.     Vital Signs  There were no vitals filed for this visit.    General Exam:     Constitutional: Patient is adequately groomed with no evidence of malnutrition  DTRs: Deep tendon reflexes are intact  Mental Status: The patient is oriented to time, place and person. The patient's mood and affect are appropriate.  Lymphatic: The lymphatic examination bilaterally reveals all areas to be without enlargement or induration.  Vascular: Examination reveals no swelling or calf tenderness.  Peripheral pulses are palpable and 2+.  Neurological: The patient has good coordination.  There is no weakness or sensory

## 2025-07-10 NOTE — TELEPHONE ENCOUNTER
Spoke to patient and informed them that their MRI has been authorized and that they can call and schedule scan at their convenience. Also told them that they can call and schedule a f/u with Dr. Scanlon once they have MRI scheduled, leaving at least 2-3 days for our office to receive their results.

## 2025-07-10 NOTE — PROGRESS NOTES
[facility]  Cardiology Consult    Misty Womack  1955    July 10, 2025      Reason for Referral: Aortic stenosis    Referring physician: Dr Fontana    CC: \"I am here to discuss my upcoming procedure.\"      Subjective:     History of Present Illness:    Misty Womack is a 69 y.o. patient with a PMH significant for paroxsymal atrial fibrillation s/p ablation 7/26/2023, atrial flutter s/p ablation 2/8/2024, aortic stenosis, TIA (2006), hypertension and hyperlipidemia.      Today, she is here for evaluation prior to the TAVR procedure. She continues to have shortness of breath and fatigue. Patient denies exertional chest pain/pressure, dyspnea at res, PND, orthopnea, palpitations, lightheadedness, weight changes, changes in LE edema, and syncope. Patient reports compliance to her medications.     Past Medical History:   has a past medical history of Abnormal ECG, ADHD (attention deficit hyperactivity disorder), Adrenal insufficiency, Arthritis, Asthma, Atrial fibrillation (HCC), Bicuspid aortic valve, Brain tumor (HCC), Cerebrovascular disease, Chiari malformation, Chronic anticoagulation, CKD (chronic kidney disease) stage 3, GFR 30-59 ml/min (HCC), Fibromyalgia, Fibromyositis, Hearing loss, History of TIA (transient ischemic attack), Hypercholesterolemia, Hypertension, Hypothyroidism, Mood disorder, Obesity (BMI 35.0-39.9 without comorbidity), Panhypopituitarism, Paroxysmal atrial fibrillation (HCC), PONV (postoperative nausea and vomiting), Restless leg, S/P selective transsphenoidal pituitary adenomectomy, Thyroid disease, Unspecified cerebral artery occlusion with cerebral infarction, and Vitreous degeneration.    Surgical History:   has a past surgical history that includes brain surgery (03/24/2011); brain surgery (11/2007); laminectomy (C1); Knee Arthroplasty (Right, 08/25/2014); pituitary surgery (02/2015); Hysterectomy; Biceps Tenodesis (Left, 11/10/2016); Cataract removal (Bilateral,

## 2025-07-11 ENCOUNTER — TELEPHONE (OUTPATIENT)
Age: 70
End: 2025-07-11

## 2025-07-11 LAB
BACTERIA UR CULT: ABNORMAL
ORGANISM: ABNORMAL

## 2025-07-11 RX ORDER — CEPHALEXIN 500 MG/1
500 CAPSULE ORAL 2 TIMES DAILY
Qty: 10 CAPSULE | Refills: 0 | Status: SHIPPED | OUTPATIENT
Start: 2025-07-11 | End: 2025-07-16

## 2025-07-11 NOTE — TELEPHONE ENCOUNTER
Discussed UA results with Dr Moreno. Pt aware to take keflex 500mg BID starting today. Kal JARRETT

## 2025-07-14 ENCOUNTER — ANESTHESIA EVENT (OUTPATIENT)
Age: 70
DRG: 267 | End: 2025-07-14
Payer: MEDICARE

## 2025-07-15 ENCOUNTER — ANESTHESIA (OUTPATIENT)
Age: 70
DRG: 267 | End: 2025-07-15
Payer: MEDICARE

## 2025-07-15 ENCOUNTER — HOSPITAL ENCOUNTER (INPATIENT)
Age: 70
LOS: 1 days | Discharge: HOME | DRG: 267 | End: 2025-07-16
Attending: INTERNAL MEDICINE | Admitting: INTERNAL MEDICINE
Payer: MEDICARE

## 2025-07-15 ENCOUNTER — TELEPHONE (OUTPATIENT)
Dept: CARDIOLOGY CLINIC | Age: 70
End: 2025-07-15

## 2025-07-15 ENCOUNTER — APPOINTMENT (OUTPATIENT)
Age: 70
DRG: 267 | End: 2025-07-15
Attending: INTERNAL MEDICINE
Payer: MEDICARE

## 2025-07-15 DIAGNOSIS — Z95.2 HISTORY OF TRANSCATHETER AORTIC VALVE REPLACEMENT (TAVR): ICD-10-CM

## 2025-07-15 DIAGNOSIS — I35.0 SEVERE AORTIC STENOSIS: Primary | ICD-10-CM

## 2025-07-15 DIAGNOSIS — I35.0 NONRHEUMATIC AORTIC VALVE STENOSIS: ICD-10-CM

## 2025-07-15 DIAGNOSIS — I35.0 NONRHEUMATIC AORTIC VALVE STENOSIS: Primary | ICD-10-CM

## 2025-07-15 DIAGNOSIS — I35.0 AORTIC VALVE STENOSIS, ETIOLOGY OF CARDIAC VALVE DISEASE UNSPECIFIED: ICD-10-CM

## 2025-07-15 LAB
ABO/RH: NORMAL
ACTIVATED CLOTTING TIME: 115 SEC (ref 99–130)
ACTIVATED CLOTTING TIME: 221 SEC (ref 99–130)
ACTIVATED CLOTTING TIME: 372 SEC (ref 99–130)
ACTIVATED CLOTTING TIME: 99 SEC (ref 99–130)
ANTIBODY SCREEN: NORMAL
BASE EXCESS BLDA CALC-SCNC: 5 MMOL/L (ref -3–3)
CA-I BLD-SCNC: 1.28 MMOL/L (ref 1.12–1.32)
CO2 BLDA-SCNC: 32 MMOL/L
ECHO BSA: 2.29 M2
ECHO BSA: 2.29 M2
ECHO LVOT AREA: 5.3 CM2
ECHO LVOT DIAM: 2.6 CM
ECHO LVOT MEAN GRADIENT: 2 MMHG
ECHO LVOT MEAN GRADIENT: 2 MMHG
ECHO LVOT PEAK GRADIENT: 3 MMHG
ECHO LVOT PEAK VELOCITY: 0.8 M/S
ECHO LVOT STROKE VOLUME INDEX: 50.9 ML/M2
ECHO LVOT SV: 112 ML
ECHO LVOT VTI: 21.1 CM
EKG ATRIAL RATE: 60 BPM
EKG DIAGNOSIS: NORMAL
EKG P AXIS: 47 DEGREES
EKG P-R INTERVAL: 182 MS
EKG Q-T INTERVAL: 452 MS
EKG QRS DURATION: 68 MS
EKG QTC CALCULATION (BAZETT): 452 MS
EKG R AXIS: 26 DEGREES
EKG T AXIS: 43 DEGREES
EKG VENTRICULAR RATE: 60 BPM
GLUCOSE BLD-MCNC: 103 MG/DL (ref 70–99)
HCO3 BLDA-SCNC: 30.1 MMOL/L (ref 21–29)
HCT VFR BLD AUTO: 31 % (ref 36–48)
HGB BLD CALC-MCNC: 10.4 GM/DL (ref 12–16)
LACTATE BLD-SCNC: 0.77 MMOL/L (ref 0.4–2)
NT-PROBNP SERPL-MCNC: 1882 PG/ML (ref 0–124)
PCO2 BLDA: 53.3 MM HG (ref 35–45)
PERFORMED ON: ABNORMAL
PH BLDA: 7.36 [PH] (ref 7.35–7.45)
PO2 BLDA: 197.9 MM HG (ref 75–108)
POC SAMPLE TYPE: ABNORMAL
POTASSIUM BLD-SCNC: 3.6 MMOL/L (ref 3.5–5.1)
SAO2 % BLDA: 100 % (ref 93–100)
SODIUM BLD-SCNC: 140 MMOL/L (ref 136–145)
TROPONIN, HIGH SENSITIVITY: 18 NG/L (ref 0–14)

## 2025-07-15 PROCEDURE — 94640 AIRWAY INHALATION TREATMENT: CPT

## 2025-07-15 PROCEDURE — 6360000004 HC RX CONTRAST MEDICATION: Performed by: INTERNAL MEDICINE

## 2025-07-15 PROCEDURE — 33361 REPLACE AORTIC VALVE PERQ: CPT | Performed by: THORACIC SURGERY (CARDIOTHORACIC VASCULAR SURGERY)

## 2025-07-15 PROCEDURE — 2500000003 HC RX 250 WO HCPCS: Performed by: INTERNAL MEDICINE

## 2025-07-15 PROCEDURE — 6370000000 HC RX 637 (ALT 250 FOR IP): Performed by: INTERNAL MEDICINE

## 2025-07-15 PROCEDURE — 94150 VITAL CAPACITY TEST: CPT

## 2025-07-15 PROCEDURE — 36415 COLL VENOUS BLD VENIPUNCTURE: CPT

## 2025-07-15 PROCEDURE — 83880 ASSAY OF NATRIURETIC PEPTIDE: CPT

## 2025-07-15 PROCEDURE — 3700000001 HC ADD 15 MINUTES (ANESTHESIA): Performed by: INTERNAL MEDICINE

## 2025-07-15 PROCEDURE — 2580000003 HC RX 258

## 2025-07-15 PROCEDURE — 85347 COAGULATION TIME ACTIVATED: CPT

## 2025-07-15 PROCEDURE — 02RF38Z REPLACEMENT OF AORTIC VALVE WITH ZOOPLASTIC TISSUE, PERCUTANEOUS APPROACH: ICD-10-PCS | Performed by: THORACIC SURGERY (CARDIOTHORACIC VASCULAR SURGERY)

## 2025-07-15 PROCEDURE — 5A2204Z RESTORATION OF CARDIAC RHYTHM, SINGLE: ICD-10-PCS | Performed by: THORACIC SURGERY (CARDIOTHORACIC VASCULAR SURGERY)

## 2025-07-15 PROCEDURE — 6360000002 HC RX W HCPCS: Performed by: INTERNAL MEDICINE

## 2025-07-15 PROCEDURE — 82803 BLOOD GASES ANY COMBINATION: CPT

## 2025-07-15 PROCEDURE — 2580000003 HC RX 258: Performed by: INTERNAL MEDICINE

## 2025-07-15 PROCEDURE — B41D1ZZ FLUOROSCOPY OF AORTA AND BILATERAL LOWER EXTREMITY ARTERIES USING LOW OSMOLAR CONTRAST: ICD-10-PCS | Performed by: THORACIC SURGERY (CARDIOTHORACIC VASCULAR SURGERY)

## 2025-07-15 PROCEDURE — 6360000002 HC RX W HCPCS

## 2025-07-15 PROCEDURE — 84132 ASSAY OF SERUM POTASSIUM: CPT

## 2025-07-15 PROCEDURE — 93005 ELECTROCARDIOGRAM TRACING: CPT | Performed by: INTERNAL MEDICINE

## 2025-07-15 PROCEDURE — C1760 CLOSURE DEV, VASC: HCPCS | Performed by: INTERNAL MEDICINE

## 2025-07-15 PROCEDURE — 86923 COMPATIBILITY TEST ELECTRIC: CPT

## 2025-07-15 PROCEDURE — 84295 ASSAY OF SERUM SODIUM: CPT

## 2025-07-15 PROCEDURE — 84484 ASSAY OF TROPONIN QUANT: CPT

## 2025-07-15 PROCEDURE — 93010 ELECTROCARDIOGRAM REPORT: CPT | Performed by: INTERNAL MEDICINE

## 2025-07-15 PROCEDURE — 33361 REPLACE AORTIC VALVE PERQ: CPT | Performed by: INTERNAL MEDICINE

## 2025-07-15 PROCEDURE — 83605 ASSAY OF LACTIC ACID: CPT

## 2025-07-15 PROCEDURE — 85014 HEMATOCRIT: CPT

## 2025-07-15 PROCEDURE — 2140000000 HC CCU INTERMEDIATE R&B

## 2025-07-15 PROCEDURE — 2709999900 HC NON-CHARGEABLE SUPPLY: Performed by: INTERNAL MEDICINE

## 2025-07-15 PROCEDURE — 82947 ASSAY GLUCOSE BLOOD QUANT: CPT

## 2025-07-15 PROCEDURE — B41C1ZZ FLUOROSCOPY OF PELVIC ARTERIES USING LOW OSMOLAR CONTRAST: ICD-10-PCS | Performed by: THORACIC SURGERY (CARDIOTHORACIC VASCULAR SURGERY)

## 2025-07-15 PROCEDURE — C1889 IMPLANT/INSERT DEVICE, NOC: HCPCS | Performed by: INTERNAL MEDICINE

## 2025-07-15 PROCEDURE — C1725 CATH, TRANSLUMIN NON-LASER: HCPCS | Performed by: INTERNAL MEDICINE

## 2025-07-15 PROCEDURE — 94761 N-INVAS EAR/PLS OXIMETRY MLT: CPT

## 2025-07-15 PROCEDURE — 86850 RBC ANTIBODY SCREEN: CPT

## 2025-07-15 PROCEDURE — 3700000000 HC ANESTHESIA ATTENDED CARE: Performed by: INTERNAL MEDICINE

## 2025-07-15 PROCEDURE — 82330 ASSAY OF CALCIUM: CPT

## 2025-07-15 PROCEDURE — C1769 GUIDE WIRE: HCPCS | Performed by: INTERNAL MEDICINE

## 2025-07-15 PROCEDURE — C1894 INTRO/SHEATH, NON-LASER: HCPCS | Performed by: INTERNAL MEDICINE

## 2025-07-15 PROCEDURE — 86901 BLOOD TYPING SEROLOGIC RH(D): CPT

## 2025-07-15 PROCEDURE — 86900 BLOOD TYPING SEROLOGIC ABO: CPT

## 2025-07-15 DEVICE — SAPIEN 3 ULTRA RESILIA TRANSCATHETER HEART VALVE, 26MM
Type: IMPLANTABLE DEVICE | Status: FUNCTIONAL
Brand: SAPIEN 3 ULTRA RESILIA

## 2025-07-15 RX ORDER — CLOPIDOGREL BISULFATE 75 MG/1
75 TABLET ORAL DAILY
Status: DISCONTINUED | OUTPATIENT
Start: 2025-07-15 | End: 2025-07-16 | Stop reason: HOSPADM

## 2025-07-15 RX ORDER — SODIUM CHLORIDE 9 MG/ML
INJECTION, SOLUTION INTRAVENOUS
Status: DISCONTINUED | OUTPATIENT
Start: 2025-07-15 | End: 2025-07-15 | Stop reason: SDUPTHER

## 2025-07-15 RX ORDER — LEVOTHYROXINE SODIUM 50 UG/1
50 CAPSULE ORAL DAILY
Status: DISCONTINUED | OUTPATIENT
Start: 2025-07-15 | End: 2025-07-16 | Stop reason: HOSPADM

## 2025-07-15 RX ORDER — SODIUM CHLORIDE 0.9 % (FLUSH) 0.9 %
5-40 SYRINGE (ML) INJECTION PRN
Status: DISCONTINUED | OUTPATIENT
Start: 2025-07-15 | End: 2025-07-16 | Stop reason: HOSPADM

## 2025-07-15 RX ORDER — LIDOCAINE HYDROCHLORIDE 10 MG/ML
INJECTION, SOLUTION INFILTRATION; PERINEURAL PRN
Status: DISCONTINUED | OUTPATIENT
Start: 2025-07-15 | End: 2025-07-15 | Stop reason: HOSPADM

## 2025-07-15 RX ORDER — HYDROCORTISONE 10 MG/1
20 TABLET ORAL
Status: DISCONTINUED | OUTPATIENT
Start: 2025-07-16 | End: 2025-07-16 | Stop reason: HOSPADM

## 2025-07-15 RX ORDER — DIPHENHYDRAMINE HYDROCHLORIDE 50 MG/ML
50 INJECTION, SOLUTION INTRAMUSCULAR; INTRAVENOUS ONCE
Status: COMPLETED | OUTPATIENT
Start: 2025-07-15 | End: 2025-07-15

## 2025-07-15 RX ORDER — LIDOCAINE 4 G/G
1 PATCH TOPICAL ONCE
Status: DISCONTINUED | OUTPATIENT
Start: 2025-07-15 | End: 2025-07-15

## 2025-07-15 RX ORDER — HYDRALAZINE HYDROCHLORIDE 20 MG/ML
10 INJECTION INTRAMUSCULAR; INTRAVENOUS EVERY 6 HOURS PRN
Status: DISCONTINUED | OUTPATIENT
Start: 2025-07-15 | End: 2025-07-16 | Stop reason: HOSPADM

## 2025-07-15 RX ORDER — DIPHENHYDRAMINE HYDROCHLORIDE 50 MG/ML
INJECTION, SOLUTION INTRAMUSCULAR; INTRAVENOUS
Status: DISCONTINUED | OUTPATIENT
Start: 2025-07-15 | End: 2025-07-15 | Stop reason: SDUPTHER

## 2025-07-15 RX ORDER — AMIODARONE HYDROCHLORIDE 50 MG/ML
INJECTION, SOLUTION INTRAVENOUS
Status: DISCONTINUED | OUTPATIENT
Start: 2025-07-15 | End: 2025-07-15 | Stop reason: SDUPTHER

## 2025-07-15 RX ORDER — PROTAMINE SULFATE 10 MG/ML
INJECTION, SOLUTION INTRAVENOUS
Status: DISCONTINUED | OUTPATIENT
Start: 2025-07-15 | End: 2025-07-15 | Stop reason: SDUPTHER

## 2025-07-15 RX ORDER — 0.9 % SODIUM CHLORIDE 0.9 %
500 INTRAVENOUS SOLUTION INTRAVENOUS PRN
Status: DISCONTINUED | OUTPATIENT
Start: 2025-07-15 | End: 2025-07-16 | Stop reason: HOSPADM

## 2025-07-15 RX ORDER — FLUTICASONE PROPIONATE AND SALMETEROL 250; 50 UG/1; UG/1
1 POWDER RESPIRATORY (INHALATION)
Status: DISCONTINUED | OUTPATIENT
Start: 2025-07-15 | End: 2025-07-16 | Stop reason: HOSPADM

## 2025-07-15 RX ORDER — FAMOTIDINE 10 MG/ML
INJECTION, SOLUTION INTRAVENOUS
Status: DISCONTINUED | OUTPATIENT
Start: 2025-07-15 | End: 2025-07-15 | Stop reason: SDUPTHER

## 2025-07-15 RX ORDER — ONDANSETRON 4 MG/1
4 TABLET, ORALLY DISINTEGRATING ORAL EVERY 8 HOURS PRN
Status: DISCONTINUED | OUTPATIENT
Start: 2025-07-15 | End: 2025-07-16 | Stop reason: HOSPADM

## 2025-07-15 RX ORDER — DOCUSATE SODIUM 100 MG/1
100 CAPSULE, LIQUID FILLED ORAL DAILY
Status: DISCONTINUED | OUTPATIENT
Start: 2025-07-15 | End: 2025-07-16 | Stop reason: HOSPADM

## 2025-07-15 RX ORDER — HYDROCORTISONE SODIUM SUCCINATE 100 MG/2ML
INJECTION INTRAMUSCULAR; INTRAVENOUS
Status: DISCONTINUED | OUTPATIENT
Start: 2025-07-15 | End: 2025-07-15 | Stop reason: SDUPTHER

## 2025-07-15 RX ORDER — SODIUM CHLORIDE 0.9 % (FLUSH) 0.9 %
5-40 SYRINGE (ML) INJECTION EVERY 12 HOURS SCHEDULED
Status: DISCONTINUED | OUTPATIENT
Start: 2025-07-15 | End: 2025-07-16 | Stop reason: HOSPADM

## 2025-07-15 RX ORDER — ATROPINE SULFATE 1 MG/ML
0.5 INJECTION, SOLUTION INTRAVENOUS
Status: DISCONTINUED | OUTPATIENT
Start: 2025-07-15 | End: 2025-07-16 | Stop reason: HOSPADM

## 2025-07-15 RX ORDER — PROPOFOL 10 MG/ML
INJECTION, EMULSION INTRAVENOUS
Status: DISCONTINUED | OUTPATIENT
Start: 2025-07-15 | End: 2025-07-15 | Stop reason: SDUPTHER

## 2025-07-15 RX ORDER — ONDANSETRON 2 MG/ML
4 INJECTION INTRAMUSCULAR; INTRAVENOUS EVERY 6 HOURS PRN
Status: DISCONTINUED | OUTPATIENT
Start: 2025-07-15 | End: 2025-07-16 | Stop reason: HOSPADM

## 2025-07-15 RX ORDER — BUDESONIDE AND FORMOTEROL FUMARATE DIHYDRATE 160; 4.5 UG/1; UG/1
2 AEROSOL RESPIRATORY (INHALATION)
Status: DISCONTINUED | OUTPATIENT
Start: 2025-07-15 | End: 2025-07-15

## 2025-07-15 RX ORDER — SODIUM CHLORIDE 9 MG/ML
INJECTION, SOLUTION INTRAVENOUS PRN
Status: DISCONTINUED | OUTPATIENT
Start: 2025-07-15 | End: 2025-07-16 | Stop reason: HOSPADM

## 2025-07-15 RX ORDER — IOPAMIDOL 755 MG/ML
INJECTION, SOLUTION INTRAVASCULAR PRN
Status: DISCONTINUED | OUTPATIENT
Start: 2025-07-15 | End: 2025-07-15 | Stop reason: HOSPADM

## 2025-07-15 RX ORDER — HEPARIN SODIUM 1000 [USP'U]/ML
INJECTION, SOLUTION INTRAVENOUS; SUBCUTANEOUS
Status: DISCONTINUED | OUTPATIENT
Start: 2025-07-15 | End: 2025-07-15 | Stop reason: SDUPTHER

## 2025-07-15 RX ORDER — FENTANYL CITRATE 50 UG/ML
INJECTION, SOLUTION INTRAMUSCULAR; INTRAVENOUS
Status: DISCONTINUED | OUTPATIENT
Start: 2025-07-15 | End: 2025-07-15 | Stop reason: SDUPTHER

## 2025-07-15 RX ORDER — ALBUTEROL SULFATE 90 UG/1
2 INHALANT RESPIRATORY (INHALATION) EVERY 6 HOURS PRN
Status: DISCONTINUED | OUTPATIENT
Start: 2025-07-15 | End: 2025-07-16 | Stop reason: HOSPADM

## 2025-07-15 RX ORDER — GLYCOPYRROLATE 0.2 MG/ML
INJECTION INTRAMUSCULAR; INTRAVENOUS
Status: DISCONTINUED | OUTPATIENT
Start: 2025-07-15 | End: 2025-07-15 | Stop reason: SDUPTHER

## 2025-07-15 RX ADMIN — AMIODARONE HYDROCHLORIDE 150 MG: 50 INJECTION, SOLUTION INTRAVENOUS at 08:23

## 2025-07-15 RX ADMIN — FLUTICASONE PROPIONATE AND SALMETEROL 1 PUFF: 250; 50 POWDER RESPIRATORY (INHALATION) at 23:08

## 2025-07-15 RX ADMIN — AMIODARONE HYDROCHLORIDE 0.5 MG/MIN: 1.8 INJECTION, SOLUTION INTRAVENOUS at 16:50

## 2025-07-15 RX ADMIN — FENTANYL CITRATE 25 MCG: 50 INJECTION, SOLUTION INTRAMUSCULAR; INTRAVENOUS at 07:32

## 2025-07-15 RX ADMIN — SODIUM CHLORIDE: 9 INJECTION, SOLUTION INTRAVENOUS at 07:04

## 2025-07-15 RX ADMIN — LEVOTHYROXINE SODIUM 50 MCG: 50 CAPSULE ORAL at 13:17

## 2025-07-15 RX ADMIN — CEFAZOLIN 2000 MG: 2 INJECTION, POWDER, FOR SOLUTION INTRAMUSCULAR; INTRAVENOUS at 07:15

## 2025-07-15 RX ADMIN — FENTANYL CITRATE 50 MCG: 50 INJECTION, SOLUTION INTRAMUSCULAR; INTRAVENOUS at 07:17

## 2025-07-15 RX ADMIN — GLYCOPYRROLATE 0.2 MG: 0.2 INJECTION, SOLUTION INTRAMUSCULAR; INTRAVENOUS at 07:33

## 2025-07-15 RX ADMIN — FAMOTIDINE 20 MG: 10 INJECTION INTRAVENOUS at 07:13

## 2025-07-15 RX ADMIN — PROPOFOL 100 MCG/KG/MIN: 10 INJECTION, EMULSION INTRAVENOUS at 07:13

## 2025-07-15 RX ADMIN — PHENYLEPHRINE HYDROCHLORIDE 25 MCG/MIN: 50 INJECTION INTRAVENOUS at 07:13

## 2025-07-15 RX ADMIN — AMIODARONE HYDROCHLORIDE 1 MG/MIN: 50 INJECTION, SOLUTION INTRAVENOUS at 08:26

## 2025-07-15 RX ADMIN — DIPHENHYDRAMINE HYDROCHLORIDE 50 MG: 50 INJECTION INTRAMUSCULAR; INTRAVENOUS at 06:59

## 2025-07-15 RX ADMIN — HYDROCORTISONE SODIUM SUCCINATE 100 MG: 100 INJECTION, POWDER, FOR SOLUTION INTRAMUSCULAR; INTRAVENOUS at 07:13

## 2025-07-15 RX ADMIN — DIPHENHYDRAMINE HYDROCHLORIDE 12.5 MG: 50 INJECTION, SOLUTION INTRAMUSCULAR; INTRAVENOUS at 07:13

## 2025-07-15 RX ADMIN — HEPARIN SODIUM 11000 UNITS: 1000 INJECTION INTRAVENOUS; SUBCUTANEOUS at 08:00

## 2025-07-15 RX ADMIN — WATER 60 MG: 1 INJECTION INTRAMUSCULAR; INTRAVENOUS; SUBCUTANEOUS at 06:59

## 2025-07-15 RX ADMIN — FLUTICASONE PROPIONATE AND SALMETEROL 1 PUFF: 250; 50 POWDER RESPIRATORY (INHALATION) at 10:39

## 2025-07-15 RX ADMIN — CLOPIDOGREL BISULFATE 75 MG: 75 TABLET, FILM COATED ORAL at 14:14

## 2025-07-15 RX ADMIN — PROTAMINE SULFATE 50 MG: 10 INJECTION, SOLUTION INTRAVENOUS at 08:27

## 2025-07-15 ASSESSMENT — ENCOUNTER SYMPTOMS: SHORTNESS OF BREATH: 0

## 2025-07-15 ASSESSMENT — PAIN - FUNCTIONAL ASSESSMENT: PAIN_FUNCTIONAL_ASSESSMENT: 0-10

## 2025-07-15 NOTE — PROGRESS NOTES
Venous sheath removed. Manual pressure held x 20min. Pt instructed on removal. Rt groin site soft, no oozing, no hematoma. Site cleansed with chlorprep, tegaderm applied.

## 2025-07-15 NOTE — TELEPHONE ENCOUNTER
Pt is scheduled at [facility]     8-21-25 Echo - 2:00 pm  8-21-25 OV w/Eyad - 3:00 pm

## 2025-07-15 NOTE — PROGRESS NOTES
TAVR Chart Clarifications    [place] Questionnaire (Symptoms Over Last 2 Weeks)     CHF limiting activity  Activity Extremely Limited Quite a bit Limited Moderately Limited Slightly Limited Not Limited NA or Other   Bathing [] [] [] [] [x] []   Walking 1 blk [] [x] [] [] [] []   Hurrying/jogging [x] [] [] [] [] []     Swelling in feet, ankles, legs  Every Morning 3X per wk 1-2X per wk <1 per wk Never over 2 wks   [] [] [] [x] []     Fatigue limited activity  All of time Several /day >1 per day 3X per wk 1-2 per wk <1 per wk None in 2 wks   [] [] [] [x] [] [] []     Shortness of breath limiting activity  All of time Several/day >1 per day >3X per wk 1-2X per wk <1 per wk None in 2 wks   [] [] [] [x] [] [] []     Shortness of breath necessitating sleep in chair or with 3 or more pillows  Every night >3X per wk 1-2X per week <1 per week None in 2 wks   [] [] [] [] [x]     CHF limited enjoyment of life  Extremely limited Quite limited Moderately limited Slightly limited Not limited   [] [] [x] [] []     Feeling about spending rest of life with current heart failure symptoms  Not satisfied Mostly dissatisfied Somewhat satisfied Mostly satisfied Completely satisfied   [x] [] [] [] []     CHF altered lifestyle  Activity Severely Limited Quite a bit Limited Moderately Limited Slightly Limited Not Limited NA or Other   Hobbies, Rec [] [x] [] [] [] []   Working, Chores [] [] [x] [] [] []   Visiting family [] [] [x] [] [] []     Cardiac Rehab     [x] Educated patient and/or family on the importance of attending cardiac rehab post procedure.   [x] Educational flyer provided.     [x] Cardiology RN notified to place outpatient orders.      5 Meter Walk Test     Time #1 Time #2 Time #3 Unable to perform adequately as she has a boot on her left leg       [x]     STS Score     Mortality (%)   1.82

## 2025-07-15 NOTE — PROGRESS NOTES
Patient brought over to CVU from cath lab and attached to CVU monitoring.  Report reviewed with cath lab RN.  Right groin venous sheath intact, soft and no hematoma or oozing noted. Left groin sheath oozing dsg saturated. CJ cath lab RN at bedside. Dr Castano on way. Sheath pulled perclosed left groin. Manual pressure held x20 min, quick clot in place. Site wnl. Pedal pulses per doppler.  Occlusive dressing dry and intact.  .  Primary RN Sarahi.

## 2025-07-15 NOTE — CARE COORDINATION
07/15/25 1419   IMM Letter   IMM Letter given to Patient/Family/Significant other/Guardian/POA/by: IMM Given   IMM Letter date given: 07/15/25   IMM Letter time given: 0713

## 2025-07-15 NOTE — PROGRESS NOTES
Spoke to [facility] pharmacy re thyroid medicine. Informed pt will need to take own thyroid replacement (as she is allergic to hospital replacement). I informed pts family member who will bring it in today. Kal RN TAVR  Coordinator

## 2025-07-15 NOTE — OP NOTE
Operative Note      Patient: Misty Womack  YOB: 1955  MRN: [id number]    Date of Procedure: 7/15/2025    Pre-Op Diagnosis Codes:      * Aortic valve stenosis, etiology of cardiac valve disease unspecified [I35.0], paroxysmal afib/flutter with prior ablation, HTN, hyperlipidemia, TIA    Post-Op Diagnosis: Same, afib after BAV, converted with amio and cardioversion at completion of the case       Procedure:  R TF TAVR with 26mm S3 Ultra Resilia bovine tissue valve  BAV with 20mm True Balloon  Cardioversion for afib    Surgeon(s):  Pino Moreno MD Gibson, Michael P, MD Verma, Anil, MD    Assistant:  First Assistant: Sidney Costa RN    Anesthesia: MAC with local    Estimated Blood Loss (mL): less than 50    Implants:  Implant Name Type Inv. Item Serial No.  Lot No. LRB No. Used Action   VALVE AORTIC TRANSCATHETER HEART 26MM JEAN MARIE 3 ULTRA RESILIA - [id number] Aortic valves VALVE AORTIC TRANSCATHETER HEART 26MM JEAN MARIE 3 ULTRA RESILIA [id number] NARESH-  N/A 1 Implanted         Drains: * No LDAs found *    Findings:  Present At Time Of Surgery (PATOS) (choose all levels that have infection present):  No infection present  Other Findings: afib to NSR with amio and cardioversion    Operative Details:  Patient was sedated with MAC.  Prepped and draped in usual sterile fashion.  A timeout was called and performed.  Using fluoroscopy the femoral heads were located to identify the location of the common femoral artery.  Local anesthesia was used to anesthetize the bilateral groins.  Using large bore needle with palpation the right common femoral artery was accessed guidewire was put in place followed by 5 Latvian sheath.  An angiogram was used to confirm access site.  Was turned towards the left groin.  In similar fashion, left common femoral artery was accessed with placement of a 5 Latvian sheath.  Working through this 5 Latvian sheath pigtail catheter was

## 2025-07-15 NOTE — ANESTHESIA PRE PROCEDURE
Department of Anesthesiology  Preprocedure Note       Name:  Misty Womack   Age:  69 y.o.  :  1955                                          MRN:  [id number]         Date:  7/15/2025      Surgeon: Surgeon(s):  Pino Moreno MD Gibson, Michael P, MD    Procedure:     Medications prior to admission:   Prior to Admission medications    Medication Sig Start Date End Date Taking? Authorizing Provider   cephALEXin (KEFLEX) 500 MG capsule Take 1 capsule by mouth 2 times daily for 5 days 25 Yes Pino Moreno MD   WIXELA INHUB 250-50 MCG/ACT AEPB diskus inhaler INHALE 1 PUFF INTO THE LUNGS TWICE DAILY 25  Yes John Adair APRN - CNP   albuterol sulfate HFA (PROVENTIL;VENTOLIN;PROAIR) 108 (90 Base) MCG/ACT inhaler INHALE 2 PUFFS INTO THE LUNGS EVERY 6 HOURS AS NEEDED FOR WHEEZING 25  Yes John Adair APRN - CNP   potassium bicarbonate (EFFER-K/K-LYTE) 25 MEQ disintegrating tablet Take 1 tablet by mouth daily   Yes ProviderDeborah MD   dronedarone hcl (MULTAQ) 400 MG TABS Take 400 mg twice daily for 10 days then 400 mg daily thereafter 25  Yes Giancarlo Atkins MD   Vitamin D-Vitamin K (VITAMIN K2-VITAMIN D3 PO) Take by mouth   Yes ProviderDeborah MD   vitamin B-12 (CYANOCOBALAMIN) 100 MCG tablet Take 0.5 tablets by mouth daily   Yes Deborah Saini MD   hydrocortisone (CORTEF) 10 MG tablet 1 tablet 2 times daily 3/28/08  Yes Deborah Saini MD   TIROSINT 50 MCG CAPS  24  Yes Deborah Saini MD   magnesium 30 MG tablet Take 1 tablet by mouth 2 times daily   Yes Deborah Saini MD   ELIQUIS 5 MG TABS tablet TAKE 1 TABLET TWICE A DAY 7/10/25   Giancarlo Atkins MD   diclofenac sodium (VOLTAREN) 1 % GEL Apply 4 g topically 4 times daily as needed for Pain  Patient not taking: Reported on 7/15/2025 6/24/25   Boris Scanlon MD       Current medications:    Current Facility-Administered Medications   Medication Dose Route Frequency

## 2025-07-15 NOTE — H&P
Patient states after her metoprolol dose was  increased from  50 mg b i d  to 75 mg b i d  she developed dizziness so she cut back on metoprolol back to 50 mg b i d  since then she has no dizziness  Now her  Blood pressure well controlled  Advised to continue present medications  Advised for low-salt diet  Reason for Referral: Aortic stenosis     Referring physician: Dr Fontana     CC: \"I am here to discuss my upcoming procedure.\"        Subjective:      History of Present Illness:     Misty Womack is a 69 y.o. patient with a PMH significant for paroxsymal atrial fibrillation s/p ablation 7/26/2023, atrial flutter s/p ablation 2/8/2024, aortic stenosis, TIA (2006), hypertension and hyperlipidemia.      Today, she is here for evaluation prior to the TAVR procedure. She continues to have shortness of breath and fatigue. Patient denies exertional chest pain/pressure, dyspnea at res, PND, orthopnea, palpitations, lightheadedness, weight changes, changes in LE edema, and syncope. Patient reports compliance to her medications.      Past Medical History:   has a past medical history of Abnormal ECG, ADHD (attention deficit hyperactivity disorder), Adrenal insufficiency, Arthritis, Asthma, Atrial fibrillation (HCC), Bicuspid aortic valve, Brain tumor (HCC), Cerebrovascular disease, Chiari malformation, Chronic anticoagulation, CKD (chronic kidney disease) stage 3, GFR 30-59 ml/min (HCC), Fibromyalgia, Fibromyositis, Hearing loss, History of TIA (transient ischemic attack), Hypercholesterolemia, Hypertension, Hypothyroidism, Mood disorder, Obesity (BMI 35.0-39.9 without comorbidity), Panhypopituitarism, Paroxysmal atrial fibrillation (HCC), PONV (postoperative nausea and vomiting), Restless leg, S/P selective transsphenoidal pituitary adenomectomy, Thyroid disease, Unspecified cerebral artery occlusion with cerebral infarction, and Vitreous degeneration.     Surgical History:   has a past surgical history that includes brain surgery (03/24/2011); brain surgery (11/2007); laminectomy (C1); Knee Arthroplasty (Right, 08/25/2014); pituitary surgery (02/2015); Hysterectomy; Biceps Tenodesis (Left, 11/10/2016); Cataract removal (Bilateral, 04/2017); Total knee arthroplasty (Left, 03/01/2023); Tonsillectomy; ablation of

## 2025-07-15 NOTE — BRIEF OP NOTE
Brief Postoperative Note      Patient: Misty Womack  YOB: 1955  MRN: [id number]    Date of Procedure: 7/15/2025    Pre-Op Diagnosis Codes:      * Aortic valve stenosis, etiology of cardiac valve disease unspecified [I35.0], paroxysmal afib/flutter with prior ablation, HTN, hyperlipidemia, TIA    Post-Op Diagnosis: Same, afib after BAV, converted with amio and cardioversion at completion of the case       Procedure:  R TF TAVR with 26mm S3 Ultra Resilia bovine tissue valve  BAV with 20mm True Balloon  Cardioversion for afib    Surgeon(s):  Pino Moreno MD Gibson, MD Eyad Vasquez Anil, MD    Assistant:  First Assistant: Sidney Costa RN    Anesthesia: MAC with local    Estimated Blood Loss (mL): less than 50    Implants:  Implant Name Type Inv. Item Serial No.  Lot No. LRB No. Used Action   VALVE AORTIC TRANSCATHETER HEART 26MM JEAN MARIE 3 ULTRA RESILIA - [id number] Aortic valves VALVE AORTIC TRANSCATHETER HEART 26MM JEAN MARIE 3 ULTRA RESILIA [id number] [id number] NARESH-  N/A 1 Implanted         Drains: * No LDAs found *    Findings:  Present At Time Of Surgery (PATOS) (choose all levels that have infection present):  No infection present  Other Findings: afib to NSR with amio and cardioversion    Full Operative Note to be Dictated  Electronically signed by Eric Cabrera MD on 7/15/2025 at 8:51 AM

## 2025-07-15 NOTE — TELEPHONE ENCOUNTER
Olivia, can you schedule pt for an echo and OV w Dr. Castano at [place] in 3-5 weeks for post TAVR HFU? No need to lei pt. Kal JARRETT

## 2025-07-15 NOTE — PROGRESS NOTES
4 Eyes Skin Assessment     NAME:  Misty Womack  YOB: 1955  MEDICAL RECORD NUMBER:  [id number]    The patient is being assessed for  Cath Lab Post-Op    I agree that at least one RN has performed a thorough Head to Toe Skin Assessment on the patient. ALL assessment sites listed below have been assessed.      Areas assessed by both nurses:    Head, Face, Ears, Shoulders, Back, Chest, Arms, Elbows, Hands, Sacrum. Buttock, Coccyx, Ischium, and Legs. Feet and Heels        Does the Patient have a Wound? {Action Wound:47525}       Ok Prevention initiated by RN: No  Wound Care Orders initiated by RN: No    Pressure Injury (Stage 1,2,3,4, Unstageable, DTI, NWPT, and Complex wounds) if present, place Wound referral order by RN under : No    New Ostomies, if present place, Ostomy referral order under : No     Nurse 1 eSignature: Electronically signed by DENZEL BHATIA RN on 7/15/25 at 3:00 PM EDT    **SHARE this note so that the co-signing nurse can place an eSignature**    Nurse 2 eSignature: {Esignature:[id number]}

## 2025-07-15 NOTE — ANESTHESIA POSTPROCEDURE EVALUATION
Department of Anesthesiology  Postprocedure Note    Patient: Misty Womack  MRN: [id number]  YOB: 1955  Date of evaluation: 7/15/2025    Procedure Summary       Date: 07/15/25 Room / Location: [facility] CATH/HYBRID LAB  / [facility] CARDIAC CATH LAB    Anesthesia Start: 0704 Anesthesia Stop:     Procedures:       Transcatheter aortic valve replacement      TRANSCATHETER AORTIC VALVE REPLACEMENT FEMORAL APPROACH Diagnosis:       Aortic valve stenosis, etiology of cardiac valve disease unspecified      (Aortic valve stenosis, etiology of cardiac valve disease unspecified [I35.0])    Providers: Chan Castano MD; Eric Cabrera MD Responsible Provider: Nick Cortez MD    Anesthesia Type: MAC ASA Status: 3            Anesthesia Type: No value filed.    Ghada Phase I: Ghada Score: 10    Ghada Phase II:      Anesthesia Post Evaluation    Patient location during evaluation: PACU  Patient participation: complete - patient participated  Level of consciousness: awake  Airway patency: patent  Nausea & Vomiting: no vomiting and no nausea  Cardiovascular status: hemodynamically stable  Respiratory status: acceptable  Hydration status: stable  Multimodal analgesia pain management approach  Pain management: adequate    No notable events documented.

## 2025-07-16 ENCOUNTER — APPOINTMENT (OUTPATIENT)
Age: 70
DRG: 267 | End: 2025-07-16
Attending: INTERNAL MEDICINE
Payer: MEDICARE

## 2025-07-16 VITALS
SYSTOLIC BLOOD PRESSURE: 134 MMHG | DIASTOLIC BLOOD PRESSURE: 61 MMHG | BODY MASS INDEX: 37.98 KG/M2 | HEIGHT: 67 IN | WEIGHT: 242 LBS | OXYGEN SATURATION: 98 % | HEART RATE: 67 BPM | RESPIRATION RATE: 18 BRPM | TEMPERATURE: 97.6 F

## 2025-07-16 LAB
ANION GAP SERPL CALCULATED.3IONS-SCNC: 12 MMOL/L (ref 3–16)
BUN SERPL-MCNC: 21 MG/DL (ref 7–20)
CALCIUM SERPL-MCNC: 8.6 MG/DL (ref 8.3–10.6)
CHLORIDE SERPL-SCNC: 103 MMOL/L (ref 99–110)
CO2 SERPL-SCNC: 22 MMOL/L (ref 21–32)
CREAT SERPL-MCNC: 1.2 MG/DL (ref 0.6–1.2)
DEPRECATED RDW RBC AUTO: 15.2 % (ref 12.4–15.4)
ECHO AO ASC DIAM: 4.1 CM
ECHO AO ASCENDING AORTA INDEX: 1.87 CM/M2
ECHO AO ROOT DIAM: 2.9 CM
ECHO AO ROOT INDEX: 1.32 CM/M2
ECHO AR MAX VEL PISA: 3.7 M/S
ECHO AV ACCELERATION TIME: 67 MS
ECHO AV AREA PEAK VELOCITY: 2.5 CM2
ECHO AV AREA VTI: 2.5 CM2
ECHO AV AREA/BSA PEAK VELOCITY: 1.1 CM2/M2
ECHO AV AREA/BSA VTI: 1.1 CM2/M2
ECHO AV MEAN GRADIENT: 16 MMHG
ECHO AV MEAN VELOCITY: 1.9 M/S
ECHO AV PEAK GRADIENT: 29 MMHG
ECHO AV PEAK VELOCITY: 2.7 M/S
ECHO AV REGURGITANT PHT: 425 MS
ECHO AV VELOCITY RATIO: 0.44
ECHO AV VTI: 63.2 CM
ECHO BSA: 2.28 M2
ECHO EST RA PRESSURE: 8 MMHG
ECHO LA AREA 2C: 30.1 CM2
ECHO LA AREA 4C: 27 CM2
ECHO LA DIAMETER INDEX: 1.83 CM/M2
ECHO LA DIAMETER: 4 CM
ECHO LA MAJOR AXIS: 6.9 CM
ECHO LA MINOR AXIS: 6.7 CM
ECHO LA TO AORTIC ROOT RATIO: 1.38
ECHO LA VOL BP: 97 ML (ref 22–52)
ECHO LA VOL MOD A2C: 110 ML (ref 22–52)
ECHO LA VOL MOD A4C: 84 ML (ref 22–52)
ECHO LA VOL/BSA BIPLANE: 44 ML/M2 (ref 16–34)
ECHO LA VOLUME INDEX MOD A2C: 50 ML/M2 (ref 16–34)
ECHO LA VOLUME INDEX MOD A4C: 38 ML/M2 (ref 16–34)
ECHO LV E' LATERAL VELOCITY: 6.96 CM/S
ECHO LV E' SEPTAL VELOCITY: 7.29 CM/S
ECHO LV EDV A2C: 91 ML
ECHO LV EDV A4C: 81 ML
ECHO LV EDV INDEX A4C: 37 ML/M2
ECHO LV EDV NDEX A2C: 42 ML/M2
ECHO LV EF PHYSICIAN: 65 %
ECHO LV EJECTION FRACTION A2C: 67 %
ECHO LV EJECTION FRACTION A4C: 67 %
ECHO LV EJECTION FRACTION BIPLANE: 68 % (ref 55–100)
ECHO LV ESV A2C: 30 ML
ECHO LV ESV A4C: 27 ML
ECHO LV ESV INDEX A2C: 14 ML/M2
ECHO LV ESV INDEX A4C: 12 ML/M2
ECHO LV FRACTIONAL SHORTENING: 43 % (ref 28–44)
ECHO LV INTERNAL DIMENSION DIASTOLE INDEX: 2.01 CM/M2
ECHO LV INTERNAL DIMENSION DIASTOLIC: 4.4 CM (ref 3.9–5.3)
ECHO LV INTERNAL DIMENSION SYSTOLIC INDEX: 1.14 CM/M2
ECHO LV INTERNAL DIMENSION SYSTOLIC: 2.5 CM
ECHO LV IVSD: 1.1 CM (ref 0.6–0.9)
ECHO LV MASS 2D: 168.9 G (ref 67–162)
ECHO LV MASS INDEX 2D: 77.1 G/M2 (ref 43–95)
ECHO LV POSTERIOR WALL DIASTOLIC: 1.1 CM (ref 0.6–0.9)
ECHO LV RELATIVE WALL THICKNESS RATIO: 0.5
ECHO LVOT AREA: 5.3 CM2
ECHO LVOT AV VTI INDEX: 0.47
ECHO LVOT DIAM: 2.6 CM
ECHO LVOT MEAN GRADIENT: 4 MMHG
ECHO LVOT PEAK GRADIENT: 6 MMHG
ECHO LVOT PEAK VELOCITY: 1.2 M/S
ECHO LVOT STROKE VOLUME INDEX: 71.7 ML/M2
ECHO LVOT SV: 157.1 ML
ECHO LVOT VTI: 29.6 CM
ECHO MV A VELOCITY: 1.21 M/S
ECHO MV E VELOCITY: 1.22 M/S
ECHO MV E/A RATIO: 1.01
ECHO MV E/E' LATERAL: 17.53
ECHO MV E/E' RATIO (AVERAGED): 17.13
ECHO MV E/E' SEPTAL: 16.74
ECHO PV MAX VELOCITY: 1.1 M/S
ECHO PV PEAK GRADIENT: 5 MMHG
ECHO RA AREA 4C: 19.5 CM2
ECHO RA END SYSTOLIC VOLUME APICAL 4 CHAMBER INDEX BSA: 24 ML/M2
ECHO RA VOLUME: 53 ML
ECHO RIGHT VENTRICULAR SYSTOLIC PRESSURE (RVSP): 35 MMHG
ECHO RV BASAL DIMENSION: 3.7 CM
ECHO RV FREE WALL PEAK S': 14.4 CM/S
ECHO RV LONGITUDINAL DIMENSION: 7.9 CM
ECHO RV MID DIMENSION: 2.3 CM
ECHO RV TAPSE: 3 CM (ref 1.7–?)
ECHO TV REGURGITANT MAX VELOCITY: 2.58 M/S
ECHO TV REGURGITANT PEAK GRADIENT: 27 MMHG
GFR SERPLBLD CREATININE-BSD FMLA CKD-EPI: 49 ML/MIN/{1.73_M2}
GLUCOSE SERPL-MCNC: 188 MG/DL (ref 70–99)
HCT VFR BLD AUTO: 31.5 % (ref 36–48)
HGB BLD-MCNC: 10.6 G/DL (ref 12–16)
MCH RBC QN AUTO: 29.3 PG (ref 26–34)
MCHC RBC AUTO-ENTMCNC: 33.7 G/DL (ref 31–36)
MCV RBC AUTO: 87.1 FL (ref 80–100)
PLATELET # BLD AUTO: 129 K/UL (ref 135–450)
PMV BLD AUTO: 9.1 FL (ref 5–10.5)
POTASSIUM SERPL-SCNC: 4.2 MMOL/L (ref 3.5–5.1)
RBC # BLD AUTO: 3.62 M/UL (ref 4–5.2)
SODIUM SERPL-SCNC: 137 MMOL/L (ref 136–145)
WBC # BLD AUTO: 17.1 K/UL (ref 4–11)

## 2025-07-16 PROCEDURE — 93306 TTE W/DOPPLER COMPLETE: CPT

## 2025-07-16 PROCEDURE — 6370000000 HC RX 637 (ALT 250 FOR IP): Performed by: INTERNAL MEDICINE

## 2025-07-16 PROCEDURE — 85027 COMPLETE CBC AUTOMATED: CPT

## 2025-07-16 PROCEDURE — 93306 TTE W/DOPPLER COMPLETE: CPT | Performed by: INTERNAL MEDICINE

## 2025-07-16 PROCEDURE — 2500000003 HC RX 250 WO HCPCS: Performed by: INTERNAL MEDICINE

## 2025-07-16 PROCEDURE — 80048 BASIC METABOLIC PNL TOTAL CA: CPT

## 2025-07-16 RX ADMIN — FLUTICASONE PROPIONATE AND SALMETEROL 1 PUFF: 250; 50 POWDER RESPIRATORY (INHALATION) at 08:00

## 2025-07-16 RX ADMIN — CEPHALEXIN 500 MG: 250 CAPSULE ORAL at 09:34

## 2025-07-16 RX ADMIN — LEVOTHYROXINE SODIUM 50 MCG: 50 CAPSULE ORAL at 06:26

## 2025-07-16 RX ADMIN — SODIUM CHLORIDE, PRESERVATIVE FREE 10 ML: 5 INJECTION INTRAVENOUS at 09:43

## 2025-07-16 RX ADMIN — HYDROCORTISONE 20 MG: 10 TABLET ORAL at 09:33

## 2025-07-16 RX ADMIN — CLOPIDOGREL BISULFATE 75 MG: 75 TABLET, FILM COATED ORAL at 09:34

## 2025-07-16 NOTE — PLAN OF CARE
Problem: Pain  Goal: Verbalizes/displays adequate comfort level or baseline comfort level  7/16/2025 0452 by Midkiff, Lacey, RN  Outcome: Progressing  Flowsheets (Taken 7/15/2025 1923)  Verbalizes/displays adequate comfort level or baseline comfort level:   Encourage patient to monitor pain and request assistance   Assess pain using appropriate pain scale  7/15/2025 1454 by Hanna Campbell, RN  Outcome: Progressing     Problem: Safety - Adult  Goal: Free from fall injury  7/16/2025 0452 by Midkiff, Lacey, RN  Outcome: Progressing  7/15/2025 1454 by Hanna Campbell RN  Outcome: Progressing     Problem: Chronic Conditions and Co-morbidities  Goal: Patient's chronic conditions and co-morbidity symptoms are monitored and maintained or improved  7/16/2025 0452 by Midkiff, Lacey, RN  Outcome: Progressing  Flowsheets (Taken 7/15/2025 1930)  Care Plan - Patient's Chronic Conditions and Co-Morbidity Symptoms are Monitored and Maintained or Improved:   Monitor and assess patient's chronic conditions and comorbid symptoms for stability, deterioration, or improvement   Collaborate with multidisciplinary team to address chronic and comorbid conditions and prevent exacerbation or deterioration   Update acute care plan with appropriate goals if chronic or comorbid symptoms are exacerbated and prevent overall improvement and discharge  7/15/2025 1454 by Hanna Campbell, RN  Outcome: Progressing

## 2025-07-16 NOTE — CARE COORDINATION
Discharge Planning Note:  Chart reviewed and it appears that patient has minimal needs for discharge at this time. Patient for planned TAVR    Risk Score 12 %     Primary Care Physician is John Adair APRN - CNP        Primary insurance is [employer]    Please notify case management if any discharge needs are identified.      Case management will continue to follow progress and update discharge plan as needed.

## 2025-07-16 NOTE — DISCHARGE INSTR - COC
Continuity of Care Form    Patient Name: Misty Womack   :  1955  MRN:  [id number]    Admit date:  7/15/2025  Discharge date:  2025    Code Status Order: Full Code   Advance Directives:    Date/Time Healthcare Directive Type of Healthcare Directive Copy in Chart Healthcare Agent Appointed Healthcare Agent's Name Healthcare Agent's Phone Number    07/15/25 0627 No, patient does not have an advance directive for healthcare treatment  --  --  --  --  --             Admitting Physician:  Chan Castano MD  PCP: John Adair, APRN - CNP    Discharging Nurse: Monisha JARRETT  Discharging Hospital Unit/Room#: CVU-2917/2917-01  Discharging Unit Phone Number: [phone]    Emergency Contact:   Extended Emergency Contact Information  Primary Emergency Contact: Caridad Cordoba   [facility]  Home Phone: [phone]  Relation: Child  Secondary Emergency Contact: Reema Hall           [facility]  Home Phone: [phone]  Mobile Phone: [phone]  Relation: Friend    Past Surgical History:  Past Surgical History:   Procedure Laterality Date    ABLATION OF DYSRHYTHMIC FOCUS      x`s 2    BICEPS TENODESIS Left 11/10/2016    Dr Pineda    BRAIN SURGERY  2011    chiari 1 repair/ decompression and C1 laminectomy    BRAIN SURGERY  2007    adenoma removal, pituitary    CARDIAC PROCEDURE N/A 2025    Left heart cath / coronary angiography performed by John Fontana MD at [facility] CARDIAC CATH LAB    CARDIAC PROCEDURE N/A 7/15/2025    Transcatheter aortic valve replacement performed by Chan Castano MD at [facility] CARDIAC CATH LAB    CARDIAC SURGERY N/A 7/15/2025    TRANSCATHETER AORTIC VALVE REPLACEMENT FEMORAL APPROACH performed by Eric Cabrera MD at [facility] CARDIAC CATH LAB    CATARACT REMOVAL Bilateral 2017    HYSTERECTOMY (CERVIX STATUS UNKNOWN)      HYSTERECTOMY, VAGINAL      JOINT REPLACEMENT      KNEE ARTHROPLASTY Right 2014    right tkr    KNEE

## 2025-07-16 NOTE — PLAN OF CARE
VSS on RA. BL groin sites stable post TAVR. No complaint of chest pain. Walked in halls unassisted x300 ft tolerated well.

## 2025-07-16 NOTE — PROGRESS NOTES
Discharge instructions reviewed with patient who verbalized understanding.  All home medications have been reviewed, questions answered and patient voiced understanding. Follow up appointment(s) reviewed with patient and all attempts made to schedule within 7-10 days of discharge.  Patient given discharge instructions and appointment times.  Patient discharged to home with friend via private car.  Taken to lobby via wheelchair.

## 2025-07-17 ENCOUNTER — TELEPHONE (OUTPATIENT)
Dept: FAMILY MEDICINE CLINIC | Age: 70
End: 2025-07-17

## 2025-07-17 NOTE — TELEPHONE ENCOUNTER
Care Transitions Initial Follow Up Call    Outreach made within 2 business days of discharge: Yes    Patient: Misty Womack Patient : 1955   MRN: [id number]  Reason for Admission: aortic stenosis   Discharge Date: 25       Spoke with: DISHA    Discharge department/facility: [facility] Interactive Patient Contact:  Was patient able to fill all prescriptions: Yes  Was patient instructed to bring all medications to the follow-up visit: Yes  Is patient taking all medications as directed in the discharge summary? Yes  Does patient understand their discharge instructions: Yes  Does patient have questions or concerns that need addressed prior to 7-14 day follow up office visit: no    Additional needs identified to be addressed with provider  No needs identified      SCHED         Scheduled appointment with PCP within 7-14 days    Follow Up  Future Appointments   Date Time Provider Department Center   2025  9:15 AM Boris Scanlon MD [facility]   2025  2:20 PM Jhon Adair, APRN - CNP [facility]   2025  1:40 PM Marlene Hall, PT [facility]   2025  2:00 PM [facility] ECHO 2 [facility]   2025  3:00 PM Chan Castano MD [facility]   10/22/2025 11:30 AM Giancarlo Atkins MD [facility]       Liya Briceno MA

## 2025-07-18 NOTE — DISCHARGE SUMMARY
[facility]   TAVR DISCHARGE SUMMARY    Admit Date 7/15/2025   Discharge Date 7/16/2025   Admit MD Chan Castano MD   Admit Diagnosis Aortic valve stenosis, etiology of cardiac valve disease unspecified [I35.0]  Symptomatic severe aortic stenosis with normal ejection fraction [I35.0]   Discharge Diagnosis Patient Active Problem List   Diagnosis    Insomnia    Asthma    Edema    Primary hypertension    Hypothyroidism    Adrenal insufficiency    Class 2 obesity in adult    Right knee DJD    Panhypopituitarism    S/P selective transsphenoidal pituitary adenomectomy    Status post total left knee replacement    Sprain of left elbow  [facility]  DOI 6/17/16    Rupture of left distal biceps tendon    History of TIA (transient ischemic attack)    Multinodular goiter    Hypercholesteremia    Anemia    Benign neoplasm of pituitary gland and craniopharyngeal duct (pouch) (HCC)    Brachial neuritis or radiculitis    Chronic ethmoidal sinusitis    Chronic maxillary sinusitis    Chronic pain disorder    Chronic rhinitis    Constipation    Degeneration of intervertebral disc, site unspecified    Depressive disorder, not elsewhere classified    Disorder of skin or subcutaneous tissue    Displacement of cervical intervertebral disc without myelopathy    Disturbance of skin sensation    Dyshormonogenic goiter    Galactorrhea not associated with childbirth    Hypopotassemia    Iron deficiency anemia    Myalgia and myositis    Other extrapyramidal disease and abnormal movement disorder    Other malaise and fatigue    Pain in joint    Pain in limb    Pituitary adenoma (HCC)    Pituitary dwarfism    Pituitary tumor    Pruritic disorder    Plantar fasciitis of right foot    Tendonitis, Achilles, right    Right foot pain    Posterior tibial tendinitis of right lower extremity    Age-related nuclear cataract    Encntr for f/u exam aft trtmt for cond oth than malig neoplm    Regular astigmatism, right eye    Vitreous degeneration,

## 2025-07-19 LAB
BLOOD BANK DISPENSE STATUS: NORMAL
BLOOD BANK PRODUCT CODE: NORMAL
BPU ID: NORMAL
DESCRIPTION BLOOD BANK: NORMAL

## 2025-07-22 ENCOUNTER — TELEPHONE (OUTPATIENT)
Dept: CARDIOLOGY CLINIC | Age: 70
End: 2025-07-22

## 2025-07-22 ENCOUNTER — OFFICE VISIT (OUTPATIENT)
Dept: ORTHOPEDIC SURGERY | Age: 70
End: 2025-07-22

## 2025-07-22 DIAGNOSIS — M76.62 LEFT ACHILLES TENDINITIS: Primary | ICD-10-CM

## 2025-07-22 DIAGNOSIS — M72.2 PLANTAR FASCIITIS OF LEFT FOOT: ICD-10-CM

## 2025-07-22 DIAGNOSIS — M21.41 PES PLANUS OF BOTH FEET: ICD-10-CM

## 2025-07-22 DIAGNOSIS — M21.42 PES PLANUS OF BOTH FEET: ICD-10-CM

## 2025-07-22 PROCEDURE — MISCD123 KELLY/BREG CAST BOOT (RETAIL): Performed by: FAMILY MEDICINE

## 2025-07-22 RX ORDER — PREDNISONE 20 MG/1
TABLET ORAL
Qty: 20 TABLET | Refills: 0 | Status: SHIPPED | OUTPATIENT
Start: 2025-07-22

## 2025-07-22 NOTE — PROGRESS NOTES
Chief Complaint  Results (TR MRI FOOT )    FU newer onset left foot and heel with Achilles pain with suspected plantar fascia with overpronation.  Review of left foot MRI    History of Present Illness:  Misty Womack is a 69 y.o. female who is a patient of kaiedn DEAN presenting today for left heel pain. On may 23rd 2025, she lost balance while attempting to put on the hitch of her car.  At the time, patient reports falling on her right side and hitting her buttock and had.  At the time she did not lose consciousness.  However, 5 days later patient woke up, attempted to stand up from bed and experienced significant pain in her left heel.  She has never had such pain before.  Since then, the pain has been ongoing is worse with activity but on occasion is felt at rest at random times.  She describes the pain as dull ache 7/10 intensity.  Patient is a  and spends a good portion of her day on her feet and walking.  Thus far, patient has tried physical therapy exercises at home for plantar fasciitis which all have been ineffective at controlling her pain.  Of note, patient has also tried Tylenol but it has been ineffective.  She states that she is unable to take anti-inflammatory medication because she is on Eliquis and also has a corn allergy she does wear off-the-shelf inserts and is having substantial morning pain and stiffness mostly in the heel.  She is being seen today for orthopedic and sports consultation with initial plain film imaging      Of note, around 50 years ago patient had an Achilles bone spur removal surgery in the left foot but has not had surgery on the left foot since then.  History of Present Illness  Jennyfer presents for evaluation of ongoing left foot and heel pain. This started following a fall getting out of her car on a decline at home on 05/22/2025 when she injured her contralateral right ankle and heel with insidious onset of pain on 05/27/2025 in the affected left

## 2025-07-22 NOTE — TELEPHONE ENCOUNTER
KOLBY Love,   Patient s/p TAVR and s/p DCCV for AFIB 7/15/2025. She has a follow up with echo and Dr. Castano 8/21/25. The patient saw Dr. Scanlon with Ortho today for left heel pain. Cast placed. 13 day prednisone taper ordered, with plana to use Voltaren gel once steroid round is completed. Please advise if okay to proceed from a cardiac standpoint? Thanks.

## 2025-07-22 NOTE — TELEPHONE ENCOUNTER
Patient called in wanting to know if it would be okay for her to use a 13-day packet of Prednisone.     Please advise.     Callback: [phone]

## 2025-07-23 ENCOUNTER — OFFICE VISIT (OUTPATIENT)
Dept: FAMILY MEDICINE CLINIC | Age: 70
End: 2025-07-23

## 2025-07-23 VITALS
SYSTOLIC BLOOD PRESSURE: 134 MMHG | HEIGHT: 67 IN | HEART RATE: 68 BPM | OXYGEN SATURATION: 98 % | DIASTOLIC BLOOD PRESSURE: 84 MMHG | BODY MASS INDEX: 37.83 KG/M2 | WEIGHT: 241 LBS

## 2025-07-23 DIAGNOSIS — Z09 HOSPITAL DISCHARGE FOLLOW-UP: ICD-10-CM

## 2025-07-23 DIAGNOSIS — Z79.2 NEED FOR ANTIBIOTIC PROPHYLAXIS FOR DENTAL PROCEDURE: ICD-10-CM

## 2025-07-23 DIAGNOSIS — Z95.2 S/P TAVR (TRANSCATHETER AORTIC VALVE REPLACEMENT): Primary | ICD-10-CM

## 2025-07-23 RX ORDER — AMOXICILLIN 500 MG/1
2000 CAPSULE ORAL ONCE
Qty: 4 CAPSULE | Refills: 0 | Status: SHIPPED | OUTPATIENT
Start: 2025-07-23 | End: 2025-07-23

## 2025-07-23 NOTE — PROGRESS NOTES
Post-Discharge Transitional Care  Follow Up      Misty Womack   YOB: 1955    Date of Office Visit:  7/23/2025  Date of Hospital Admission: 7/15/25  Date of Hospital Discharge: 7/16/25  Risk of hospital readmission (high >=14%. Medium >=10%) :Readmission Risk Score: 12.2      Care management risk score Rising risk (score 2-5) and Complex Care (Scores >=6): No Risk Score On File     Non face to face  following discharge, date last encounter closed (first attempt may have been earlier): 07/17/2025    Call initiated 2 business days of discharge: Yes    ASSESSMENT/PLAN:     1. S/P TAVR (transcatheter aortic valve replacement)  Hospital notes and results reviewed.  Medications reconciled.  Elective procedure.  Following with cardiology.  Has postop scheduled.  Continue with management by them.  Follow-up for AWV before the end of the year    2. Hospital discharge follow-up  Hospital notes and results reviewed.  Medications reconciled.  - [place] DISCHARGE MEDS RECONCILED W/ CURRENT OUTPATIENT MED LIST    3. Need for antibiotic prophylaxis for dental procedure  Patient has a dental procedure to be performed in August. Sending amoxicillin.  - amoxicillin (AMOXIL) 500 MG capsule; Take 4 capsules by mouth once for 1 dose Take 1 hour before dental procedure  Dispense: 4 capsule; Refill: 0                Medical Decision Making: moderate complexity  Return for Medicare Annual Visit/Fasting Labs before end of year.           Subjective:   Inpatient course: Discharge summary reviewed- see chart.    History of Present Illness  The patient is a 69-year-old female presenting today for a hospital follow-up.    She underwent a scheduled TAVR procedure, which was largely successful except for a drop in blood pressure and an episode of atrial fibrillation. Despite these complications, she reports feeling well and has noticed an increase in her energy levels. Her ascending aorta measured high, but Dr. Cabrera advised that

## 2025-07-24 ENCOUNTER — HOSPITAL ENCOUNTER (OUTPATIENT)
Dept: PHYSICAL THERAPY | Age: 70
Setting detail: THERAPIES SERIES
Discharge: HOME | End: 2025-07-24
Attending: ORTHOPAEDIC SURGERY
Payer: MEDICARE

## 2025-07-24 PROCEDURE — 97110 THERAPEUTIC EXERCISES: CPT | Performed by: PHYSICAL THERAPIST

## 2025-07-24 PROCEDURE — 97140 MANUAL THERAPY 1/> REGIONS: CPT | Performed by: PHYSICAL THERAPIST

## 2025-07-24 PROCEDURE — 97530 THERAPEUTIC ACTIVITIES: CPT | Performed by: PHYSICAL THERAPIST

## 2025-07-24 NOTE — FLOWSHEET NOTE
Estim Attended (69569)    Canalith Repositioning (11539)     Physical Performance Test (61675)    Custom orthotic ()     Other:    Other: CP 15'    Total Timed Code Tx Minutes 53'  4       Total Treatment Minutes 73'         Charge Justification:  (20301) THERAPEUTIC EXERCISE - Provided verbal/tactile cueing for HEP and/or activities related to strengthening, flexibility, endurance, ROM performed to prevent loss of range of motion, maintain or improve muscular strength or increase flexibility, following either an injury or surgery.   (16487) MANUAL THERAPY -  Manual therapy techniques, 1 or more regions, each 15 minutes (Mobilization/manipulation, manual lymphatic drainage, manual traction) for the purpose of modulating pain, promoting relaxation,  increasing ROM, reducing/eliminating soft tissue swelling/inflammation/restriction, improving soft tissue extensibility and allowing for proper ROM for normal function with self care, mobility, lifting and ambulation      GOALS   5/1  GOALS:  Patient stated goal: Reduce pain in shoulder  [x] Progressing: [] Met: [] Not Met: [] Adjusted    Therapist goals for Patient:   Short Term Goals: To be achieved in: 2 weeks  1. Independent in HEP and progression per patient tolerance, in order to prevent re-injury.   [] Progressing: [x] Met: [] Not Met: [] Adjusted  2. Patient will have a decrease in pain to <2/10 to facilitate improvement in movement, function, and ADLs as indicated by Functional Deficits.  [] Progressing: [x] Met: [] Not Met: [] Adjusted      Long Term Goals: To be achieved in: 16 weeks  1. Disability index score of 12% or less for the Upper Extremity functional Scale to assist with reaching prior level of function with activities such as lifting bags of groceries.  [] Progressing: [] Met: [x] Not Met: [] Adjusted  2. Patient will demonstrate increased AROM of right shoulder to WNL without pain to allow for proper joint functioning to enable patient to

## 2025-07-30 ENCOUNTER — APPOINTMENT (OUTPATIENT)
Dept: PHYSICAL THERAPY | Age: 70
End: 2025-07-30
Attending: ORTHOPAEDIC SURGERY
Payer: MEDICARE

## 2025-08-05 ENCOUNTER — HOSPITAL ENCOUNTER (OUTPATIENT)
Dept: PHYSICAL THERAPY | Age: 70
Setting detail: THERAPIES SERIES
Discharge: HOME | End: 2025-08-05
Attending: ORTHOPAEDIC SURGERY
Payer: MEDICARE

## 2025-08-05 PROCEDURE — 97110 THERAPEUTIC EXERCISES: CPT | Performed by: PHYSICAL THERAPIST

## 2025-08-05 PROCEDURE — 97140 MANUAL THERAPY 1/> REGIONS: CPT | Performed by: PHYSICAL THERAPIST

## 2025-08-05 PROCEDURE — 97530 THERAPEUTIC ACTIVITIES: CPT | Performed by: PHYSICAL THERAPIST

## 2025-08-07 ENCOUNTER — OFFICE VISIT (OUTPATIENT)
Dept: ORTHOPEDIC SURGERY | Age: 70
End: 2025-08-07
Payer: MEDICARE

## 2025-08-07 ENCOUNTER — APPOINTMENT (OUTPATIENT)
Dept: PHYSICAL THERAPY | Age: 70
End: 2025-08-07
Attending: ORTHOPAEDIC SURGERY
Payer: MEDICARE

## 2025-08-07 VITALS — BODY MASS INDEX: 37.83 KG/M2 | WEIGHT: 241 LBS | HEIGHT: 67 IN

## 2025-08-07 DIAGNOSIS — M72.2 PLANTAR FASCIITIS OF LEFT FOOT: ICD-10-CM

## 2025-08-07 DIAGNOSIS — M76.62 LEFT ACHILLES TENDINITIS: Primary | ICD-10-CM

## 2025-08-07 DIAGNOSIS — M79.672 PAIN OF LEFT HEEL: ICD-10-CM

## 2025-08-07 DIAGNOSIS — M21.41 PES PLANUS OF BOTH FEET: ICD-10-CM

## 2025-08-07 DIAGNOSIS — M21.42 PES PLANUS OF BOTH FEET: ICD-10-CM

## 2025-08-07 PROCEDURE — 99213 OFFICE O/P EST LOW 20 MIN: CPT | Performed by: FAMILY MEDICINE

## 2025-08-07 PROCEDURE — 1124F ACP DISCUSS-NO DSCNMKR DOCD: CPT | Performed by: FAMILY MEDICINE

## 2025-08-12 ENCOUNTER — HOSPITAL ENCOUNTER (OUTPATIENT)
Dept: PHYSICAL THERAPY | Age: 70
Setting detail: THERAPIES SERIES
Discharge: HOME | End: 2025-08-12
Attending: ORTHOPAEDIC SURGERY
Payer: MEDICARE

## 2025-08-12 PROCEDURE — 97110 THERAPEUTIC EXERCISES: CPT | Performed by: PHYSICAL THERAPIST

## 2025-08-12 PROCEDURE — 97140 MANUAL THERAPY 1/> REGIONS: CPT | Performed by: PHYSICAL THERAPIST

## 2025-08-12 PROCEDURE — 97530 THERAPEUTIC ACTIVITIES: CPT | Performed by: PHYSICAL THERAPIST

## 2025-08-18 ENCOUNTER — HOSPITAL ENCOUNTER (OUTPATIENT)
Dept: PHYSICAL THERAPY | Age: 70
Setting detail: THERAPIES SERIES
Discharge: HOME | End: 2025-08-18
Attending: ORTHOPAEDIC SURGERY
Payer: MEDICARE

## 2025-08-18 ENCOUNTER — APPOINTMENT (OUTPATIENT)
Dept: PHYSICAL THERAPY | Age: 70
End: 2025-08-18
Attending: ORTHOPAEDIC SURGERY
Payer: MEDICARE

## 2025-08-18 PROCEDURE — 97110 THERAPEUTIC EXERCISES: CPT | Performed by: PHYSICAL THERAPIST

## 2025-08-18 PROCEDURE — 97161 PT EVAL LOW COMPLEX 20 MIN: CPT | Performed by: PHYSICAL THERAPIST

## 2025-08-18 PROCEDURE — 97140 MANUAL THERAPY 1/> REGIONS: CPT | Performed by: PHYSICAL THERAPIST

## 2025-08-20 ENCOUNTER — HOSPITAL ENCOUNTER (OUTPATIENT)
Dept: PHYSICAL THERAPY | Age: 70
Setting detail: THERAPIES SERIES
Discharge: HOME | End: 2025-08-20
Attending: ORTHOPAEDIC SURGERY
Payer: MEDICARE

## 2025-08-20 PROCEDURE — 97110 THERAPEUTIC EXERCISES: CPT | Performed by: PHYSICAL THERAPIST

## 2025-08-20 PROCEDURE — 97140 MANUAL THERAPY 1/> REGIONS: CPT | Performed by: PHYSICAL THERAPIST

## 2025-08-20 PROCEDURE — 97530 THERAPEUTIC ACTIVITIES: CPT | Performed by: PHYSICAL THERAPIST

## 2025-08-21 ENCOUNTER — HOSPITAL ENCOUNTER (OUTPATIENT)
Age: 70
Discharge: HOME | End: 2025-08-23
Attending: INTERNAL MEDICINE
Payer: MEDICARE

## 2025-08-21 ENCOUNTER — OFFICE VISIT (OUTPATIENT)
Dept: CARDIOLOGY CLINIC | Age: 70
End: 2025-08-21
Payer: MEDICARE

## 2025-08-21 VITALS
OXYGEN SATURATION: 97 % | WEIGHT: 238 LBS | BODY MASS INDEX: 37.28 KG/M2 | DIASTOLIC BLOOD PRESSURE: 82 MMHG | SYSTOLIC BLOOD PRESSURE: 114 MMHG | HEART RATE: 62 BPM

## 2025-08-21 VITALS
WEIGHT: 241 LBS | BODY MASS INDEX: 37.83 KG/M2 | SYSTOLIC BLOOD PRESSURE: 129 MMHG | HEIGHT: 67 IN | DIASTOLIC BLOOD PRESSURE: 79 MMHG

## 2025-08-21 DIAGNOSIS — Z95.2 S/P TAVR (TRANSCATHETER AORTIC VALVE REPLACEMENT): ICD-10-CM

## 2025-08-21 DIAGNOSIS — Z95.2 HISTORY OF TRANSCATHETER AORTIC VALVE REPLACEMENT (TAVR): ICD-10-CM

## 2025-08-21 DIAGNOSIS — I10 ESSENTIAL HYPERTENSION: ICD-10-CM

## 2025-08-21 DIAGNOSIS — I35.0 NONRHEUMATIC AORTIC VALVE STENOSIS: ICD-10-CM

## 2025-08-21 DIAGNOSIS — I48.0 PAROXYSMAL ATRIAL FIBRILLATION (HCC): ICD-10-CM

## 2025-08-21 DIAGNOSIS — I35.0 NONRHEUMATIC AORTIC VALVE STENOSIS: Primary | ICD-10-CM

## 2025-08-21 PROCEDURE — 93306 TTE W/DOPPLER COMPLETE: CPT

## 2025-08-21 PROCEDURE — 3074F SYST BP LT 130 MM HG: CPT | Performed by: INTERNAL MEDICINE

## 2025-08-21 PROCEDURE — 3079F DIAST BP 80-89 MM HG: CPT | Performed by: INTERNAL MEDICINE

## 2025-08-21 PROCEDURE — 1124F ACP DISCUSS-NO DSCNMKR DOCD: CPT | Performed by: INTERNAL MEDICINE

## 2025-08-21 PROCEDURE — 99214 OFFICE O/P EST MOD 30 MIN: CPT | Performed by: INTERNAL MEDICINE

## 2025-08-22 LAB
ECHO AO ASC DIAM: 4.1 CM
ECHO AO ASCENDING AORTA INDEX: 1.87 CM/M2
ECHO AO ROOT DIAM: 3.2 CM
ECHO AO ROOT INDEX: 1.46 CM/M2
ECHO AV AREA PEAK VELOCITY: 1.4 CM2
ECHO AV AREA VTI: 1.5 CM2
ECHO AV AREA/BSA PEAK VELOCITY: 0.6 CM2/M2
ECHO AV AREA/BSA VTI: 0.7 CM2/M2
ECHO AV MEAN GRADIENT: 16 MMHG
ECHO AV MEAN VELOCITY: 1.9 M/S
ECHO AV PEAK GRADIENT: 29 MMHG
ECHO AV PEAK VELOCITY: 2.7 M/S
ECHO AV VELOCITY RATIO: 0.41
ECHO AV VTI: 65 CM
ECHO BSA: 2.27 M2
ECHO EST RA PRESSURE: 3 MMHG
ECHO IVC EXP: 1.5 CM
ECHO LA AREA 2C: 20.9 CM2
ECHO LA AREA 4C: 21 CM2
ECHO LA MAJOR AXIS: 5.2 CM
ECHO LA MINOR AXIS: 5.4 CM
ECHO LA VOL BP: 67 ML (ref 22–52)
ECHO LA VOL MOD A2C: 67 ML (ref 22–52)
ECHO LA VOL MOD A4C: 65 ML (ref 22–52)
ECHO LA VOL/BSA BIPLANE: 31 ML/M2 (ref 16–34)
ECHO LA VOLUME INDEX MOD A2C: 31 ML/M2 (ref 16–34)
ECHO LA VOLUME INDEX MOD A4C: 30 ML/M2 (ref 16–34)
ECHO LV E' LATERAL VELOCITY: 9.98 CM/S
ECHO LV E' SEPTAL VELOCITY: 7.35 CM/S
ECHO LV EF PHYSICIAN: 60 %
ECHO LV FRACTIONAL SHORTENING: 35 % (ref 28–44)
ECHO LV INTERNAL DIMENSION DIASTOLE INDEX: 2.24 CM/M2
ECHO LV INTERNAL DIMENSION DIASTOLIC: 4.9 CM (ref 3.9–5.3)
ECHO LV INTERNAL DIMENSION SYSTOLIC INDEX: 1.46 CM/M2
ECHO LV INTERNAL DIMENSION SYSTOLIC: 3.2 CM
ECHO LV IVSD: 1 CM (ref 0.6–0.9)
ECHO LV MASS 2D: 176 G (ref 67–162)
ECHO LV MASS INDEX 2D: 80.4 G/M2 (ref 43–95)
ECHO LV POSTERIOR WALL DIASTOLIC: 1 CM (ref 0.6–0.9)
ECHO LV RELATIVE WALL THICKNESS RATIO: 0.41
ECHO LVOT AREA: 3.5 CM2
ECHO LVOT AV VTI INDEX: 0.41
ECHO LVOT DIAM: 2.1 CM
ECHO LVOT MEAN GRADIENT: 3 MMHG
ECHO LVOT PEAK GRADIENT: 4 MMHG
ECHO LVOT PEAK VELOCITY: 1.1 M/S
ECHO LVOT STROKE VOLUME INDEX: 42.4 ML/M2
ECHO LVOT SV: 92.8 ML
ECHO LVOT VTI: 26.8 CM
ECHO MV A VELOCITY: 1.02 M/S
ECHO MV AREA VTI: 2.3 CM2
ECHO MV E DECELERATION TIME (DT): 270 MS
ECHO MV E VELOCITY: 1.18 M/S
ECHO MV E/A RATIO: 1.16
ECHO MV E/E' LATERAL: 11.82
ECHO MV E/E' RATIO (AVERAGED): 13.94
ECHO MV E/E' SEPTAL: 16.05
ECHO MV LVOT VTI INDEX: 1.53
ECHO MV MAX VELOCITY: 1.2 M/S
ECHO MV MEAN GRADIENT: 2 MMHG
ECHO MV MEAN VELOCITY: 0.7 M/S
ECHO MV PEAK GRADIENT: 6 MMHG
ECHO MV VTI: 41.1 CM
ECHO PV MAX VELOCITY: 1.1 M/S
ECHO PV PEAK GRADIENT: 5 MMHG
ECHO RA AREA 4C: 20.8 CM2
ECHO RA END SYSTOLIC VOLUME APICAL 4 CHAMBER INDEX BSA: 31 ML/M2
ECHO RA VOLUME: 68 ML
ECHO RIGHT VENTRICULAR SYSTOLIC PRESSURE (RVSP): 21 MMHG
ECHO RV BASAL DIMENSION: 3.7 CM
ECHO RV FREE WALL PEAK S': 14.8 CM/S
ECHO RV MID DIMENSION: 2.5 CM
ECHO RV TAPSE: 2.8 CM (ref 1.7–?)
ECHO TV REGURGITANT MAX VELOCITY: 2.12 M/S
ECHO TV REGURGITANT PEAK GRADIENT: 18 MMHG

## 2025-08-25 ENCOUNTER — APPOINTMENT (OUTPATIENT)
Dept: PHYSICAL THERAPY | Age: 70
End: 2025-08-25
Attending: ORTHOPAEDIC SURGERY
Payer: MEDICARE

## 2025-08-27 ENCOUNTER — HOSPITAL ENCOUNTER (OUTPATIENT)
Dept: PHYSICAL THERAPY | Age: 70
Setting detail: THERAPIES SERIES
Discharge: HOME | End: 2025-08-27
Attending: ORTHOPAEDIC SURGERY
Payer: MEDICARE

## 2025-08-27 PROCEDURE — 97140 MANUAL THERAPY 1/> REGIONS: CPT | Performed by: PHYSICAL THERAPIST

## 2025-08-27 PROCEDURE — 97110 THERAPEUTIC EXERCISES: CPT | Performed by: PHYSICAL THERAPIST

## 2025-08-27 PROCEDURE — 97112 NEUROMUSCULAR REEDUCATION: CPT | Performed by: PHYSICAL THERAPIST

## 2025-08-28 ENCOUNTER — OFFICE VISIT (OUTPATIENT)
Dept: ORTHOPEDIC SURGERY | Age: 70
End: 2025-08-28
Payer: MEDICARE

## 2025-08-28 VITALS — HEIGHT: 67 IN | WEIGHT: 238 LBS | BODY MASS INDEX: 37.35 KG/M2

## 2025-08-28 DIAGNOSIS — M76.62 LEFT ACHILLES TENDINITIS: Primary | ICD-10-CM

## 2025-08-28 DIAGNOSIS — M21.41 PES PLANUS OF BOTH FEET: ICD-10-CM

## 2025-08-28 DIAGNOSIS — M72.2 PLANTAR FASCIITIS OF LEFT FOOT: ICD-10-CM

## 2025-08-28 DIAGNOSIS — M79.672 PAIN OF LEFT HEEL: ICD-10-CM

## 2025-08-28 DIAGNOSIS — M21.42 PES PLANUS OF BOTH FEET: ICD-10-CM

## 2025-08-28 PROCEDURE — 1124F ACP DISCUSS-NO DSCNMKR DOCD: CPT | Performed by: FAMILY MEDICINE

## 2025-08-28 PROCEDURE — 1125F AMNT PAIN NOTED PAIN PRSNT: CPT | Performed by: FAMILY MEDICINE

## 2025-08-28 PROCEDURE — 1159F MED LIST DOCD IN RCRD: CPT | Performed by: FAMILY MEDICINE

## 2025-08-28 PROCEDURE — 99213 OFFICE O/P EST LOW 20 MIN: CPT | Performed by: FAMILY MEDICINE

## 2025-08-29 ENCOUNTER — TELEPHONE (OUTPATIENT)
Dept: ORTHOPEDIC SURGERY | Age: 70
End: 2025-08-29

## 2025-09-03 ENCOUNTER — HOSPITAL ENCOUNTER (OUTPATIENT)
Dept: PHYSICAL THERAPY | Age: 70
Setting detail: THERAPIES SERIES
Discharge: HOME | End: 2025-09-03
Attending: ORTHOPAEDIC SURGERY
Payer: MEDICARE

## 2025-09-03 PROCEDURE — 97110 THERAPEUTIC EXERCISES: CPT | Performed by: PHYSICAL THERAPIST

## 2025-09-03 PROCEDURE — 97530 THERAPEUTIC ACTIVITIES: CPT | Performed by: PHYSICAL THERAPIST

## 2025-09-03 PROCEDURE — 97112 NEUROMUSCULAR REEDUCATION: CPT | Performed by: PHYSICAL THERAPIST

## (undated) DEVICE — PINNACLE INTRODUCER SHEATH: Brand: PINNACLE

## (undated) DEVICE — Device: Brand: NOMOLINE™ LH ADULT NASAL CO2 CANNULA WITH O2 4M

## (undated) DEVICE — ABLATOR ENDOSCOPIC ELECTROCAUTERY 90 DEG RF ASPIRATING STERILE DISPOSABLE APOLLORF I90

## (undated) DEVICE — AMPLATZ EXTRA STIFF WIRE GUIDE: Brand: AMPLATZ

## (undated) DEVICE — NEEDLE HYPO 18GA L1.5IN THN WALL PIVOTING SHLD BVL ORIENTED

## (undated) DEVICE — SUTURE VCRL + SZ 1 L18IN ABSRB UD L36MM CT-1 1/2 CIR VCP841D

## (undated) DEVICE — TR BAND RADIAL ARTERY COMPRESSION DEVICE: Brand: TR BAND

## (undated) DEVICE — CANNULA ARTHSCP L7CM DIA7MM TRNSLUC THRD FLX W/ NO SQUIRT

## (undated) DEVICE — NEEDLE SUT PASS FOR ROT CUF LABRAL REP SUREFIRE SCORPION

## (undated) DEVICE — CATH LAB PACK: Brand: MEDLINE INDUSTRIES, INC.

## (undated) DEVICE — DRESSING,GAUZE,XEROFORM,CURAD,1"X8",ST: Brand: CURAD

## (undated) DEVICE — GUIDEWIRE VASC L260CM DIA0.035IN RAD 3MM J TIP L7CM PTFE

## (undated) DEVICE — DRAPE,SHOULDER,BEACH CHAIR,STERILE: Brand: MEDLINE

## (undated) DEVICE — CATHETER DIAG AD 5FR L110CM 145DEG COR GRY HYDRPHLC NYL

## (undated) DEVICE — PERCLOSE™ PROSTYLE™ SUTURE-MEDIATED CLOSURE AND REPAIR SYSTEM: Brand: PERCLOSE™ PROSTYLE™

## (undated) DEVICE — AMPLATZ ULTRA STIFF WIRE GUIDE: Brand: AMPLATZ

## (undated) DEVICE — SOLUTION IV 1000ML 0.9% SOD CHL FOR IRRIG PLAS CONT

## (undated) DEVICE — KIT AT-X65 ANGIOTOUCH HAND CONTROLLER

## (undated) DEVICE — CATHETER ANGIO 5FR L100CM GRY S STL NYL JL3.5 3 SEG BRAID L

## (undated) DEVICE — 3M™ COBAN™ NL STERILE NON-LATEX SELF-ADHERENT WRAP, 2086S, 6 IN X 5 YD (15 CM X 4,5 M), 12 ROLLS/CASE: Brand: 3M™ COBAN™

## (undated) DEVICE — STERILE TOTAL KNEE DRAPE PACK: Brand: CARDINAL HEALTH

## (undated) DEVICE — BASIC SINGLE BASIN 1-LF: Brand: MEDLINE INDUSTRIES, INC.

## (undated) DEVICE — CATHETER DIAG AD 5FR L100CM COR GRY HYDRPHLC NYL IM W/O

## (undated) DEVICE — GLOVE ORTHO 8   MSG9480

## (undated) DEVICE — GOWN SIRUS NONREIN XL W/TWL: Brand: MEDLINE INDUSTRIES, INC.

## (undated) DEVICE — BOOT POS LEG DEMAYO

## (undated) DEVICE — GOWN,SIRUS,POLYRNF,BRTHSLV,XL,30/CS: Brand: MEDLINE

## (undated) DEVICE — KIT INT FIX FEM TIB CKPT MAKOPLASTY

## (undated) DEVICE — BANDAGE COMPR W6INXL15YD WHT BGE POLY COT WV E HK LOOP CLSR

## (undated) DEVICE — INTENDED TO SUPPORT AND MAINTAIN THE POSITION OF AN ANESTHETIZED PATIENT DURING SURGERY: Brand: ERIN BEACH CHAIR FACE MASK

## (undated) DEVICE — EXTENSION SET, 2 INJECTION SITES, MALE LUER LOCK ADAPTER WITH RETRACTABLE COLLAR: Brand: INTERLINK

## (undated) DEVICE — DRAPE,ORTHOMAX,EXTREMITY: Brand: MEDLINE

## (undated) DEVICE — Device

## (undated) DEVICE — SUTURE VICRYL + SZ 2-0 L18IN ABSRB UD CT1 L36MM 1/2 CIR VCP839D

## (undated) DEVICE — TRUE™ DILATATION BALLOON VALVULOPLASTY CATHETER, 20 MM X 4.5 CM, 110 CM CATHETER: Brand: TRUE DILATATION

## (undated) DEVICE — SHOULDER ARTHROSCOPY: Brand: MEDLINE INDUSTRIES, INC.

## (undated) DEVICE — PIN BNE FIX TEMP L140MM DIA4MM MAKO

## (undated) DEVICE — SUTURE STRATAFIX SPRL SZ 3-0 L12IN ABSRB UD FS-1 L30X30CM SXMP2B410

## (undated) DEVICE — STERILE POLYISOPRENE POWDER-FREE SURGICAL GLOVES: Brand: PROTEXIS

## (undated) DEVICE — TOTAL KNEE: Brand: MEDLINE INDUSTRIES, INC.

## (undated) DEVICE — VALVE AORT 26 MM TRANSCATHETER HRT VLV SYS SAPIEN 3 ULTRA

## (undated) DEVICE — SYSTEM SKIN CLOSURE 42CM DERMABOND PRINEO

## (undated) DEVICE — CATHETER ANGIO INFIN 038IN AMPLATZ 534545T

## (undated) DEVICE — GLIDESHEATH SLENDER NITINOL HYDROPHILIC COATED INTRODUCER SHEATH: Brand: GLIDESHEATH SLENDER

## (undated) DEVICE — GLOVE SURG SZ 8 L12IN FNGR THK79MIL GRN LTX FREE

## (undated) DEVICE — CANNULA ARTHSCP L7CM DIA6MM CONIC TIP THRD NONSHIELDED DISP

## (undated) DEVICE — BUR SHV L13CM DIA4MM 8 FLUT OVL FOR RAP AGG BNE RESECT

## (undated) DEVICE — HYPODERMIC SAFETY NEEDLE: Brand: MAGELLAN

## (undated) DEVICE — SOLUTION PREP PAINT POV IOD FOR SKIN MUCOUS MEM

## (undated) DEVICE — MERCY HEALTH WEST TURNOVER: Brand: MEDLINE INDUSTRIES, INC.

## (undated) DEVICE — TOWEL,OR,DSP,ST,BLUE,STD,4/PK,20PK/CS: Brand: MEDLINE

## (undated) DEVICE — CORD RETRCT SIL

## (undated) DEVICE — SOLUTION IRRIGATION LR 3000 ML USP TITAN XL CONTAINER 4/CA

## (undated) DEVICE — SUTURE ABSRB L30CM 2-0 VLT SPRL PDS + STRATAFIX SXPP1B410

## (undated) DEVICE — GUIDEWIRE VASC L150CM DIA0.035IN FLX END L7CM J 3MM PTFE

## (undated) DEVICE — SUTURE ABSORBABLE MONOFILAMENT 1-0 OS8 14 IN STRATAFIX SPRL SXPD2B202

## (undated) DEVICE — PAD, DEFIB, ADULT, RADIOTRANS, PHYSIO: Brand: MEDLINE

## (undated) DEVICE — ELECTRODE PT RET AD L9FT HI MOIST COND ADH HYDRGEL CORDED

## (undated) DEVICE — BLADE SURG SAW STD S STL OSC W/ SERR EDGE DISP

## (undated) DEVICE — TUBE IRRIG L8IN LNG PT W/ CONN FOR PMP SYS REDEUCE

## (undated) DEVICE — SUTURE PDS + SZ 0 L27IN ABSRB VLT L36MM CT 1 1 2 CIR PDP340H

## (undated) DEVICE — PIN BNE FIX L110MM DIA32MM

## (undated) DEVICE — SOLUTION IV IRRIG POUR BRL 0.9% SODIUM CHL 2F7124

## (undated) DEVICE — DRAPE,ANGIO,BRACH,STERILE,38X44: Brand: MEDLINE

## (undated) DEVICE — SHOULDER STABILIZATION KIT,                                    DISPOSABLE 12 PER BOX

## (undated) DEVICE — SUTURE NONABSORBABLE MONOFILAMENT 4-0 FS-2 18 IN ETHILON 662H

## (undated) DEVICE — SWAN-GANZ BIPOLAR PACING CATHETER: Brand: SWAN-GANZ

## (undated) DEVICE — CATHETER DIAG AD 5FR L100CM COR NYL JUDKINS R 5 DILATED

## (undated) DEVICE — STOCKINETTE COMPRESSION W9XL48IN IMPERVIOUS WITH PULL TAB

## (undated) DEVICE — SUTURE STRATAFIX SPRL SZ 2 0 L14IN ABSRB UD MH L36MM 1 2 CIR SXMD2B401

## (undated) DEVICE — COTTON UNDERCAST PADDING,CRIMPED FINISH: Brand: WEBRIL

## (undated) DEVICE — KIT DRP FOR RIO ROBOTIC ARM ASST SYS

## (undated) DEVICE — DUAL CUT SAGITTAL BLADE

## (undated) DEVICE — 1010 S-DRAPE TOWEL DRAPE 10/BX: Brand: STERI-DRAPE™

## (undated) DEVICE — ZIMMER® STERILE DISPOSABLE TOURNIQUET CUFF WITH PLC, DUAL PORT, SINGLE BLADDER, 34 IN. (86 CM)

## (undated) DEVICE — PAD,NON-ADHERENT,3X8,STERILE,LF,1/PK: Brand: MEDLINE

## (undated) DEVICE — OPTIFOAM GENTLE LIQUITRAP, SACRUM, 7"X7": Brand: MEDLINE

## (undated) DEVICE — STERILE LATEX POWDER-FREE SURGICAL GLOVESWITH NITRILE COATING: Brand: PROTEXIS

## (undated) DEVICE — KIT TRK KNEE PROC VIZADISC